# Patient Record
Sex: MALE | Race: WHITE | NOT HISPANIC OR LATINO | Employment: FULL TIME | URBAN - METROPOLITAN AREA
[De-identification: names, ages, dates, MRNs, and addresses within clinical notes are randomized per-mention and may not be internally consistent; named-entity substitution may affect disease eponyms.]

---

## 2017-01-04 ENCOUNTER — HOSPITAL ENCOUNTER (OUTPATIENT)
Dept: PHYSICAL THERAPY | Facility: CLINIC | Age: 47
Setting detail: THERAPIES SERIES
End: 2017-01-04
Attending: PSYCHIATRY & NEUROLOGY
Payer: OTHER MISCELLANEOUS

## 2017-01-04 PROCEDURE — 97110 THERAPEUTIC EXERCISES: CPT | Mod: GP | Performed by: PHYSICAL THERAPIST

## 2017-01-04 PROCEDURE — 97162 PT EVAL MOD COMPLEX 30 MIN: CPT | Mod: GP | Performed by: PHYSICAL THERAPIST

## 2017-01-04 PROCEDURE — 97140 MANUAL THERAPY 1/> REGIONS: CPT | Mod: GP | Performed by: PHYSICAL THERAPIST

## 2017-01-04 PROCEDURE — 40000718 ZZHC STATISTIC PT DEPARTMENT ORTHO VISIT: Performed by: PHYSICAL THERAPIST

## 2017-01-04 NOTE — PROGRESS NOTES
Houston LOPEZ [facility]   PHYSICAL THERAPY EVALUATION    01/04/17 1300   General Information   Type of Visit Initial OP Ortho PT Evaluation   Start of Care Date 01/04/17   Referring Physician MASOUD Goff, - Jeanette   Patient/Family Goals Statement decrease pain, get back to work   Orders Evaluate and Treat   Date of Order 12/08/17  (was waiting per MD orders)   Insurance Type Other  (work comp)   Medical Diagnosis hip pain, low back pain , thigh pain   Surgical/Medical history reviewed Yes  (DM)   Body Part(s)   Body Part(s) Hip   Presentation and Etiology   Pertinent history of current problem (include personal factors and/or comorbidities that impact the POC) work injury 10/24/16 lifting heavy , felt he wrenched his back, then later same day, was blowing the sand off the road and hose recoiled and twisted him.  LBP immed first few weeks, then noticed hip pain.  Was going to chiro for LB for about a month.  HAd MRI of hip and LB.  Hip pain still bothering him.  Sx increase sleeping, lying on his side, walking, putting shoes on.  LBP pretty much resolved.  Sx with bending, squatting.  Has been off work since injury.  Pain 2-6/10.  Has injection in hiop last week, which has helped more than anything else.   How/Where did it occur At work;While lifting   Onset date of current episode/exacerbation 10/24/17   Prior Level of Function   Functional Level Prior Comment work is active, indep living   Current Level of Function   Patient role/employment history A. Employed   Employment Comments construction, demolition   Fall Risk Screen   Fall screen completed by PT   Per patient - Fall 2 or more times in past year? No   Per patient - Fall with injury in past year? No   Is patient a fall risk? No   Hip Objective Findings   Side (if bilateral, select both right and left) Left   Posture L thoracic curvature, stands in R trunk ROT, no lumbar lordosis, mild lordosis in lower thoracic   Hip ROM Comments moving L hip  from flexion to exten hip has audible and painful snap, seeming to be deep inside hip joint   Lumbar ROM standing flexion 75% pulls LB, very tight, %, SB L 50% pain L, SB R 100%   Pelvic Screen +L fwd bend, supine L ASIS high/R low   Straight Leg Raise Test neg   Scour Test +L, painful compression even without scour   ИРИНА Test neg, tight   FADIR Test +L   Palpation tender L ant hip, L SI, B QL, lumbar paraspinals   Left Hip Flexion PROM min loss, pain end range   Left Hip Adduction PROM painful immediately   Left Hip ER PROM 40* pain/ R 30*   Left Hip IR PROM 5* painful/ R 15*   Left Hip Ext PROM 5*   Left Hip Flexion Strength 4/5 min pain   Left Hip Abduction Strength 4/5    Left Hamstring Flexibility 65*   Planned Therapy Interventions   Planned Therapy Interventions stretching;strengthening;ROM;manual therapy   Clinical Impression   Criteria for Skilled Therapeutic Interventions Met yes, treatment indicated   PT Diagnosis L hip pain, probable labral tear, pelvic/spinal malalignments   Functional limitations due to impairments walk, stand, squat, bend, lift   Clinical Presentation Evolving/Changing   Clinical Presentation Rationale LB and hip involvement, appear to be 2 separate conditions, not working, signif activity restrictions   Clinical Decision Making (Complexity) Moderate complexity   Therapy Frequency 1 time/week   Predicted Duration of Therapy Intervention (days/wks) 6wks   Risk & Benefits of therapy have been explained Yes   Patient, Family & other staff in agreement with plan of care Yes   Education Assessment   Preferred Learning Style Demonstration;Pictures/video   Barriers to Learning No barriers   ORTHO GOALS   PT Ortho Eval Goals 1;2   Ortho Goal 1   Goal Description pt to have painfree walking of community distances in 4wk   Target Date 02/04/17   Ortho Goal 2   Goal Description pt will be able to squat and lift from floor 20-30# in prep for return to work in 6wk   Target Date 02/15/17    Total Evaluation Time   Total Evaluation Time 30   Kris Hoenk, PT #[id number]  [facility]

## 2017-01-10 ENCOUNTER — HOSPITAL ENCOUNTER (OUTPATIENT)
Dept: PHYSICAL THERAPY | Facility: CLINIC | Age: 47
Setting detail: THERAPIES SERIES
End: 2017-01-10
Attending: PSYCHIATRY & NEUROLOGY
Payer: OTHER MISCELLANEOUS

## 2017-01-10 PROCEDURE — 97110 THERAPEUTIC EXERCISES: CPT | Mod: GP | Performed by: PHYSICAL THERAPIST

## 2017-01-10 PROCEDURE — 40000718 ZZHC STATISTIC PT DEPARTMENT ORTHO VISIT: Performed by: PHYSICAL THERAPIST

## 2017-01-10 PROCEDURE — 97140 MANUAL THERAPY 1/> REGIONS: CPT | Mod: GP | Performed by: PHYSICAL THERAPIST

## 2017-01-10 NOTE — PROGRESS NOTES
Houston LOPEZ Singh   PHYSICAL THERAPY PROGRESS NOTE  01/10/17 1300   Signing Clinician's Name / Credentials   Signing clinician's name / credentials Kris Hoenk, PT   Session Number   Session Number 2/9 to 2/6    Subjective Report   Subjective Report fell in tub, hip snapped, pain shot down ant thigh and he fell, L nut hurt after last here, went awway next day   Objective Measures   Objective Measures Objective Measure 1   Objective Measure 1   Details cont audible click/clunk x2 insde L hip with moving from hip flexion to extension position   Therapeutic Procedure/exercise   Minutes 10   Skilled Intervention progression of stretches   Patient Response limit ROM to avoid pain , snapping   Treatment Detail hip flexor kneeling, ИРИНА, quad stretch, review leg lifts   Manual Therapy   Minutes 15   Skilled Intervention MET, jt mobs to improve mobility and alignment   Patient Response B SI tender, L piriformis tight, tender   Treatment Detail MET pube clearing, prone L post ilium correction, cont/relax L ant hip stretch, R on L sacral torsion MET, uni PA R sacral base, L piriformis MFR/trigger points   Plan   Plan for next session sees MD againthis week, will recheck in one week, may need another scan for specifically the L hip, PT not sure if it would be more indepth than the pelvis MRI he had, hip sx resemble labral tear   Total Session Time   Total Session Time 25   Kris Hoenk, PT #[id number]  [facility]

## 2017-01-12 ENCOUNTER — OFFICE VISIT (OUTPATIENT)
Dept: ORTHOPEDICS | Facility: CLINIC | Age: 47
End: 2017-01-12
Payer: OTHER MISCELLANEOUS

## 2017-01-12 VITALS
BODY MASS INDEX: 30.35 KG/M2 | SYSTOLIC BLOOD PRESSURE: 135 MMHG | WEIGHT: 212 LBS | DIASTOLIC BLOOD PRESSURE: 90 MMHG | HEIGHT: 70 IN

## 2017-01-12 DIAGNOSIS — K29.00 ACUTE GASTRITIS, PRESENCE OF BLEEDING UNSPECIFIED, UNSPECIFIED GASTRITIS TYPE: ICD-10-CM
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
## 2017-01-12 DIAGNOSIS — S76.012D STRAIN OF HIP ADDUCTOR MUSCLE, LEFT, SUBSEQUENT ENCOUNTER: ICD-10-CM

## 2017-01-12 DIAGNOSIS — M24.852 SNAPPING HIP SYNDROME, LEFT: ICD-10-CM

## 2017-01-12 DIAGNOSIS — M16.12 OSTEOARTHRITIS OF LEFT HIP, UNSPECIFIED OSTEOARTHRITIS TYPE: ICD-10-CM

## 2017-01-12 DIAGNOSIS — M25.552 LEFT HIP PAIN: ICD-10-CM

## 2017-01-12 PROCEDURE — 99214 OFFICE O/P EST MOD 30 MIN: CPT | Performed by: FAMILY MEDICINE

## 2017-01-12 RX ORDER — OMEPRAZOLE 40 MG/1
40 CAPSULE, DELAYED RELEASE ORAL DAILY
Qty: 30 CAPSULE | Refills: 1 | Status: SHIPPED | OUTPATIENT
Start: 2017-01-12 | End: 2017-10-04

## 2017-01-12 RX ORDER — CYCLOBENZAPRINE HCL 10 MG
10 TABLET ORAL 3 TIMES DAILY PRN
Qty: 30 TABLET | Refills: 1 | Status: SHIPPED
Start: 2017-01-12 | End: 2017-03-27

## 2017-01-12 NOTE — Clinical Note
January 12, 2017      Houston A Singh  [address]   [facility] [address]      Houston was seen in my office again today for left hip pain after a work injury in October 24, 2016.  He has had a workup for a lumbar spine issue which did not identify an obvious cause of his pain, as well as an SI joint injection that did not provide relief.  His pelvis MRI demonstrated R>L hip OA changes, and so he was sent to me for further orthopedic evaluation.  His exam suggested an intraarticular hip joint issue as the primary pain generator.  He likely had an acute injury on some chronic underlying degeneration in his hip joint.  An acute labral injury is also a possibility.  He had 50% reduction in pain deep in his joint from the injection, but has ongoing pain in the muscles with continued snapping sensation.    I have advised that he can continue with physical therapy, as well consult with orthopedic surgery for additional recommnedations.  I have also provided antiinflammatory topical gel and a muscle relaxer to help with his pain.  He will follow up with me in 3 weeks to assess his progress and determine ongoing treatment options.  I have recommended that he be off from work during this time.  Updated recommendations on workability will be provided after his next visit, but can also be updated sooner based on his clinical progress, as needed.      Thanks in advance for your help, feel free to contact my office with questions or concerns.        Kobe Cifuentes DO, LORA  Primary Care Sports Medicine  [facility]

## 2017-01-12 NOTE — PROGRESS NOTES
"Houston Winters  :  1970  DOS: 2017  MRN: [id number]    Sports Medicine Clinic Visit    PCP: Norma Ramirez    Houston Winters is a 46 year old male who is seen as self referral presenting with left hip pain.    Injury: Work - caught, heaving sandblaster with his left left leg - bracing straight to stop ~ 2 months ago (10/24/16).  Also notes second injury that day after lost  of hose and twisted over top of left hip.  Pain located over left deep anterior groin, radiating to left anterior thigh.  Reports intermittent radiating, pain to left thigh.  Additional Features:  Positive: grinding, instability and weakness.  Symptoms are better with Rest.  Symptoms are worse with: prolonged standing, lifting, tying shoes.  Other evaluation and/or treatments so far consists of: Tylenol, Other medications, Rest and neurology consult (Jeanette), SI joint injection, chiropractic.  Recent imaging completed: MRI completed 16 - has copy with today.  Prior History of related problems: none  Patient reports that SI joint injection on  provided minimal relief.     Social History: works as [employer]     Interim History - 2017  Since last visit on 2016 patient has moderate left hip pain.  Left hip IA steroid injection completed on  provided ~ 45% relief.  He does note increased \"popping, grinding\" sensation over last 2 weeks.  Reports that \"popping\" sensation has caused him 2 times d/t weakness/increased pain.  Pain worse after walking > 0.5 half mile.  No new injury in the interim.  Taking up to 8-16 aleve daily.  Denies hematemesis, hemoptysis, melena or hematochezia    Review of Systems  Musculoskeletal: as above  Remainder of review of systems is negative including constitutional, CV, pulmonary, GI, Skin and Neurologic except as noted in HPI or medical history.  Past Medical and surgical history reviewed    Objective  /90 mmHg  Ht 5' 10\" (1.778 m)  Wt 212 lb " (96.163 kg)  BMI 30.42 kg/m2    General: healthy, alert and in no distress    HEENT: no scleral icterus or conjunctival erythema   Skin: no suspicious lesions or rash. No jaundice.   CV: regular rhythm by palpation, 2+ distal pulses, no pedal edema    GI:  Tender epigastric area, mild, not distended  Resp: normal respiratory effort without conversational dyspnea   Psych: normal mood and affect    Gait: antalgic, appropriate coordination and balance   Neuro: normal light touch sensory exam of the extremities. Motor strength as noted below     Left hip exam    Inspection:        no edema or ecchymosis in hip area    ROM:       Flexion 110       internal rotation 15      external rotation 30      Range of motion limited by pain, improved but still present     Mild loss of flexion and IR on R, painfree    Strength:        flexion 4/5       extension 4/5       abduction 3/5       adduction 4/5, painful    Tender:        greater trochanter       Anterior hip joint       ipsilateral SI joint TTP      Adductor attachemnt to pubes      TFL, glute medius    Non Tender:        remainder of hip area       illiac crest       ASIS       pubis    Sensation:        grossly intact in hip and thigh    Skin:       well perfused       capillary refill brisk    Special Tests:        neg (-) ИРИНА       positive (+) FADIR       positive (+) scour       neg (-) Brenden    Neg slump and SLR, intact b/l DTRs 2+, no appreciable asymmetry with strength testing, no focal lumbar TTP    Radiology  See scanned.  MRI evidence of R > L hip OA changes.  Reports and images reviewed, reports sent for scanning into epic.  Mild lumbar DDD, no stenosis, reviewed report    Assessment:  1. Work related injury    2. Left hip pain    3. Osteoarthritis of left hip, unspecified osteoarthritis type    4. Snapping hip syndrome, left    5. Strain of hip adductor muscle, left, subsequent encounter    6. Acute gastritis, presence of bleeding unspecified, unspecified  gastritis type        Plan:  Discussed the assessment with the patient.  Follow up: 3 weeks, off work for now  Letter updated  Some improvement in hip joint from injection, but muscles still very tender and dysfunctional around hip  Signs of ongoing adductor strain, snapping hip  Continue PT, trial of some activation therapy today in clinic, no significant change s/p tx  Switch muscle relaxer to help with nighttime pain and with sleep  RICE reviewed  Ortho referral given underlying degnerative change, though this may need to continue to be treated nonsurgically  Advised he must stop NSAIDs for multiple reasons, reviewed in detail today  He has been taking ranitidine, rx provided for prilosec to help with stomach protection  Topical NSAID provided, try to avoid aleve or any NSAIDs altogether for at least 1-2 weeks  Took decadron in the past, prefer not to repeat steroid burst  Supportive care reviewed  All questions were answered today  Contact us with additional questions or concerns  Signs and sx of concern reviewed        Kobe Joseph DO, CACODY  Primary Care Sports Medicine  [facility]       Time spent in one-on-one evaluation and discussion with patient regarding nature of problem, course, prior treatments, and therapeutic options, at least 50% of which was spent in counseling and coordination of care: 35 minutes        Disclaimer: This note consists of symbols derived from keyboarding, dictation and/or voice recognition software. As a result, there may be errors in the script that have gone undetected. Please consider this when interpreting information found in this chart.

## 2017-01-12 NOTE — NURSING NOTE
"Initial /90 mmHg  Ht 5' 10\" (1.778 m)  Wt 212 lb (96.163 kg)  BMI 30.42 kg/m2 Estimated body mass index is 30.42 kg/(m^2) as calculated from the following:    Height as of this encounter: 5' 10\" (1.778 m).    Weight as of this encounter: 212 lb (96.163 kg). .    Vijay Vitale ATC  "

## 2017-01-17 ENCOUNTER — HOSPITAL ENCOUNTER (OUTPATIENT)
Dept: PHYSICAL THERAPY | Facility: CLINIC | Age: 47
Setting detail: THERAPIES SERIES
End: 2017-01-17
Attending: PSYCHIATRY & NEUROLOGY
Payer: OTHER MISCELLANEOUS

## 2017-01-17 PROCEDURE — 40000718 ZZHC STATISTIC PT DEPARTMENT ORTHO VISIT: Performed by: PHYSICAL THERAPIST

## 2017-01-17 PROCEDURE — 97110 THERAPEUTIC EXERCISES: CPT | Mod: GP | Performed by: PHYSICAL THERAPIST

## 2017-01-18 ENCOUNTER — TELEPHONE (OUTPATIENT)
Dept: ORTHOPEDICS | Facility: CLINIC | Age: 47
End: 2017-01-18

## 2017-01-18 DIAGNOSIS — S76.012D STRAIN OF LEFT HIP ADDUCTOR MUSCLE, SUBSEQUENT ENCOUNTER: ICD-10-CM

## 2017-01-18 DIAGNOSIS — M16.52 POST-TRAUMATIC OSTEOARTHRITIS OF LEFT HIP: Primary | ICD-10-CM

## 2017-01-18 DIAGNOSIS — M24.852 SNAPPING HIP SYNDROME, LEFT: ICD-10-CM

## 2017-01-18 NOTE — TELEPHONE ENCOUNTER
Notified patient that letter has been completed and would be available for  at [facility] tomorrow.   He also notes that he has some discomfort over left quadriceps activation point following treatment last week.  Reassured patient that this is normal and that is the area that he was most aggravated.    Vijay Vitale ATC

## 2017-01-18 NOTE — TELEPHONE ENCOUNTER
Reason for Call:  Other letter    Detailed comments: pt calling stating he would like to go back to work on Monday 1/23. Would need a letter stating he can go back w/ supervisor restrictions/ light duty. Please call pt to go over restrictions further  Will p/u letter when finished.     Phone Number Patient can be reached at: Home number on file [phone] (home)    Best Time: any     Can we leave a detailed message on this number? YES    Call taken on 1/18/2017 at 10:34 AM by Mayra Rajan

## 2017-01-18 NOTE — TELEPHONE ENCOUNTER
Discussed patient's job requirements.  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  He desires to return to work with restrictions at this time.  Dr Joseph please advise on restrictions.  He has appt scheduled with ortho surgeon on 1/27 - was delayed d/t work comp approval.  Letter pended.    Vijay Vitale ATC

## 2017-01-18 NOTE — Clinical Note
[facility]  [address]  [address]  [place] [address]  [phone]      Houston Winters, male, 1970  [address]   [facility] [address]      Date of Treatment: 1/18/2017    Date of Accident: 10/24/16    Diagnosis:   1. Post-traumatic osteoarthritis of left hip    2. Snapping hip syndrome, left    3. Strain of left hip adductor muscle, subsequent encounter        Work Related:  YES    The employee is UNABLE to return to work until 1/23/17.    When the patient returns to work, the following restrictions apply until 2/1/2017:  A) Bend: Occasionally (1-3 hours)  B) Squat: Not at all (0 hours)  C) Walk/Stand: Occasionally (1-3 hours)  D) Reach Above Shoulders: Frequently (4-8 hours)  E) Lift, carry, push, and pull no more than:  0-10 lbs.    The employee might be taking medication so that they cannot operate moving machinery or perform activities that require balancing or working above ground.    Other restrictions: light duty work standing or sitting most appropriate for now.    FOLLOW-UP  Has appointment with orthopedic surgeon 1/27/2017                Kobe Joseph DO, LORA  Primary Care Sports Medicine  [facility]

## 2017-01-24 ENCOUNTER — TELEPHONE (OUTPATIENT)
Dept: ORTHOPEDICS | Facility: CLINIC | Age: 47
End: 2017-01-24

## 2017-01-24 NOTE — Clinical Note
[facility]  [address]  [address]  [place] [address]  [phone]      January 24, 2017    Houston A Singh  [address]   [facility] [address]        To whom it may concern,    Houston is under my care for a hip issue.  He has been referred to an orthopedic Surgeon for further evaluation and treatment.  He should remain off work at this time until at least 1/30/17 or until he sees the surgeon.  Further work restrictions will come from the orthopedic surgeon.           Sincerely,      Kobe Joseph DO, CAQ/srf

## 2017-01-24 NOTE — TELEPHONE ENCOUNTER
Patient requesting letter to state off work until 1/30.  Letter completed at the direction of Dr. Joseph. Further restrictions will need to come from the orthopedic surgeon at this time.       Patient notified, letter placed at  for him to .     BRENDAN Gonzales,  ATC

## 2017-01-25 ENCOUNTER — TELEPHONE (OUTPATIENT)
Dept: FAMILY MEDICINE | Facility: CLINIC | Age: 47
End: 2017-01-25

## 2017-01-25 DIAGNOSIS — E78.5 HYPERLIPIDEMIA WITH TARGET LDL LESS THAN 100: Primary | ICD-10-CM

## 2017-01-26 ENCOUNTER — HOSPITAL ENCOUNTER (OUTPATIENT)
Dept: PHYSICAL THERAPY | Facility: CLINIC | Age: 47
Setting detail: THERAPIES SERIES
End: 2017-01-26
Attending: PSYCHIATRY & NEUROLOGY
Payer: OTHER MISCELLANEOUS

## 2017-01-26 PROCEDURE — 40000718 ZZHC STATISTIC PT DEPARTMENT ORTHO VISIT: Performed by: PHYSICAL THERAPIST

## 2017-01-26 PROCEDURE — 97110 THERAPEUTIC EXERCISES: CPT | Mod: GP | Performed by: PHYSICAL THERAPIST

## 2017-01-26 NOTE — PROGRESS NOTES
Houston Maddoxbo  1970   PHYSICAL THERAPY PROGRESS NOTE  01/26/17 1500   Signing Clinician's Name / Credentials   Signing clinician's name / credentials Kris Hoenk, PT   Session Number   Session Number 4/9 to 2/6/17 WC   Adult Goals   PT Ortho Eval Goals 1;2   Ortho Goal 1   Goal Description pt to have painfree walking of community distances in 4wk  (not met)   Target Date 02/04/17   Ortho Goal 2   Goal Description pt will be able to squat and lift from floor 20-30# in prep for return to work in 6wk   Target Date 02/15/17   Subjective Report   Subjective Report stiff in AM when wakes up, aches all the time, can ache to knee, still snaps, no longer hurts every step he takes., is back to work with restrictions, in warehouse, sweeping   Objective Measure 1   Details cont audible click/clunk x2 inside L hip with moving from hip flexion to extension position either active or passive, very tender L glut medius tendon insertion   Therapeutic Procedure/exercise   Minutes 15   Skilled Intervention added strength ex   Patient Response limit ROM to avoid pain , snapping   Treatment Detail add to HEP clam x15, seated piriformis stretch, cont stretches, SLR, hip abd   Plan   Plan for next session seeing ortho MD tomorrow, plan may change based on findings   Total Session Time   Total Session Time 15   Kris Hoenk, PT #[id number]  [facility]

## 2017-02-03 ENCOUNTER — HOSPITAL ENCOUNTER (OUTPATIENT)
Dept: PHYSICAL THERAPY | Facility: CLINIC | Age: 47
Setting detail: THERAPIES SERIES
End: 2017-02-03
Attending: PSYCHIATRY & NEUROLOGY
Payer: OTHER MISCELLANEOUS

## 2017-02-03 PROCEDURE — 97110 THERAPEUTIC EXERCISES: CPT | Mod: GP | Performed by: PHYSICAL THERAPIST

## 2017-02-03 PROCEDURE — 40000718 ZZHC STATISTIC PT DEPARTMENT ORTHO VISIT: Performed by: PHYSICAL THERAPIST

## 2017-03-08 NOTE — PROGRESS NOTES
PHYSICAL THERAPY DISCHARGE  03/8/17 1300   Signing Clinician's Name / Credentials   Signing clinician's name / credentials Kris Hoenk, PT   Session Number   Session Number 5/9 to 2/6/17    Ortho Goal 1   Goal Description pt to have painfree walking of community distances in 4wk  (not met)   Target Date 02/04/17   Ortho Goal 2   Goal Description pt will be able to squat and lift from floor 20-30# in prep for return to work in 6wk  (does it but it hurts)   Target Date 02/15/17   Subjective Report   Subjective Report back to work regular job, full duty, [employer] sending him to [place]? said he needs a hip scope, still hurts every step   Objective Measure 1   Details seated hip flexion while pt was just moving his leg around was able to hear audible click in hip   Therapeutic Procedure/exercise   Minutes 17   Skilled Intervention finalize HEP   Patient Response uderstood   Treatment Detail finalize current HEP - cont hip abd, clam, piriformis stretches, avoid clicking, discuss plan whlie waiting for MD   Plan   Plan for next session likely to have scope, needs to see a different surgeon, per pt may need new diagnostics with dye  Patient has not been seen in over 30 days and will be discharged at this time.  Final status and plan as stated     Total Session Time   Total Session Time 17   Kris Hoenk, PT #[id number]  [facility]

## 2017-03-27 ENCOUNTER — OFFICE VISIT (OUTPATIENT)
Dept: FAMILY MEDICINE | Facility: CLINIC | Age: 47
End: 2017-03-27
Payer: OTHER MISCELLANEOUS

## 2017-03-27 VITALS
SYSTOLIC BLOOD PRESSURE: 120 MMHG | HEIGHT: 70 IN | DIASTOLIC BLOOD PRESSURE: 80 MMHG | WEIGHT: 202.6 LBS | BODY MASS INDEX: 29.01 KG/M2 | HEART RATE: 88 BPM

## 2017-03-27 DIAGNOSIS — E78.5 HYPERLIPIDEMIA WITH TARGET LDL LESS THAN 100: ICD-10-CM

## 2017-03-27 DIAGNOSIS — M25.852 LEFT HIP IMPINGEMENT SYNDROME: Primary | ICD-10-CM

## 2017-03-27 DIAGNOSIS — E11.9 TYPE 2 DIABETES MELLITUS WITHOUT COMPLICATION, WITHOUT LONG-TERM CURRENT USE OF INSULIN (H): ICD-10-CM

## 2017-03-27 LAB — HBA1C MFR BLD: 12.6 % (ref 4.3–6)

## 2017-03-27 PROCEDURE — 36415 COLL VENOUS BLD VENIPUNCTURE: CPT | Performed by: FAMILY MEDICINE

## 2017-03-27 PROCEDURE — 83036 HEMOGLOBIN GLYCOSYLATED A1C: CPT | Performed by: FAMILY MEDICINE

## 2017-03-27 PROCEDURE — 99214 OFFICE O/P EST MOD 30 MIN: CPT | Performed by: FAMILY MEDICINE

## 2017-03-27 RX ORDER — METFORMIN HCL 500 MG
500 TABLET, EXTENDED RELEASE 24 HR ORAL
Qty: 60 TABLET | Refills: 2 | Status: SHIPPED | OUTPATIENT
Start: 2017-03-27 | End: 2017-12-29

## 2017-03-27 RX ORDER — HYDROCODONE BITARTRATE AND ACETAMINOPHEN 10; 325 MG/1; MG/1
1 TABLET ORAL EVERY 4 HOURS PRN
Qty: 120 TABLET | Refills: 0 | Status: SHIPPED | OUTPATIENT
Start: 2017-03-27 | End: 2017-09-13

## 2017-03-27 RX ORDER — ATORVASTATIN CALCIUM 10 MG/1
10 TABLET, FILM COATED ORAL EVERY EVENING
Qty: 90 TABLET | Refills: 3 | Status: SHIPPED | OUTPATIENT
Start: 2017-03-27 | End: 2017-12-29

## 2017-03-27 NOTE — MR AVS SNAPSHOT
"              After Visit Summary   3/27/2017    Houston Winters    MRN: [id number]           Patient Information     Date Of Birth          1970        Visit Information        Provider Department      3/27/2017 8:40 AM OLEG Gloria MD [facility]        Today's Diagnoses     Left hip impingement syndrome    -  1    Type 2 diabetes mellitus without complication, without long-term current use of insulin (H)        Hyperlipidemia with target LDL less than 100           Follow-ups after your visit        Who to contact     If you have questions or need follow up information about today's clinic visit or your schedule please contact [facility] directly at [phone].  Normal or non-critical lab and imaging results will be communicated to you by [employer], letter or phone within 4 business days after the clinic has received the results. If you do not hear from us within 7 days, please contact the clinic through MyChart or phone. If you have a critical or abnormal lab result, we will notify you by phone as soon as possible.  Submit refill requests through [employer] or call your pharmacy and they will forward the refill request to us. Please allow 3 business days for your refill to be completed.          Additional Information About Your Visit        MyChart Information     Patience lets you send messages to your doctor, view your test results, renew your prescriptions, schedule appointments and more. To sign up, go to [email or web address] . Click on \"Log in\" on the left side of the screen, which will take you to the Welcome page. Then click on \"Sign up Now\" on the right side of the page.     You will be asked to enter the access code listed below, as well as some personal information. Please follow the directions to create your username and password.     Your access code is: [email or web address]  Expires: 2017 12:09 PM     Your access code will  in 90 days. If you need help or " "a new code, please call your [facility] clinic or [phone].        Care EveryWhere ID     This is your Care EveryWhere ID. This could be used by other organizations to access your [facility] medical records  [id number]        Your Vitals Were     Pulse Height BMI (Body Mass Index)             88 5' 10\" (1.778 m) 29.07 kg/m2          Blood Pressure from Last 3 Encounters:   03/27/17 120/80   01/12/17 135/90   12/30/16 (!) 132/93    Weight from Last 3 Encounters:   03/27/17 202 lb 9.6 oz (91.9 kg)   01/12/17 212 lb (96.2 kg)   12/30/16 212 lb (96.2 kg)              We Performed the Following     Hemoglobin A1c          Today's Medication Changes          These changes are accurate as of: 3/27/17 12:09 PM.  If you have any questions, ask your nurse or doctor.               Start taking these medicines.        Dose/Directions    amitriptyline 25 MG tablet   Commonly known as:  ELAVIL   Used for:  Left hip impingement syndrome        Dose:  25 mg   Take 1 tablet (25 mg) by mouth At Bedtime   Quantity:  30 tablet   Refills:  1       atorvastatin 10 MG tablet   Commonly known as:  LIPITOR   Used for:  Hyperlipidemia with target LDL less than 100        Dose:  10 mg   Take 1 tablet (10 mg) by mouth every evening   Quantity:  90 tablet   Refills:  3       HYDROcodone-acetaminophen  MG per tablet   Commonly known as:  NORCO   Used for:  Left hip impingement syndrome        Dose:  1 tablet   Take 1 tablet by mouth every 4 hours as needed for moderate to severe pain maximum 6 tablet(s) per day   Quantity:  120 tablet   Refills:  0       metFORMIN 500 MG 24 hr tablet   Commonly known as:  GLUCOPHAGE-XR   Used for:  Type 2 diabetes mellitus without complication, without long-term current use of insulin (H)        Dose:  500 mg   Take 1 tablet (500 mg) by mouth daily (with dinner) For 2 weeks, then 2 tablets with dinner   Quantity:  60 tablet   Refills:  2         Stop taking these medicines if you haven't already. Please " contact your care team if you have questions.     ibuprofen 200 MG tablet   Commonly known as:  ADVIL/MOTRIN           nortriptyline 10 MG capsule   Commonly known as:  PAMELOR           zolpidem 10 MG tablet   Commonly known as:  AMBIEN                Where to get your medicines      These medications were sent to [facility] - [place], [place] - [address]  [address] [facility] [address]     Phone:  [phone]     amitriptyline 25 MG tablet    atorvastatin 10 MG tablet    metFORMIN 500 MG 24 hr tablet         Some of these will need a paper prescription and others can be bought over the counter.  Ask your nurse if you have questions.     Bring a paper prescription for each of these medications     HYDROcodone-acetaminophen  MG per tablet                Primary Care Provider Office Phone # Fax #    Norma Augustaana ELMIRA Ramirez [phone]       [facility] [address] [address]        Thank you!     Thank you for choosing [facility]  for your care. Our goal is always to provide you with excellent care. Hearing back from our patients is one way we can continue to improve our services. Please take a few minutes to complete the written survey that you may receive in the mail after your visit with us. Thank you!             Your Updated Medication List - Protect others around you: Learn how to safely use, store and throw away your medicines at www.disposemymeds.org.          This list is accurate as of: 3/27/17 12:09 PM.  Always use your most recent med list.                   Brand Name Dispense Instructions for use    amitriptyline 25 MG tablet    ELAVIL    30 tablet    Take 1 tablet (25 mg) by mouth At Bedtime       aspirin 81 MG EC tablet     90 tablet    Take 1 tablet (81 mg) by mouth daily       atorvastatin 10 MG tablet    LIPITOR    90 tablet    Take 1 tablet (10 mg) by mouth every evening        blood glucose monitoring lancets     102 each    1 each daily       blood glucose monitoring test strip    ACCU-CHEK SMARTVIEW    100 strip    Check blood sugar 4 times daily       HYDROcodone-acetaminophen  MG per tablet    NORCO    120 tablet    Take 1 tablet by mouth every 4 hours as needed for moderate to severe pain maximum 6 tablet(s) per day       metFORMIN 500 MG 24 hr tablet    GLUCOPHAGE-XR    60 tablet    Take 1 tablet (500 mg) by mouth daily (with dinner) For 2 weeks, then 2 tablets with dinner       MULTIVITAMIN PO      Take 1 tablet by mouth daily       omeprazole 40 MG capsule    priLOSEC    30 capsule    Take 1 capsule (40 mg) by mouth daily Take 30-60 minutes before a meal.       order for DME     1 each    Equipment being ordered: #1 glucometer, #100 strips, #100 lancets.   Check blood sugar at least twice daily and as needed for any signs or symptoms of hypo or hyperglycemia.  May refill lancets and strips x3.

## 2017-03-27 NOTE — NURSING NOTE
"Chief Complaint   Patient presents with     Work Comp     lower back and left hip DOI 10/24/2016 pt. was seen in clinic with Dr. Aden on 10/26/2016        Initial /80 (BP Location: Right arm, Patient Position: Chair, Cuff Size: Adult Regular)  Pulse 88  Ht 5' 10\" (1.778 m)  Wt 202 lb 9.6 oz (91.9 kg)  BMI 29.07 kg/m2 Estimated body mass index is 29.07 kg/(m^2) as calculated from the following:    Height as of this encounter: 5' 10\" (1.778 m).    Weight as of this encounter: 202 lb 9.6 oz (91.9 kg).  Medication Reconciliation: complete     Leatha Morelos, CLAUDIA      "

## 2017-03-27 NOTE — PROGRESS NOTES
Subjective:  Houston Winters is a 46 year old male   Chief Complaint   Patient presents with     Work Comp     lower back and left hip DOI 10/24/2016 pt. was seen in clinic with Dr. Aden on 10/26/2016      He had a workman's comp related injury and has seen 2 different orthopedic surgeons who recommend surgery for a torn labrum in the left hip. He is seeing the Workmen's Comp. physician this week to hopefully get approval to go ahead with surgery. He is here today primarily to request some pain medications to help him until the surgery can be approved and arranged. He has been tried on various medications to try to help with this pain, including several different nonsteroidal inflammatory drugs and none of them have helped. He admits that recently he has been taking 2 Aleve tablets up to every 4-6 hours. We discussed that that is exceeding the recommended dosage and is putting him at risk for kidney injury and gastrointestinal injury. He has also been tried on gabapentin and nortriptyline at night with no benefit. Zolpidem gave him too much sedation the next day. He has been given narcotics in the past for this injury and some other injuries and that is what he is really requesting.    Diabetes he has not had follow-up on his diabetes for over a year and states that he improved his diet and lost weight and doesn't need to take any medications. However he has not had a glycosylated hemoglobin for over a year        Encounter Diagnoses   Name Primary?     Left hip impingement syndrome Yes     Type 2 diabetes mellitus without complication, without long-term current use of insulin (H)      Hyperlipidemia with target LDL less than 100        ROS:General: No change in weight, sleep or appetite.  Normal energy.  No fever or chills  Resp: No coughing, wheezing or shortness of breath  CV: No chest pains or palpitations  GI: No nausea, vomiting,  heartburn, abdominal pain, diarrhea, constipation or change in bowel  habits    Medical, surgical, social, and family histories, medications and allergies reviewed and updated.    Objective:  Exam:    GENERAL APPEARANCE ADULT: Alert, no acute distress  EYES: PERRL, EOM normal, conjunctiva and lids normal  MS: He has a very antalgic gait with favoring his left leg; pain with range of motion of the left hip, including flexion and extension and internal and external rotation  SKIN: no suspicious lesions or rashes  PSYCH: mentation appears normal., affect and mood normal    Results for orders placed or performed in visit on 03/27/17   Hemoglobin A1c   Result Value Ref Range    Hemoglobin A1C 12.6 (H) 4.3 - 6.0 %        ASSESSMENT:  1. Left hip impingement syndrome    2. Type 2 diabetes mellitus without complication, without long-term current use of insulin (H)    3. Hyperlipidemia with target LDL less than 100        PLAN:  Orders Placed This Encounter     Hemoglobin A1c     amitriptyline (ELAVIL) 25 MG tablet     HYDROcodone-acetaminophen (NORCO)  MG per tablet     metFORMIN (GLUCOPHAGE-XR) 500 MG 24 hr tablet     atorvastatin (LIPITOR) 10 MG tablet       We had a long discussion about the concerns of long-term use of narcotics, especially if it is going to take some time to get approval for his surgery. I did agree to give him one prescription of hydrocodone that should last until he can get the surgery arranged. If Workmen's Comp. is balking at doing the surgery, I think he needs to pursue legal measures to get this accomplished.  The glycosylated hemoglobin came back after he left the office. See the note attached to the lab results recommending putting him back on metformin and atorvastatin

## 2017-03-27 NOTE — PROGRESS NOTES
Please CALL PATIENT    Notify patient of ABNORMAL results.  The glycosylated hemoglobin is quite elevated which indicates that her blood sugars are not well-controlled. Even though you have improved her diet and weight, you need to be on a medication to bring her blood sugars down. I'm going to put you on metformin which works well and does not carry a risk of causing low blood sugars. I also recommend that you go back on the low dose of Lipitor as anyone with diabetes is recommended to be on a statin to lower the risk of stroke and heart attack. Then come back in to clinic in 3 months for a follow-up glycosylated hemoglobin to see how well the medication is working

## 2017-04-08 ENCOUNTER — HOSPITAL ENCOUNTER (EMERGENCY)
Facility: CLINIC | Age: 47
Discharge: HOME | End: 2017-04-08
Attending: FAMILY MEDICINE | Admitting: FAMILY MEDICINE
Payer: COMMERCIAL

## 2017-04-08 VITALS — TEMPERATURE: 98.4 F | SYSTOLIC BLOOD PRESSURE: 128 MMHG | OXYGEN SATURATION: 95 % | DIASTOLIC BLOOD PRESSURE: 84 MMHG

## 2017-04-08 DIAGNOSIS — E11.65 TYPE 2 DIABETES MELLITUS WITH HYPERGLYCEMIA, WITHOUT LONG-TERM CURRENT USE OF INSULIN (H): ICD-10-CM

## 2017-04-08 LAB
ALBUMIN UR-MCNC: NEGATIVE MG/DL
AMORPH CRY #/AREA URNS HPF: ABNORMAL /HPF
ANION GAP SERPL CALCULATED.3IONS-SCNC: 9 MMOL/L (ref 3–14)
APPEARANCE UR: ABNORMAL
BASE EXCESS BLDV CALC-SCNC: 2.4 MMOL/L
BASOPHILS # BLD AUTO: 0 10E9/L (ref 0–0.2)
BASOPHILS NFR BLD AUTO: 0.4 %
BILIRUB UR QL STRIP: NEGATIVE
BUN SERPL-MCNC: 14 MG/DL (ref 7–30)
CALCIUM SERPL-MCNC: 9.2 MG/DL (ref 8.5–10.1)
CHLORIDE SERPL-SCNC: 102 MMOL/L (ref 94–109)
CO2 SERPL-SCNC: 26 MMOL/L (ref 20–32)
COLOR UR AUTO: YELLOW
CREAT SERPL-MCNC: 0.55 MG/DL (ref 0.66–1.25)
DIFFERENTIAL METHOD BLD: NORMAL
EOSINOPHIL # BLD AUTO: 0 10E9/L (ref 0–0.7)
EOSINOPHIL NFR BLD AUTO: 0.5 %
ERYTHROCYTE [DISTWIDTH] IN BLOOD BY AUTOMATED COUNT: 12.2 % (ref 10–15)
GFR SERPL CREATININE-BSD FRML MDRD: ABNORMAL ML/MIN/1.7M2
GLUCOSE BLDC GLUCOMTR-MCNC: 186 MG/DL (ref 70–99)
GLUCOSE BLDC GLUCOMTR-MCNC: 360 MG/DL (ref 70–99)
GLUCOSE SERPL-MCNC: 351 MG/DL (ref 70–99)
GLUCOSE UR STRIP-MCNC: >1000 MG/DL
HBA1C MFR BLD: 12 % (ref 4.3–6)
HCO3 BLDV-SCNC: 27 MMOL/L (ref 21–28)
HCT VFR BLD AUTO: 45.5 % (ref 40–53)
HGB BLD-MCNC: 14.9 G/DL (ref 13.3–17.7)
HGB UR QL STRIP: NEGATIVE
IMM GRANULOCYTES # BLD: 0 10E9/L (ref 0–0.4)
IMM GRANULOCYTES NFR BLD: 0.2 %
KETONES BLD-SCNC: 0.1 MMOL/L (ref 0–0.6)
KETONES UR STRIP-MCNC: 10 MG/DL
LEUKOCYTE ESTERASE UR QL STRIP: NEGATIVE
LYMPHOCYTES # BLD AUTO: 1.5 10E9/L (ref 0.8–5.3)
LYMPHOCYTES NFR BLD AUTO: 27.6 %
MCH RBC QN AUTO: 30.7 PG (ref 26.5–33)
MCHC RBC AUTO-ENTMCNC: 32.7 G/DL (ref 31.5–36.5)
MCV RBC AUTO: 94 FL (ref 78–100)
MONOCYTES # BLD AUTO: 0.4 10E9/L (ref 0–1.3)
MONOCYTES NFR BLD AUTO: 7 %
NEUTROPHILS # BLD AUTO: 3.6 10E9/L (ref 1.6–8.3)
NEUTROPHILS NFR BLD AUTO: 64.3 %
NITRATE UR QL: NEGATIVE
PCO2 BLDV: 42 MM HG (ref 40–50)
PH BLDV: 7.42 PH (ref 7.32–7.43)
PH UR STRIP: 7.5 PH (ref 5–7)
PLATELET # BLD AUTO: 207 10E9/L (ref 150–450)
PO2 BLDV: 53 MM HG (ref 25–47)
POTASSIUM SERPL-SCNC: 4.6 MMOL/L (ref 3.4–5.3)
RBC # BLD AUTO: 4.86 10E12/L (ref 4.4–5.9)
RBC #/AREA URNS AUTO: 10 /HPF (ref 0–2)
SODIUM SERPL-SCNC: 137 MMOL/L (ref 133–144)
SP GR UR STRIP: 1.03 (ref 1–1.03)
URN SPEC COLLECT METH UR: ABNORMAL
UROBILINOGEN UR STRIP-MCNC: NORMAL MG/DL (ref 0–2)
WBC # BLD AUTO: 5.6 10E9/L (ref 4–11)
WBC #/AREA URNS AUTO: 0 /HPF (ref 0–2)

## 2017-04-08 PROCEDURE — 83036 HEMOGLOBIN GLYCOSYLATED A1C: CPT | Performed by: EMERGENCY MEDICINE

## 2017-04-08 PROCEDURE — 96361 HYDRATE IV INFUSION ADD-ON: CPT | Mod: 59

## 2017-04-08 PROCEDURE — 82010 KETONE BODYS QUAN: CPT | Performed by: EMERGENCY MEDICINE

## 2017-04-08 PROCEDURE — 99284 EMERGENCY DEPT VISIT MOD MDM: CPT | Performed by: FAMILY MEDICINE

## 2017-04-08 PROCEDURE — 25000131 ZZH RX MED GY IP 250 OP 636 PS 637: Performed by: FAMILY MEDICINE

## 2017-04-08 PROCEDURE — 82803 BLOOD GASES ANY COMBINATION: CPT | Performed by: EMERGENCY MEDICINE

## 2017-04-08 PROCEDURE — 99284 EMERGENCY DEPT VISIT MOD MDM: CPT | Mod: 25

## 2017-04-08 PROCEDURE — 25000128 H RX IP 250 OP 636: Performed by: FAMILY MEDICINE

## 2017-04-08 PROCEDURE — 80048 BASIC METABOLIC PNL TOTAL CA: CPT | Performed by: EMERGENCY MEDICINE

## 2017-04-08 PROCEDURE — 00000146 ZZHCL STATISTIC GLUCOSE BY METER IP

## 2017-04-08 PROCEDURE — 25000128 H RX IP 250 OP 636: Performed by: EMERGENCY MEDICINE

## 2017-04-08 PROCEDURE — 96374 THER/PROPH/DIAG INJ IV PUSH: CPT

## 2017-04-08 PROCEDURE — 81001 URINALYSIS AUTO W/SCOPE: CPT | Performed by: FAMILY MEDICINE

## 2017-04-08 PROCEDURE — 85025 COMPLETE CBC W/AUTO DIFF WBC: CPT | Performed by: EMERGENCY MEDICINE

## 2017-04-08 RX ORDER — GLIPIZIDE 2.5 MG/1
2.5 TABLET, EXTENDED RELEASE ORAL DAILY
Qty: 30 TABLET | Refills: 0 | Status: SHIPPED | OUTPATIENT
Start: 2017-04-08 | End: 2017-05-08 | Stop reason: ALTCHOICE

## 2017-04-08 RX ORDER — SODIUM CHLORIDE 9 MG/ML
1000 INJECTION, SOLUTION INTRAVENOUS CONTINUOUS
Status: DISCONTINUED | OUTPATIENT
Start: 2017-04-08 | End: 2017-04-08 | Stop reason: HOSPADM

## 2017-04-08 RX ADMIN — INSULIN HUMAN 10 UNITS: 100 INJECTION, SOLUTION PARENTERAL at 13:28

## 2017-04-08 RX ADMIN — SODIUM CHLORIDE 2000 ML: 9 INJECTION, SOLUTION INTRAVENOUS at 13:27

## 2017-04-08 RX ADMIN — SODIUM CHLORIDE 1000 ML: 9 INJECTION, SOLUTION INTRAVENOUS at 12:32

## 2017-04-08 ASSESSMENT — ENCOUNTER SYMPTOMS
ALLERGIC/IMMUNOLOGIC NEGATIVE: 1
RESPIRATORY NEGATIVE: 1
FATIGUE: 1
CARDIOVASCULAR NEGATIVE: 1
EYES NEGATIVE: 1
MUSCULOSKELETAL NEGATIVE: 1
NEUROLOGICAL NEGATIVE: 1
HEMATOLOGIC/LYMPHATIC NEGATIVE: 1
ENDOCRINE NEGATIVE: 1
PSYCHIATRIC NEGATIVE: 1
GASTROINTESTINAL NEGATIVE: 1

## 2017-04-08 NOTE — ED AVS SNAPSHOT
[facility]    [address] [address]    Phone:  [phone]    Fax:  [phone]                                       Hosuton Winters   MRN: [id number]    Department:  [facility]   Date of Visit:  4/8/2017           After Visit Summary Signature Page     I have received my discharge instructions, and my questions have been answered. I have discussed any challenges I see with this plan with the nurse or doctor.    ..........................................................................................................................................  Patient/Patient Representative Signature      ..........................................................................................................................................  Patient Representative Print Name and Relationship to Patient    ..................................................               ................................................  Date                                            Time    ..........................................................................................................................................  Reviewed by Signature/Title    ...................................................              ..............................................  Date                                                            Time

## 2017-04-08 NOTE — ED AVS SNAPSHOT
[facility] Emergency Department    [address] [address]    Phone:  [phone]    Fax:  [phone]                                       Houston Winters   MRN: [id number]    Department:  [facility] Emergency Department   Date of Visit:  4/8/2017           Patient Information     Date Of Birth          1970        Your diagnoses for this visit were:     Type 2 diabetes mellitus with hyperglycemia, without long-term current use of insulin (H)        You were seen by Brendan Yoo MD.      Follow-up Information     Follow up with Norma Ramirez APRN CNP. Schedule an appointment as soon as possible for a visit in 10 days.    Specialty:  Nurse Practitioner - Adult Health    Contact information:    [facility]  [address] [address]  [phone]          Discharge Instructions         Exercise to Manage Your Blood Sugar  Being physically active every day can help you manage your blood sugar. That s because an active lifestyle can improve your body s ability to use insulin. Daily activity can also help delay or prevent complications of diabetes. And it s a great way to relieve stress. If you aren t normally active, be sure to consult your health care provider before getting started.    How Much Activity Do You Need?  If daily activity is new to you, start slow and steady. Begin with 10 minutes of activity each day. Then work up to at least 40 minutes of moderate to high intensity physical activity on at least 3 to 4 days each week. Do this by adding a few minutes each week. It doesn t have to be done all at once. Each active period throughout the day adds up.  Just Move!  You don t have to join a gym or own pricey sports equipment. Just get out and walk. Walking is an aerobic exercise that makes your heart and lungs work hard. It helps your heart and blood vessels. Walking requires only a sturdy pair of sneakers and your own two feet. The  more you walk, the easier it gets.    Schedule time every day to move your feet.    Make it part of your daily routine.    Walk with a friend or a group to keep it interesting and fun.    Try taking several short walks during the day to meet your daily activity goal.  A Pedometer Makes Every Step Count  A pedometer is a small device that keeps track of how many steps you take. You can clip it to your belt (or a strap on your arm or leg) and go about your daily routine. At the end of the day, the pedometer shows the total number of steps you took. Use a pedometer to set daily goals for yourself. For instance, if you walk 4,000 steps a day, try adding 200 more steps each day. Aim for a goal of 7,500. With every step, you re doing a little more to help your body use insulin.   Adding Resistance Exercise  Resistance exercise (also called strength training), makes muscles stronger. It also helps muscles use insulin better. Ask your health care provider whether this type of exercise is right for you. If it is, your health care provider can help you work it in to your activity plan.  Staying Safe  Being active may cause blood sugar to drop faster than usual. This is especially true if you take medication to manage your blood sugar. But there are things you can do to help reduce the risk of accidental lows. Keep these tips in mind:    Always carry identification when you exercise outside your home. Carry a cell phone to use in case of emergency.    If you can, include friends and family in your activities.    Wear a medical ID bracelet that says you have diabetes.    Use the right safety equipment for the activity you do (such as a bicycle helmet when you ride a bicycle outdoors). Wear closed-toed shoes that fit your feet well.    Drink plenty of water before and during activity.    Keep a fast-acting sugar (such as glucose tablets) on hand in case of low blood sugar.    Dress properly for the weather. Wear a hat if it s  niki, or wait until evening if it s too hot.    Avoid being active for long periods in very hot or very cold weather.    Skip activity if you re sick.     Notice How Activity Affects Blood Sugar  Physical activity is important when you have diabetes. But you need to keep an eye on your blood sugar level. Check often if you have been active for longer than usual, or if the activity was unplanned. Make it a habit to check your blood sugar before being active. And check again a few hours later. Use your log book to write down how activity affects your numbers. If you take insulin, you may be able to adjust your dose before a planned activity. This can help prevent lows. Talk to your health care provider to learn more.        1325-1711 [employer]. [address] [place] [address], [place] [address]. All rights reserved. This information is not intended as a substitute for professional medical care. Always follow your healthcare professional's instructions.      Discontinue the metformin.  Glipizide as directed.  Recheck in clinic with her primary care provider in 10-14 days.  Return to the emergency department if worse or further concerns.    24 Hour Appointment Hotline       To make an appointment at any [facility], call [phone] ([phone]). If you don't have a family doctor or clinic, we will help you find one. [facility] clinics are conveniently located to serve the needs of you and your family.             Review of your medicines      START taking        Dose / Directions Last dose taken    glipiZIDE 2.5 MG 24 hr tablet   Commonly known as:  glipiZIDE XL   Dose:  2.5 mg   Quantity:  30 tablet        Take 1 tablet (2.5 mg) by mouth daily   Refills:  0          Our records show that you are taking the medicines listed below. If these are incorrect, please call your family doctor or clinic.        Dose / Directions Last dose taken    amitriptyline 25 MG tablet   Commonly known as:  ELAVIL   Dose:   25 mg   Quantity:  30 tablet        Take 1 tablet (25 mg) by mouth At Bedtime   Refills:  1        aspirin 81 MG EC tablet   Dose:  81 mg   Quantity:  90 tablet        Take 1 tablet (81 mg) by mouth daily   Refills:  3        atorvastatin 10 MG tablet   Commonly known as:  LIPITOR   Dose:  10 mg   Quantity:  90 tablet        Take 1 tablet (10 mg) by mouth every evening   Refills:  3        blood glucose monitoring lancets   Dose:  1 each   Quantity:  102 each        1 each daily   Refills:  12        blood glucose monitoring test strip   Commonly known as:  ACCU-CHEK SMARTVIEW   Quantity:  100 strip        Check blood sugar 4 times daily   Refills:  12        HYDROcodone-acetaminophen  MG per tablet   Commonly known as:  NORCO   Dose:  1 tablet   Quantity:  120 tablet        Take 1 tablet by mouth every 4 hours as needed for moderate to severe pain maximum 6 tablet(s) per day   Refills:  0        metFORMIN 500 MG 24 hr tablet   Commonly known as:  GLUCOPHAGE-XR   Dose:  500 mg   Quantity:  60 tablet        Take 1 tablet (500 mg) by mouth daily (with dinner) For 2 weeks, then 2 tablets with dinner   Refills:  2        MULTIVITAMIN PO   Dose:  1 tablet        Take 1 tablet by mouth daily   Refills:  0        omeprazole 40 MG capsule   Commonly known as:  priLOSEC   Dose:  40 mg   Quantity:  30 capsule        Take 1 capsule (40 mg) by mouth daily Take 30-60 minutes before a meal.   Refills:  1        order for DME   Quantity:  1 each        Equipment being ordered: #1 glucometer, #100 strips, #100 lancets.   Check blood sugar at least twice daily and as needed for any signs or symptoms of hypo or hyperglycemia.  May refill lancets and strips x3.   Refills:  3                Prescriptions were sent or printed at these locations (1 Prescription)                   Other Prescriptions                Printed at Department/Unit printer (1 of 1)         glipiZIDE (GLIPIZIDE XL) 2.5 MG 24 hr tablet                 Procedures and tests performed during your visit     Procedure/Test Number of Times Performed    Basic metabolic panel 1    Blood gas venous 1    CBC with platelets differential 1    Glucose Monitoring Nursing 1    Glucose by meter 2    Hemaglobin A1C 1    Ketone quantitative 1    UA reflex to Microscopic 1      Orders Needing Specimen Collection     None      Pending Results     No orders found from 4/6/2017 to 4/9/2017.            Pending Culture Results     No orders found from 4/6/2017 to 4/9/2017.            Test Results From Your Hospital Stay        4/8/2017 11:51 AM      Component Results     Component Value Ref Range & Units Status    Glucose 360 (H) 70 - 99 mg/dL Final         4/8/2017 12:49 PM      Component Results     Component Value Ref Range & Units Status    WBC 5.6 4.0 - 11.0 10e9/L Final    RBC Count 4.86 4.4 - 5.9 10e12/L Final    Hemoglobin 14.9 13.3 - 17.7 g/dL Final    Hematocrit 45.5 40.0 - 53.0 % Final    MCV 94 78 - 100 fl Final    MCH 30.7 26.5 - 33.0 pg Final    MCHC 32.7 31.5 - 36.5 g/dL Final    RDW 12.2 10.0 - 15.0 % Final    Platelet Count 207 150 - 450 10e9/L Final    Diff Method Automated Method  Final    % Neutrophils 64.3 % Final    % Lymphocytes 27.6 % Final    % Monocytes 7.0 % Final    % Eosinophils 0.5 % Final    % Basophils 0.4 % Final    % Immature Granulocytes 0.2 % Final    Absolute Neutrophil 3.6 1.6 - 8.3 10e9/L Final    Absolute Lymphocytes 1.5 0.8 - 5.3 10e9/L Final    Absolute Monocytes 0.4 0.0 - 1.3 10e9/L Final    Absolute Eosinophils 0.0 0.0 - 0.7 10e9/L Final    Absolute Basophils 0.0 0.0 - 0.2 10e9/L Final    Abs Immature Granulocytes 0.0 0 - 0.4 10e9/L Final         4/8/2017  1:08 PM      Component Results     Component Value Ref Range & Units Status    Sodium 137 133 - 144 mmol/L Final    Potassium 4.6 3.4 - 5.3 mmol/L Final    Specimen slightly hemolyzed, potassium may be falsely elevated    Chloride 102 94 - 109 mmol/L Final    Carbon Dioxide 26 20 - 32  mmol/L Final    Anion Gap 9 3 - 14 mmol/L Final    Glucose 351 (H) 70 - 99 mg/dL Final    Urea Nitrogen 14 7 - 30 mg/dL Final    Creatinine 0.55 (L) 0.66 - 1.25 mg/dL Final    GFR Estimate >90  Non  GFR Calc   >60 mL/min/1.7m2 Final    GFR Estimate If Black >90   GFR Calc   >60 mL/min/1.7m2 Final    Calcium 9.2 8.5 - 10.1 mg/dL Final         4/8/2017  1:19 PM      Component Results     Component Value Ref Range & Units Status    Ph Venous 7.42 7.32 - 7.43 pH Final    PCO2 Venous 42 40 - 50 mm Hg Final    PO2 Venous 53 (H) 25 - 47 mm Hg Final    Bicarbonate Venous 27 21 - 28 mmol/L Final    Base Excess Venous 2.4 mmol/L Final    Abnormal Result, Ref range: -7.7 to 1.9         4/8/2017  1:47 PM      Component Results     Component Value Ref Range & Units Status    Ketone Quantitative 0.1 0.0 - 0.6 mmol/L Final         4/8/2017  3:41 PM      Component Results     Component Value Ref Range & Units Status    Color Urine Yellow  Final    Appearance Urine Slightly Cloudy  Final    Glucose Urine >1000 (A) NEG mg/dL Final    Bilirubin Urine Negative NEG Final    Ketones Urine 10 (A) NEG mg/dL Final    Specific Gravity Urine 1.029 1.003 - 1.035 Final    Blood Urine Negative NEG Final    pH Urine 7.5 (H) 5.0 - 7.0 pH Final    Protein Albumin Urine Negative NEG mg/dL Final    Urobilinogen mg/dL Normal 0.0 - 2.0 mg/dL Final    Nitrite Urine Negative NEG Final    Leukocyte Esterase Urine Negative NEG Final    Source Midstream Urine  Final    RBC Urine 10 (H) 0 - 2 /HPF Final    WBC Urine 0 0 - 2 /HPF Final    Amorphous Crystals Many (A) NEG /HPF Final         4/8/2017  1:14 PM      Component Results     Component Value Ref Range & Units Status    Hemoglobin A1C 12.0 (H) 4.3 - 6.0 % Final         4/8/2017  2:55 PM      Component Results     Component Value Ref Range & Units Status    Glucose 186 (H) 70 - 99 mg/dL Final                Thank you for choosing [facility]       Thank you for choosing  "[facility] for your care. Our goal is always to provide you with excellent care. Hearing back from our patients is one way we can continue to improve our services. Please take a few minutes to complete the written survey that you may receive in the mail after you visit with us. Thank you!        [employer] Information     [employer] lets you send messages to your doctor, view your test results, renew your prescriptions, schedule appointments and more. To sign up, go to [email or web address] . Click on \"Log in\" on the left side of the screen, which will take you to the Welcome page. Then click on \"Sign up Now\" on the right side of the page.     You will be asked to enter the access code listed below, as well as some personal information. Please follow the directions to create your username and password.     Your access code is: [email or web address]  Expires: 2017 12:09 PM     Your access code will  in 90 days. If you need help or a new code, please call your [facility] clinic or [phone].        Care EveryWhere ID     This is your Care EveryWhere ID. This could be used by other organizations to access your [facility] medical records  [id number]        After Visit Summary       This is your record. Keep this with you and show to your community pharmacist(s) and doctor(s) at your next visit.                  "

## 2017-04-08 NOTE — ED PROVIDER NOTES
History     Chief Complaint   Patient presents with     Hyperglycemia     running 300-400, 455 prior to coming in, on metformin     HPI  Houston Winters is a 46 year old male, past medical history significant for erectile dysfunction, epilepsy, obesity, hyperlipidemia, tobacco use disorder, hypertension, presents to the emergency department with concerns of hyperglycemia.  History is obtained from the patient and his wife who state that over the past week his blood sugars have been in the 400 range and they're concerned about it.  He notes a general overall lack of energy.  Not working since this past October as he has a work-related injury that he is currently awaiting surgery for.  Despite this he has not gained weight but rather lasted despite excellent appetite and normal activity because of his pain and that he is not working.  He apparently went to see his primary care doctor week ago after self discontinuing all of his medications about a year ago, he had metformin started last week.  Since that time he reports feeling poorly and having higher blood sugars then baseline.  He reports to me that his blood sugar would be in the 180-250 range prior to starting metformin.    Active Ambulatory Problems     Diagnosis Date Noted     GANGLOIN CYST /RIGHT ANKLE 06/15/2006     ED (erectile dysfunction) 12/18/2008     Partial epilepsy (H) 03/12/2009     Obesity 07/17/2014     Hyperlipidemia with target LDL less than 100 07/17/2014     Tobacco use disorder 02/04/2015     Hypertension, goal below 140/90 02/04/2015     Type 2 diabetes mellitus without complication, without long-term current use of insulin (H) 03/27/2017     Resolved Ambulatory Problems     Diagnosis Date Noted     CARDIOVASCULAR SCREENING; LDL GOAL LESS THAN 160 10/31/2010     Anemia 10/29/2013     Essential hypertension, benign 03/19/2014     Past Medical History:   Diagnosis Date     Epilepsy (H)      MEDICAL HISTORY OF - Age 16      Nicotine dependence       Past Surgical History:   Procedure Laterality Date     CL AFF SURGICAL PATHOLOGY  2005    ganglion cyst removed     EXCISE MASS LOWER EXTREMITY  2/15/2013    Procedure: EXCISE MASS LOWER EXTREMITY;  Right Ankle Excision of ganglion cyst;  Surgeon: Akbar Melo DPM;  Location: [facility] OR     HERNIA REPAIR  2000    umbilical repair     HERNIORRHAPHY UMBILICAL N/A 11/10/2015    Procedure: HERNIORRHAPHY UMBILICAL;  Surgeon: Garry Leos MD;  Location: [facility] OR     Social History     Social History     Marital status:      Spouse name: N/A     Number of children: N/A     Years of education: N/A     Occupational History     Not on file.     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 No     Social History Narrative     Family History   Problem Relation Age of Onset     Arthritis Mother      CANCER Maternal Grandmother      Arthritis Maternal Grandmother      lupus     GASTROINTESTINAL DISEASE Maternal Grandfather      war injury, colostomy     Respiratory Daughter      growing out of it     Unknown/Adopted Father      DIABETES No family hx of      Coronary Artery Disease No family hx of      Hypertension No  family hx of      Hyperlipidemia No family hx of      Breast Cancer No family hx of      Cancer - colorectal No family hx of      Ovarian Cancer No family hx of      Prostate Cancer No family hx of      Current Facility-Administered Medications   Medication     0.9% sodium chloride infusion     Current Outpatient Prescriptions   Medication     glipiZIDE (GLIPIZIDE XL) 2.5 MG 24 hr tablet     amitriptyline (ELAVIL) 25 MG tablet     HYDROcodone-acetaminophen (NORCO)  MG per tablet     metFORMIN (GLUCOPHAGE-XR) 500 MG 24 hr tablet     atorvastatin (LIPITOR) 10 MG tablet     omeprazole (PRILOSEC) 40 MG capsule     aspirin 81 MG EC tablet     Multiple Vitamins-Minerals (MULTIVITAMIN OR)     ORDER FOR DME     glucose blood VI test strips (ACCU-CHEK SMARTVIEW) strip     ACCU-CHEK FASTCLIX LANCETS MISC      No Known Allergies    I have reviewed the Medications, Allergies, Past Medical and Surgical History, and Social History in the Epic system.    Review of Systems   Constitutional: Positive for fatigue.   HENT: Negative.    Eyes: Negative.    Respiratory: Negative.    Cardiovascular: Negative.    Gastrointestinal: Negative.    Endocrine: Negative.    Genitourinary: Negative.    Musculoskeletal: Negative.    Allergic/Immunologic: Negative.    Neurological: Negative.    Hematological: Negative.    Psychiatric/Behavioral: Negative.    All other systems reviewed and are negative.      Physical Exam   BP: 127/81  Heart Rate: 108  Temp: 98.4  F (36.9  C)  SpO2: 95 %  Physical Exam   Constitutional: He is oriented to person, place, and time. He appears well-developed and well-nourished.   HENT:   Head: Normocephalic and atraumatic.   Right Ear: External ear normal.   Left Ear: External ear normal.   Nose: Nose normal.   Mouth/Throat: Oropharynx is clear and moist.   Eyes: Conjunctivae and EOM are normal. Pupils are equal, round, and reactive to light.   Neck: Normal range of motion. Neck supple.   Cardiovascular: Normal  rate, regular rhythm, normal heart sounds and intact distal pulses.    Pulmonary/Chest: Effort normal and breath sounds normal.   Abdominal: Soft. Bowel sounds are normal.   Musculoskeletal: Normal range of motion.   Neurological: He is alert and oriented to person, place, and time.   Skin: Skin is warm and dry.   Psychiatric: He has a normal mood and affect. His behavior is normal.   Nursing note and vitals reviewed.      ED Course     ED Course     Procedures             Critical Care time:  none               Labs Ordered and Resulted from Time of ED Arrival Up to the Time of Departure from the ED   GLUCOSE BY METER - Abnormal; Notable for the following:        Result Value    Glucose 360 (*)     All other components within normal limits   BASIC METABOLIC PANEL - Abnormal; Notable for the following:     Glucose 351 (*)     Creatinine 0.55 (*)     All other components within normal limits   BLOOD GAS VENOUS - Abnormal; Notable for the following:     PO2 Venous 53 (*)     All other components within normal limits   HEMOGLOBIN A1C - Abnormal; Notable for the following:     Hemoglobin A1C 12.0 (*)     All other components within normal limits   GLUCOSE BY METER - Abnormal; Notable for the following:     Glucose 186 (*)     All other components within normal limits   CBC WITH PLATELETS DIFFERENTIAL   KETONE QUANTITATIVE   URINE MACROSCOPIC WITH REFLEX TO MICRO   GLUCOSE MONITOR NURSING POCT   3:37 PM  I reviewed the patient's lab diagnostics with him.  He is hemoglobin A1c of 12 would certainly reflect very poor long-term glycemic control.  He seems fixated on not being comfortable taking metformin.  We'll discontinue the metformin for him and I will have him take glipizide XL 2.5 mg once daily with the plan for follow-up in clinic doing Accu-Cheks at least twice daily over the next 10 days.  Return to the emergency department for acute concerns.      Assessments & Plan (with Medical Decision Making)   Poorly  controlled 46-year-old male type II diabetic who presents with concerns of hyperglycemia has discussed in the HPI.  Physical exam is unremarkable.  Lab diagnostics are reviewed as above reflecting poor glycemic control chronically.  Discussed medication at length with him, follow-up on his hyperglycemic concern should be in clinic.  He is welcome to return to the emergency department of course at any time for acute concern.  We were able to lower his blood sugar with fluids and insulin acutely.  I have discontinued his metformin although this would be the best 1st choice of medication for him he does not seem to tolerate it nor want to be on it.  For this reason I placed him on glipizide and asked that he follow-up with his primary care provider in 10 days.  I would like him to do Accu-Cheks twice daily.      Disclaimer: This note consists of symbols derived from keyboarding, dictation and/or voice recognition software. As a result, there may be errors in the script that have gone undetected. Please consider this when interpreting information found in this chart.      I have reviewed the nursing notes.    I have reviewed the findings, diagnosis, plan and need for follow up with the patient.    New Prescriptions    GLIPIZIDE (GLIPIZIDE XL) 2.5 MG 24 HR TABLET    Take 1 tablet (2.5 mg) by mouth daily       Final diagnoses:   Type 2 diabetes mellitus with hyperglycemia, without long-term current use of insulin (H)       4/8/2017   [facility] EMERGENCY DEPARTMENT     Brendan Yoo MD  04/08/17 [id number]

## 2017-04-08 NOTE — DISCHARGE INSTRUCTIONS
Exercise to Manage Your Blood Sugar  Being physically active every day can help you manage your blood sugar. That s because an active lifestyle can improve your body s ability to use insulin. Daily activity can also help delay or prevent complications of diabetes. And it s a great way to relieve stress. If you aren t normally active, be sure to consult your health care provider before getting started.    How Much Activity Do You Need?  If daily activity is new to you, start slow and steady. Begin with 10 minutes of activity each day. Then work up to at least 40 minutes of moderate to high intensity physical activity on at least 3 to 4 days each week. Do this by adding a few minutes each week. It doesn t have to be done all at once. Each active period throughout the day adds up.  Just Move!  You don t have to join a gym or own pricey sports equipment. Just get out and walk. Walking is an aerobic exercise that makes your heart and lungs work hard. It helps your heart and blood vessels. Walking requires only a sturdy pair of sneakers and your own two feet. The more you walk, the easier it gets.    Schedule time every day to move your feet.    Make it part of your daily routine.    Walk with a friend or a group to keep it interesting and fun.    Try taking several short walks during the day to meet your daily activity goal.  A Pedometer Makes Every Step Count  A pedometer is a small device that keeps track of how many steps you take. You can clip it to your belt (or a strap on your arm or leg) and go about your daily routine. At the end of the day, the pedometer shows the total number of steps you took. Use a pedometer to set daily goals for yourself. For instance, if you walk 4,000 steps a day, try adding 200 more steps each day. Aim for a goal of 7,500. With every step, you re doing a little more to help your body use insulin.   Adding Resistance Exercise  Resistance exercise (also called strength training), makes  muscles stronger. It also helps muscles use insulin better. Ask your health care provider whether this type of exercise is right for you. If it is, your health care provider can help you work it in to your activity plan.  Staying Safe  Being active may cause blood sugar to drop faster than usual. This is especially true if you take medication to manage your blood sugar. But there are things you can do to help reduce the risk of accidental lows. Keep these tips in mind:    Always carry identification when you exercise outside your home. Carry a cell phone to use in case of emergency.    If you can, include friends and family in your activities.    Wear a medical ID bracelet that says you have diabetes.    Use the right safety equipment for the activity you do (such as a bicycle helmet when you ride a bicycle outdoors). Wear closed-toed shoes that fit your feet well.    Drink plenty of water before and during activity.    Keep a fast-acting sugar (such as glucose tablets) on hand in case of low blood sugar.    Dress properly for the weather. Wear a hat if it s niki, or wait until evening if it s too hot.    Avoid being active for long periods in very hot or very cold weather.    Skip activity if you re sick.     Notice How Activity Affects Blood Sugar  Physical activity is important when you have diabetes. But you need to keep an eye on your blood sugar level. Check often if you have been active for longer than usual, or if the activity was unplanned. Make it a habit to check your blood sugar before being active. And check again a few hours later. Use your log book to write down how activity affects your numbers. If you take insulin, you may be able to adjust your dose before a planned activity. This can help prevent lows. Talk to your health care provider to learn more.        9506-3233 [employer]. [address] [place] [address], [place] [address]. All rights reserved. This information is not intended as a  substitute for professional medical care. Always follow your healthcare professional's instructions.      Discontinue the metformin.  Glipizide as directed.  Recheck in clinic with her primary care provider in 10-14 days.  Return to the emergency department if worse or further concerns.

## 2017-05-08 ENCOUNTER — OFFICE VISIT (OUTPATIENT)
Dept: FAMILY MEDICINE | Facility: CLINIC | Age: 47
End: 2017-05-08
Payer: OTHER MISCELLANEOUS

## 2017-05-08 VITALS
BODY MASS INDEX: 29.79 KG/M2 | WEIGHT: 208.1 LBS | HEART RATE: 92 BPM | SYSTOLIC BLOOD PRESSURE: 120 MMHG | HEIGHT: 70 IN | DIASTOLIC BLOOD PRESSURE: 64 MMHG

## 2017-05-08 DIAGNOSIS — F17.200 TOBACCO USE DISORDER: ICD-10-CM

## 2017-05-08 DIAGNOSIS — E11.9 TYPE 2 DIABETES MELLITUS WITHOUT COMPLICATION, WITHOUT LONG-TERM CURRENT USE OF INSULIN (H): ICD-10-CM

## 2017-05-08 DIAGNOSIS — I10 HYPERTENSION, GOAL BELOW 140/90: ICD-10-CM

## 2017-05-08 DIAGNOSIS — E78.5 HYPERLIPIDEMIA WITH TARGET LDL LESS THAN 100: ICD-10-CM

## 2017-05-08 DIAGNOSIS — M25.859 FEMORAL ACETABULAR IMPINGEMENT: ICD-10-CM

## 2017-05-08 DIAGNOSIS — M24.152 DEGENERATIVE TEAR OF ACETABULAR LABRUM OF LEFT HIP: ICD-10-CM

## 2017-05-08 DIAGNOSIS — Z01.818 PREOP GENERAL PHYSICAL EXAM: Primary | ICD-10-CM

## 2017-05-08 PROCEDURE — 99214 OFFICE O/P EST MOD 30 MIN: CPT | Performed by: FAMILY MEDICINE

## 2017-05-08 RX ORDER — INSULIN GLARGINE 100 [IU]/ML
22 INJECTION, SOLUTION SUBCUTANEOUS EVERY MORNING
Qty: 10 ML | Refills: 3 | Status: SHIPPED | OUTPATIENT
Start: 2017-05-08 | End: 2017-05-15

## 2017-05-08 RX ORDER — SYRINGE-NEEDLE,INSULIN,0.5 ML 27GX1/2"
SYRINGE, EMPTY DISPOSABLE MISCELLANEOUS
Qty: 100 EACH | Refills: 11 | Status: SHIPPED | OUTPATIENT
Start: 2017-05-08 | End: 2021-05-18

## 2017-05-08 NOTE — PATIENT INSTRUCTIONS
Before Your Surgery      Call your surgeon if there is any change in your health. This includes signs of a cold or flu (such as a sore throat, runny nose, cough, rash or fever).    Do not smoke, drink alcohol or take over the counter medicine (unless your surgeon or primary care doctor tells you to) for the 24 hours before and after surgery.    If you take prescribed drugs: Follow your doctor s orders about which medicines to take and which to stop until after surgery. Take 1/2 your dose of Lantus the am of surgery. Don't take any metformin the day of surgery    Eating and drinking prior to surgery: follow the instructions from your surgeon    Take a shower or bath the night before surgery. Use the soap your surgeon gave you to gently clean your skin. If you do not have soap from your surgeon, use your regular soap. Do not shave or scrub the surgery site.  Wear clean pajamas and have clean sheets on your bed.

## 2017-05-08 NOTE — PROGRESS NOTES
[facility]  [address]  [facility] [address]  [phone]  Dept: [phone]    PRE-OP EVALUATION:  Today's date: 2017    Houston Winters (: 1970) presents for pre-operative evaluation assessment as requested by Dr. Veliz.  He requires evaluation and anesthesia risk assessment prior to undergoing surgery/procedure for treatment of left hip .  Proposed procedure: Repair of labral tear left hip    Date of Surgery/ Procedure: 2017  Time of Surgery/ Procedure: yet to be determined  Hospital/Surgical Facility: [facility]     Primary Physician: Norma Ramirez  Type of Anesthesia Anticipated: General    Patient has a Health Care Directive or Living Will:  NO    1. NO - Do you have a history of heart attack, stroke, stent, bypass or surgery on an artery in the head, neck, heart or legs?  2. NO - Do you ever have any pain or discomfort in your chest?  3. NO - Do you have a history of  Heart Failure?  4. NO - Are you troubled by shortness of breath when: walking on the level, up a slight hill or at night?  5. NO - Do you currently have a cold, bronchitis or other respiratory infection?  6. NO - Do you have a cough, shortness of breath or wheezing?  7. YES - Do you sometimes get pains in the calves of your legs when you walk?  8. NO - Do you or anyone in your family have previous history of blood clots?  9. NO - Do you or does anyone in your family have a serious bleeding problem such as prolonged bleeding following surgeries or cuts?  10. NO - Have you ever had problems with anemia or been told to take iron pills?  11. NO - Have you had any abnormal blood loss such as black, tarry or bloody stools, or abnormal vaginal bleeding?  12. NO - Have you ever had a blood transfusion?  13. NO - Have you or any of your relatives ever had problems with anesthesia?  14. NO - Do you have sleep apnea, excessive snoring or daytime drowsiness?  15. NO - Do you have any prosthetic  heart valves?  16. NO - Do you have prosthetic joints?  17. NO - Is there any chance that you may be pregnant?      HPI:                                                      Brief HPI related to upcoming procedure: He suffered a work-related injury to his left hip over a year ago and this has not responded to conservative measures. Evaluation confirms that there is a labral tear of the left acetabulum along with acetabular femoral impingement. Dr. Veliz has recommended the above procedure for definitive care of this problem      See problem list for active medical problems.  Problems all longstanding and stable, except as noted/documented.  See ROS for pertinent symptoms related to these conditions.                                                                                                  .  DIABETES - Patient has a longstanding history of DiabetesType Type II . He has had reasonable control of his blood sugars, then over the last year he lost a significant amount of weight and began eating a healthier diet, so he took himself off of his diabetic medications, thinking his blood sugars were well-controlled. At a recent office visit on 3/27/17 his glycosylated hemoglobin was checked and came back at 12.6, so I convinced him to go back on his metformin. His blood sugars continued quite elevated so he went into the emergency room, and they took him off the metformin and put him on glipizide, but this has not helped either. At this point he states he is willing to go on insulin to get his blood sugars under control so that he does not have to cancel his hip surgery.                                                                                                                                           .    MEDICAL HISTORY:                                                      Patient Active Problem List    Diagnosis Date Noted     Type 2 diabetes mellitus without complication, without long-term current use of  "insulin (H) 03/27/2017     Priority: Medium     Tobacco use disorder 02/04/2015     Priority: Medium     Hypertension, goal below 140/90 02/04/2015     Priority: Medium     Obesity 07/17/2014     Priority: Medium     Hyperlipidemia with target LDL less than 100 07/17/2014     Priority: Medium     Diagnosis updated by automated process. Provider to review and confirm.       Partial epilepsy (H) 03/12/2009     Priority: Medium     (Problem list name updated by automated process. Provider to review and confirm.)       ED (erectile dysfunction) 12/18/2008     Priority: Medium     GANGLOIN CYST /RIGHT ANKLE 06/15/2006     Priority: Medium      Past Medical History:   Diagnosis Date     Epilepsy (H)     Intractable epilepsy, last seizure 2008     MEDICAL HISTORY OF - Age 16     Gangrene in foot stepped on a nail hospitalized for 7 weeks     Nicotine dependence      Past Surgical History:   Procedure Laterality Date     CL AFF SURGICAL PATHOLOGY  2005    ganglion cyst removed     EXCISE MASS LOWER EXTREMITY  2/15/2013    Procedure: EXCISE MASS LOWER EXTREMITY;  Right Ankle Excision of ganglion cyst;  Surgeon: Akbar Melo DPM;  Location: [facility] OR     HERNIA REPAIR  2000    umbilical repair     HERNIORRHAPHY UMBILICAL N/A 11/10/2015    Procedure: HERNIORRHAPHY UMBILICAL;  Surgeon: Garry Leos MD;  Location: [facility] OR     Current Outpatient Prescriptions   Medication Sig Dispense Refill     insulin glargine (LANTUS VIAL) 100 UNIT/ML injection Inject 22 Units Subcutaneous every morning 10 mL 3     order for DME Test strips for pt's glucometer, brand as covered by insurance Test bid and prn. 200 each 4     insulin syringe-needle U-100 (BD INSULIN SYRINGE ULTRAFINE) 31G X 5/16\" 1 ML Use one syringe 22 units daily daily or as directed. 100 each 11     amitriptyline (ELAVIL) 25 MG tablet Take 1 tablet (25 mg) by mouth At Bedtime 30 tablet 1     HYDROcodone-acetaminophen (NORCO)  MG per tablet Take 1 tablet by mouth " "every 4 hours as needed for moderate to severe pain maximum 6 tablet(s) per day 120 tablet 0     metFORMIN (GLUCOPHAGE-XR) 500 MG 24 hr tablet Take 1 tablet (500 mg) by mouth daily (with dinner) For 2 weeks, then 2 tablets with dinner 60 tablet 2     atorvastatin (LIPITOR) 10 MG tablet Take 1 tablet (10 mg) by mouth every evening 90 tablet 3     omeprazole (PRILOSEC) 40 MG capsule Take 1 capsule (40 mg) by mouth daily Take 30-60 minutes before a meal. 30 capsule 1     Multiple Vitamins-Minerals (MULTIVITAMIN OR) Take 1 tablet by mouth daily        aspirin 81 MG EC tablet Take 1 tablet (81 mg) by mouth daily 90 tablet 3     ORDER FOR DME Equipment being ordered: #1 glucometer, #100 strips, #100 lancets.   Check blood sugar at least twice daily and as needed for any signs or symptoms of hypo or hyperglycemia.  May refill lancets and strips x3. 1 each 3     glucose blood VI test strips (ACCU-CHEK SMARTVIEW) strip Check blood sugar 4 times daily 100 strip 12     ACCU-CHEK FASTCLIX LANCETS MISC 1 each daily 102 each 12     OTC products: None, except as noted above    No Known Allergies   Latex Allergy: NO    Social History   Substance Use Topics     Smoking status: Former Smoker     Packs/day: 0.25     Years: 25.00     Types: Cigarettes     Quit date: 1/18/2016     Smokeless tobacco: Former User     Alcohol use 0.0 oz/week     0 Standard drinks or equivalent per week      Comment: mixed 2-4 on weekends, not every weekend     History   Drug Use No       REVIEW OF SYSTEMS:                                                    Constitutional, HEENT, cardiovascular, pulmonary, gi and gu systems are negative, except as otherwise noted.    EXAM:                                                    /64 (BP Location: Right arm, Patient Position: Chair, Cuff Size: Adult Large)  Pulse 92  Ht 5' 10\" (1.778 m)  Wt 208 lb 1.6 oz (94.4 kg)  BMI 29.86 kg/m2    GENERAL APPEARANCE: healthy, alert and no distress     EYES: EOMI,  " PERRL     HENT: ear canals and TM's normal and nose and mouth without ulcers or lesions     NECK: no adenopathy, no asymmetry, masses, or scars and thyroid normal to palpation     RESP: lungs clear to auscultation - no rales, rhonchi or wheezes     CV: regular rates and rhythm, normal S1 S2, no S3 or S4 and no murmur, click or rub     ABDOMEN:  soft, nontender, no HSM or masses and bowel sounds normal     MS: Antalgic gait with obvious pain in left hip with ambulation     SKIN: no suspicious lesions or rashes     NEURO: Normal strength and tone, sensory exam grossly normal, mentation intact and speech normal     PSYCH: mentation appears normal. and affect normal/bright     LYMPHATICS: No axillary, cervical, or supraclavicular nodes    DIAGNOSTICS:                                                    EKG: Not indicated due to non-vascular surgery and low risk of event (age <65 and without cardiac risk factors)    Recent Labs   Lab Test  04/08/17   1230  03/27/17   0917  04/26/16   0857   01/03/14   1610   06/26/09   1242   HGB  14.9   --   14.5   < >  13.0*   < >  13.9   PLT  207   --   207   < >  162   --   236   INR   --    --    --    --   0.94   --   0.92   NA  137   --   134   < >   --    < >  141   POTASSIUM  4.6   --   4.7   < >   --    < >  4.0   CR  0.55*   --   0.70   < >   --    < >  0.99   A1C  12.0*  12.6*   --    < >   --    < >   --     < > = values in this interval not displayed.        IMPRESSION:                                                    Reason for surgery/procedure: Left femoral acetabular impingement and left acetabular labral tear  Diagnosis/reason for consult: Evaluate for anesthesia risk     The proposed surgical procedure is considered LOW risk.    REVISED CARDIAC RISK INDEX  The patient has the following serious cardiovascular risks for perioperative complications such as (MI, PE, VFib and 3  AV Block):  No serious cardiac risks  INTERPRETATION: 0 risks: Class I (very low risk - 0.4%  "complication rate)    The patient has the following additional risks for perioperative complications:  No identified additional risks      ICD-10-CM    1. Preop general physical exam Z01.818    2. Femoral acetabular impingement M25.859    3. 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 Type 2 diabetes mellitus without complication, without long-term current use of insulin (H) E11.9 insulin glargine (LANTUS VIAL) 100 UNIT/ML injection     order for DME     insulin syringe-needle U-100 (BD INSULIN SYRINGE ULTRAFINE) 31G X 5/16\" 1 ML   5. Hyperlipidemia with target LDL less than 100 E78.5    6. Tobacco use disorder F17.200    7. Hypertension, goal below 140/90 I10        RECOMMENDATIONS:                                                      To try and improve his blood sugar control will put him on Lantus 22 units daily and have him go back on metformin. If he is not seeing a significant improvement in his sugars in a week will call me and will increase the dose of Lantus    --Patient is to take all scheduled medications on the day of surgery EXCEPT for modifications listed below.    Diabetes Medication Use    -----Hold usual  oral diabetic meds (e.g. Metformin while NPO.   -----Take 50% of long acting insulin (e.g. Lantus,) while NPO (fasting)      APPROVAL GIVEN to proceed with proposed procedure, without further diagnostic evaluation       Signed Electronically by: OLEG Gloria MD    Copy of this evaluation report is provided to requesting physician.    [facility] Preop Guidelines  "

## 2017-05-08 NOTE — NURSING NOTE
"Chief Complaint   Patient presents with     Work Comp     Pre-Op Exam     surgery 5/22/2017 with Dr. Gibbons @ [facility] ? left hip scope fix inpindgement     ER F/U     diabetes        Initial /64 (BP Location: Right arm, Patient Position: Chair, Cuff Size: Adult Large)  Pulse 92  Ht 5' 10\" (1.778 m)  Wt 208 lb 1.6 oz (94.4 kg)  BMI 29.86 kg/m2 Estimated body mass index is 29.86 kg/(m^2) as calculated from the following:    Height as of this encounter: 5' 10\" (1.778 m).    Weight as of this encounter: 208 lb 1.6 oz (94.4 kg).  Medication Reconciliation: complete     Leatha Morelos, CMA      "

## 2017-05-08 NOTE — MR AVS SNAPSHOT
After Visit Summary   5/8/2017    Houston Winters    MRN: [id number]           Patient Information     Date Of Birth          1970        Visit Information        Provider Department      5/8/2017 10:20 AM OLEG Gloria MD [facility]        Today's Diagnoses     Preop general physical exam    -  1    Type 2 diabetes mellitus without complication, without long-term current use of insulin (H)          Care Instructions      Before Your Surgery      Call your surgeon if there is any change in your health. This includes signs of a cold or flu (such as a sore throat, runny nose, cough, rash or fever).    Do not smoke, drink alcohol or take over the counter medicine (unless your surgeon or primary care doctor tells you to) for the 24 hours before and after surgery.    If you take prescribed drugs: Follow your doctor s orders about which medicines to take and which to stop until after surgery. Take 1/2 your dose of Lantus the am of surgery. Don't take any metformin the day of surgery    Eating and drinking prior to surgery: follow the instructions from your surgeon    Take a shower or bath the night before surgery. Use the soap your surgeon gave you to gently clean your skin. If you do not have soap from your surgeon, use your regular soap. Do not shave or scrub the surgery site.  Wear clean pajamas and have clean sheets on your bed.         Follow-ups after your visit        Who to contact     If you have questions or need follow up information about today's clinic visit or your schedule please contact [facility]note 3] directly at [phone].  Normal or non-critical lab and imaging results will be communicated to you by MyChart, letter or phone within 4 business days after the clinic has received the results. If you do not hear from us within 7 days, please contact the clinic through MyChart or phone. If you have a critical or abnormal lab result, we will notify you by  "phone as soon as possible.  Submit refill requests through [employer] or call your pharmacy and they will forward the refill request to us. Please allow 3 business days for your refill to be completed.          Additional Information About Your Visit        [employer] Information     [employer] lets you send messages to your doctor, view your test results, renew your prescriptions, schedule appointments and more. To sign up, go to [email or web address] . Click on \"Log in\" on the left side of the screen, which will take you to the Welcome page. Then click on \"Sign up Now\" on the right side of the page.     You will be asked to enter the access code listed below, as well as some personal information. Please follow the directions to create your username and password.     Your access code is: [email or web address]  Expires: 2017 12:09 PM     Your access code will  in 90 days. If you need help or a new code, please call your [facility] clinic or [phone].        Care EveryWhere ID     This is your Care EveryWhere ID. This could be used by other organizations to access your [facility] medical records  [id number]        Your Vitals Were     Pulse Height BMI (Body Mass Index)             92 5' 10\" (1.778 m) 29.86 kg/m2          Blood Pressure from Last 3 Encounters:   17 120/64   17 128/84   17 120/80    Weight from Last 3 Encounters:   17 208 lb 1.6 oz (94.4 kg)   17 202 lb 9.6 oz (91.9 kg)   17 212 lb (96.2 kg)              Today, you had the following     No orders found for display         Today's Medication Changes          These changes are accurate as of: 17 11:18 AM.  If you have any questions, ask your nurse or doctor.               Start taking these medicines.        Dose/Directions    insulin glargine 100 UNIT/ML injection   Commonly known as:  LANTUS VIAL   Used for:  Type 2 diabetes mellitus without complication, without long-term current use of insulin (H)        Dose:  22 " Units   Inject 22 Units Subcutaneous every morning   Quantity:  10 mL   Refills:  3         Stop taking these medicines if you haven't already. Please contact your care team if you have questions.     glipiZIDE 2.5 MG 24 hr tablet   Commonly known as:  glipiZIDE XL                Where to get your medicines      These medications were sent to [facility] - [place], [place] - [address]  [address], [facility] [address]     Phone:  [phone]     insulin glargine 100 UNIT/ML injection                Primary Care Provider Office Phone # Fax #    ELMIRA Birch [phone]       [facility] [address] [facility] [address]        Thank you!     Thank you for choosing [facility]  for your care. Our goal is always to provide you with excellent care. Hearing back from our patients is one way we can continue to improve our services. Please take a few minutes to complete the written survey that you may receive in the mail after your visit with us. Thank you!             Your Updated Medication List - Protect others around you: Learn how to safely use, store and throw away your medicines at www.disposemymeds.org.          This list is accurate as of: 5/8/17 11:18 AM.  Always use your most recent med list.                   Brand Name Dispense Instructions for use    amitriptyline 25 MG tablet    ELAVIL    30 tablet    Take 1 tablet (25 mg) by mouth At Bedtime       aspirin 81 MG EC tablet     90 tablet    Take 1 tablet (81 mg) by mouth daily       atorvastatin 10 MG tablet    LIPITOR    90 tablet    Take 1 tablet (10 mg) by mouth every evening       blood glucose monitoring lancets     102 each    1 each daily       blood glucose monitoring test strip    ACCU-CHEK SMARTVIEW    100 strip    Check blood sugar 4 times daily       HYDROcodone-acetaminophen  MG per tablet    NORCO    120 tablet    Take 1 tablet by mouth every 4 hours  as needed for moderate to severe pain maximum 6 tablet(s) per day       insulin glargine 100 UNIT/ML injection    LANTUS VIAL    10 mL    Inject 22 Units Subcutaneous every morning       metFORMIN 500 MG 24 hr tablet    GLUCOPHAGE-XR    60 tablet    Take 1 tablet (500 mg) by mouth daily (with dinner) For 2 weeks, then 2 tablets with dinner       MULTIVITAMIN PO      Take 1 tablet by mouth daily       omeprazole 40 MG capsule    priLOSEC    30 capsule    Take 1 capsule (40 mg) by mouth daily Take 30-60 minutes before a meal.       order for DME     1 each    Equipment being ordered: #1 glucometer, #100 strips, #100 lancets.   Check blood sugar at least twice daily and as needed for any signs or symptoms of hypo or hyperglycemia.  May refill lancets and strips x3.

## 2017-05-15 ENCOUNTER — TELEPHONE (OUTPATIENT)
Dept: FAMILY MEDICINE | Facility: CLINIC | Age: 47
End: 2017-05-15

## 2017-05-15 DIAGNOSIS — E11.9 TYPE 2 DIABETES MELLITUS WITHOUT COMPLICATION, WITHOUT LONG-TERM CURRENT USE OF INSULIN (H): ICD-10-CM

## 2017-05-15 RX ORDER — INSULIN GLARGINE 100 [IU]/ML
30 INJECTION, SOLUTION SUBCUTANEOUS EVERY MORNING
Qty: 10 ML | Refills: 3 | Status: SHIPPED | OUTPATIENT
Start: 2017-05-15 | End: 2021-05-18

## 2017-05-15 NOTE — TELEPHONE ENCOUNTER
Patient was given the information listed below and will call if he has any questions or concerns.    Germania RICHARDS RN

## 2017-05-15 NOTE — TELEPHONE ENCOUNTER
Reason for Call:  Other call back    Detailed comments: patient called stating he is suppose to check in with Dr. Gloria regarding his blood sugars. He states that Dr. Gloria put him on a Lantus dose of 22, and 1000mg Metformin, he states that the Lantus does not seem to be lowing his sugars. Morning and Evening his blood sugars are still high 300's, and at mid day they do go down to 250. Wondering if he should adjust the Lantus again, as he is having surgery next Monday.    Phone Number Patient can be reached at: Home number on file [phone] (home)    Best Time: any    Can we leave a detailed message on this number? YES   Meme Alvarez  Clinic Station  [facility]      Call taken on 5/15/2017 at 8:43 AM by Meme Alvarez

## 2017-05-15 NOTE — TELEPHONE ENCOUNTER
S-(situation): Patient is still having elevated blood sugars in the morning and in the evening    B-(background): Patient was seen on 5/8/17 for an pre op and some changes were made to his medications     A-(assessment): Patient was seen in clinic and was put on lantus 22 units and takes metformin 2 tablets in the morning.  Patient states his blood sugar is still elevated. Patient reports his sugars are 350-415 in the morning and at 3 or 4pm they range about 250 and in the evening at 7 or 8pm they are 370-425.  Patient does report he has slight headaches and increased urine frequency.  Patient was told to call if the medication was not helping his sugars.    R-(recommendations): Will send to the provider for review.      Thank you  Germania RICHARDS RN

## 2017-05-15 NOTE — TELEPHONE ENCOUNTER
Yes, he should increase his Lantus to 30 units daily, and if no significant drop in his blood sugars in 4 days, go up to 36 units

## 2017-06-05 ENCOUNTER — HOSPITAL ENCOUNTER (OUTPATIENT)
Dept: PHYSICAL THERAPY | Facility: CLINIC | Age: 47
Setting detail: THERAPIES SERIES
End: 2017-06-05
Attending: ORTHOPAEDIC SURGERY
Payer: OTHER MISCELLANEOUS

## 2017-06-05 PROCEDURE — 40000718 ZZHC STATISTIC PT DEPARTMENT ORTHO VISIT: Performed by: PHYSICAL THERAPIST

## 2017-06-05 PROCEDURE — 97162 PT EVAL MOD COMPLEX 30 MIN: CPT | Mod: GP | Performed by: PHYSICAL THERAPIST

## 2017-06-05 PROCEDURE — 97110 THERAPEUTIC EXERCISES: CPT | Mod: GP | Performed by: PHYSICAL THERAPIST

## 2017-06-05 NOTE — PROGRESS NOTES
06/05/17 0800   General Information   Type of Visit Initial OP Ortho PT Evaluation   Start of Care Date 06/05/17   Referring Physician Eriberto Veliz    Patient/Family Goals Statement Decrease P, Shoes and Socks, in/out of car, Walking, Stairs    Orders Evaluate and Treat   Date of Order 05/31/17   Insurance Type Other   Insurance Comments/Visits Authorized Work Comp    Medical Diagnosis s/p Hip Arthoroscopy, CAM resection, Acetabular Chondroplasty, Microffx Acetabulum.    Surgical/Medical history reviewed (DM, Seizures, L Hip Scope 5/22/17. )   Precautions/Limitations no known precautions/limitations   Weight-Bearing Status - LLE partial weight-bearing (% in comments)   General Information Comments Taking vicoden for P.    Body Part(s)   Body Part(s) Hip   Presentation and Etiology   Pertinent history of current problem (include personal factors and/or comorbidities that impact the POC) At work, Pushed really hard on lift gate of truck, Back started to  hurt, was spun around a little later the same day. Had P in LB and Hip. Back got better, but was continuing to have groin pain. Had some PT, but hip was still problematic, saw Ortho, was referred to specialist for hips, had to wait 3 months to get every authorized and get appointments. Underwent surgery 5/22/17, is currently on crutches for 4 -6 weeks. Since surgery buttock pain and medial groin pain is gone.    Impairments A. Pain;B. Decreased WB tolerance;D. Decreased ROM;E. Decreased flexibility;F. Decreased strength and endurance;G. Impaired balance;H. Impaired gait   Functional Limitations perform activities of daily living;perform required work activities   Symptom Location P in L groin and down front of L leg to knee. Tingling only if sit for a long time.    How/Where did it occur At work   Onset date of current episode/exacerbation 10/24/16   Chronicity Chronic   Pain rating (0-10 point scale) (4-8/10- )   Pain quality A. Sharp;C. Aching;E. Shooting;F.  Stabbing   Frequency of pain/symptoms A. Constant   Pain/symptoms are: Worse in the morning   Pain/symptoms exacerbated by A. Sitting;B. Walking;C. Lifting;G. Certain positions;I. Bending;J. ADL;K. Home tasks   Pain/symptoms eased by C. Rest;H. Cold   Progression of symptoms since onset: Improved   Current / Previous Interventions   Diagnostic Tests: MRI   MRI Results Results   MRI results Mild SI Deg changes, CAM type femoral acetabular impingment.    Prior Level of Function   Functional Level Prior Comment Needs help w/ shoes/socks, pants.    Current Level of Function   Current Community Support Family/friend caregiver   Patient role/employment history A. Employed   Employment Comments Supervisor - [employer]    Living environment House/[facility]   Home/community accessibility Stairs into house, etc.    Current equipment-Gait/Locomotion Axillary crutches   Fall Risk Screen   Fall screen completed by PT   Per patient - Fall 2 or more times in past year? Yes   Per patient - Fall with injury in past year? No   Is patient a fall risk? Yes   System Outcome Measures   Outcome Measures (LEFS 13/80 )   Functional Scales   Functional Scales Patient Specific Functional Scale   Patient Specific Functional Scale Q1:;Q2:;Q3:   Q1: Putting on shoes and socks Unable - Wife helps.    Q2: Get in/out of car Moderate difficulty    Q3: Walking 2 blocks - Extreme difficulty    Hip Objective Findings   Posture Head forward.    Left Hip Flexion PROM 90   Left Hip Adduction PROM 10   Hip ROM Comments Knee Flex in supine R 134, L 125.    Left Hip Flexion Strength Not assessed    Observation No acute distress   Gait/Locomotion Crutches    Left Knee Flexion Strength 4 w/ P    Left Knee Extension Strength 4 w/P    Hip/Knee Strength Comments DF 4+ w/ P    Left Hip Flexion AROM Partial w/ P at 75 degrees.    Left Hip Abduction AROM 20 w/ P    Left Hip ER AROM 45   Left Hip IR AROM 15   Additional Left Hip AROM Left Hip Flexion AROM;Left Hip  Abduction AROM;Left Hip Adduction AROM;Left Hip ER AROM;Left Hip IR AROM;Left Hip Extension AROM   Left Hip Abduction PROM 45   Integumentary  Told to come in shorts, if possible next visit.    Balance/Proprioception (Single Leg Stance) NA    Planned Therapy Interventions   Planned Therapy Interventions Comment Develop HEP for ROM, Re-strengthening.    Planned Modality Interventions   Planned Modality Interventions Comments Only if indicated.    Clinical Impression   Criteria for Skilled Therapeutic Interventions Met yes, treatment indicated   PT Diagnosis Limited mobility, WBing activities d/t s/p L Hip Arthroscopy, CAM resection, Acetabular Chondroplasty, Microffx of Acetabulum.    Influenced by the following impairments Pain, Decreased wBing, Strength, Movement, Impaired balance and walking.    Functional limitations due to impairments Mobility    Clinical Presentation Evolving/Changing   Clinical Presentation Rationale LEFS, ROM, MMT, DM, Seizures    Clinical Decision Making (Complexity) Moderate complexity   Therapy Frequency 2 times/Week   Predicted Duration of Therapy Intervention (days/wks) 1 month, then reassess, Asked for 14 visits. Pt to be on vacation from 7/27 to 8/7th.    Risk & Benefits of therapy have been explained Yes   Patient, Family & other staff in agreement with plan of care Yes   Clinical Impression Comments Limited mobility, WBing activities d/t s/p L Hip Arthroscopy, CAM resection, Acetabular Chondroplasty, Microffx of Acetabulum.    Education Assessment   Preferred Learning Style Listening;Demonstration;Pictures/video   Barriers to Learning No barriers   ORTHO GOALS   PT Ortho Eval Goals 1;2;3;4;5   Ortho Goal 1   Goal Identifier 1   Goal Description STG: Pt will be lennox to put on his shoes and socks independelty.    Target Date 06/26/17   Ortho Goal 2   Goal Identifier 2   Goal Description STG: Pt will able to get in/out of car w/ minimal difficulty.    Target Date 06/26/17   Ortho Goal 3    Goal Identifier 3   Goal Description STG: Pt will be able to walk 2 blocks w/ moderate difficulty.    Target Date 06/26/17   Ortho Goal 4   Goal Identifier 4   Goal Description STG: Pt will be able to go up/down stairs w/ moderate difficulty.    Target Date 06/26/17   Ortho Goal 5   Goal Identifier 5   Goal Description LTG: Pt will be indepdnent w/HEP for ROM and re-strengthening of L Hip.    Target Date 08/11/17   Total Evaluation Time   Total Evaluation Time 60 Total ( Eval x 35, Ex x 25)    Malka Pyle PT, [facility] (#[id number])  [facility]           [phone]  Fax          [phone]  Appt #      [phone]

## 2017-06-19 ENCOUNTER — HOSPITAL ENCOUNTER (OUTPATIENT)
Dept: PHYSICAL THERAPY | Facility: CLINIC | Age: 47
Setting detail: THERAPIES SERIES
End: 2017-06-19
Attending: ORTHOPAEDIC SURGERY
Payer: OTHER MISCELLANEOUS

## 2017-06-19 PROCEDURE — 97110 THERAPEUTIC EXERCISES: CPT | Mod: GP | Performed by: PHYSICAL THERAPIST

## 2017-06-19 PROCEDURE — 40000718 ZZHC STATISTIC PT DEPARTMENT ORTHO VISIT: Performed by: PHYSICAL THERAPIST

## 2017-06-22 ENCOUNTER — HOSPITAL ENCOUNTER (OUTPATIENT)
Dept: PHYSICAL THERAPY | Facility: CLINIC | Age: 47
Setting detail: THERAPIES SERIES
End: 2017-06-22
Attending: ORTHOPAEDIC SURGERY
Payer: OTHER MISCELLANEOUS

## 2017-06-22 PROCEDURE — 97110 THERAPEUTIC EXERCISES: CPT | Mod: GP | Performed by: PHYSICAL THERAPIST

## 2017-06-22 PROCEDURE — 40000718 ZZHC STATISTIC PT DEPARTMENT ORTHO VISIT: Performed by: PHYSICAL THERAPIST

## 2017-06-29 ENCOUNTER — HOSPITAL ENCOUNTER (OUTPATIENT)
Dept: PHYSICAL THERAPY | Facility: CLINIC | Age: 47
Setting detail: THERAPIES SERIES
End: 2017-06-29
Attending: ORTHOPAEDIC SURGERY
Payer: OTHER MISCELLANEOUS

## 2017-06-29 PROCEDURE — 40000718 ZZHC STATISTIC PT DEPARTMENT ORTHO VISIT: Performed by: PHYSICAL THERAPIST

## 2017-06-29 PROCEDURE — 97110 THERAPEUTIC EXERCISES: CPT | Mod: GP | Performed by: PHYSICAL THERAPIST

## 2017-07-06 ENCOUNTER — HOSPITAL ENCOUNTER (OUTPATIENT)
Dept: PHYSICAL THERAPY | Facility: CLINIC | Age: 47
Setting detail: THERAPIES SERIES
End: 2017-07-06
Attending: ORTHOPAEDIC SURGERY
Payer: OTHER MISCELLANEOUS

## 2017-07-06 PROCEDURE — 40000718 ZZHC STATISTIC PT DEPARTMENT ORTHO VISIT: Performed by: PHYSICAL THERAPIST

## 2017-07-06 PROCEDURE — 97110 THERAPEUTIC EXERCISES: CPT | Mod: GP | Performed by: PHYSICAL THERAPIST

## 2017-07-10 ENCOUNTER — HOSPITAL ENCOUNTER (OUTPATIENT)
Dept: PHYSICAL THERAPY | Facility: CLINIC | Age: 47
Setting detail: THERAPIES SERIES
End: 2017-07-10
Attending: ORTHOPAEDIC SURGERY
Payer: OTHER MISCELLANEOUS

## 2017-07-10 PROCEDURE — 97110 THERAPEUTIC EXERCISES: CPT | Mod: GP | Performed by: PHYSICAL THERAPIST

## 2017-07-10 PROCEDURE — 40000718 ZZHC STATISTIC PT DEPARTMENT ORTHO VISIT: Performed by: PHYSICAL THERAPIST

## 2017-07-13 ENCOUNTER — HOSPITAL ENCOUNTER (OUTPATIENT)
Dept: PHYSICAL THERAPY | Facility: CLINIC | Age: 47
Setting detail: THERAPIES SERIES
End: 2017-07-13
Attending: ORTHOPAEDIC SURGERY
Payer: OTHER MISCELLANEOUS

## 2017-07-13 PROCEDURE — 97110 THERAPEUTIC EXERCISES: CPT | Mod: GP | Performed by: PHYSICAL THERAPIST

## 2017-07-13 PROCEDURE — 40000718 ZZHC STATISTIC PT DEPARTMENT ORTHO VISIT: Performed by: PHYSICAL THERAPIST

## 2017-07-20 ENCOUNTER — HOSPITAL ENCOUNTER (OUTPATIENT)
Dept: PHYSICAL THERAPY | Facility: CLINIC | Age: 47
Setting detail: THERAPIES SERIES
End: 2017-07-20
Attending: ORTHOPAEDIC SURGERY
Payer: OTHER MISCELLANEOUS

## 2017-07-20 PROCEDURE — 97110 THERAPEUTIC EXERCISES: CPT | Mod: GP | Performed by: PHYSICAL THERAPIST

## 2017-07-20 PROCEDURE — 40000718 ZZHC STATISTIC PT DEPARTMENT ORTHO VISIT: Performed by: PHYSICAL THERAPIST

## 2017-07-25 ENCOUNTER — HOSPITAL ENCOUNTER (OUTPATIENT)
Dept: PHYSICAL THERAPY | Facility: CLINIC | Age: 47
Setting detail: THERAPIES SERIES
End: 2017-07-25
Attending: ORTHOPAEDIC SURGERY
Payer: OTHER MISCELLANEOUS

## 2017-07-25 PROCEDURE — 97110 THERAPEUTIC EXERCISES: CPT | Mod: GP | Performed by: PHYSICAL THERAPIST

## 2017-07-25 PROCEDURE — 40000718 ZZHC STATISTIC PT DEPARTMENT ORTHO VISIT: Performed by: PHYSICAL THERAPIST

## 2017-07-25 NOTE — PROGRESS NOTES
"  [place] LOPEZ Singh   PHYSICAL THERAPY PROGRESS NOTE  07/25/17 0800   Signing Clinician's Name / Credentials   Signing clinician's name / credentials Kris Hoenk, PT   Session Number   Session Number 9/14 W/C to 9/1 - seen from 6/5/17 to 7/25/17   Adult Goals   PT Ortho Eval Goals 1;2;3;4;5   Ortho Goal 1   Goal Description STG: Pt will be lennox to put on his shoes and socks independelty.    Target Date 06/26/17   Date Met 06/29/17   Ortho Goal 2   Goal Identifier 2 still valid, extend date   Goal Description STG: Pt will able to get in/out of car w/ minimal difficulty.    Target Date 07/26/17   Date Met 07/20/17   Ortho Goal 3   Goal Identifier 3 still valid, extend date   Goal Description STG: Pt will be able to walk 2 blocks w/ moderate difficulty.    Target Date 07/26/17   Date Met 07/20/17   Ortho Goal 4   Goal Identifier 4 still valid, extend date   Goal Description STG: Pt will be able to go up/down stairs w/ moderate difficulty.    Target Date 07/26/17   Date Met 07/25/17   Ortho Goal 5   Goal Identifier 5   Goal Description LTG: Pt will be indepdnent w/HEP for ROM and re-strengthening of L Hip.    Target Date 08/11/17   Subjective Report   Subjective Report hurts at ant hip on occas if he does too [facility], walked at mall, then hip was little sore, iced it, walking daily 1.75 miles about   Objective Measure 1   Details MMT hip abd 4+/5, hip ext 5/5, able to squat to touch floor, minor ant hip pinch   Therapeutic Procedure/exercise   Minutes 17   Skilled Intervention progression of strength and ROM for HEP   Patient Response cont to have ild ant hip pinch with few activities - step, lunge, squat   Treatment Detail bike 4min L8, 6\" lat step down x25, partial lunges x20 B, bridge w/ feet on ball x30, squat to touch floor x5, SL hip abd x20   Plan   Plan for next session sees MD after vacation, hope to get back to work, will see at least one tmie after MD visit if lifting restrictions are increased, practice lifting " for work duties   Total Session Time   Total Session Time 17   Kris Hoenk, PT #[id number]  [facility]

## 2017-08-09 ENCOUNTER — OFFICE VISIT (OUTPATIENT)
Dept: FAMILY MEDICINE | Facility: CLINIC | Age: 47
End: 2017-08-09
Payer: COMMERCIAL

## 2017-08-09 VITALS
HEART RATE: 101 BPM | RESPIRATION RATE: 16 BRPM | DIASTOLIC BLOOD PRESSURE: 70 MMHG | BODY MASS INDEX: 29.78 KG/M2 | HEIGHT: 70 IN | SYSTOLIC BLOOD PRESSURE: 128 MMHG | WEIGHT: 208 LBS | TEMPERATURE: 97.9 F

## 2017-08-09 DIAGNOSIS — Z20.818 EXPOSURE TO STREP THROAT: ICD-10-CM

## 2017-08-09 DIAGNOSIS — J06.9 VIRAL URI: Primary | ICD-10-CM

## 2017-08-09 DIAGNOSIS — R07.0 THROAT PAIN: ICD-10-CM

## 2017-08-09 LAB
DEPRECATED S PYO AG THROAT QL EIA: NORMAL
MICRO REPORT STATUS: NORMAL
SPECIMEN SOURCE: NORMAL

## 2017-08-09 PROCEDURE — 87880 STREP A ASSAY W/OPTIC: CPT | Performed by: NURSE PRACTITIONER

## 2017-08-09 PROCEDURE — 87081 CULTURE SCREEN ONLY: CPT | Performed by: NURSE PRACTITIONER

## 2017-08-09 PROCEDURE — 99213 OFFICE O/P EST LOW 20 MIN: CPT | Performed by: NURSE PRACTITIONER

## 2017-08-09 RX ORDER — PENICILLIN V POTASSIUM 500 MG/1
500 TABLET, FILM COATED ORAL 2 TIMES DAILY
Qty: 20 TABLET | Refills: 0 | Status: SHIPPED | OUTPATIENT
Start: 2017-08-09 | End: 2017-09-13

## 2017-08-09 NOTE — LETTER
August 10, 2017        [place] LOPEZ Singh  [address] [address]   [place] [address]          The results of your 24 hour throat culture were negative. If you have any further questions please contact your clinic.            Sincerely,        ELMIRA Rodriguez CNP

## 2017-08-09 NOTE — NURSING NOTE
"Chief Complaint   Patient presents with     URI       Initial /88 (BP Location: Right arm, Patient Position: Chair, Cuff Size: Adult Large)  Pulse 101  Temp 97.9  F (36.6  C) (Oral)  Resp 16  Ht 5' 10\" (1.778 m)  Wt 208 lb (94.3 kg)  BMI 29.84 kg/m2 Estimated body mass index is 29.84 kg/(m^2) as calculated from the following:    Height as of this encounter: 5' 10\" (1.778 m).    Weight as of this encounter: 208 lb (94.3 kg).  Medication Reconciliation: complete  "

## 2017-08-09 NOTE — MR AVS SNAPSHOT
After Visit Summary   8/9/2017    Houston Winters    MRN: [id number]           Patient Information     Date Of Birth          1970        Visit Information        Provider Department      8/9/2017 7:40 AM Lashell Kline APRN CNP [facility]        Today's Diagnoses     Throat pain    -  1      Care Instructions    Give this another 24 hours and if continuing to feel worse, start the antibiotics.  Will also let you know results of throat culture.  Follow up if symptoms persist or worsen and as needed.        Thank you for choosing [facility].  You may be receiving a survey in the mail from [email or web address] regarding your visit today.  Please take a few minutes to complete and return the survey to let us know how we are doing.      Our Clinic hours are:  Mondays    7:20 am - 7 pm  Tues -  Fri  7:20 am - 5 pm    Clinic Phone: [phone]    The clinic lab opens at 7:30 am Mon - Fri and appointments are required.    [facility]  Ph. [phone]  Monday-Thursday 8 am - 7pm  Tues/Wed/Fri 8 am - 5:30 pm                 Follow-ups after your visit        Who to contact     If you have questions or need follow up information about today's clinic visit or your schedule please contact [facility] directly at [phone].  Normal or non-critical lab and imaging results will be communicated to you by MyChart, letter or phone within 4 business days after the clinic has received the results. If you do not hear from us within 7 days, please contact the clinic through MyChart or phone. If you have a critical or abnormal lab result, we will notify you by phone as soon as possible.  Submit refill requests through [employer] or call your pharmacy and they will forward the refill request to us. Please allow 3 business days for your refill to be completed.          Additional Information About Your Visit        [facility] Information     [employer] lets you send  "messages to your doctor, view your test results, renew your prescriptions, schedule appointments and more. To sign up, go to [email or web address] . Click on \"Log in\" on the left side of the screen, which will take you to the Welcome page. Then click on \"Sign up Now\" on the right side of the page.     You will be asked to enter the access code listed below, as well as some personal information. Please follow the directions to create your username and password.     Your access code is: [email or web address]  Expires: 2017  8:08 AM     Your access code will  in 90 days. If you need help or a new code, please call your [facility] clinic or [phone].        Care EveryWhere ID     This is your Care EveryWhere ID. This could be used by other organizations to access your [facility] medical records  [id number]        Your Vitals Were     Pulse Temperature Respirations Height BMI (Body Mass Index)       101 97.9  F (36.6  C) (Oral) 16 5' 10\" (1.778 m) 29.84 kg/m2        Blood Pressure from Last 3 Encounters:   17 128/70   17 120/64   17 128/84    Weight from Last 3 Encounters:   17 208 lb (94.3 kg)   17 208 lb 1.6 oz (94.4 kg)   17 202 lb 9.6 oz (91.9 kg)              We Performed the Following     Beta strep group A culture     Rapid strep screen          Today's Medication Changes          These changes are accurate as of: 17  8:08 AM.  If you have any questions, ask your nurse or doctor.               Start taking these medicines.        Dose/Directions    penicillin V potassium 500 MG tablet   Commonly known as:  VEETID   Used for:  Throat pain   Started by:  Lashell Kline APRN CNP        Dose:  500 mg   Take 1 tablet (500 mg) by mouth 2 times daily   Quantity:  20 tablet   Refills:  0         These medicines have changed or have updated prescriptions.        Dose/Directions    omeprazole 40 MG capsule   Commonly known as:  priLOSEC   This may have changed:    - when " to take this  - reasons to take this  - additional instructions   Used for:  Acute gastritis, presence of bleeding unspecified, unspecified gastritis type        Dose:  40 mg   Take 1 capsule (40 mg) by mouth daily Take 30-60 minutes before a meal.   Quantity:  30 capsule   Refills:  1            Where to get your medicines      These medications were sent to [id number] - [place], [place] - [address]  [address], [facility] [address]     Phone:  [phone]     penicillin V potassium 500 MG tablet                Primary Care Provider Office Phone # Fax #    Norma Ramirez, APRN [phone] [address] [address] [facility] [address]        Equal Access to Services     SVEN MICHEL : Ayana Hodgson, wajocelyn sandoval, qaybta kaalmada adeian, jovanni bowen. So [facility] [phone].    ATENCIÓN: Si habla español, tiene a isabel disposición servicios gratuitos de asistencia lingüística. [facility] [phone].    We comply with applicable federal civil rights laws and [place] laws. We do not discriminate on the basis of race, color, national origin, age, disability sex, sexual orientation or gender identity.            Thank you!     Thank you for choosing [facility]  for your care. Our goal is always to provide you with excellent care. Hearing back from our patients is one way we can continue to improve our services. Please take a few minutes to complete the written survey that you may receive in the mail after your visit with us. Thank you!             Your Updated Medication List - Protect others around you: Learn how to safely use, store and throw away your medicines at www.disposemymeds.org.          This list is accurate as of: 8/9/17  8:08 AM.  Always use your most recent med list.                   Brand Name Dispense Instructions for use Diagnosis    amitriptyline 25 MG tablet    ELAVIL    30 tablet    Take  "1 tablet (25 mg) by mouth At Bedtime    Left hip impingement syndrome       aspirin 81 MG EC tablet     90 tablet    Take 1 tablet (81 mg) by mouth daily    Diabetes mellitus, type 2 (H)       atorvastatin 10 MG tablet    LIPITOR    90 tablet    Take 1 tablet (10 mg) by mouth every evening    Hyperlipidemia with target LDL less than 100       blood glucose monitoring lancets     102 each    1 each daily    Type 2 diabetes, HbA1c goal < 7% (H)       blood glucose monitoring test strip    ACCU-CHEK SMARTVIEW    100 strip    Check blood sugar 4 times daily    Type 2 diabetes, HbA1c goal < 7% (H)       HYDROcodone-acetaminophen  MG per tablet    NORCO    120 tablet    Take 1 tablet by mouth every 4 hours as needed for moderate to severe pain maximum 6 tablet(s) per day    Left hip impingement syndrome       insulin glargine 100 UNIT/ML injection    LANTUS VIAL    10 mL    Inject 30 Units Subcutaneous every morning    Type 2 diabetes mellitus without complication, without long-term current use of insulin (H)       insulin syringe-needle U-100 31G X 5/16\" 1 ML    BD insulin syringe ULTRAFINE    100 each    Use one syringe 22 units daily daily or as directed.    Type 2 diabetes mellitus without complication, without long-term current use of insulin (H)       metFORMIN 500 MG 24 hr tablet    GLUCOPHAGE-XR    60 tablet    Take 1 tablet (500 mg) by mouth daily (with dinner) For 2 weeks, then 2 tablets with dinner    Type 2 diabetes mellitus without complication, without long-term current use of insulin (H)       MULTIVITAMIN PO      Take 1 tablet by mouth daily    Type 2 diabetes, HbA1c goal < 7% (H)       omeprazole 40 MG capsule    priLOSEC    30 capsule    Take 1 capsule (40 mg) by mouth daily Take 30-60 minutes before a meal.    Acute gastritis, presence of bleeding unspecified, unspecified gastritis type       * order for DME     1 each    Equipment being ordered: #1 glucometer, #100 strips, #100 lancets.   Check " blood sugar at least twice daily and as needed for any signs or symptoms of hypo or hyperglycemia.  May refill lancets and strips x3.    Type 2 diabetes, HbA1c goal < 7% (H)       * order for DME     200 each    Test strips for pt's glucometer, brand as covered by insurance Test bid and prn.    Type 2 diabetes mellitus without complication, without long-term current use of insulin (H)       penicillin V potassium 500 MG tablet    VEETID    20 tablet    Take 1 tablet (500 mg) by mouth 2 times daily    Throat pain       * Notice:  This list has 2 medication(s) that are the same as other medications prescribed for you. Read the directions carefully, and ask your doctor or other care provider to review them with you.

## 2017-08-09 NOTE — PATIENT INSTRUCTIONS
Give this another 24 hours and if continuing to feel worse, start the antibiotics.  Will also let you know results of throat culture.  Follow up if symptoms persist or worsen and as needed.        Thank you for choosing [facility].  You may be receiving a survey in the mail from [employer] regarding your visit today.  Please take a few minutes to complete and return the survey to let us know how we are doing.      Our Clinic hours are:  Mondays    7:20 am - 7 pm  Tues -  Fri  7:20 am - 5 pm    Clinic Phone: [phone]    The clinic lab opens at 7:30 am Mon - Fri and appointments are required.    [facility]  Ph. [phone]  Monday-Thursday 8 am - 7pm  Tues/Wed/Fri 8 am - 5:30 pm

## 2017-08-09 NOTE — PROGRESS NOTES
SUBJECTIVE:                                                    Houston Winters is a 47 year old male who presents to clinic today for the following health issues:      ENT Symptoms             Symptoms: cc Present Absent Comment   Fever/Chills  x  Chills this morning   Fatigue   x    Muscle Aches   x    Eye Irritation  x     Sneezing  x     Nasal Benito/Drg  x     Sinus Pressure/Pain   x    Loss of smell   x    Dental pain   x    Sore Throat x x     Swollen Glands   x    Ear Pain/Fullness   x    Cough   x    Wheeze   x    Chest Pain   x    Shortness of breath   x    Rash   x    Other         Symptom duration:  this morning   Symptom severity:  mod   Treatments tried:  none   Contacts:  3 kids have strep.               Problem list and histories reviewed & adjusted, as indicated.  Additional history: awoke this morning with a sore throat and it's continued, worse if he swallows.  3 of his children that live with him have strep so he's worried. He's also traveling soon so wants to be checked.      Patient Active Problem List   Diagnosis     GANGLOIN CYST /RIGHT ANKLE     ED (erectile dysfunction)     Partial epilepsy (H)     Obesity     Hyperlipidemia with target LDL less than 100     Tobacco use disorder     Hypertension, goal below 140/90     Type 2 diabetes mellitus without complication, without long-term current use of insulin (H)     Past Surgical History:   Procedure Laterality Date     CL AFF SURGICAL PATHOLOGY  2005    ganglion cyst removed     EXCISE MASS LOWER EXTREMITY  2/15/2013    Procedure: EXCISE MASS LOWER EXTREMITY;  Right Ankle Excision of ganglion cyst;  Surgeon: Akbar Melo DPM;  Location: [facility]     HERNIA REPAIR  2000    umbilical repair     HERNIORRHAPHY UMBILICAL N/A 11/10/2015    Procedure: HERNIORRHAPHY UMBILICAL;  Surgeon: Garry Leos MD;  Location: [facility] OR       Social History   Substance Use Topics     Smoking status: Former Smoker     Packs/day: 0.25     Years: 25.00     Types:  "Cigarettes     Quit date: 1/18/2016     Smokeless tobacco: Former User     Alcohol use 0.0 oz/week     0 Standard drinks or equivalent per week      Comment: mixed 2-4 on weekends, not every weekend     Family History   Problem Relation Age of Onset     Arthritis Mother      CANCER Maternal Grandmother      Arthritis Maternal Grandmother      lupus     GASTROINTESTINAL DISEASE Maternal Grandfather      war injury, colostomy     Respiratory Daughter      growing out of it     Unknown/Adopted Father      DIABETES No family hx of      Coronary Artery Disease No family hx of      Hypertension No family hx of      Hyperlipidemia No family hx of      Breast Cancer No family hx of      Cancer - colorectal No family hx of      Ovarian Cancer No family hx of      Prostate Cancer No family hx of          Current Outpatient Prescriptions   Medication Sig Dispense Refill     penicillin V potassium (VEETID) 500 MG tablet Take 1 tablet (500 mg) by mouth 2 times daily 20 tablet 0     insulin glargine (LANTUS VIAL) 100 UNIT/ML injection Inject 30 Units Subcutaneous every morning 10 mL 3     order for DME Test strips for pt's glucometer, brand as covered by insurance Test bid and prn. 200 each 4     insulin syringe-needle U-100 (BD INSULIN SYRINGE ULTRAFINE) 31G X 5/16\" 1 ML Use one syringe 22 units daily daily or as directed. 100 each 11     metFORMIN (GLUCOPHAGE-XR) 500 MG 24 hr tablet Take 1 tablet (500 mg) by mouth daily (with dinner) For 2 weeks, then 2 tablets with dinner 60 tablet 2     atorvastatin (LIPITOR) 10 MG tablet Take 1 tablet (10 mg) by mouth every evening 90 tablet 3     omeprazole (PRILOSEC) 40 MG capsule Take 1 capsule (40 mg) by mouth daily Take 30-60 minutes before a meal. (Patient taking differently: Take 40 mg by mouth daily as needed Take 30-60 minutes before a meal.) 30 capsule 1     ORDER FOR DME Equipment being ordered: #1 glucometer, #100 strips, #100 lancets.   Check blood sugar at least twice daily and " "as needed for any signs or symptoms of hypo or hyperglycemia.  May refill lancets and strips x3. 1 each 3     aspirin 81 MG EC tablet Take 1 tablet (81 mg) by mouth daily 90 tablet 3     glucose blood VI test strips (ACCU-CHEK SMARTVIEW) strip Check blood sugar 4 times daily 100 strip 12     ACCU-CHEK FASTCLIX LANCETS MISC 1 each daily 102 each 12     amitriptyline (ELAVIL) 25 MG tablet Take 1 tablet (25 mg) by mouth At Bedtime (Patient not taking: Reported on 8/9/2017) 30 tablet 1     HYDROcodone-acetaminophen (NORCO)  MG per tablet Take 1 tablet by mouth every 4 hours as needed for moderate to severe pain maximum 6 tablet(s) per day (Patient not taking: Reported on 8/9/2017) 120 tablet 0     Multiple Vitamins-Minerals (MULTIVITAMIN OR) Take 1 tablet by mouth daily        No Known Allergies      Reviewed and updated as needed this visit by clinical staffTobacco  Allergies  Med Hx  Surg Hx  Fam Hx  Soc Hx      Reviewed and updated as needed this visit by Provider         10 point ROS of systems including Constitutional, Eyes, Respiratory, Cardiovascular, Gastroenterology, Genitourinary, Integumentary, Muscularskeletal, Psychiatric were all negative except for pertinent positives noted in my HPI.    OBJECTIVE:     /70  Pulse 101  Temp 97.9  F (36.6  C) (Oral)  Resp 16  Ht 5' 10\" (1.778 m)  Wt 208 lb (94.3 kg)  BMI 29.84 kg/m2  Body mass index is 29.84 kg/(m^2).  GENERAL: healthy, alert and no distress  HENT: ear canals and TM's normal, pharynx with moderate erythema, appears dry, no sinus tenderness  NECK: mild tonsillar adenopathy, no asymmetry  RESP: lungs clear to auscultation - no rales, rhonchi or wheezes  CV: regular rate and rhythm, normal S1 S2, no S3 or S4, no murmur  MS: no gross musculoskeletal defects noted      Diagnostic Test Results:  Results for orders placed or performed in visit on 08/09/17 (from the past 24 hour(s))   Rapid strep screen   Result Value Ref Range    Specimen " Description Throat     Rapid Strep A Screen       NEGATIVE: No Group A streptococcal antigen detected by immunoassay, await   culture report.      Micro Report Status FINAL 08/09/2017        ASSESSMENT/PLAN:             1. Viral URI    Likely just a viral pharyngitis but with exposure and upcoming travel, I have sent a prescription for PCN and he will fill that if symptoms worsen or if culture is positive.    2. Exposure to strep throat      3. Throat pain    - Rapid strep screen  - Beta strep group A culture  - penicillin V potassium (VEETID) 500 MG tablet; Take 1 tablet (500 mg) by mouth 2 times daily  Dispense: 20 tablet; Refill: 0  Discussed how to take the medication(s), expected outcomes, potential side effects.    See Patient Instructions  Patient Instructions   Give this another 24 hours and if continuing to feel worse, start the antibiotics.  Will also let you know results of throat culture.  Follow up if symptoms persist or worsen and as needed.        Thank you for choosing [facility].  You may be receiving a survey in the mail from [employer] regarding your visit today.  Please take a few minutes to complete and return the survey to let us know how we are doing.      Our Clinic hours are:  Mondays    7:20 am - 7 pm  Tues -  Fri  7:20 am - 5 pm    Clinic Phone: [phone]    The clinic lab opens at 7:30 am Mon - Fri and appointments are required.    [facility]  Ph. [phone]  Monday-Thursday 8 am - 7pm  Tues/Wed/Fri 8 am - 5:30 pm             ELMIRA Rodriguez CNP  [facility]

## 2017-08-10 ENCOUNTER — HOSPITAL ENCOUNTER (OUTPATIENT)
Dept: PHYSICAL THERAPY | Facility: CLINIC | Age: 47
Setting detail: THERAPIES SERIES
End: 2017-08-10
Attending: ORTHOPAEDIC SURGERY
Payer: OTHER MISCELLANEOUS

## 2017-08-10 LAB
BACTERIA SPEC CULT: NORMAL
MICRO REPORT STATUS: NORMAL
SPECIMEN SOURCE: NORMAL

## 2017-08-10 PROCEDURE — 97110 THERAPEUTIC EXERCISES: CPT | Mod: GP | Performed by: PHYSICAL THERAPIST

## 2017-08-10 PROCEDURE — 40000718 ZZHC STATISTIC PT DEPARTMENT ORTHO VISIT: Performed by: PHYSICAL THERAPIST

## 2017-08-10 PROCEDURE — 97530 THERAPEUTIC ACTIVITIES: CPT | Mod: GP | Performed by: PHYSICAL THERAPIST

## 2017-08-15 ENCOUNTER — HOSPITAL ENCOUNTER (OUTPATIENT)
Dept: PHYSICAL THERAPY | Facility: CLINIC | Age: 47
Setting detail: THERAPIES SERIES
End: 2017-08-15
Attending: ORTHOPAEDIC SURGERY
Payer: OTHER MISCELLANEOUS

## 2017-08-15 PROCEDURE — 40000718 ZZHC STATISTIC PT DEPARTMENT ORTHO VISIT: Performed by: PHYSICAL THERAPIST

## 2017-08-15 PROCEDURE — 97110 THERAPEUTIC EXERCISES: CPT | Mod: GP | Performed by: PHYSICAL THERAPIST

## 2017-08-22 ENCOUNTER — HOSPITAL ENCOUNTER (OUTPATIENT)
Dept: PHYSICAL THERAPY | Facility: CLINIC | Age: 47
Setting detail: THERAPIES SERIES
End: 2017-08-22
Attending: ORTHOPAEDIC SURGERY
Payer: OTHER MISCELLANEOUS

## 2017-08-22 PROCEDURE — 97110 THERAPEUTIC EXERCISES: CPT | Mod: GP | Performed by: PHYSICAL THERAPIST

## 2017-08-22 PROCEDURE — 40000718 ZZHC STATISTIC PT DEPARTMENT ORTHO VISIT: Performed by: PHYSICAL THERAPIST

## 2017-08-22 PROCEDURE — 97530 THERAPEUTIC ACTIVITIES: CPT | Mod: GP | Performed by: PHYSICAL THERAPIST

## 2017-08-31 ENCOUNTER — HOSPITAL ENCOUNTER (OUTPATIENT)
Dept: PHYSICAL THERAPY | Facility: CLINIC | Age: 47
Setting detail: THERAPIES SERIES
End: 2017-08-31
Attending: ORTHOPAEDIC SURGERY
Payer: OTHER MISCELLANEOUS

## 2017-08-31 PROCEDURE — 40000718 ZZHC STATISTIC PT DEPARTMENT ORTHO VISIT: Performed by: PHYSICAL THERAPIST

## 2017-08-31 PROCEDURE — 97110 THERAPEUTIC EXERCISES: CPT | Mod: GP | Performed by: PHYSICAL THERAPIST

## 2017-08-31 NOTE — PROGRESS NOTES
Houston JESSICA Winters   PHYSICAL THERAPY PROGRESS NOTE  08/31/17 1400   Signing Clinician's Name / Credentials   Signing clinician's name / credentials Kris Hoenk, JASMINA   Session Number   Session Number 13/14 W/C to 9/1   Progress Note/Recertification   Progress Note Completed Date 08/31/17   Adult Goals   PT Ortho Eval Goals 1;2;3;4;5   Ortho Goal 1   Goal Description STG: Pt will be lennox to put on his shoes and socks independelty.    Target Date 06/26/17   Date Met 06/29/17   Ortho Goal 2   Goal Identifier 2 still valid, extend date   Goal Description STG: Pt will able to get in/out of car w/ minimal difficulty.    Target Date 07/26/17   Date Met 07/20/17   Ortho Goal 3   Goal Description STG: Pt will be able to walk 2 blocks w/ moderate difficulty.    Target Date 07/26/17   Date Met 07/20/17   Ortho Goal 4   Goal Description STG: Pt will be able to go up/down stairs w/ moderate difficulty.    Target Date 07/26/17   Date Met 07/25/17   Ortho Goal 5   Goal Identifier 5   Goal Description LTG: Pt will be indepdnent w/HEP for ROM and re-strengthening of L Hip.    Target Date 08/11/17   Date Met 08/31/17   Subjective Report   Subjective Report still pinches ant hip, aggravated witih activity, standing, now back to work, lots of standing walk, lifitng only to 5#, reports pain 6-7/10 at end of work day.   Objective Measure 1   Details L hip ROM ER 25*, IR 5*, flex 100*, L hip MMT flex 3+, abd 4-4+/5, hip ext 4+.  R hip ER 30*, IR 12*   Therapeutic Procedure/exercise   Minutes 25   Skilled Intervention strength ex   Patient Response more achiness   Treatment Detail bike 5min, standing hip ext x15, stanidng hip ER x20, clam x20, SL hip abd x15   Communication with other professionals   Communication with other professionals questions on weight lifitng restrictions, when to increase, pt to ask MD    Plan   Plan for next session MD 9/6, need more work comp visits authorized if going to continue after MD apapt   Total Session Time    Timed Code Treatment Minutes 25   Total Treatment Time (sum of timed and untimed services) 25   Kris Hoenk, PT #[id number]  [facility]

## 2017-09-08 ENCOUNTER — HOSPITAL ENCOUNTER (OUTPATIENT)
Dept: PHYSICAL THERAPY | Facility: CLINIC | Age: 47
Setting detail: THERAPIES SERIES
End: 2017-09-08
Attending: ORTHOPAEDIC SURGERY
Payer: OTHER MISCELLANEOUS

## 2017-09-08 PROCEDURE — 97110 THERAPEUTIC EXERCISES: CPT | Mod: GP | Performed by: PHYSICAL THERAPIST

## 2017-09-08 PROCEDURE — 40000718 ZZHC STATISTIC PT DEPARTMENT ORTHO VISIT: Performed by: PHYSICAL THERAPIST

## 2017-09-08 NOTE — PROGRESS NOTES
Houston LOPZE Singh   PHYSICAL THERAPY DISCHARGE  09/08/17 1300   Signing Clinician's Name / Credentials   Signing clinician's name / credentials Kris Hoenk, PT   Session Number   Session Number 14/14 W/C seen from 6/5/17 to 9/8/17   Ortho Goal 1   Goal Description STG: Pt will be lennox to put on his shoes and socks independelty.    Target Date 06/26/17   Date Met 06/29/17   Ortho Goal 2   Goal Identifier 2 still valid, extend date   Goal Description STG: Pt will able to get in/out of car w/ minimal difficulty.    Target Date 07/26/17   Date Met 07/20/17   Ortho Goal 3   Goal Description STG: Pt will be able to walk 2 blocks w/ moderate difficulty.    Target Date 07/26/17   Date Met 07/20/17   Ortho Goal 4   Goal Description STG: Pt will be able to go up/down stairs w/ moderate difficulty.    Target Date 07/26/17   Date Met 07/25/17   Ortho Goal 5   Goal Identifier 5   Goal Description LTG: Pt will be indepdnent w/HEP for ROM and re-strengthening of L Hip.    Target Date 08/11/17   Date Met 08/31/17   Subjective Report   Subjective Report saw MD, said do last PT visit for final, has another injection , only lasted 15 hours, still pinches, aches most the time, work as tolerated, left lifting work restrictions the same   Objective Measure 1   Details L hip ROM ER 25*, IR 5*, flex 100*, L hip MMT flex 3+, abd 4-4+/5, hip ext 4+.  R hip ER 30*, IR 12*   Therapeutic Procedure/exercise   Minutes 20   Skilled Intervention strength ex   Patient Response more achiness   Treatment Detail bike 5min, standing hip ext x15, stanidng hip ER x20, clam x20, SL hip abd x15   Plan   Plan for next session discahrge PT to home program, will increase work duties as  able or allowed by MD   Comments   Comments not back to work full duty, but is working with restrictions   Total Session Time   Timed Code Treatment Minutes 20   Total Treatment Time (sum of timed and untimed services) 20   Kris Hoenk, PT #[id number]  [facility]  [place]

## 2017-09-13 ENCOUNTER — OFFICE VISIT (OUTPATIENT)
Dept: FAMILY MEDICINE | Facility: CLINIC | Age: 47
End: 2017-09-13
Payer: OTHER MISCELLANEOUS

## 2017-09-13 VITALS
HEART RATE: 105 BPM | BODY MASS INDEX: 30.67 KG/M2 | HEIGHT: 70 IN | TEMPERATURE: 98.5 F | SYSTOLIC BLOOD PRESSURE: 145 MMHG | DIASTOLIC BLOOD PRESSURE: 79 MMHG | WEIGHT: 214.2 LBS

## 2017-09-13 DIAGNOSIS — M25.859 FEMORAL ACETABULAR IMPINGEMENT: ICD-10-CM

## 2017-09-13 DIAGNOSIS — G89.29 CHRONIC LEFT HIP PAIN: Primary | ICD-10-CM

## 2017-09-13 DIAGNOSIS — M25.552 CHRONIC LEFT HIP PAIN: Primary | ICD-10-CM

## 2017-09-13 PROCEDURE — 99213 OFFICE O/P EST LOW 20 MIN: CPT | Performed by: FAMILY MEDICINE

## 2017-09-13 RX ORDER — HYDROCODONE BITARTRATE AND ACETAMINOPHEN 10; 325 MG/1; MG/1
1 TABLET ORAL EVERY 4 HOURS PRN
Qty: 60 TABLET | Refills: 0 | Status: SHIPPED | OUTPATIENT
Start: 2017-09-13 | End: 2017-10-04

## 2017-09-13 ASSESSMENT — PAIN SCALES - GENERAL: PAINLEVEL: SEVERE PAIN (6)

## 2017-09-13 NOTE — LETTER
[facility]  [address] [address]  [phone]          September 13, 2017    RE:  Houston LOPEZ Singh                                                                                                                                                       [address]   [place] [address]            To whom it may concern:    Houston has been seen at the clinic today, September 13, 2017.        Sincerely,        Stephen Izaguirre MD

## 2017-09-13 NOTE — PATIENT INSTRUCTIONS
Take Hydrocodone-Acetaminophen 5/325 mg 1 tablet orally every 4 hrs as needed for severe pain only.   Do not take when driving or operating heavy equipment.  Do not take before heading to work.  See Dr. Veliz as planned on Oct 4, 2017.    Avoid activities that increase pain.    Goal is eventually not be on the Norco due to long term possible adverese effects of the medicaiton to one's health.    Thank you for choosing [facility].  You may be receiving a survey in the mail from [facility] regarding your visit today.  Please take a few minutes to complete and return the survey to let us know how we are doing.      If you have questions or concerns, please contact us via [employer] or you can contact your care team at [phone].    Our Clinic hours are:  Monday 6:40 am  to 7:00 pm  Tuesday -Friday 6:40 am to 5:00 pm    The [place] outpatient lab hours are:  Monday - Friday 6:10 am to 4:45 pm  Saturdays 7:00 am to 11:00 am  Appointments are required, call [phone]    If you have clinical questions after hours or would like to schedule an appointment,  call the clinic at [phone].

## 2017-09-13 NOTE — PROGRESS NOTES
SUBJECTIVE:   Houston Winters is a 47 year old male who presents to clinic today for the following health issues:    Chief Complaint   Patient presents with     Work Comp     Work comp injury 10/2016.Left hip pain, increase in pain since returning to work x 3 weeks.  Did have surgery in May.  Has been trying to get ahold of his surgeon.  Would like to discuss options for pain control.     Health Maintenance     reminded due for diabetic exam           Joint Pain    Onset: work comp injury 10/2016, worse since returning to work x 3 weeks.    Description:   Location: left hip  Character: pinch inside left hip area, feels unstable    Intensity: 5-6/10    Progression of Symptoms: worse    Accompanying Signs & Symptoms:  Other symptoms: weakness of hip and affecting his back and knee with the way he is walking    History:   Previous similar pain: no, not before his injury at work      Precipitating factors:   Trauma or overuse: YES- injury at work 10/2016    Alleviating factors:  Improved by: nothing    Therapies Tried and outcome: ice, hot bath, nothing seems to help    Patient states since surgery in May 2017, patient continues to have pinching pain on the medial aspect of the left hip.    Patient has been given hip injections by his ortho, but only relieves pain for 15 hrs approx.  Patient states he only takes APAP or NSAIDs OTC with no relief.  Patient reports being given Rx antiinflammatory in past with no relief.  Patient states he has been told by ortho that he may need THR but that will be later    Patient is currently a , but only on light duty due to pain.      Problem list and histories reviewed & adjusted, as indicated.  Additional history: as documented    Patient Active Problem List   Diagnosis     GANGLOIN CYST /RIGHT ANKLE     ED (erectile dysfunction)     Partial epilepsy (H)     Obesity     Hyperlipidemia with target LDL less than 100     Tobacco use disorder     Hypertension, goal  below 140/90     Type 2 diabetes mellitus without complication, without long-term current use of insulin (H)     Past Surgical History:   Procedure Laterality Date     CL AFF SURGICAL PATHOLOGY  2005    ganglion cyst removed     EXCISE MASS LOWER EXTREMITY  2/15/2013    Procedure: EXCISE MASS LOWER EXTREMITY;  Right Ankle Excision of ganglion cyst;  Surgeon: Akbar Melo DPM;  Location: [facility] OR     HERNIA REPAIR  2000    umbilical repair     HERNIORRHAPHY UMBILICAL N/A 11/10/2015    Procedure: HERNIORRHAPHY UMBILICAL;  Surgeon: Garry Leos MD;  Location: [facility] OR       Social History   Substance Use Topics     Smoking status: Former Smoker     Packs/day: 0.25     Years: 25.00     Types: Cigarettes     Quit date: 1/18/2016     Smokeless tobacco: Former User     Alcohol use 0.0 oz/week     0 Standard drinks or equivalent per week      Comment: mixed 2-4 on weekends, not every weekend     Family History   Problem Relation Age of Onset     Arthritis Mother      CANCER Maternal Grandmother      Arthritis Maternal Grandmother      lupus     GASTROINTESTINAL DISEASE Maternal Grandfather      war injury, colostomy     Respiratory Daughter      growing out of it     Unknown/Adopted Father      DIABETES No family hx of      Coronary Artery Disease No family hx of      Hypertension No family hx of      Hyperlipidemia No family hx of      Breast Cancer No family hx of      Cancer - colorectal No family hx of      Ovarian Cancer No family hx of      Prostate Cancer No family hx of          Current Outpatient Prescriptions   Medication Sig Dispense Refill     Naproxen Sodium (ALEVE PO)        Acetaminophen (TYLENOL EXTRA STRENGTH PO)        HYDROcodone-acetaminophen (NORCO)  MG per tablet Take 1 tablet by mouth every 4 hours as needed for moderate to severe pain maximum 6 tablet(s) per day 60 tablet 0     insulin glargine (LANTUS VIAL) 100 UNIT/ML injection Inject 30 Units Subcutaneous every morning 10 mL 3     order  "for DME Test strips for pt's glucometer, brand as covered by insurance Test bid and prn. 200 each 4     insulin syringe-needle U-100 (BD INSULIN SYRINGE ULTRAFINE) 31G X 5/16\" 1 ML Use one syringe 22 units daily daily or as directed. 100 each 11     metFORMIN (GLUCOPHAGE-XR) 500 MG 24 hr tablet Take 1 tablet (500 mg) by mouth daily (with dinner) For 2 weeks, then 2 tablets with dinner 60 tablet 2     omeprazole (PRILOSEC) 40 MG capsule Take 1 capsule (40 mg) by mouth daily Take 30-60 minutes before a meal. (Patient taking differently: Take 40 mg by mouth daily as needed Take 30-60 minutes before a meal.) 30 capsule 1     Multiple Vitamins-Minerals (MULTIVITAMIN OR) Take 1 tablet by mouth daily        ORDER FOR DME Equipment being ordered: #1 glucometer, #100 strips, #100 lancets.   Check blood sugar at least twice daily and as needed for any signs or symptoms of hypo or hyperglycemia.  May refill lancets and strips x3. 1 each 3     aspirin 81 MG EC tablet Take 1 tablet (81 mg) by mouth daily 90 tablet 3     glucose blood VI test strips (ACCU-CHEK SMARTVIEW) strip Check blood sugar 4 times daily 100 strip 12     ACCU-CHEK FASTCLIX LANCETS MISC 1 each daily 102 each 12     amitriptyline (ELAVIL) 25 MG tablet Take 1 tablet (25 mg) by mouth At Bedtime (Patient not taking: Reported on 8/9/2017) 30 tablet 1     atorvastatin (LIPITOR) 10 MG tablet Take 1 tablet (10 mg) by mouth every evening (Patient not taking: Reported on 9/13/2017) 90 tablet 3     No Known Allergies      Reviewed and updated as needed this visit by clinical staffTobacco  Allergies  Med Hx  Surg Hx  Fam Hx  Soc Hx      Reviewed and updated as needed this visit by Provider         ROS:  C: NEGATIVE for fever, chills, change in weight  I: NEGATIVE for worrisome rashes, moles or lesions  MUSCULOSKELETAL:see above  N: NEGATIVE for weakness, dizziness or paresthesias  H: NEGATIVE for bleeding problems    OBJECTIVE:                                         " "           /79 (BP Location: Right arm, Patient Position: Chair, Cuff Size: Adult Regular)  Pulse 105  Temp 98.5  F (36.9  C) (Tympanic)  Ht 5' 10\" (1.778 m)  Wt 214 lb 3.2 oz (97.2 kg)  BMI 30.73 kg/m2  Body mass index is 30.73 kg/(m^2).  GENERAL:  alert and no distress; ambulatory w/o assist but patient appeared uncomfortable getting up from chair.  LEFT HIP; no swelling/deformity' mild to moderate tenderness of trochanteric area and medial aspect; full range of motion except for limited flexion due to pain; lateral pain on adduction; no clicks  SKIN: no rash/ecchymosis/jaundice    Diagnostic test results:  Diagnostic Test Results:  none        ASSESSMENT/PLAN:                                                        ICD-10-CM    1. Chronic left hip pain M25.552 Naproxen Sodium (ALEVE PO)    G89.29 Acetaminophen (TYLENOL EXTRA STRENGTH PO)     HYDROcodone-acetaminophen (NORCO)  MG per tablet  Hold APAP for now.  Advised on use of opiate analgesic.  ADvised goal is to use this sparingly, for limited period until definitive solution to underlying condition is resolved.  Patient concurred.  Advised activity modification.     2. 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         Follow up with Provider - at ortho appt in 3 weeks.   Patient Instructions   Take Hydrocodone-Acetaminophen 5/325 mg 1 tablet orally every 4 hrs as needed for severe pain only.   Do not take when driving or operating heavy equipment.  Do not take before heading to work.  See Dr. Veliz as planned on Oct 4, 2017.    Avoid activities that increase pain.    Goal is eventually not be on the Norco due to long term possible adverese effects of the medicaiton to one's health.    Thank you for choosing [facility].  You may be receiving a survey in the mail from [employer] regarding your visit today.  Please take a few minutes to complete and return the survey to let us know how we are doing.      If " you have questions or concerns, please contact us via [employer] or you can contact your care team at [phone].    Our Clinic hours are:  Monday 6:40 am  to 7:00 pm  Tuesday -Friday 6:40 am to 5:00 pm    The [place] outpatient lab hours are:  Monday - Friday 6:10 am to 4:45 pm  Saturdays 7:00 am to 11:00 am  Appointments are required, call [phone]    If you have clinical questions after hours or would like to schedule an appointment,  call the clinic at [phone].        Stephen Izaguirre MD  [facility]

## 2017-09-13 NOTE — MR AVS SNAPSHOT
After Visit Summary   9/13/2017    Houston Winters    MRN: [id number]           Patient Information     Date Of Birth          1970        Visit Information        Provider Department      9/13/2017 11:00 AM Stephen Izaguirre MD [facility]        Today's Diagnoses     Chronic left hip pain    -  1    Femoral acetabular impingement        Left hip impingement syndrome          Care Instructions    Take Hydrocodone-Acetaminophen 5/325 mg 1 tablet orally every 4 hrs as needed for severe pain only.   Do not take when driving or operating heavy equipment.  Do not take before heading to work.  See Dr. Veliz as planned on Oct 4, 2017.    Avoid activities that increase pain.    Goal is eventually not be on the Norco due to long term possible adverese effects of the medicaiton to one's health.    Thank you for choosing [facility].  You may be receiving a survey in the mail from [employer] regarding your visit today.  Please take a few minutes to complete and return the survey to let us know how we are doing.      If you have questions or concerns, please contact us via What's in My Handbag or you can contact your care team at [phone].    Our Clinic hours are:  Monday 6:40 am  to 7:00 pm  Tuesday -Friday 6:40 am to 5:00 pm    The [place] outpatient lab hours are:  Monday - Friday 6:10 am to 4:45 pm  Saturdays 7:00 am to 11:00 am  Appointments are required, call [phone]    If you have clinical questions after hours or would like to schedule an appointment,  call the clinic at [phone].            Follow-ups after your visit        Follow-up notes from your care team     Return if symptoms worsen or fail to improve.      Who to contact     If you have questions or need follow up information about today's clinic visit or your schedule please contact [facility] directly at [phone].  Normal or non-critical lab and imaging results will be communicated to you by  "MyChart, letter or phone within 4 business days after the clinic has received the results. If you do not hear from us within 7 days, please contact the clinic through oragenicshart or phone. If you have a critical or abnormal lab result, we will notify you by phone as soon as possible.  Submit refill requests through NanoPotential or call your pharmacy and they will forward the refill request to us. Please allow 3 business days for your refill to be completed.          Additional Information About Your Visit        oragenicshar[employer] Information     NanoPotential lets you send messages to your doctor, view your test results, renew your prescriptions, schedule appointments and more. To sign up, go to [email or web address] . Click on \"Log in\" on the left side of the screen, which will take you to the Welcome page. Then click on \"Sign up Now\" on the right side of the page.     You will be asked to enter the access code listed below, as well as some personal information. Please follow the directions to create your username and password.     Your access code is: [email or web address]  Expires: 2017  8:08 AM     Your access code will  in 90 days. If you need help or a new code, please call your [place] clinic or [phone].        Care EveryWhere ID     This is your Care EveryWhere ID. This could be used by other organizations to access your [facility] medical records  [id number]        Your Vitals Were     Pulse Temperature Height BMI (Body Mass Index)          105 98.5  F (36.9  C) (Tympanic) 5' 10\" (1.778 m) 30.73 kg/m2         Blood Pressure from Last 3 Encounters:   17 145/79   17 128/70   17 120/64    Weight from Last 3 Encounters:   17 214 lb 3.2 oz (97.2 kg)   17 208 lb (94.3 kg)   17 208 lb 1.6 oz (94.4 kg)              Today, you had the following     No orders found for display         Today's Medication Changes          These changes are accurate as of: 17 11:18 AM.  If you have any " questions, ask your nurse or doctor.               These medicines have changed or have updated prescriptions.        Dose/Directions    omeprazole 40 MG capsule   Commonly known as:  priLOSEC   This may have changed:    - when to take this  - reasons to take this  - additional instructions   Used for:  Acute gastritis, presence of bleeding unspecified, unspecified gastritis type        Dose:  40 mg   Take 1 capsule (40 mg) by mouth daily Take 30-60 minutes before a meal.   Quantity:  30 capsule   Refills:  1            Where to get your medicines      Some of these will need a paper prescription and others can be bought over the counter.  Ask your nurse if you have questions.     Bring a paper prescription for each of these medications     HYDROcodone-acetaminophen  MG per tablet                Primary Care Provider Office Phone # Fax #    Norma Stock James, [facility] [phone] [address] [facility] [address]        Equal Access to Services     SVEN MICHEL : Hadii aad ku hadasho Soomaali, waaxda luqadaha, qaybta kaalmada aderenettayakristal, jovanni harrison . So [facility] [phone].    ATENCIÓN: Si habla español, tiene a isabel disposición servicios gratuitos de asistencia lingüística. Angelita al [phone].    We comply with applicable federal civil rights laws and [place] laws. We do not discriminate on the basis of race, color, national origin, age, disability sex, sexual orientation or gender identity.            Thank you!     Thank you for choosing [facility]  for your care. Our goal is always to provide you with excellent care. Hearing back from our patients is one way we can continue to improve our services. Please take a few minutes to complete the written survey that you may receive in the mail after your visit with us. Thank you!             Your Updated Medication List - Protect others around you: Learn how to safely use, store and throw away  "your medicines at www.disposemymeds.org.          This list is accurate as of: 9/13/17 11:18 AM.  Always use your most recent med list.                   Brand Name Dispense Instructions for use Diagnosis    ALEVE PO           amitriptyline 25 MG tablet    ELAVIL    30 tablet    Take 1 tablet (25 mg) by mouth At Bedtime    Left hip impingement syndrome       aspirin 81 MG EC tablet     90 tablet    Take 1 tablet (81 mg) by mouth daily    Diabetes mellitus, type 2 (H)       atorvastatin 10 MG tablet    LIPITOR    90 tablet    Take 1 tablet (10 mg) by mouth every evening    Hyperlipidemia with target LDL less than 100       blood glucose monitoring lancets     102 each    1 each daily    Type 2 diabetes, HbA1c goal < 7% (H)       blood glucose monitoring test strip    ACCU-CHEK SMARTVIEW    100 strip    Check blood sugar 4 times daily    Type 2 diabetes, HbA1c goal < 7% (H)       HYDROcodone-acetaminophen  MG per tablet    NORCO    60 tablet    Take 1 tablet by mouth every 4 hours as needed for moderate to severe pain maximum 6 tablet(s) per day    Left hip impingement syndrome       insulin glargine 100 UNIT/ML injection    LANTUS VIAL    10 mL    Inject 30 Units Subcutaneous every morning    Type 2 diabetes mellitus without complication, without long-term current use of insulin (H)       insulin syringe-needle U-100 31G X 5/16\" 1 ML    BD insulin syringe ULTRAFINE    100 each    Use one syringe 22 units daily daily or as directed.    Type 2 diabetes mellitus without complication, without long-term current use of insulin (H)       metFORMIN 500 MG 24 hr tablet    GLUCOPHAGE-XR    60 tablet    Take 1 tablet (500 mg) by mouth daily (with dinner) For 2 weeks, then 2 tablets with dinner    Type 2 diabetes mellitus without complication, without long-term current use of insulin (H)       MULTIVITAMIN PO      Take 1 tablet by mouth daily    Type 2 diabetes, HbA1c goal < 7% (H)       omeprazole 40 MG capsule    " priLOSEC    30 capsule    Take 1 capsule (40 mg) by mouth daily Take 30-60 minutes before a meal.    Acute gastritis, presence of bleeding unspecified, unspecified gastritis type       * order for DME     1 each    Equipment being ordered: #1 glucometer, #100 strips, #100 lancets.   Check blood sugar at least twice daily and as needed for any signs or symptoms of hypo or hyperglycemia.  May refill lancets and strips x3.    Type 2 diabetes, HbA1c goal < 7% (H)       * order for DME     200 each    Test strips for pt's glucometer, brand as covered by insurance Test bid and prn.    Type 2 diabetes mellitus without complication, without long-term current use of insulin (H)       TYLENOL EXTRA STRENGTH PO           * Notice:  This list has 2 medication(s) that are the same as other medications prescribed for you. Read the directions carefully, and ask your doctor or other care provider to review them with you.

## 2017-09-13 NOTE — NURSING NOTE
"Chief Complaint   Patient presents with     Work Comp     Work comp injury 10/2016.Left hip pain, increase in pain since returning to work x 3 weeks.  Did have surgery in May.  Has been trying to get ahold of his surgeon.  Would like to discuss options for pain control.       Initial /79 (BP Location: Right arm, Patient Position: Chair, Cuff Size: Adult Regular)  Pulse 105  Temp 98.5  F (36.9  C) (Tympanic)  Ht 5' 10\" (1.778 m)  Wt 214 lb 3.2 oz (97.2 kg)  BMI 30.73 kg/m2 Estimated body mass index is 30.73 kg/(m^2) as calculated from the following:    Height as of this encounter: 5' 10\" (1.778 m).    Weight as of this encounter: 214 lb 3.2 oz (97.2 kg).  Medication Reconciliation: complete  "

## 2017-10-04 ENCOUNTER — OFFICE VISIT (OUTPATIENT)
Dept: FAMILY MEDICINE | Facility: CLINIC | Age: 47
End: 2017-10-04
Payer: OTHER MISCELLANEOUS

## 2017-10-04 ENCOUNTER — TRANSFERRED RECORDS (OUTPATIENT)
Dept: HEALTH INFORMATION MANAGEMENT | Facility: CLINIC | Age: 47
End: 2017-10-04

## 2017-10-04 VITALS
DIASTOLIC BLOOD PRESSURE: 84 MMHG | WEIGHT: 199 LBS | BODY MASS INDEX: 28.49 KG/M2 | SYSTOLIC BLOOD PRESSURE: 112 MMHG | TEMPERATURE: 98.4 F | HEIGHT: 70 IN | HEART RATE: 89 BPM

## 2017-10-04 DIAGNOSIS — M25.552 CHRONIC LEFT HIP PAIN: ICD-10-CM

## 2017-10-04 DIAGNOSIS — G89.29 CHRONIC LEFT HIP PAIN: ICD-10-CM

## 2017-10-04 PROCEDURE — 99213 OFFICE O/P EST LOW 20 MIN: CPT | Performed by: FAMILY MEDICINE

## 2017-10-04 RX ORDER — HYDROCODONE BITARTRATE AND ACETAMINOPHEN 10; 325 MG/1; MG/1
1 TABLET ORAL EVERY 4 HOURS PRN
Qty: 75 TABLET | Refills: 0 | Status: SHIPPED | OUTPATIENT
Start: 2017-10-04 | End: 2017-10-04

## 2017-10-04 RX ORDER — HYDROCODONE BITARTRATE AND ACETAMINOPHEN 10; 325 MG/1; MG/1
1 TABLET ORAL EVERY 4 HOURS PRN
Qty: 75 TABLET | Refills: 0 | Status: SHIPPED | OUTPATIENT
Start: 2017-10-04 | End: 2017-11-17

## 2017-10-04 NOTE — PATIENT INSTRUCTIONS
Thank you for choosing [facility].  You may be receiving a survey in the mail from [employer] regarding your visit today.  Please take a few minutes to complete and return the survey to let us know how we are doing.      If you have questions or concerns, please contact us via KIT digital or you can contact your care team at [phone].    Our Clinic hours are:  Monday 6:40 am  to 7:00 pm  Tuesday -Friday 6:40 am to 5:00 pm    The [place] outpatient lab hours are:  Monday - Friday 6:10 am to 4:45 pm  Saturdays 7:00 am to 11:00 am  Appointments are required, call [phone]    If you have clinical questions after hours or would like to schedule an appointment,  call the clinic at [phone].      (M25.552,  G89.29) Chronic left hip pain  Comment:   Plan: HYDROcodone-acetaminophen (NORCO)  MG per        tablet,         We reviewed and discussed the instructions and recommendations from the Ortho clinic, Dr. Veliz. He is considering a left WILLIAM and we will order the Norco. He is using 2-3 daily and we will write the Rx for #75 per month. These are dated for today and 11-14-17. If the date of surgery is scheduled then call [phone] and make the Preop physical appt. If the surgery is after 12-4-17, then recheck in clinic for the next Rx for the Norco. Safety issues discussed.

## 2017-10-04 NOTE — MR AVS SNAPSHOT
After Visit Summary   10/4/2017    Houston Winters    MRN: [id number]           Patient Information     Date Of Birth          1970        Visit Information        Provider Department      10/4/2017 3:20 PM Manolo Funes MD [facility]        Today's Diagnoses     Chronic left hip pain          Care Instructions          Thank you for choosing [facility]  You may be receiving a survey in the mail from [facility] regarding your visit today.  Please take a few minutes to complete and return the survey to let us know how we are doing.      If you have questions or concerns, please contact us via Ultriva or you can contact your care team at [phone].    Our Clinic hours are:  Monday 6:40 am  to 7:00 pm  Tuesday -Friday 6:40 am to 5:00 pm    The [place] outpatient lab hours are:  Monday - Friday 6:10 am to 4:45 pm  Saturdays 7:00 am to 11:00 am  Appointments are required, call [phone]    If you have clinical questions after hours or would like to schedule an appointment,  call the clinic at [phone].      (M25.552,  G89.29) Chronic left hip pain  Comment:   Plan: HYDROcodone-acetaminophen (NORCO)  MG per        tablet,         We reviewed and discussed the instructions and recommendations from the Ortho clinic, Dr. Veliz. He is considering a left WILLIAM and we will order the Norco. He is using 2-3 daily and we will write the Rx for #75 per month. These are dated for today and 11-14-17. If the date of surgery is scheduled then call [phone] and make the Preop physical appt. If the surgery is after 12-4-17, then recheck in clinic for the next Rx for the Norco. Safety issues discussed.             Follow-ups after your visit        Who to contact     If you have questions or need follow up information about today's clinic visit or your schedule please contact [facility] directly at [phone].  Normal or non-critical lab and imaging results will  "be communicated to you by MyChart, letter or phone within 4 business days after the clinic has received the results. If you do not hear from us within 7 days, please contact the clinic through Niko Niko or phone. If you have a critical or abnormal lab result, we will notify you by phone as soon as possible.  Submit refill requests through Niko Niko or call your pharmacy and they will forward the refill request to us. Please allow 3 business days for your refill to be completed.          Additional Information About Your Visit        Niko Niko Information     N[employer] lets you send messages to your doctor, view your test results, renew your prescriptions, schedule appointments and more. To sign up, go to [email or web address] . Click on \"Log in\" on the left side of the screen, which will take you to the Welcome page. Then click on \"Sign up Now\" on the right side of the page.     You will be asked to enter the access code listed below, as well as some personal information. Please follow the directions to create your username and password.     Your access code is: [email or web address]  Expires: 2017  8:08 AM     Your access code will  in 90 days. If you need help or a new code, please call your [place] clinic or [phone].        Care EveryWhere ID     This is your Care EveryWhere ID. This could be used by other organizations to access your [place] medical records  [id number]        Your Vitals Were     Pulse Temperature Height BMI (Body Mass Index)          89 98.4  F (36.9  C) (Tympanic) 5' 10\" (1.778 m) 28.55 kg/m2         Blood Pressure from Last 3 Encounters:   10/04/17 112/84   17 145/79   17 128/70    Weight from Last 3 Encounters:   10/04/17 199 lb (90.3 kg)   17 214 lb 3.2 oz (97.2 kg)   17 208 lb (94.3 kg)              Today, you had the following     No orders found for display         Today's Medication Changes          These changes are accurate as of: 10/4/17  4:07 PM.  If you " have any questions, ask your nurse or doctor.               Start taking these medicines.        Dose/Directions    HYDROcodone-acetaminophen  MG per tablet   Commonly known as:  NORCO   Used for:  Chronic left hip pain   Started by:  Manolo Funes MD        Dose:  1 tablet   Take 1 tablet by mouth every 4 hours as needed for moderate to severe pain maximum 6 tablet(s) per day. Fill on 11-4-17.   Quantity:  75 tablet   Refills:  0         Stop taking these medicines if you haven't already. Please contact your care team if you have questions.     amitriptyline 25 MG tablet   Commonly known as:  ELAVIL   Stopped by:  Manolo Funes MD                Where to get your medicines      Some of these will need a paper prescription and others can be bought over the counter.  Ask your nurse if you have questions.     Bring a paper prescription for each of these medications     HYDROcodone-acetaminophen  MG per tablet                Primary Care Provider Office Phone # Fax #    Norma Montrell Ramirez, APRN [facility] [phone] [address] [facility] [address]        Equal Access to Services     YURY MICHEL : Hadii junior ku hadasho Soomaali, waaxda luqadaha, qaybta kaalmada adeegyada, waxiqra samaniego haykaleb harrison . So [facility] [phone].    ATENCIÓN: Si habla español, tiene a isabel disposición servicios gratuitos de asistencia lingüística. Llame al [phone].    We comply with applicable federal civil rights laws and [place] laws. We do not discriminate on the basis of race, color, national origin, age, disability, sex, sexual orientation, or gender identity.            Thank you!     Thank you for choosing [facility]  for your care. Our goal is always to provide you with excellent care. Hearing back from our patients is one way we can continue to improve our services. Please take a few minutes to complete the written survey that you may receive in the mail  "after your visit with us. Thank you!             Your Updated Medication List - Protect others around you: Learn how to safely use, store and throw away your medicines at www.disposemymeds.org.          This list is accurate as of: 10/4/17  4:07 PM.  Always use your most recent med list.                   Brand Name Dispense Instructions for use Diagnosis    aspirin 81 MG EC tablet     90 tablet    Take 1 tablet (81 mg) by mouth daily    Diabetes mellitus, type 2 (H)       atorvastatin 10 MG tablet    LIPITOR    90 tablet    Take 1 tablet (10 mg) by mouth every evening    Hyperlipidemia with target LDL less than 100       blood glucose monitoring lancets     102 each    1 each daily    Type 2 diabetes, HbA1c goal < 7% (H)       blood glucose monitoring test strip    ACCU-CHEK SMARTVIEW    100 strip    Check blood sugar 4 times daily    Type 2 diabetes, HbA1c goal < 7% (H)       HYDROcodone-acetaminophen  MG per tablet    NORCO    75 tablet    Take 1 tablet by mouth every 4 hours as needed for moderate to severe pain maximum 6 tablet(s) per day. Fill on 11-4-17.    Chronic left hip pain       insulin glargine 100 UNIT/ML injection    LANTUS VIAL    10 mL    Inject 30 Units Subcutaneous every morning    Type 2 diabetes mellitus without complication, without long-term current use of insulin (H)       insulin syringe-needle U-100 31G X 5/16\" 1 ML    BD insulin syringe ULTRAFINE    100 each    Use one syringe 22 units daily daily or as directed.    Type 2 diabetes mellitus without complication, without long-term current use of insulin (H)       metFORMIN 500 MG 24 hr tablet    GLUCOPHAGE-XR    60 tablet    Take 1 tablet (500 mg) by mouth daily (with dinner) For 2 weeks, then 2 tablets with dinner    Type 2 diabetes mellitus without complication, without long-term current use of insulin (H)       MULTIVITAMIN PO      Take 1 tablet by mouth daily    Type 2 diabetes, HbA1c goal < 7% (H)       * order for DME     1 " each    Equipment being ordered: #1 glucometer, #100 strips, #100 lancets.   Check blood sugar at least twice daily and as needed for any signs or symptoms of hypo or hyperglycemia.  May refill lancets and strips x3.    Type 2 diabetes, HbA1c goal < 7% (H)       * order for DME     200 each    Test strips for pt's glucometer, brand as covered by insurance Test bid and prn.    Type 2 diabetes mellitus without complication, without long-term current use of insulin (H)       * Notice:  This list has 2 medication(s) that are the same as other medications prescribed for you. Read the directions carefully, and ask your doctor or other care provider to review them with you.

## 2017-10-04 NOTE — PROGRESS NOTES
"  SUBJECTIVE:   Houston Winters is a 47 year old male who presents to clinic today for the following health issues:      WORK COMP-PAIN MANAGEMENT      Duration: 10-24-16 for date of injury.    Description (location/character/radiation): Work Comp follow up pain management.  Chronic left hip pain.  He did have surgery on 5-22-17 at [facility].   He would like to discuss about a longer refill of the pain medication so he is not coming in monthly.  He did get a phone call today that the next MRI is approved to be scheduled.    Intensity:  Varies with what he is doing.  Getting worse.    Accompanying signs and symptoms: Weakness of the hip and is affecting how he walks with his back and knee.     History (similar episodes/previous evaluation): Previous clinic visit.  Had a clinic visit with Dr. Veliz this morning.     Precipitating or alleviating factors: Sitting and more active.    Therapies tried and outcome: Ice, hot bath.  Hydrocodone  as directed.      Here to talk about pain management.            Current Outpatient Prescriptions:      HYDROcodone-acetaminophen (NORCO)  MG per tablet, Take 1 tablet by mouth every 4 hours as needed for moderate to severe pain maximum 6 tablet(s) per day. Fill on 11-4-17., Disp: 75 tablet, Rfl: 0     insulin glargine (LANTUS VIAL) 100 UNIT/ML injection, Inject 30 Units Subcutaneous every morning, Disp: 10 mL, Rfl: 3     order for DME, Test strips for pt's glucometer, brand as covered by insurance Test bid and prn., Disp: 200 each, Rfl: 4     insulin syringe-needle U-100 (BD INSULIN SYRINGE ULTRAFINE) 31G X 5/16\" 1 ML, Use one syringe 22 units daily daily or as directed., Disp: 100 each, Rfl: 11     metFORMIN (GLUCOPHAGE-XR) 500 MG 24 hr tablet, Take 1 tablet (500 mg) by mouth daily (with dinner) For 2 weeks, then 2 tablets with dinner, Disp: 60 tablet, Rfl: 2     atorvastatin (LIPITOR) 10 MG tablet, Take 1 tablet (10 mg) by mouth every evening, Disp: 90 tablet, Rfl: " "3     Multiple Vitamins-Minerals (MULTIVITAMIN OR), Take 1 tablet by mouth daily , Disp: , Rfl:      ORDER FOR DME, Equipment being ordered: #1 glucometer, #100 strips, #100 lancets.   Check blood sugar at least twice daily and as needed for any signs or symptoms of hypo or hyperglycemia.  May refill lancets and strips x3., Disp: 1 each, Rfl: 3     aspirin 81 MG EC tablet, Take 1 tablet (81 mg) by mouth daily, Disp: 90 tablet, Rfl: 3     glucose blood VI test strips (ACCU-CHEK SMARTVIEW) strip, Check blood sugar 4 times daily, Disp: 100 strip, Rfl: 12     ACCU-CHEK FASTCLIX LANCETS MISC, 1 each daily, Disp: 102 each, Rfl: 12    Patient Active Problem List   Diagnosis     GANGLOIN CYST /RIGHT ANKLE     ED (erectile dysfunction)     Partial epilepsy (H)     Obesity     Hyperlipidemia with target LDL less than 100     Tobacco use disorder     Hypertension, goal below 140/90     Type 2 diabetes mellitus without complication, without long-term current use of insulin (H)       Blood pressure 112/84, pulse 89, temperature 98.4  F (36.9  C), temperature source Tympanic, height 5' 10\" (1.778 m), weight 199 lb (90.3 kg).    Exam:  GENERAL APPEARANCE: healthy, alert and no distress  MS: decreased range of motion of the left hip.   SKIN: no suspicious lesions or rashes  PSYCH: mentation appears normal and affect normal/bright      (M25.552,  G89.29) Chronic left hip pain  Comment:   Plan: HYDROcodone-acetaminophen (NORCO)  MG per        tablet,         We reviewed and discussed the instructions and recommendations from the Ortho clinic, Dr. Veliz. He is considering a left WILLIAM and we will order the Norco. He is using 2-3 daily and we will write the Rx for #75 per month. These are dated for today and 11-14-17. If the date of surgery is scheduled then call [phone] and make the Preop physical appt. If the surgery is after 12-4-17, then recheck in clinic for the next Rx for the Norco. Safety issues discussed.     Manolo Webber " Tost[place]

## 2017-10-04 NOTE — NURSING NOTE
"Chief Complaint   Patient presents with     Work Comp     Here to discuss about pain management.       Initial /84  Pulse 89  Temp 98.4  F (36.9  C) (Tympanic)  Ht 5' 10\" (1.778 m)  Wt 199 lb (90.3 kg)  BMI 28.55 kg/m2 Estimated body mass index is 28.55 kg/(m^2) as calculated from the following:    Height as of this encounter: 5' 10\" (1.778 m).    Weight as of this encounter: 199 lb (90.3 kg).  Medication Reconciliation: complete  "

## 2017-10-16 ENCOUNTER — TELEPHONE (OUTPATIENT)
Dept: FAMILY MEDICINE | Facility: CLINIC | Age: 47
End: 2017-10-16

## 2017-10-16 NOTE — TELEPHONE ENCOUNTER
Panel management: Diabetes    Please send a letter and remind the patient that he is due for a diabetes follow-up office visit. It has been 6 months since his last one and his risk factors were not under control at that time

## 2017-11-17 ENCOUNTER — OFFICE VISIT (OUTPATIENT)
Dept: FAMILY MEDICINE | Facility: CLINIC | Age: 47
End: 2017-11-17
Payer: COMMERCIAL

## 2017-11-17 VITALS
HEART RATE: 89 BPM | BODY MASS INDEX: 30.21 KG/M2 | HEIGHT: 70 IN | SYSTOLIC BLOOD PRESSURE: 136 MMHG | WEIGHT: 211 LBS | DIASTOLIC BLOOD PRESSURE: 80 MMHG

## 2017-11-17 DIAGNOSIS — M25.552 CHRONIC LEFT HIP PAIN: ICD-10-CM

## 2017-11-17 DIAGNOSIS — G89.29 CHRONIC LEFT HIP PAIN: ICD-10-CM

## 2017-11-17 PROCEDURE — 99213 OFFICE O/P EST LOW 20 MIN: CPT | Performed by: FAMILY MEDICINE

## 2017-11-17 RX ORDER — HYDROCODONE BITARTRATE AND ACETAMINOPHEN 10; 325 MG/1; MG/1
1 TABLET ORAL EVERY 4 HOURS PRN
Qty: 75 TABLET | Refills: 0 | Status: SHIPPED | OUTPATIENT
Start: 2017-11-17 | End: 2018-01-04

## 2017-11-17 NOTE — NURSING NOTE
"Chief Complaint   Patient presents with     Work Comp     pain management       Initial /80 (BP Location: Left arm, Patient Position: Chair, Cuff Size: Adult Large)  Pulse 89  Ht 5' 10\" (1.778 m)  Wt 211 lb (95.7 kg)  BMI 30.28 kg/m2 Estimated body mass index is 30.28 kg/(m^2) as calculated from the following:    Height as of this encounter: 5' 10\" (1.778 m).    Weight as of this encounter: 211 lb (95.7 kg).  Medication Reconciliation: complete  "

## 2017-11-17 NOTE — PROGRESS NOTES
"  SUBJECTIVE:   Houston Winters is a 47 year old male who presents to clinic today for the following health issues:      WORK COMP-PAIN MANAGEMENT       Duration: 10-24-16 for date of injury.    Description (location/character/radiation): Work Comp follow up pain management.  Chronic left hip pain.  He's waiting on to get a surgery date scheduled once the workman's comp gets worked out.    Intensity:  Grades his pain a 4-5 today.    Accompanying signs and symptoms: Weakness of the hip and is affecting how he walks with his back and knee.     History (similar episodes/previous evaluation): Previous clinic visits.    Precipitating or alleviating factors: Sitting and more active.    Therapies tried and outcome: Ice, hot bath.  Hydrocodone  as directed.      Here to talk about pain management.    Medication Followup of Hydrocodone 10/325    Taking Medication as prescribed: yes    Side Effects:  None    Medication Helping Symptoms:  yes         Current Outpatient Prescriptions:      HYDROcodone-acetaminophen (NORCO)  MG per tablet, Take 1 tablet by mouth every 4 hours as needed for moderate to severe pain maximum 6 tablet(s) per day. Fill on 12-4-17., Disp: 75 tablet, Rfl: 0     insulin glargine (LANTUS VIAL) 100 UNIT/ML injection, Inject 30 Units Subcutaneous every morning, Disp: 10 mL, Rfl: 3     metFORMIN (GLUCOPHAGE-XR) 500 MG 24 hr tablet, Take 1 tablet (500 mg) by mouth daily (with dinner) For 2 weeks, then 2 tablets with dinner, Disp: 60 tablet, Rfl: 2     atorvastatin (LIPITOR) 10 MG tablet, Take 1 tablet (10 mg) by mouth every evening, Disp: 90 tablet, Rfl: 3     aspirin 81 MG EC tablet, Take 1 tablet (81 mg) by mouth daily, Disp: 90 tablet, Rfl: 3     order for DME, Test strips for pt's glucometer, brand as covered by insurance Test bid and prn., Disp: 200 each, Rfl: 4     insulin syringe-needle U-100 (BD INSULIN SYRINGE ULTRAFINE) 31G X 5/16\" 1 ML, Use one syringe 22 units daily daily or as " "directed., Disp: 100 each, Rfl: 11     Multiple Vitamins-Minerals (MULTIVITAMIN OR), Take 1 tablet by mouth daily , Disp: , Rfl:      ORDER FOR DME, Equipment being ordered: #1 glucometer, #100 strips, #100 lancets.   Check blood sugar at least twice daily and as needed for any signs or symptoms of hypo or hyperglycemia.  May refill lancets and strips x3., Disp: 1 each, Rfl: 3     glucose blood VI test strips (ACCU-CHEK SMARTVIEW) strip, Check blood sugar 4 times daily, Disp: 100 strip, Rfl: 12     ACCU-CHEK FASTCLIX LANCETS MISC, 1 each daily, Disp: 102 each, Rfl: 12    Patient Active Problem List   Diagnosis     GANGLOIN CYST /RIGHT ANKLE     ED (erectile dysfunction)     Partial epilepsy (H)     Obesity     Hyperlipidemia with target LDL less than 100     Tobacco use disorder     Hypertension, goal below 140/90     Type 2 diabetes mellitus without complication, without long-term current use of insulin (H)       Blood pressure 136/80, pulse 89, height 5' 10\" (1.778 m), weight 211 lb (95.7 kg).    Exam:  GENERAL APPEARANCE: healthy, alert and no distress  CV: regular rates and rhythm,  MS: decreased range of motion of the left hip.   SKIN: no suspicious lesions or rashes  PSYCH: mentation appears normal and affect normal/bright      (M25.552,  G89.29) Chronic left hip pain  Comment:   Plan: HYDROcodone-acetaminophen (NORCO)  MG per        tablet        For the pain management we discussed the use of the Norco and he is using two every day and there are about half the days he uses one more. This is a total of 75 per month.   The llast Rx was dated on 11-4-17, and today will give the new Rx with the date to fill on 12-4-17. If needed and the surgery is not done by 1-4-18 then recheck in the clinic before that date. If the surgery is scheduled then call [phone] for a preop appt. Use the non drug therapies.       Manolo Funes"

## 2017-11-17 NOTE — MR AVS SNAPSHOT
After Visit Summary   11/17/2017    Houston Winters    MRN: [id number]           Patient Information     Date Of Birth          1970        Visit Information        Provider Department      11/17/2017 2:20 PM Manolo Funes MD [facility]        Today's Diagnoses     Chronic left hip pain          Care Instructions          Thank you for choosing [facility].  You may be receiving a survey in the mail from [facility] regarding your visit today.  Please take a few minutes to complete and return the survey to let us know how we are doing.      If you have questions or concerns, please contact us via [email or web address] or you can contact your care team at [phone].    Our Clinic hours are:  Monday 6:40 am  to 7:00 pm  Tuesday -Friday 6:40 am to 5:00 pm    The [place] outpatient lab hours are:  Monday - Friday 6:10 am to 4:45 pm  Saturdays 7:00 am to 11:00 am  Appointments are required, call [phone]    If you have clinical questions after hours or would like to schedule an appointment,  call the clinic at [phone].    For the pain management we discussed the use of the Norco and he is using two every day and there are about half the days he uses one more. This is a total of 75 per month.   The llast Rx was dated on 11-4-17, and today will give the new Rx with the date to fill on 12-4-17. If needed and the surgery is not done by 1-4-18 then recheck in the clinic before that date. If the surgery is scheduled then call [phone] for a preop appt. Use the non drug therapies.           Follow-ups after your visit        Who to contact     If you have questions or need follow up information about today's clinic visit or your schedule please contact [facility] directly at [phone].  Normal or non-critical lab and imaging results will be communicated to you by MyChart, letter or phone within 4 business days after the clinic has received the results. If you do not  "hear from us within 7 days, please contact the clinic through [employer] or phone. If you have a critical or abnormal lab result, we will notify you by phone as soon as possible.  Submit refill requests through [employer] or call your pharmacy and they will forward the refill request to us. Please allow 3 business days for your refill to be completed.          Additional Information About Your Visit        [email or web address] Information     [employer] lets you send messages to your doctor, view your test results, renew your prescriptions, schedule appointments and more. To sign up, go to [email or web address] . Click on \"Log in\" on the left side of the screen, which will take you to the Welcome page. Then click on \"Sign up Now\" on the right side of the page.     You will be asked to enter the access code listed below, as well as some personal information. Please follow the directions to create your username and password.     Your access code is: [email or web address]  Expires: 2/15/2018  3:14 PM     Your access code will  in 90 days. If you need help or a new code, please call your [place] clinic or [phone].        Care EveryWhere ID     This is your Care EveryWhere ID. This could be used by other organizations to access your [place] medical records  [id number]        Your Vitals Were     Pulse Height BMI (Body Mass Index)             89 5' 10\" (1.778 m) 30.28 kg/m2          Blood Pressure from Last 3 Encounters:   17 136/80   10/04/17 112/84   17 145/79    Weight from Last 3 Encounters:   17 211 lb (95.7 kg)   10/04/17 199 lb (90.3 kg)   17 214 lb 3.2 oz (97.2 kg)              Today, you had the following     No orders found for display         Today's Medication Changes          These changes are accurate as of: 17  3:14 PM.  If you have any questions, ask your nurse or doctor.               These medicines have changed or have updated prescriptions.        Dose/Directions    " HYDROcodone-acetaminophen  MG per tablet   Commonly known as:  NORCO   This may have changed:  additional instructions   Used for:  Chronic left hip pain   Changed by:  Manolo Funes MD        Dose:  1 tablet   Take 1 tablet by mouth every 4 hours as needed for moderate to severe pain maximum 6 tablet(s) per day. Fill on 12-4-17.   Quantity:  75 tablet   Refills:  0            Where to get your medicines      Some of these will need a paper prescription and others can be bought over the counter.  Ask your nurse if you have questions.     Bring a paper prescription for each of these medications     HYDROcodone-acetaminophen  MG per tablet                Primary Care Provider Office Phone # Fax #    Norma Ramirez, APRN [facility] [phone] [address] [address]        Equal Access to Services     SVEN MICHEL : Ayana morenoo Soomaali, waaxda luqadaha, qaybta kaalmada adeegyada, jovanni harrison . So [facility] [phone].    ATENCIÓN: Si habla español, tiene a isabel disposición servicios gratuitos de asistencia lingüística. Llame al [phone].    We comply with applicable federal civil rights laws and [place] laws. We do not discriminate on the basis of race, color, national origin, age, disability, sex, sexual orientation, or gender identity.            Thank you!     Thank you for choosing [facility]  for your care. Our goal is always to provide you with excellent care. Hearing back from our patients is one way we can continue to improve our services. Please take a few minutes to complete the written survey that you may receive in the mail after your visit with us. Thank you!             Your Updated Medication List - Protect others around you: Learn how to safely use, store and throw away your medicines at www.disposemymeds.org.          This list is accurate as of: 11/17/17  3:14 PM.  Always use your most recent med  "list.                   Brand Name Dispense Instructions for use Diagnosis    aspirin 81 MG EC tablet     90 tablet    Take 1 tablet (81 mg) by mouth daily    Diabetes mellitus, type 2 (H)       atorvastatin 10 MG tablet    LIPITOR    90 tablet    Take 1 tablet (10 mg) by mouth every evening    Hyperlipidemia with target LDL less than 100       blood glucose monitoring lancets     102 each    1 each daily    Type 2 diabetes, HbA1c goal < 7% (H)       blood glucose monitoring test strip    ACCU-CHEK SMARTVIEW    100 strip    Check blood sugar 4 times daily    Type 2 diabetes, HbA1c goal < 7% (H)       HYDROcodone-acetaminophen  MG per tablet    NORCO    75 tablet    Take 1 tablet by mouth every 4 hours as needed for moderate to severe pain maximum 6 tablet(s) per day. Fill on 12-4-17.    Chronic left hip pain       insulin glargine 100 UNIT/ML injection    LANTUS VIAL    10 mL    Inject 30 Units Subcutaneous every morning    Type 2 diabetes mellitus without complication, without long-term current use of insulin (H)       insulin syringe-needle U-100 31G X 5/16\" 1 ML    BD insulin syringe ULTRAFINE    100 each    Use one syringe 22 units daily daily or as directed.    Type 2 diabetes mellitus without complication, without long-term current use of insulin (H)       metFORMIN 500 MG 24 hr tablet    GLUCOPHAGE-XR    60 tablet    Take 1 tablet (500 mg) by mouth daily (with dinner) For 2 weeks, then 2 tablets with dinner    Type 2 diabetes mellitus without complication, without long-term current use of insulin (H)       MULTIVITAMIN PO      Take 1 tablet by mouth daily    Type 2 diabetes, HbA1c goal < 7% (H)       * order for DME     1 each    Equipment being ordered: #1 glucometer, #100 strips, #100 lancets.   Check blood sugar at least twice daily and as needed for any signs or symptoms of hypo or hyperglycemia.  May refill lancets and strips x3.    Type 2 diabetes, HbA1c goal < 7% (H)       * order for DME     200 " each    Test strips for pt's glucometer, brand as covered by insurance Test bid and prn.    Type 2 diabetes mellitus without complication, without long-term current use of insulin (H)       * Notice:  This list has 2 medication(s) that are the same as other medications prescribed for you. Read the directions carefully, and ask your doctor or other care provider to review them with you.

## 2017-11-17 NOTE — PATIENT INSTRUCTIONS
Thank you for choosing [facility].  You may be receiving a survey in the mail from [facility] regarding your visit today.  Please take a few minutes to complete and return the survey to let us know how we are doing.      If you have questions or concerns, please contact us via [email or web address] or you can contact your care team at [phone].    Our Clinic hours are:  Monday 6:40 am  to 7:00 pm  Tuesday -Friday 6:40 am to 5:00 pm    The [place] outpatient lab hours are:  Monday - Friday 6:10 am to 4:45 pm  Saturdays 7:00 am to 11:00 am  Appointments are required, call [phone]    If you have clinical questions after hours or would like to schedule an appointment,  call the clinic at [phone].    For the pain management we discussed the use of the Norco and he is using two every day and there are about half the days he uses one more. This is a total of 75 per month.   The llast Rx was dated on 11-4-17, and today will give the new Rx with the date to fill on 12-4-17. If needed and the surgery is not done by 1-4-18 then recheck in the clinic before that date. If the surgery is scheduled then call [phone] for a preop appt. Use the non drug therapies.

## 2017-12-29 ENCOUNTER — HOSPITAL ENCOUNTER (INPATIENT)
Facility: CLINIC | Age: 47
LOS: 2 days | Discharge: HOME | End: 2017-12-31
Attending: EMERGENCY MEDICINE | Admitting: INTERNAL MEDICINE
Payer: COMMERCIAL

## 2017-12-29 ENCOUNTER — APPOINTMENT (OUTPATIENT)
Dept: CT IMAGING | Facility: CLINIC | Age: 47
End: 2017-12-29
Attending: PHYSICIAN ASSISTANT
Payer: COMMERCIAL

## 2017-12-29 DIAGNOSIS — K56.609 SMALL BOWEL OBSTRUCTION (H): Primary | ICD-10-CM

## 2017-12-29 LAB
ALBUMIN SERPL-MCNC: 4 G/DL (ref 3.4–5)
ALBUMIN UR-MCNC: NEGATIVE MG/DL
ALP SERPL-CCNC: 125 U/L (ref 40–150)
ALT SERPL W P-5'-P-CCNC: 37 U/L (ref 0–70)
ANION GAP SERPL CALCULATED.3IONS-SCNC: 9 MMOL/L (ref 3–14)
APPEARANCE UR: CLEAR
AST SERPL W P-5'-P-CCNC: 14 U/L (ref 0–45)
BASOPHILS # BLD AUTO: 0 10E9/L (ref 0–0.2)
BASOPHILS NFR BLD AUTO: 0.1 %
BILIRUB SERPL-MCNC: 0.5 MG/DL (ref 0.2–1.3)
BILIRUB UR QL STRIP: NEGATIVE
BUN SERPL-MCNC: 12 MG/DL (ref 7–30)
CALCIUM SERPL-MCNC: 9 MG/DL (ref 8.5–10.1)
CHLORIDE SERPL-SCNC: 99 MMOL/L (ref 94–109)
CO2 SERPL-SCNC: 26 MMOL/L (ref 20–32)
COLOR UR AUTO: YELLOW
CREAT SERPL-MCNC: 0.52 MG/DL (ref 0.66–1.25)
DIFFERENTIAL METHOD BLD: NORMAL
EOSINOPHIL # BLD AUTO: 0.1 10E9/L (ref 0–0.7)
EOSINOPHIL NFR BLD AUTO: 0.5 %
ERYTHROCYTE [DISTWIDTH] IN BLOOD BY AUTOMATED COUNT: 12.7 % (ref 10–15)
GFR SERPL CREATININE-BSD FRML MDRD: >90 ML/MIN/1.7M2
GLUCOSE BLDC GLUCOMTR-MCNC: 264 MG/DL (ref 70–99)
GLUCOSE BLDC GLUCOMTR-MCNC: 268 MG/DL (ref 70–99)
GLUCOSE SERPL-MCNC: 315 MG/DL (ref 70–99)
GLUCOSE UR STRIP-MCNC: >1000 MG/DL
HBA1C MFR BLD: 12 % (ref 4.3–6)
HCT VFR BLD AUTO: 46.4 % (ref 40–53)
HGB BLD-MCNC: 15.6 G/DL (ref 13.3–17.7)
HGB UR QL STRIP: NEGATIVE
IMM GRANULOCYTES # BLD: 0 10E9/L (ref 0–0.4)
IMM GRANULOCYTES NFR BLD: 0.3 %
KETONES UR STRIP-MCNC: 5 MG/DL
LACTATE BLD-SCNC: 1.1 MMOL/L (ref 0.7–2)
LEUKOCYTE ESTERASE UR QL STRIP: NEGATIVE
LIPASE SERPL-CCNC: 98 U/L (ref 73–393)
LYMPHOCYTES # BLD AUTO: 2.5 10E9/L (ref 0.8–5.3)
LYMPHOCYTES NFR BLD AUTO: 26.7 %
MCH RBC QN AUTO: 31 PG (ref 26.5–33)
MCHC RBC AUTO-ENTMCNC: 33.6 G/DL (ref 31.5–36.5)
MCV RBC AUTO: 92 FL (ref 78–100)
MONOCYTES # BLD AUTO: 0.6 10E9/L (ref 0–1.3)
MONOCYTES NFR BLD AUTO: 6.2 %
NEUTROPHILS # BLD AUTO: 6.1 10E9/L (ref 1.6–8.3)
NEUTROPHILS NFR BLD AUTO: 66.2 %
NITRATE UR QL: NEGATIVE
PH UR STRIP: 6 PH (ref 5–7)
PLATELET # BLD AUTO: 296 10E9/L (ref 150–450)
POTASSIUM SERPL-SCNC: 4.1 MMOL/L (ref 3.4–5.3)
PROT SERPL-MCNC: 8 G/DL (ref 6.8–8.8)
RBC # BLD AUTO: 5.03 10E12/L (ref 4.4–5.9)
SODIUM SERPL-SCNC: 134 MMOL/L (ref 133–144)
SOURCE: ABNORMAL
SP GR UR STRIP: >1.05 (ref 1–1.03)
UROBILINOGEN UR STRIP-MCNC: NORMAL MG/DL (ref 0–2)
WBC # BLD AUTO: 9.2 10E9/L (ref 4–11)

## 2017-12-29 PROCEDURE — 80053 COMPREHEN METABOLIC PANEL: CPT | Performed by: PHYSICIAN ASSISTANT

## 2017-12-29 PROCEDURE — 99223 1ST HOSP IP/OBS HIGH 75: CPT | Mod: AI | Performed by: INTERNAL MEDICINE

## 2017-12-29 PROCEDURE — 81003 URINALYSIS AUTO W/O SCOPE: CPT | Performed by: PHYSICIAN ASSISTANT

## 2017-12-29 PROCEDURE — 25000128 H RX IP 250 OP 636: Performed by: PHYSICIAN ASSISTANT

## 2017-12-29 PROCEDURE — 83690 ASSAY OF LIPASE: CPT | Performed by: PHYSICIAN ASSISTANT

## 2017-12-29 PROCEDURE — 85025 COMPLETE CBC W/AUTO DIFF WBC: CPT | Performed by: PHYSICIAN ASSISTANT

## 2017-12-29 PROCEDURE — 25000125 ZZHC RX 250: Performed by: PHYSICIAN ASSISTANT

## 2017-12-29 PROCEDURE — 99285 EMERGENCY DEPT VISIT HI MDM: CPT | Mod: 25 | Performed by: EMERGENCY MEDICINE

## 2017-12-29 PROCEDURE — 96376 TX/PRO/DX INJ SAME DRUG ADON: CPT | Performed by: EMERGENCY MEDICINE

## 2017-12-29 PROCEDURE — 74177 CT ABD & PELVIS W/CONTRAST: CPT

## 2017-12-29 PROCEDURE — 36415 COLL VENOUS BLD VENIPUNCTURE: CPT | Performed by: PHYSICIAN ASSISTANT

## 2017-12-29 PROCEDURE — 83036 HEMOGLOBIN GLYCOSYLATED A1C: CPT | Performed by: PHYSICIAN ASSISTANT

## 2017-12-29 PROCEDURE — 25000131 ZZH RX MED GY IP 250 OP 636 PS 637: Performed by: PHYSICIAN ASSISTANT

## 2017-12-29 PROCEDURE — 83605 ASSAY OF LACTIC ACID: CPT | Performed by: PHYSICIAN ASSISTANT

## 2017-12-29 PROCEDURE — 96374 THER/PROPH/DIAG INJ IV PUSH: CPT | Performed by: EMERGENCY MEDICINE

## 2017-12-29 PROCEDURE — 00000146 ZZHCL STATISTIC GLUCOSE BY METER IP

## 2017-12-29 PROCEDURE — 12000007 ZZH R&B INTERMEDIATE

## 2017-12-29 RX ORDER — ONDANSETRON 2 MG/ML
4 INJECTION INTRAMUSCULAR; INTRAVENOUS EVERY 6 HOURS PRN
Status: DISCONTINUED | OUTPATIENT
Start: 2017-12-29 | End: 2017-12-31 | Stop reason: HOSPADM

## 2017-12-29 RX ORDER — NALOXONE HYDROCHLORIDE 0.4 MG/ML
.1-.4 INJECTION, SOLUTION INTRAMUSCULAR; INTRAVENOUS; SUBCUTANEOUS
Status: DISCONTINUED | OUTPATIENT
Start: 2017-12-29 | End: 2017-12-31 | Stop reason: HOSPADM

## 2017-12-29 RX ORDER — SODIUM CHLORIDE 9 MG/ML
INJECTION, SOLUTION INTRAVENOUS CONTINUOUS
Status: DISCONTINUED | OUTPATIENT
Start: 2017-12-29 | End: 2017-12-30

## 2017-12-29 RX ORDER — DEXTROSE MONOHYDRATE 25 G/50ML
25-50 INJECTION, SOLUTION INTRAVENOUS
Status: DISCONTINUED | OUTPATIENT
Start: 2017-12-29 | End: 2017-12-31 | Stop reason: HOSPADM

## 2017-12-29 RX ORDER — HYDROMORPHONE HYDROCHLORIDE 1 MG/ML
.3-.5 INJECTION, SOLUTION INTRAMUSCULAR; INTRAVENOUS; SUBCUTANEOUS
Status: DISCONTINUED | OUTPATIENT
Start: 2017-12-29 | End: 2017-12-31 | Stop reason: HOSPADM

## 2017-12-29 RX ORDER — HYDROMORPHONE HYDROCHLORIDE 1 MG/ML
0.5 INJECTION, SOLUTION INTRAMUSCULAR; INTRAVENOUS; SUBCUTANEOUS
Status: COMPLETED | OUTPATIENT
Start: 2017-12-29 | End: 2017-12-29

## 2017-12-29 RX ORDER — PROCHLORPERAZINE 25 MG
25 SUPPOSITORY, RECTAL RECTAL EVERY 12 HOURS PRN
Status: DISCONTINUED | OUTPATIENT
Start: 2017-12-29 | End: 2017-12-31 | Stop reason: HOSPADM

## 2017-12-29 RX ORDER — NICOTINE POLACRILEX 4 MG
15-30 LOZENGE BUCCAL
Status: DISCONTINUED | OUTPATIENT
Start: 2017-12-29 | End: 2017-12-31 | Stop reason: HOSPADM

## 2017-12-29 RX ORDER — BISACODYL 5 MG/1
5 TABLET, DELAYED RELEASE ORAL DAILY PRN
COMMUNITY
End: 2018-10-07

## 2017-12-29 RX ORDER — IOPAMIDOL 755 MG/ML
100 INJECTION, SOLUTION INTRAVASCULAR ONCE
Status: COMPLETED | OUTPATIENT
Start: 2017-12-29 | End: 2017-12-29

## 2017-12-29 RX ADMIN — Medication 0.5 MG: at 18:14

## 2017-12-29 RX ADMIN — IOPAMIDOL 100 ML: 755 INJECTION, SOLUTION INTRAVENOUS at 16:41

## 2017-12-29 RX ADMIN — Medication 0.5 MG: at 16:17

## 2017-12-29 RX ADMIN — INSULIN ASPART 5 UNITS: 100 INJECTION, SOLUTION INTRAVENOUS; SUBCUTANEOUS at 23:47

## 2017-12-29 RX ADMIN — Medication 0.5 MG: at 22:21

## 2017-12-29 RX ADMIN — SODIUM CHLORIDE 66 ML: 9 INJECTION, SOLUTION INTRAVENOUS at 16:41

## 2017-12-29 RX ADMIN — SODIUM CHLORIDE 1000 ML: 9 INJECTION, SOLUTION INTRAVENOUS at 18:15

## 2017-12-29 RX ADMIN — SODIUM CHLORIDE: 9 INJECTION, SOLUTION INTRAVENOUS at 22:17

## 2017-12-29 ASSESSMENT — ACTIVITIES OF DAILY LIVING (ADL)
AMBULATION: 0-->INDEPENDENT
SWALLOWING: 0-->SWALLOWS FOODS/LIQUIDS WITHOUT DIFFICULTY
RETIRED_EATING: 0-->INDEPENDENT
COGNITION: 0 - NO COGNITION ISSUES REPORTED
RETIRED_COMMUNICATION: 0-->UNDERSTANDS/COMMUNICATES WITHOUT DIFFICULTY
BATHING: 0-->INDEPENDENT
DRESS: 0-->INDEPENDENT
FALL_HISTORY_WITHIN_LAST_SIX_MONTHS: NO
TRANSFERRING: 0-->INDEPENDENT
TOILETING: 0-->INDEPENDENT

## 2017-12-29 ASSESSMENT — ENCOUNTER SYMPTOMS
ABDOMINAL PAIN: 1
VOMITING: 0
DIARRHEA: 0
MUSCULOSKELETAL NEGATIVE: 1
CONSTIPATION: 0
FEVER: 0
CHILLS: 0
RESPIRATORY NEGATIVE: 1
CARDIOVASCULAR NEGATIVE: 1
CONSTITUTIONAL NEGATIVE: 1
NAUSEA: 0

## 2017-12-29 NOTE — IP AVS SNAPSHOT
MRN:[id number]                      After Visit Summary   12/29/2017    Houston Winters    MRN: [id number]           Thank you!     Thank you for choosing [facility] for your care. Our goal is always to provide you with excellent care. Hearing back from our patients is one way we can continue to improve our services. Please take a few minutes to complete the written survey that you may receive in the mail after you visit with us. Thank you!        Patient Information     Date Of Birth          1970        Designated Caregiver       Most Recent Value    Caregiver    Will someone help with your care after discharge? no      About your hospital stay     You were admitted on:  December 29, 2017 You last received care in the:  [facility]    You were discharged on:  December 31, 2017       Who to Call     For medical emergencies, please call 911.  For non-urgent questions about your medical care, please call your primary care provider or clinic, [phone]          Attending Provider     Provider Specialty    Horacio Boykin MD Emergency Medicine    Sharon Simpson PA-C Physician Assistant    Alex Knutson MD Internal Medicine    Brooke Negrete MD Internal Medicine       Primary Care Provider Office Phone # Fax #    Norma Ramirez, ELMIRA ANDREWS [phone]      After Care Instructions     Activity       Your activity upon discharge: activity as tolerated            Diet       Follow this diet upon discharge: Orders Placed This Encounter  Regular diet                  Follow-up Appointments     Follow-up and recommended labs and tests        Follow up with primary care provider, Norma Ramirez, within 1-2weeks, for hospital follow- up.                  Your next 10 appointments already scheduled     Jan 04, 2018  2:40 PM CST   [facility]    [address]  [address]  [facility] [address]   [phone]              Pending Results     No orders found from 2017 to 2017.            Statement of Approval     Ordered          17 1317  I have reviewed and agree with all the recommendations and orders detailed in this document.  EFFECTIVE NOW     Approved and electronically signed by:  Brooke Negrete MD             Admission Information     Date & Time Provider Department Dept. Phone    2017 Brooke Negrete MD [facility] [place] [phone]      Your Vitals Were     Blood Pressure Pulse Temperature Respirations Height Weight    134/81 (BP Location: Left arm) 87 99.1  F (37.3  C) (Oral) 18 1.778 m (5' 10\") 92.1 kg (203 lb 0.7 oz)    Pulse Oximetry BMI (Body Mass Index)                97% 29.13 kg/m2          MyChart Information     [employer] lets you send messages to your doctor, view your test results, renew your prescriptions, schedule appointments and more. To sign up, go to [email or web address] . Click on \"Log in\" on the left side of the screen, which will take you to the Welcome page. Then click on \"Sign up Now\" on the right side of the page.     You will be asked to enter the access code listed below, as well as some personal information. Please follow the directions to create your username and password.     Your access code is: [email or web address]  Expires: 2/15/2018  3:14 PM     Your access code will  in 90 days. If you need help or a new code, please call your [place] clinic or [phone].        Care EveryWhere ID     This is your Care EveryWhere ID. This could be used by other organizations to access your [place] medical records  [id number]        Equal Access to Services     SVEN MICHEL : Ayana Hodgson, pia sandoval, jovanni mckinney. So [facility] [phone].    ATENCIÓN: Si habla español, tiene a isabel disposición servicios gratuitos de asistencia " "sherinFabián Goldstein al [phone].    We comply with applicable federal civil rights laws and [place] laws. We do not discriminate on the basis of race, color, national origin, age, disability, sex, sexual orientation, or gender identity.               Review of your medicines      CONTINUE these medicines which have NOT CHANGED        Dose / Directions    aspirin 81 MG EC tablet   Used for:  Diabetes mellitus, type 2 (H)        Dose:  81 mg   Take 1 tablet (81 mg) by mouth daily   Quantity:  90 tablet   Refills:  3       bisacodyl 5 MG EC tablet   Commonly known as:  DULCOLAX        Dose:  5 mg   Take 5 mg by mouth daily as needed for constipation   Refills:  0       HYDROcodone-acetaminophen  MG per tablet   Commonly known as:  NORCO   Used for:  Chronic left hip pain        Dose:  1 tablet   Take 1 tablet by mouth every 4 hours as needed for moderate to severe pain maximum 6 tablet(s) per day. Fill on 12-4-17.   Quantity:  75 tablet   Refills:  0       insulin glargine 100 UNIT/ML injection   Commonly known as:  LANTUS VIAL   Used for:  Type 2 diabetes mellitus without complication, without long-term current use of insulin (H)        Dose:  30 Units   Inject 30 Units Subcutaneous every morning   Quantity:  10 mL   Refills:  3       insulin syringe-needle U-100 31G X 5/16\" 1 ML   Commonly known as:  BD insulin syringe ULTRAFINE   Used for:  Type 2 diabetes mellitus without complication, without long-term current use of insulin (H)        Use one syringe 22 units daily daily or as directed.   Quantity:  100 each   Refills:  11       MULTIVITAMIN PO   Used for:  Type 2 diabetes, HbA1c goal < 7% (H)        Dose:  1 tablet   Take 1 tablet by mouth daily   Refills:  0       order for DME   Used for:  Type 2 diabetes mellitus without complication, without long-term current use of insulin (H)        Test strips for pt's glucometer, brand as covered by insurance Test bid and prn.   Quantity:  200 each   Refills:  " "4                Protect others around you: Learn how to safely use, store and throw away your medicines at www.disposemymeds.org.             Medication List: This is a list of all your medications and when to take them. Check marks below indicate your daily home schedule. Keep this list as a reference.      Medications           Morning Afternoon Evening Bedtime As Needed    aspirin 81 MG EC tablet   Take 1 tablet (81 mg) by mouth daily                                   bisacodyl 5 MG EC tablet   Commonly known as:  DULCOLAX   Take 5 mg by mouth daily as needed for constipation                                   HYDROcodone-acetaminophen  MG per tablet   Commonly known as:  NORCO   Take 1 tablet by mouth every 4 hours as needed for moderate to severe pain maximum 6 tablet(s) per day. Fill on 12-4-17.                                   insulin glargine 100 UNIT/ML injection   Commonly known as:  LANTUS VIAL   Inject 30 Units Subcutaneous every morning                                   insulin syringe-needle U-100 31G X 5/16\" 1 ML   Commonly known as:  BD insulin syringe ULTRAFINE   Use one syringe 22 units daily daily or as directed.                                MULTIVITAMIN PO   Take 1 tablet by mouth daily                                   order for DME   Test strips for pt's glucometer, brand as covered by insurance Test bid and prn.                                  "

## 2017-12-29 NOTE — IP AVS SNAPSHOT
[facility]    [address] [address]    Phone:  [phone]    Fax:  [phone]                                       After Visit Summary   12/29/2017    Houston Winters    MRN: [id number]           After Visit Summary Signature Page     I have received my discharge instructions, and my questions have been answered. I have discussed any challenges I see with this plan with the nurse or doctor.    ..........................................................................................................................................  Patient/Patient Representative Signature      ..........................................................................................................................................  Patient Representative Print Name and Relationship to Patient    ..................................................               ................................................  Date                                            Time    ..........................................................................................................................................  Reviewed by Signature/Title    ...................................................              ..............................................  Date                                                            Time

## 2017-12-29 NOTE — ED PROVIDER NOTES
History     Chief Complaint   Patient presents with     Abdominal Pain     onset week HX     HPI  Houston Winters is a 47 year old male with history of type 2 diabetes mellitus, hypertension, epilepsy who presents with complaints of diffuse abdominal pain over the past week.  Patient reports that the pain was intermittent at first but has been become more constant and severe especially today.  He describes the pain as cramping and occasionally sharp.  Patient reports that he initially thought he was taking too many pain medications (that he takes for his hip) causing constipation however notes that his bowel movements have been normal and he has even trialed various laxatives with no change in his pain.  He has not had any nausea or vomiting at any time.  He denies any associated diarrhea or urinary symptoms.  Denies fevers, chills, cough, sore throat, or rash.  Patient has history of umbilical hernia surgery repair 2 years ago (by Dr. Leos).  He also had another umbilical hernia repair in 2000.  He denies any other abdominal surgeries in the past.  Patient reports he has been able to continue eating without any change in his pain.  Patient is scheduled to have a left total hip replacement in 3 weeks (this is what he takes his Norco for).      Problem List:    Patient Active Problem List    Diagnosis Date Noted     Type 2 diabetes mellitus without complication, without long-term current use of insulin (H) 03/27/2017     Priority: Medium     Tobacco use disorder 02/04/2015     Priority: Medium     Hypertension, goal below 140/90 02/04/2015     Priority: Medium     Obesity 07/17/2014     Priority: Medium     Hyperlipidemia with target LDL less than 100 07/17/2014     Priority: Medium     Diagnosis updated by automated process. Provider to review and confirm.       Partial epilepsy (H) 03/12/2009     Priority: Medium     (Problem list name updated by automated process. Provider to review and confirm.)       ED  (erectile dysfunction) 12/18/2008     Priority: Medium     GANGLOIN CYST /RIGHT ANKLE 06/15/2006     Priority: Medium        Past Medical History:    Past Medical History:   Diagnosis Date     Epilepsy (H)      MEDICAL HISTORY OF - Age 16      Nicotine dependence        Past Surgical History:    Past Surgical History:   Procedure Laterality Date     CL AFF SURGICAL PATHOLOGY  2005    ganglion cyst removed     EXCISE MASS LOWER EXTREMITY  2/15/2013    Procedure: EXCISE MASS LOWER EXTREMITY;  Right Ankle Excision of ganglion cyst;  Surgeon: Akbar Melo DPM;  Location: [facility] OR     HERNIA REPAIR  2000    umbilical repair     HERNIORRHAPHY UMBILICAL N/A 11/10/2015    Procedure: HERNIORRHAPHY UMBILICAL;  Surgeon: Garry Leos MD;  Location: [facility] OR       Family History:    Family History   Problem Relation Age of Onset     Arthritis Mother      CANCER Maternal Grandmother      Arthritis Maternal Grandmother      lupus     GASTROINTESTINAL DISEASE Maternal Grandfather      war injury, colostomy     Respiratory Daughter      growing out of it     Unknown/Adopted Father      DIABETES No family hx of      Coronary Artery Disease No family hx of      Hypertension No family hx of      Hyperlipidemia No family hx of      Breast Cancer No family hx of      Cancer - colorectal No family hx of      Ovarian Cancer No family hx of      Prostate Cancer No family hx of        Social History:  Marital Status:   [2]  Social History   Substance Use Topics     Smoking status: Former Smoker     Packs/day: 0.25     Years: 25.00     Types: Cigarettes     Quit date: 1/18/2016     Smokeless tobacco: Former User     Alcohol use 0.0 oz/week     0 Standard drinks or equivalent per week      Comment: mixed 2-4 on weekends, not every weekend        Medications:      bisacodyl (DULCOLAX) 5 MG EC tablet   HYDROcodone-acetaminophen (NORCO)  MG per tablet   insulin glargine (LANTUS VIAL) 100 UNIT/ML injection   order for DME  "  insulin syringe-needle U-100 (BD INSULIN SYRINGE ULTRAFINE) 31G X 5/16\" 1 ML   Multiple Vitamins-Minerals (MULTIVITAMIN OR)   aspirin 81 MG EC tablet         Review of Systems   Constitutional: Negative.  Negative for chills and fever.   Respiratory: Negative.    Cardiovascular: Negative.    Gastrointestinal: Positive for abdominal pain. Negative for constipation, diarrhea, nausea and vomiting.   Genitourinary: Negative.    Musculoskeletal: Negative.    Skin: Negative.  Negative for rash.   All other systems reviewed and are negative.      Physical Exam   BP: 119/85  Pulse: (!) 16  Heart Rate: 116  Temp: 98.4  F (36.9  C)  Resp: 20  Height: 177.8 cm (5' 10\")  Weight: 95.3 kg (210 lb)  SpO2: 97 %      Physical Exam   Constitutional: He appears well-developed and well-nourished. He appears distressed.   HENT:   Head: Normocephalic and atraumatic.   Cardiovascular: Normal rate, regular rhythm and normal heart sounds.    Pulmonary/Chest: Effort normal and breath sounds normal.   Abdominal: Soft. He exhibits distension. There is generalized tenderness. There is guarding. There is no rebound.   Neurological: He is alert.   Skin: Skin is warm and dry. No rash noted.       ED Course     ED Course     Procedures    Results for orders placed or performed during the hospital encounter of 12/29/17   CT Abdomen Pelvis w Contrast    Narrative    CT ABDOMEN AND PELVIS WITH CONTRAST 12/29/2017 4:46 PM     HISTORY: Diffuse abdominal tenderness and distention.     TECHNIQUE: 100 mL Isovue-370. Radiation dose for this scan was reduced  using automated exposure control, adjustment of the mA and/or kV  according to patient size, or iterative reconstruction technique.    COMPARISON: CT scan from 4/26/2016.    FINDINGS: Visualized lung bases are unremarkable. Fatty infiltration  of the liver is stable. Gallbladder is unremarkable. Spleen and  pancreas are normal. No adrenal lesions. Kidneys are normal. No  retroperitoneal adenopathy or " evidence of aortic aneurysm.    There are multiple loops of dilated bowel in the midabdomen. It is  difficult to identify the exact point of transition but there are  multiple normal-sized loops of small bowel in the distal ileum. This  is suspicious for small bowel obstruction likely in the mid abdomen.    There is a small amount of free fluid seen which is abnormal as well.  No definite free air. There is a large amount of liquid in the stomach  as well.    The appendix is normal. No evidence of diverticulitis. No thickened  bowel wall.    Incidental note made of several soft tissue calcifications or  ossification just anterior to the left hip joint.      Impression    IMPRESSION:  1. Multiple dilated fluid containing loops of small bowel in the  midabdomen suspicious for small bowel obstruction. It is likely an  obstruction of the mid small bowel although the exact transition point  is difficult to identify. No evidence of hernia.  2. There is a small amount of free fluid in the pelvis. No free air  seen.  3. Stable fatty infiltration of the liver.   Comprehensive metabolic panel   Result Value Ref Range    Sodium 134 133 - 144 mmol/L    Potassium 4.1 3.4 - 5.3 mmol/L    Chloride 99 94 - 109 mmol/L    Carbon Dioxide 26 20 - 32 mmol/L    Anion Gap 9 3 - 14 mmol/L    Glucose 315 (H) 70 - 99 mg/dL    Urea Nitrogen 12 7 - 30 mg/dL    Creatinine 0.52 (L) 0.66 - 1.25 mg/dL    GFR Estimate >90 >60 mL/min/1.7m2    GFR Estimate If Black >90 >60 mL/min/1.7m2    Calcium 9.0 8.5 - 10.1 mg/dL    Bilirubin Total 0.5 0.2 - 1.3 mg/dL    Albumin 4.0 3.4 - 5.0 g/dL    Protein Total 8.0 6.8 - 8.8 g/dL    Alkaline Phosphatase 125 40 - 150 U/L    ALT 37 0 - 70 U/L    AST 14 0 - 45 U/L   Lipase   Result Value Ref Range    Lipase 98 73 - 393 U/L   CBC with platelets differential   Result Value Ref Range    WBC 9.2 4.0 - 11.0 10e9/L    RBC Count 5.03 4.4 - 5.9 10e12/L    Hemoglobin 15.6 13.3 - 17.7 g/dL    Hematocrit 46.4 40.0 - 53.0 %     MCV 92 78 - 100 fl    MCH 31.0 26.5 - 33.0 pg    MCHC 33.6 31.5 - 36.5 g/dL    RDW 12.7 10.0 - 15.0 %    Platelet Count 296 150 - 450 10e9/L    Diff Method Automated Method     % Neutrophils 66.2 %    % Lymphocytes 26.7 %    % Monocytes 6.2 %    % Eosinophils 0.5 %    % Basophils 0.1 %    % Immature Granulocytes 0.3 %    Absolute Neutrophil 6.1 1.6 - 8.3 10e9/L    Absolute Lymphocytes 2.5 0.8 - 5.3 10e9/L    Absolute Monocytes 0.6 0.0 - 1.3 10e9/L    Absolute Eosinophils 0.1 0.0 - 0.7 10e9/L    Absolute Basophils 0.0 0.0 - 0.2 10e9/L    Abs Immature Granulocytes 0.0 0 - 0.4 10e9/L       Assessments & Plan (with Medical Decision Making)     Pt is a 47 year old male with history of type 2 diabetes mellitus, hypertension, epilepsy who presents with complaints of diffuse abdominal pain over the past week.  Patient reports that the pain was intermittent at first but has been become more constant and severe especially today.  He describes the pain as cramping and occasionally sharp.  Patient reports that he initially thought he was taking too many pain medications (that he takes for his hip) causing constipation however notes that his bowel movements have been normal and he has even trialed various laxatives with no change in his pain.  He has not had any nausea or vomiting at any time.  Patient has history of umbilical hernia surgery repair 2 years ago (by Dr. Leos).  He also had another umbilical hernia repair in 2000.  He denies any other abdominal surgeries in the past.  Patient reports he has been able to continue eating without any change in his pain.  Pt is afebrile on arrival.  Exam as above.  Patient appears uncomfortable on arrival.  He has diffuse abdominal tenderness and distention.  Pt appears more comfortable after receiving 0.5 mg Dilaudid.  No leukocytosis noted on CBC.  CMP is largely unremarkable.  Lipase is not elevated.  Abdominal/pelvis CT revealed multiple dilated fluid containing loops of small  bowel in the mid-abdomen concerning for small bowel obstruction.  Radiology reports this is likely a mid-small bowel obstruction although the exact transition point is not easily identified.  Discussed results with patient.  Discussed patient's case with on-call surgeon, Dr. Coronado, at 5:39 PM.  He recommends admitting patient to the medicine service, and he will evaluate patient in the morning.  He recommends that patient is to remain NPO and receive IV fluids in the meantime.  If patients starts vomiting, Dr. Coronado reports that an NG tube should be placed.      Discussed pt's case with hospitalist on-call, Dr. Knutson, who accepts admission and care of the patient. Also discussed pt's case with the ED physician on staff (Dr. Boykin).  He agreed with the assessment, diagnosis, treatment, and plan of the patient.    I have reviewed the nursing notes.    I have reviewed the findings, diagnosis, plan and need for follow up with the patient.       ED to Inpatient Handoff:    Discussed with Dr. Knutson at 1800  Patient accepted for Inpatient Stay  Pending studies include None  Code Status: Full Code           Current Discharge Medication List          Final diagnoses:   Small bowel obstruction       12/29/2017   [facility] EMERGENCY DEPARTMENT     Sharon Simpson PA-C  12/29/17 [id number]

## 2017-12-30 LAB
ALBUMIN SERPL-MCNC: 3 G/DL (ref 3.4–5)
ALBUMIN UR-MCNC: NEGATIVE MG/DL
ALP SERPL-CCNC: 88 U/L (ref 40–150)
ALT SERPL W P-5'-P-CCNC: 32 U/L (ref 0–70)
ANION GAP SERPL CALCULATED.3IONS-SCNC: 6 MMOL/L (ref 3–14)
APPEARANCE UR: CLEAR
AST SERPL W P-5'-P-CCNC: 17 U/L (ref 0–45)
BILIRUB SERPL-MCNC: 0.9 MG/DL (ref 0.2–1.3)
BILIRUB UR QL STRIP: NEGATIVE
BUN SERPL-MCNC: 13 MG/DL (ref 7–30)
CALCIUM SERPL-MCNC: 7.8 MG/DL (ref 8.5–10.1)
CHLORIDE SERPL-SCNC: 105 MMOL/L (ref 94–109)
CO2 SERPL-SCNC: 27 MMOL/L (ref 20–32)
COLOR UR AUTO: YELLOW
CREAT SERPL-MCNC: 0.59 MG/DL (ref 0.66–1.25)
ERYTHROCYTE [DISTWIDTH] IN BLOOD BY AUTOMATED COUNT: 12.5 % (ref 10–15)
GFR SERPL CREATININE-BSD FRML MDRD: >90 ML/MIN/1.7M2
GLUCOSE BLDC GLUCOMTR-MCNC: 200 MG/DL (ref 70–99)
GLUCOSE BLDC GLUCOMTR-MCNC: 205 MG/DL (ref 70–99)
GLUCOSE BLDC GLUCOMTR-MCNC: 237 MG/DL (ref 70–99)
GLUCOSE BLDC GLUCOMTR-MCNC: 242 MG/DL (ref 70–99)
GLUCOSE BLDC GLUCOMTR-MCNC: 256 MG/DL (ref 70–99)
GLUCOSE SERPL-MCNC: 237 MG/DL (ref 70–99)
GLUCOSE UR STRIP-MCNC: >1000 MG/DL
HCT VFR BLD AUTO: 39.8 % (ref 40–53)
HGB BLD-MCNC: 13 G/DL (ref 13.3–17.7)
HGB UR QL STRIP: NEGATIVE
KETONES UR STRIP-MCNC: NEGATIVE MG/DL
LACTATE BLD-SCNC: 1 MMOL/L (ref 0.7–2)
LEUKOCYTE ESTERASE UR QL STRIP: NEGATIVE
MCH RBC QN AUTO: 30.6 PG (ref 26.5–33)
MCHC RBC AUTO-ENTMCNC: 32.7 G/DL (ref 31.5–36.5)
MCV RBC AUTO: 94 FL (ref 78–100)
MUCOUS THREADS #/AREA URNS LPF: PRESENT /LPF
NITRATE UR QL: NEGATIVE
PH UR STRIP: 6.5 PH (ref 5–7)
PLATELET # BLD AUTO: 236 10E9/L (ref 150–450)
POTASSIUM SERPL-SCNC: 4.1 MMOL/L (ref 3.4–5.3)
PROT SERPL-MCNC: 5.9 G/DL (ref 6.8–8.8)
RBC # BLD AUTO: 4.25 10E12/L (ref 4.4–5.9)
RBC #/AREA URNS AUTO: 0 /HPF (ref 0–2)
SODIUM SERPL-SCNC: 138 MMOL/L (ref 133–144)
SOURCE: ABNORMAL
SP GR UR STRIP: 1.02 (ref 1–1.03)
SQUAMOUS #/AREA URNS AUTO: <1 /HPF (ref 0–1)
UROBILINOGEN UR STRIP-MCNC: NORMAL MG/DL (ref 0–2)
WBC # BLD AUTO: 5.7 10E9/L (ref 4–11)
WBC #/AREA URNS AUTO: <1 /HPF (ref 0–2)

## 2017-12-30 PROCEDURE — 83605 ASSAY OF LACTIC ACID: CPT | Performed by: PHYSICIAN ASSISTANT

## 2017-12-30 PROCEDURE — 36415 COLL VENOUS BLD VENIPUNCTURE: CPT | Performed by: PHYSICIAN ASSISTANT

## 2017-12-30 PROCEDURE — 85027 COMPLETE CBC AUTOMATED: CPT | Performed by: PHYSICIAN ASSISTANT

## 2017-12-30 PROCEDURE — 25000128 H RX IP 250 OP 636: Performed by: SURGERY

## 2017-12-30 PROCEDURE — 99253 IP/OBS CNSLTJ NEW/EST LOW 45: CPT | Performed by: SURGERY

## 2017-12-30 PROCEDURE — 81001 URINALYSIS AUTO W/SCOPE: CPT | Performed by: INTERNAL MEDICINE

## 2017-12-30 PROCEDURE — 80053 COMPREHEN METABOLIC PANEL: CPT | Performed by: PHYSICIAN ASSISTANT

## 2017-12-30 PROCEDURE — 25000128 H RX IP 250 OP 636: Performed by: PHYSICIAN ASSISTANT

## 2017-12-30 PROCEDURE — 99233 SBSQ HOSP IP/OBS HIGH 50: CPT | Performed by: INTERNAL MEDICINE

## 2017-12-30 PROCEDURE — 25000128 H RX IP 250 OP 636: Performed by: INTERNAL MEDICINE

## 2017-12-30 PROCEDURE — 12000007 ZZH R&B INTERMEDIATE

## 2017-12-30 PROCEDURE — 00000146 ZZHCL STATISTIC GLUCOSE BY METER IP

## 2017-12-30 RX ORDER — DEXTROSE MONOHYDRATE 25 G/50ML
25-50 INJECTION, SOLUTION INTRAVENOUS
Status: DISCONTINUED | OUTPATIENT
Start: 2017-12-30 | End: 2017-12-30

## 2017-12-30 RX ORDER — NICOTINE POLACRILEX 4 MG
15-30 LOZENGE BUCCAL
Status: DISCONTINUED | OUTPATIENT
Start: 2017-12-30 | End: 2017-12-30

## 2017-12-30 RX ORDER — DEXTROSE, SODIUM CHLORIDE, SODIUM LACTATE, POTASSIUM CHLORIDE, AND CALCIUM CHLORIDE 5; .6; .31; .03; .02 G/100ML; G/100ML; G/100ML; G/100ML; G/100ML
INJECTION, SOLUTION INTRAVENOUS CONTINUOUS
Status: DISCONTINUED | OUTPATIENT
Start: 2017-12-30 | End: 2017-12-30

## 2017-12-30 RX ORDER — SODIUM CHLORIDE, SODIUM LACTATE, POTASSIUM CHLORIDE, CALCIUM CHLORIDE 600; 310; 30; 20 MG/100ML; MG/100ML; MG/100ML; MG/100ML
INJECTION, SOLUTION INTRAVENOUS CONTINUOUS
Status: DISCONTINUED | OUTPATIENT
Start: 2017-12-30 | End: 2017-12-31 | Stop reason: CLARIF

## 2017-12-30 RX ADMIN — Medication 0.5 MG: at 22:41

## 2017-12-30 RX ADMIN — INSULIN ASPART 4 UNITS: 100 INJECTION, SOLUTION INTRAVENOUS; SUBCUTANEOUS at 08:52

## 2017-12-30 RX ADMIN — SODIUM CHLORIDE, POTASSIUM CHLORIDE, SODIUM LACTATE AND CALCIUM CHLORIDE: 600; 310; 30; 20 INJECTION, SOLUTION INTRAVENOUS at 07:44

## 2017-12-30 RX ADMIN — INSULIN ASPART 3 UNITS: 100 INJECTION, SOLUTION INTRAVENOUS; SUBCUTANEOUS at 12:21

## 2017-12-30 RX ADMIN — SODIUM CHLORIDE, POTASSIUM CHLORIDE, SODIUM LACTATE AND CALCIUM CHLORIDE: 600; 310; 30; 20 INJECTION, SOLUTION INTRAVENOUS at 09:37

## 2017-12-30 RX ADMIN — SODIUM CHLORIDE, POTASSIUM CHLORIDE, SODIUM LACTATE AND CALCIUM CHLORIDE: 600; 310; 30; 20 INJECTION, SOLUTION INTRAVENOUS at 21:28

## 2017-12-30 RX ADMIN — SODIUM CHLORIDE, POTASSIUM CHLORIDE, SODIUM LACTATE AND CALCIUM CHLORIDE 1000 ML: 600; 310; 30; 20 INJECTION, SOLUTION INTRAVENOUS at 08:54

## 2017-12-30 RX ADMIN — INSULIN ASPART 3 UNITS: 100 INJECTION, SOLUTION INTRAVENOUS; SUBCUTANEOUS at 05:07

## 2017-12-30 RX ADMIN — Medication 0.5 MG: at 05:11

## 2017-12-30 RX ADMIN — Medication 0.5 MG: at 17:17

## 2017-12-30 NOTE — PLAN OF CARE
Problem: Patient Care Overview  Goal: Plan of Care/Patient Progress Review  Outcome: Improving  Pt tolerating clear liquids, continues to deny abdominal pain or nausea.  BS hypo, no flatus this evening.  Given 1 dose of IV dilaudid for hip pain with relief.

## 2017-12-30 NOTE — PROGRESS NOTES
[facility] Internal Medicine Progress Note     Date of Service (when I saw the patient): 12/30/2017    REASON FOR ADMISSION / INTERVAL HISTORY:  Presented with abdominal pain. Has SBO. See details below.     Ct abd-IMPRESSION:  1. Multiple dilated fluid containing loops of small bowel in the  midabdomen suspicious for small bowel obstruction. It is likely an  obstruction of the mid small bowel although the exact transition point  is difficult to identify. No evidence of hernia.  2. There is a small amount of free fluid in the pelvis. No free air  seen.  3. Stable fatty infiltration of the liver.    ASSESSMENT/PLAN:   Small bowel obstruction  Patient presents with crampy abdominal pain x 1. CT abdomen as above.  Suspect small bowel obstruction due to adhesions as history of open ventral hernia repair about 11 years ago with laparoscopic revision in 2015.  Passing flatus and had a small BM  -start clears     Diabetes Mellitus, Type II  Chronic, not well controlled.  Last a1C 12.0% on 04/2017. Managed with Lantus 30 units every morning. Previously on metformin, but patient stopped medication.   -continue lantus at 15units in the morning/ ISS    Chronic Left Hip Pain  Patient has a history of left hip arthroplasty after injury at work with continued pain. Scheduled to have left hip replacement in January at [facility]. Managed as an outpatient with 10 mg oxycodone - 325mg acetaminophen at home q 6 hours. Usually takes 2-3 per day.  Continue po meds when taking po.     Hyperlipidemia  Chronic. Previously on atorvastatin, however patient reports he discontinued it. Last lipid 2015. HDL 36, LDL 90, triglycerides 219.     Partial Seizure Disorder  Patient has a history of seizures. States his last seizure was 8-10 years ago. Previously on topamax. Not currently taking anything    DISPO  Home in AM if bowel obstruction resolved/ taking po.         DANNIE THACKER MD   Pg [phone]    DVT Prhylaxis: Low Risk/Ambulatory  "with no VTE prophylaxis indicated  Code Status: Full Code    ROS:  As described in A/P and Exam.  Otherwise ALL are  negative.    PHYSICAL EXAM:  All vitals have been reviewed    Blood pressure 126/77, pulse 72, temperature 98.3  F (36.8  C), temperature source Oral, resp. rate 18, height 1.778 m (5' 10\"), weight 92.1 kg (203 lb 0.7 oz), SpO2 97 %.    I/O this shift:  In: -   Out: 150 [Urine:150]    GENERAL APPEARANCE: healthy, alert and no distress  EYES: conjunctiva clear, eyes grossly normal  HENT: external ears and nose normal   NECK: supple, no masses or adenopathy  RESP: lungs clear to auscultation - no rales, rhonchi or wheezes  CV: regular rate and rhythm, normal S1 S2, no S3 or S4 and no murmur, click or rub   ABDOMEN: soft,mild diffuse tenderness, no HSM or masses and bowel sounds normal  MS: no clubbing, cyanosis; no edema  SKIN: clear without significant rashes or lesions  NEURO: -non-focal moves all 4 extr    ROUTINE  LABS (Last four results)  CMP  Recent Labs  Lab 12/30/17  0750 12/29/17  1545    134   POTASSIUM 4.1 4.1   CHLORIDE 105 99   CO2 27 26   ANIONGAP 6 9   * 315*   BUN 13 12   CR 0.59* 0.52*   GFRESTIMATED >90 >90   GFRESTBLACK >90 >90   BERNABE 7.8* 9.0   PROTTOTAL 5.9* 8.0   ALBUMIN 3.0* 4.0   BILITOTAL 0.9 0.5   ALKPHOS 88 125   AST 17 14   ALT 32 37     CBC  Recent Labs  Lab 12/30/17  0750 12/29/17  1545   WBC 5.7 9.2   RBC 4.25* 5.03   HGB 13.0* 15.6   HCT 39.8* 46.4   MCV 94 92   MCH 30.6 31.0   MCHC 32.7 33.6   RDW 12.5 12.7    296     INRNo lab results found in last 7 days.  Arterial Blood GasNo lab results found in last 7 days.    Recent Results (from the past 24 hour(s))   CT Abdomen Pelvis w Contrast    Narrative    CT ABDOMEN AND PELVIS WITH CONTRAST 12/29/2017 4:46 PM     HISTORY: Diffuse abdominal tenderness and distention.     TECHNIQUE: 100 mL Isovue-370. Radiation dose for this scan was reduced  using automated exposure control, adjustment of the mA and/or " kV  according to patient size, or iterative reconstruction technique.    COMPARISON: CT scan from 4/26/2016.    FINDINGS: Visualized lung bases are unremarkable. Fatty infiltration  of the liver is stable. Gallbladder is unremarkable. Spleen and  pancreas are normal. No adrenal lesions. Kidneys are normal. No  retroperitoneal adenopathy or evidence of aortic aneurysm.    There are multiple loops of dilated bowel in the midabdomen. It is  difficult to identify the exact point of transition but there are  multiple normal-sized loops of small bowel in the distal ileum. This  is suspicious for small bowel obstruction likely in the mid abdomen.    There is a small amount of free fluid seen which is abnormal as well.  No definite free air. There is a large amount of liquid in the stomach  as well.    The appendix is normal. No evidence of diverticulitis. No thickened  bowel wall.    Incidental note made of several soft tissue calcifications or  ossification just anterior to the left hip joint.      Impression    IMPRESSION:  1. Multiple dilated fluid containing loops of small bowel in the  midabdomen suspicious for small bowel obstruction. It is likely an  obstruction of the mid small bowel although the exact transition point  is difficult to identify. No evidence of hernia.  2. There is a small amount of free fluid in the pelvis. No free air  seen.  3. Stable fatty infiltration of the liver.    KRYSTIAN CASEY MD

## 2017-12-30 NOTE — H&P
"[facility]    History and Physical  Hospital Medicine       Date of Admission:  12/29/2017  Date of Service: 12/29/2017     Assessment & Plan   Houston Winters is a 47 year old male with PMH type 2 DM, hypertension, seizure disorder who presents with diffuse abdominal pain x 1 week.    Small bowel obstruction  Patient presents with crampy abdominal pain x 1. CT abdomen shows, \"Multiple dilated fluid containing loops of small bowel in the midabdomen suspicious for small bowel obstruction. It is likely an obstruction of the mid small bowel although the exact transition point is difficult to identify. No evidence of hernia.\" Suspect small bowel obstruction due to adhesions as history of open ventral hernia repair about 11 years ago with laparoscopic revision in 2015. Will trial conservative therapy as patient appears nontoxic with normal WBC, afebrile, VSS, no signs of perforation on CT. Electrolytes normal. Denies fever, chills, nausea or vomiting. Pain currently well managed. Last bowel movement and flatus morning of admission.   - NPO, will place NG if emesis per surgery  - Surgery consult, will be seen in AM  - IVF with NS  - Pain management with hydrocodone   - Monitor electrolytes  - lactic acid now  - AM CBC, CMP, lactic acid    Diabetes Mellitus, Type II  Chronic, not well controlled.  Last a1C 12.0% on 04/2017. Managed with Lantus 30 units every morning. Previously on metformin, but patient stopped medication.   -continue PTA lantus at 30 units in the morning  -High SSI  -Draw A1C  -hypo/hyperglycemia protocol with blood glucose monitoring    Chronic Left Hip Pain  Patient has a history of left hip arthroplasty after injury at work with continued pain. Scheduled to have left hip replacement in January at [place]. Managed as an outpatient with 10 mg oxycodone - 325mg acetaminophen at home q 6 hours. Usually takes 2-3 per day.  - Pain management as above while NPO "     Hypertension  Previously controlled with medications, however patient states his blood pressures were higher on medication, so he weaned himself off. Patient normotensive on admission.    Hyperlipidemia  Chronic. Previously on atorvastatin, however patient reports he discontinued it. Last lipid 2015. HDL 36, LDL 90, triglycerides 219.    Partial Seizure Disorder  Patient has a history of seizures. States his last seizure was 8-10 years ago. Previously on topamax. Not currently taking anything    FEN:  -  cc/hr  -Will monitor electrolytes and replace as needed  - NPO    DVT Prophylaxis: Low Risk/Ambulatory with no VTE prophylaxis indicated  Code Status: Full Code    Disposition: Anticipate discharge in 2-3 days once pain . Appropriate for inpatient level care.    I have discussed patient and formulated plan with Dr. Alex Urbano, PA-DARIAN  [facility]        Primary Care Physician   Norma Ramirez [phone]    History is obtained from the patient, ED notes and review of the EMR.    Past Medical History      Epilepsy (H)     Intractable epilepsy, last seizure 2008     MEDICAL HISTORY OF - Age 16     Gangrene in foot stepped on a nail hospitalized for 7 weeks     Nicotine dependence        Small bowel obstruction 12/29/2017     Type 2 diabetes mellitus without complication, without long-term current use of insulin (H) 03/27/2017     Tobacco use disorder 02/04/2015     Hypertension, goal below 140/90 02/04/2015     Obesity 07/17/2014     Hyperlipidemia with target LDL less than 100 07/17/2014     ED (erectile dysfunction) 12/18/2008     GANGLOIN CYST /RIGHT ANKLE 06/15/2006     Past Surgical History   Past Surgical History:   Procedure Laterality Date     CL AFF SURGICAL PATHOLOGY  2005    ganglion cyst removed     EXCISE MASS LOWER EXTREMITY  2/15/2013    Procedure: EXCISE MASS LOWER EXTREMITY;  Right Ankle Excision of ganglion cyst;  Surgeon: Akbar Melo DPM;   "Location: [facility] OR     HERNIA REPAIR  2000    umbilical repair     HERNIORRHAPHY UMBILICAL N/A 11/10/2015    Procedure: HERNIORRHAPHY UMBILICAL;  Surgeon: Garry Leos MD;  Location: [facility] OR     History of Present Illness   Houston Winters is a 47 year old male who presents with abdominal pain x 1 week. The pain started as \"stabbing stomach cramps\", which lasted about 10-15 minutes. The pain comes and goes. It is all over his stomach. Over the week the pain became more frequent, with today being the worst. He rates it a 8/10 at its worst. He noticed that it was worse at night or while laying down. Nothing has improved the pain. He does not associated the pain with eating. He has had to strain more during bowel movements but has not noticed a changed in the color/consistency. Denies bloody or black tarry bowel movements. He tried Dulcolax and Magnesium Citrate at home with no change in symptoms. He also noted some bloating, with the left side of his abdomen worse than the right. His last bowel movement was this morning and he last flatus was also this morning. He denies nausea, vomiting, diarrhea or decrease in appetite. He does have a history of 2 prior abdominal surgeries for both for hernia repair.    He recently was sick with cold symptoms and is still recovering. He reports hoarseness, congestion, productive cough of green phlegm and rhinorrhea. He also reports chronic left hip pain which is unchanged from baseline.    He denies fever, chills, malaise, weight loss, lightheadedness, dizziness, headaches, sore throat, chest pain, shortness of breath, palpitations, changes in urination, arthralgias, myalgias or changes in mood.     ED Course: Labs were obtained including: CMP, lipase, CBC and UA. A CT of the abdomen was obtained. He was given a bolus of NS and started on continuous IVF at 125cc/hr. He was given hydromorphone for pain. Surgery was consulted and recommended: admission to inpatient with surgical consult " "in the morning, NPO, IVF and NG tube if emesis.     Prior to Admission Medications   Prior to Admission Medications   Prescriptions Last Dose Informant Patient Reported? Taking?   HYDROcodone-acetaminophen (NORCO)  MG per tablet 12/29/2017 at 1200 Self No Yes   Sig: Take 1 tablet by mouth every 4 hours as needed for moderate to severe pain maximum 6 tablet(s) per day. Fill on 12-4-17.   Multiple Vitamins-Minerals (MULTIVITAMIN OR) 12/28/2017 at Unknown time Self Yes Yes   Sig: Take 1 tablet by mouth daily    aspirin 81 MG EC tablet 12/28/2017 at Unknown time Self No Yes   Sig: Take 1 tablet (81 mg) by mouth daily   bisacodyl (DULCOLAX) 5 MG EC tablet 12/29/2017 at Unknown time Self Yes Yes   Sig: Take 5 mg by mouth daily as needed for constipation   insulin glargine (LANTUS VIAL) 100 UNIT/ML injection 12/28/2017 at am Self No Yes   Sig: Inject 30 Units Subcutaneous every morning   insulin syringe-needle U-100 (BD INSULIN SYRINGE ULTRAFINE) 31G X 5/16\" 1 ML 12/28/2017 at am Self No Yes   Sig: Use one syringe 22 units daily daily or as directed.   order for DME 12/28/2017 at Unknown time Self No Yes   Sig: Test strips for pt's glucometer, brand as covered by insurance Test bid and prn.      Facility-Administered Medications: None     Allergies   No Known Allergies    Family History    Family History   Problem Relation Age of Onset     Arthritis Mother      CANCER Maternal Grandmother      Arthritis Maternal Grandmother      lupus     GASTROINTESTINAL DISEASE Maternal Grandfather      war injury, colostomy     Respiratory Daughter      growing out of it     Unknown/Adopted Father      DIABETES No family hx of      Coronary Artery Disease No family hx of      Hypertension No family hx of      Hyperlipidemia No family hx of      Breast Cancer No family hx of      Cancer - colorectal No family hx of      Ovarian Cancer No family hx of      Prostate Cancer No family hx of      Social History   Social History " "    Social History     Marital status:      Spouse name: N/A     Number of children: N/A     Years of education: N/A     Occupational History     Not on file.     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 No     Social History Narrative       Review of Systems   The 10 point Review of Systems is negative other than noted in the HPI or here.   Constitutional: Denies fever, chills, unintentional weight loss, change in appetite or fatigue.    Physical Exam   /85  Pulse 85  Temp 98.4  F (36.9  C) (Oral)  Resp 18  Ht 1.778 m (5' 10\")  Wt 92.2 kg (203 lb 4.2 oz)  SpO2 97%  BMI 29.17 kg/m2     Weight: 203 lbs 4.23 oz Body mass index is 29.17 kg/(m^2).     Constitutional: Patient is lying comfortably in hospital bed. Alert, oriented, cooperative, no apparent distress, appears nontoxic, Appears stated age.  Eyes: Sclera are anicteric, EOMI  HENT: Normocephalic. Atraumatic.   Cardiovascular: Regular rate and rhythm, and no murmur, rubs or gallops noted. No carotid bruits noted. Radial pulses are 2+ bilaterally. No lower extremity edema.  Respiratory: No accessory muscle usage. Speaking in full sentences. " Clear to auscultation bilaterally without wheezes, crackles or rhonchi.   GI: mildly hyperactive bowel sounds present, non-tympanic. soft. Mild diffuse tenderness to palpation, worse in the LLQ and epigastrium, with rebound and voluntary guarding. Slightly distended.  Genitourinary: Deferred  Musculoskeletal: Normal muscle bulk and tone. Moves all extremities appropriately.  Skin: Warm and dry, no rashes.     Data   Data reviewed today:     Recent Labs  Lab 12/29/17  1545   WBC 9.2   HGB 15.6   MCV 92         POTASSIUM 4.1   CHLORIDE 99   CO2 26   BUN 12   CR 0.52*   ANIONGAP 9   BERNABE 9.0   *   ALBUMIN 4.0   PROTTOTAL 8.0   BILITOTAL 0.5   ALKPHOS 125   ALT 37   AST 14   LIPASE 98     Recent Results (from the past 24 hour(s))   CT Abdomen Pelvis w Contrast    Narrative    CT ABDOMEN AND PELVIS WITH CONTRAST 12/29/2017 4:46 PM     HISTORY: Diffuse abdominal tenderness and distention.     TECHNIQUE: 100 mL Isovue-370. Radiation dose for this scan was reduced  using automated exposure control, adjustment of the mA and/or kV  according to patient size, or iterative reconstruction technique.    COMPARISON: CT scan from 4/26/2016.    FINDINGS: Visualized lung bases are unremarkable. Fatty infiltration  of the liver is stable. Gallbladder is unremarkable. Spleen and  pancreas are normal. No adrenal lesions. Kidneys are normal. No  retroperitoneal adenopathy or evidence of aortic aneurysm.    There are multiple loops of dilated bowel in the midabdomen. It is  difficult to identify the exact point of transition but there are  multiple normal-sized loops of small bowel in the distal ileum. This  is suspicious for small bowel obstruction likely in the mid abdomen.    There is a small amount of free fluid seen which is abnormal as well.  No definite free air. There is a large amount of liquid in the stomach  as well.    The appendix is normal. No evidence of diverticulitis. No thickened  bowel  wall.    Incidental note made of several soft tissue calcifications or  ossification just anterior to the left hip joint.      Impression    IMPRESSION:  1. Multiple dilated fluid containing loops of small bowel in the  midabdomen suspicious for small bowel obstruction. It is likely an  obstruction of the mid small bowel although the exact transition point  is difficult to identify. No evidence of hernia.  2. There is a small amount of free fluid in the pelvis. No free air  seen.  3. Stable fatty infiltration of the liver.       I personally reviewed the abdominal CT image(s).     I have discussed patient and formulated plan with Dr. Alex Knutson.    Kaitlin Urbano, [facility]

## 2017-12-30 NOTE — PROGRESS NOTES
Pt reports some pain with urination this AM, voided 150 cc home urine.  New complaint since admission.  Dr. Negrete aware, 1L bolus given, UA sent.  Denies abdominal pain or nausea at this time.  Few BS heard, abdomin soft, but no flatus.  Pt up ambulating in hallway, continue to encourage activity.

## 2017-12-30 NOTE — PLAN OF CARE
Problem: Patient Care Overview  Goal: Plan of Care/Patient Progress Review  Pt denies nausea. States continues to have crampy abdominal pain. rec'd dilaudid 0.5mg iv , one dose last aaron & one dose now with good relief. Abdomen soft, BS+, no flatus. +vdg. BG checked every 4hrs & insulin given per orders as pt is NPO.

## 2017-12-30 NOTE — CONSULTS
[facility]    Surgery Consultation    Date of Admission:  12/29/2017  Patient seen in consultation for Small Bowel Obstruction by Sharon Simpson    Assessment & Plan   Houston Winters is a 47 year old male who was admitted on 12/29/2017. I was asked to see the patient for SBO.    Conservative Mgt; no acute surgical intervention at this time.  NPO  IVF (Changed to LR @ 100; change to D5LR once BG <200)  Monitor electrolytes and replace as needed  Continue glycemic control  Medical Mgt Per Primary team.    If nausea worsens or if emesis, will need NGT placed.  If patient begins to have consistent Flatus; ok to start on CLD.      Active Problems:    Type 2 diabetes mellitus without complication, without long-term current use of insulin (H)    Small bowel obstruction      Akbar Pope    Code Status    Full Code    Reason for Consult   Reason for consult: SBO    Primary Care Physician   Norma Ramirez    Chief Complaint   Abdominal Pain/Cramping    History is obtained from the patient    History of Present Illness   Houston Winters is a 47 year old male with h/o DM and ventral hernia repair x2 who presents with 1 week history of worsening 4-6/10 sharp/cramping abdominal pain now associated with 1 day history of obstipation.  Patient states his last BM and episode of Flatus were yesterday morning.  He has had a similar episode in the past that resolved spontaneously.  Currently denies and N/V.  States the pain medications help.  Nothing appears to worsen the symptoms.  Overall, reports feeling better since hospital admission.     Past Medical History   I have reviewed this patient's medical history and updated it with pertinent information if needed.   Past Medical History:   Diagnosis Date     DM (diabetes mellitus) (H)      Epilepsy (H)     Intractable epilepsy, last seizure 2008     MEDICAL HISTORY OF - Age 16     Gangrene in foot stepped on a nail hospitalized for  "7 weeks     Nicotine dependence        Past Surgical History   I have reviewed this patient's surgical history and updated it with pertinent information if needed.  Past Surgical History:   Procedure Laterality Date     CL AFF SURGICAL PATHOLOGY  2005    ganglion cyst removed     EXCISE MASS LOWER EXTREMITY  2/15/2013    Procedure: EXCISE MASS LOWER EXTREMITY;  Right Ankle Excision of ganglion cyst;  Surgeon: Akbar Melo DPM;  Location: [facility] OR     HERNIA REPAIR  2000    umbilical repair     HERNIORRHAPHY UMBILICAL N/A 11/10/2015    Procedure: HERNIORRHAPHY UMBILICAL;  Surgeon: Garry Leos MD;  Location: [facility] OR       Prior to Admission Medications   Prior to Admission Medications   Prescriptions Last Dose Informant Patient Reported? Taking?   HYDROcodone-acetaminophen (NORCO)  MG per tablet 12/29/2017 at 1200 Self No Yes   Sig: Take 1 tablet by mouth every 4 hours as needed for moderate to severe pain maximum 6 tablet(s) per day. Fill on 12-4-17.   Multiple Vitamins-Minerals (MULTIVITAMIN OR) 12/28/2017 at Unknown time Self Yes Yes   Sig: Take 1 tablet by mouth daily    aspirin 81 MG EC tablet 12/28/2017 at Unknown time Self No Yes   Sig: Take 1 tablet (81 mg) by mouth daily   bisacodyl (DULCOLAX) 5 MG EC tablet 12/29/2017 at Unknown time Self Yes Yes   Sig: Take 5 mg by mouth daily as needed for constipation   insulin glargine (LANTUS VIAL) 100 UNIT/ML injection 12/28/2017 at am Self No Yes   Sig: Inject 30 Units Subcutaneous every morning   insulin syringe-needle U-100 (BD INSULIN SYRINGE ULTRAFINE) 31G X 5/16\" 1 ML 12/28/2017 at am Self No Yes   Sig: Use one syringe 22 units daily daily or as directed.   order for DME 12/28/2017 at Unknown time Self No Yes   Sig: Test strips for pt's glucometer, brand as covered by insurance Test bid and prn.      Facility-Administered Medications: None     Allergies   No Known Allergies    Social History   I have reviewed this patient's social history and updated " it with pertinent information if needed. Houston Winters  reports that he quit smoking about 1 years ago. His smoking use included Cigarettes. He has a 6.25 pack-year smoking history. He has quit using smokeless tobacco. He reports that he drinks alcohol. He reports that he does not use illicit drugs.    Family History   I have reviewed this patient's family history and updated it with pertinent information if needed.   Family History   Problem Relation Age of Onset     Arthritis Mother      CANCER Maternal Grandmother      Arthritis Maternal Grandmother      lupus     GASTROINTESTINAL DISEASE Maternal Grandfather      war injury, colostomy     Respiratory Daughter      growing out of it     Unknown/Adopted Father      DIABETES No family hx of      Coronary Artery Disease No family hx of      Hypertension No family hx of      Hyperlipidemia No family hx of      Breast Cancer No family hx of      Cancer - colorectal No family hx of      Ovarian Cancer No family hx of      Prostate Cancer No family hx of        Review of Systems   CONSTITUTIONAL:  negative  EYES:  negative  HEENT:  negative  RESPIRATORY:  negative  CARDIOVASCULAR:  negative  GASTROINTESTINAL:  positive for change in bowel habits and abdominal pain  GENITOURINARY:  negative  INTEGUMENT/BREAST:  negative  HEMATOLOGIC/LYMPHATIC:  negative  ALLERGIC/IMMUNOLOGIC:  negative  ENDOCRINE:  negative  MUSCULOSKELETAL:  negative  NEUROLOGICAL:  negative  BEHAVIOR/PSYCH:  negative    Physical Exam   Temp: 98.3  F (36.8  C) Temp src: Oral BP: 132/64 Pulse: 80 Heart Rate: 116 Resp: 18 SpO2: 96 % O2 Device: None (Room air)    Vital Signs with Ranges  Temp:  [98.3  F (36.8  C)-98.4  F (36.9  C)] 98.3  F (36.8  C)  Pulse:  [16-85] 80  Heart Rate:  [116] 116  Resp:  [18-20] 18  BP: (119-142)/(64-85) 132/64  SpO2:  [96 %-97 %] 96 %  203 lbs .7 oz    Constitutional: Awake, alert, cooperative, no apparent distress, and appears stated age.  Eyes: Lids and lashes normal,  pupils equal, round and reactive to light, extra ocular muscles intact, sclera clear, conjunctiva normal.  ENT: Normocephalic, without obvious abnormality, atraumatic, sinuses nontender on palpation  Respiratory: No increased work of breathing, good air exchange,   Cardiovascular:  regular rate and rhythm,  GI: No scars, soft, mildly distended, non-tender, no masses palpated, no hepatosplenomegaly.  Lymph/Hematologic: No cervical lymphadenopathy and no supraclavicular lymphadenopathy.  Genitourinary:  Deferred  Skin: No bruising or bleeding, normal skin color, texture, turgor, no redness, warmth, or swelling, no rashes, no lesions,  nails normal without discoloration or clubbing and no jaundice.  Musculoskeletal: There is no redness, warmth, or swelling of the joints.  Full range of motion noted.  Motor strength is 5 out of 5 all extremities bilaterally.  Tone is normal.  Neurologic: Awake, alert, oriented to name, place and time.  Cranial nerves II-XII are grossly intact.  Motor is 5 out of 5 bilaterally.    Sensory is intact.   Neuropsychiatric: Calm, normal eye contact, alert, normal affect, oriented to self, place, time and situation, memory for past and recent events intact and thought process normal.    Data   I personally reviewed the abdominal CT image(s) showing dilated loops of small bowel w/ air fluid levels.  No obvious transition point.  SBO.

## 2017-12-30 NOTE — PLAN OF CARE
"Problem: Patient Care Overview  Goal: Plan of Care/Patient Progress Review  Pt denies nausea. States has abdominal \"cramping\". Abdomen soft, BS+, no flatus per pt. Up indep in room. IVF infusing. Pt NPO & aware.       "

## 2017-12-30 NOTE — PLAN OF CARE
Problem: Patient Care Overview  Goal: Plan of Care/Patient Progress Review  Outcome: Improving  Pt having minimal abdominal pain, no nausea.  Reports small bm and some flatus this afternoon.  Diet advanced to clear liquids, tolerating well.

## 2017-12-30 NOTE — PLAN OF CARE
"Problem: Patient Care Overview  Goal: Plan of Care/Patient Progress Review  Outcome: No Change  [facility] ADMISSION NOTE    Patient admitted to room 2400 at approximately 1900 via cart from emergency room. Patient was accompanied by transport tech.     Verbal SBAR report received from Barbi prior to patient arrival.     Patient transferred to bed via self. Patient alert and oriented X 3. Pain is controlled with current analgesics.  Medication(s) being used: narcotic analgesics including dilaudid. Admission vital signs: Blood pressure 129/85, pulse 85, temperature 98.4  F (36.9  C), temperature source Oral, resp. rate 18, height 1.778 m (5' 10\"), weight 92.2 kg (203 lb 4.2 oz), SpO2 97 %. Patient was oriented to room, call light, plan of care, visiting hours.     The following safety risks were identified during admission: none. Yellow risk band applied: RUSS Thorpe        "

## 2017-12-31 VITALS
BODY MASS INDEX: 29.07 KG/M2 | OXYGEN SATURATION: 97 % | TEMPERATURE: 99.1 F | HEIGHT: 70 IN | HEART RATE: 87 BPM | WEIGHT: 203.04 LBS | RESPIRATION RATE: 18 BRPM | DIASTOLIC BLOOD PRESSURE: 81 MMHG | SYSTOLIC BLOOD PRESSURE: 134 MMHG

## 2017-12-31 LAB
GLUCOSE BLDC GLUCOMTR-MCNC: 284 MG/DL (ref 70–99)
GLUCOSE BLDC GLUCOMTR-MCNC: 305 MG/DL (ref 70–99)

## 2017-12-31 PROCEDURE — 25000128 H RX IP 250 OP 636: Performed by: PHYSICIAN ASSISTANT

## 2017-12-31 PROCEDURE — 00000146 ZZHCL STATISTIC GLUCOSE BY METER IP

## 2017-12-31 PROCEDURE — 99239 HOSP IP/OBS DSCHRG MGMT >30: CPT | Performed by: INTERNAL MEDICINE

## 2017-12-31 RX ADMIN — Medication 0.5 MG: at 07:02

## 2017-12-31 NOTE — PROGRESS NOTES
PATRICIA HERNANDEZ DISCHARGE NOTE    Patient discharged to home at 2:00 PM via ambulation. Accompanied by staff. Discharge instructions reviewed with patient, opportunity offered to ask questions. Prescriptions - None ordered for discharge. All belongings sent with patient.    [place]iana Ruvalcaba

## 2017-12-31 NOTE — PLAN OF CARE
Problem: Bowel Obstruction (Adult)  Goal: Signs and Symptoms of Listed Potential Problems Will be Absent, Minimized or Managed (Bowel Obstruction)  Signs and symptoms of listed potential problems will be absent, minimized or managed by discharge/transition of care (reference Bowel Obstruction (Adult) CPG).   No  Nausea. Pt complains of mild cramping pain on left side of abdomen. Also states having left hip pain & requesting dilaudid. BS+x4, although less active on left side this morning. Pt states he is passing flatus and has not had a BM this shift. Has had 8oz coffee this shift. Dilaudid 0.5mg iv given for abdomen & left hip pain.

## 2017-12-31 NOTE — PLAN OF CARE
Problem: Patient Care Overview  Goal: Plan of Care/Patient Progress Review  Pt tolerating clear liquids,  denies nausea. States has mild crampy pain but denies need for pain med at this time. Up ambulating in [place] this evening & stated he stopped at the bathroom & had medium stool. +vdg. BS+, abdomen soft. +flatus.

## 2017-12-31 NOTE — PROGRESS NOTES
"Pt tolerating clear liquid diet  Pass flatus per nursing record and had 2 recorded bowel movements.    Vital signs:  Temp: 98.1  F (36.7  C) Temp src: Oral BP: 137/77 Pulse: 87 Heart Rate: 81 Resp: 18 SpO2: 97 % O2 Device: None (Room air)   Height: 177.8 cm (5' 10\") Weight: 92.1 kg (203 lb 0.7 oz)  Estimated body mass index is 29.13 kg/(m^2) as calculated from the following:    Height as of this encounter: 1.778 m (5' 10\").    Weight as of this encounter: 92.1 kg (203 lb 0.7 oz).      Assesment/Plan:    SBO   Improved   Ok to advance diet as tolerated   No surgical intervention   Recommend to D/C IV pain medications   Ok to use toradol IV PRN    Continue electrolyte replacement protocol       Surgery available by phone if needed.    Akbar Pope D.O.  General Surgery    "

## 2017-12-31 NOTE — DISCHARGE SUMMARY
"[facility] Discharge Summary    Houston Winters MRN# [id number]   Age: 47 year old YOB: 1970     Date of Admission:  12/29/2017  Date of Discharge::  12/31/2017  Admitting Physician:  Alex Knutson MD  Discharge Physician:  Brooke Negrete MD  Primary Physician: Norma Ramirez  Transferring Facility: N/A     Home clinic: [address] [place] [address]          Admission Diagnoses:   Small bowel obstruction [K56.609]          Discharge Diagnosis:   Principle diagnosis: SBO  Secondary diagnoses:  Patient Active Problem List   Diagnosis     GANGLOIN CYST /RIGHT ANKLE     ED (erectile dysfunction)     Partial epilepsy (H)     Obesity     Hyperlipidemia with target LDL less than 100     Tobacco use disorder     Hypertension, goal below 140/90     Type 2 diabetes mellitus without complication, without long-term current use of insulin (H)     Small bowel obstruction          Brief History of Presenting Illness:   As per admit hx  Houston Winters is a 47 year old male who presents with abdominal pain x 1 week. The pain started as \"stabbing stomach cramps\", which lasted about 10-15 minutes. The pain comes and goes. It is all over his stomach. Over the week the pain became more frequent, with today being the worst. He rates it a 8/10 at its worst. He noticed that it was worse at night or while laying down. Nothing has improved the pain. He does not associated the pain with eating. He has had to strain more during bowel movements but has not noticed a changed in the color/consistency. Denies bloody or black tarry bowel movements. He tried Dulcolax and Magnesium Citrate at home with no change in symptoms. He also noted some bloating, with the left side of his abdomen worse than the right. His last bowel movement was this morning and he last flatus was also this morning. He denies nausea, vomiting, diarrhea or decrease in appetite. He does have a history of 2 prior abdominal surgeries for " both for hernia repair.       No results found for this or any previous visit (from the past 24 hour(s)).    Ct abd-IMPRESSION:  1. Multiple dilated fluid containing loops of small bowel in the  midabdomen suspicious for small bowel obstruction. It is likely an  obstruction of the mid small bowel although the exact transition point  is difficult to identify. No evidence of hernia.  2. There is a small amount of free fluid in the pelvis. No free air  seen.  3. Stable fatty infiltration of the liver.         Hospital Course:   Small bowel obstruction  Patient presents with crampy abdominal pain x 1. CT abdomen as above.  Suspect small bowel obstruction due to adhesions as history of open ventral hernia repair about 11 years ago with laparoscopic revision in 2015.  Passing flatus and had a small BM. Tolerated full diet and toast. Will have some regular food before d/c.     Diabetes Mellitus, Type II  Chronic, not well controlled.  Last a1C 12.0% on 04/2017. Managed with Lantus 30 units every morning. Previously on metformin, but patient stopped medication.   Continue home meds     Chronic Left Hip Pain  Patient has a history of left hip arthroplasty after injury at work with continued pain. Scheduled to have left hip replacement in January at [facility]. Managed as an outpatient with 10 mg oxycodone - 325mg acetaminophen at home q 6 hours. Usually takes 2-3 per day.  Continue po meds       Hyperlipidemia  Chronic. Previously on atorvastatin, however patient reports he discontinued it. Last lipid 2015. HDL 36, LDL 90, triglycerides 219.      Partial Seizure Disorder  Patient has a history of seizures. States his last seizure was 8-10 years ago. Previously on topamax. Not currently taking anything    DISPO  Home. Pt wants to go too--tolerated full diet/ toast well. Will try regular diet before d/c.             Procedures:   No procedures performed during this admission         Allergies:    No Known Allergies           "Medications Prior to Admission:     Prescriptions Prior to Admission   Medication Sig Dispense Refill Last Dose     bisacodyl (DULCOLAX) 5 MG EC tablet Take 5 mg by mouth daily as needed for constipation   12/29/2017 at Unknown time     HYDROcodone-acetaminophen (NORCO)  MG per tablet Take 1 tablet by mouth every 4 hours as needed for moderate to severe pain maximum 6 tablet(s) per day. Fill on 12-4-17. 75 tablet 0 12/29/2017 at 1200     insulin glargine (LANTUS VIAL) 100 UNIT/ML injection Inject 30 Units Subcutaneous every morning 10 mL 3 12/28/2017 at am     order for DME Test strips for pt's glucometer, brand as covered by insurance Test bid and prn. 200 each 4 12/28/2017 at Unknown time     insulin syringe-needle U-100 (BD INSULIN SYRINGE ULTRAFINE) 31G X 5/16\" 1 ML Use one syringe 22 units daily daily or as directed. 100 each 11 12/28/2017 at am     Multiple Vitamins-Minerals (MULTIVITAMIN OR) Take 1 tablet by mouth daily    12/28/2017 at Unknown time     aspirin 81 MG EC tablet Take 1 tablet (81 mg) by mouth daily 90 tablet 3 12/28/2017 at Unknown time             Discharge Medications:     Current Discharge Medication List      CONTINUE these medications which have NOT CHANGED    Details   bisacodyl (DULCOLAX) 5 MG EC tablet Take 5 mg by mouth daily as needed for constipation      HYDROcodone-acetaminophen (NORCO)  MG per tablet Take 1 tablet by mouth every 4 hours as needed for moderate to severe pain maximum 6 tablet(s) per day. Fill on 12-4-17.  Qty: 75 tablet, Refills: 0    Associated Diagnoses: Chronic left hip pain      insulin glargine (LANTUS VIAL) 100 UNIT/ML injection Inject 30 Units Subcutaneous every morning  Qty: 10 mL, Refills: 3    Comments: Note increase in dose  Associated Diagnoses: Type 2 diabetes mellitus without complication, without long-term current use of insulin (H)      order for DME Test strips for pt's glucometer, brand as covered by insurance Test bid and prn.  Qty: " "200 each, Refills: 4    Associated Diagnoses: Type 2 diabetes mellitus without complication, without long-term current use of insulin (H)      insulin syringe-needle U-100 (BD INSULIN SYRINGE ULTRAFINE) 31G X 5/16\" 1 ML Use one syringe 22 units daily daily or as directed.  Qty: 100 each, Refills: 11    Associated Diagnoses: Type 2 diabetes mellitus without complication, without long-term current use of insulin (H)      Multiple Vitamins-Minerals (MULTIVITAMIN OR) Take 1 tablet by mouth daily     Associated Diagnoses: Type 2 diabetes, HbA1c goal < 7% (H)      aspirin 81 MG EC tablet Take 1 tablet (81 mg) by mouth daily  Qty: 90 tablet, Refills: 3    Associated Diagnoses: Diabetes mellitus, type 2 (H)                   Consultations:   Consultation during this admission received from surgery            Discharge Exam:   Blood pressure 134/81, pulse 87, temperature 99.1  F (37.3  C), temperature source Oral, resp. rate 18, height 1.778 m (5' 10\"), weight 92.1 kg (203 lb 0.7 oz), SpO2 97 %.  GENERAL APPEARANCE: healthy, alert and no distress  EYES: conjunctiva clear, eyes grossly normal  HENT: external ears and nose normal   NECK: supple, no masses or adenopathy  RESP: lungs clear to auscultation - no rales, rhonchi or wheezes  CV: regular rate and rhythm, normal S1 S2, no S3 or S4 and no murmur, click or rub   ABDOMEN: soft, nontender, no HSM or masses and bowel sounds normal  MS: no clubbing, cyanosis; no edema  SKIN: clear without significant rashes or lesions  NEURO: Normal strength and tone, sensory exam grossly normal, mentation intact and speech normal    Unresulted Labs Ordered in the Past 30 Days of this Admission     No orders found from 10/30/2017 to 12/30/2017.          No results found for this or any previous visit (from the past 24 hour(s)).         Pending Tests at Discharge:   None         Discharge Instructions and Follow-Up:   Discharge diet: Regular   Discharge activity: Activity as tolerated "   Discharge follow-up: Follow up with primary care provider in 2 weeks           Discharge Disposition:   Discharged to home      Attestation:  I have reviewed today's vital signs, notes, medications, labs and imaging.    Time Spent on this Encounter   I, Brooke Negrete, personally saw the patient today and spent greater than 30 minutes discharging this patient.      Brooke Negrete MD

## 2018-01-02 ENCOUNTER — TELEPHONE (OUTPATIENT)
Dept: FAMILY MEDICINE | Facility: CLINIC | Age: 48
End: 2018-01-02

## 2018-01-02 NOTE — TELEPHONE ENCOUNTER
ED/UC/IP follow up phone call:12/31/2017 [facility]    RN please call to follow up.    Number of ED visits in past 12 months = 1/1    Meme Alvarez  Clinic Station  Flex

## 2018-01-03 NOTE — TELEPHONE ENCOUNTER
"ED / Discharge Outreach Protocol    Patient Contact    Attempt # 1    Was call answered?  Yes.  \"May I please speak with <patient name>\"  Is patient available?   Yes  Hospital/TCU/ED for chronic condition Discharge Protocol    \"Hi, my name is Alexa Lucio, a registered nurse, and I am calling from [facility].  I am calling to follow up and see how things are going for you after your recent emergency visit/hospital/TCU stay.\"    Tell me how you are doing now that you are home?\" \"feel like a million bucks, thanks, \"        Discharge Instructions    \"Let's review your discharge instructions.  What is/are the follow-up recommendations?  Pt. Response: has appt tomorrow for preop    \"Has an appointment with your primary care provider been scheduled?\"   Yes. (confirm)    \"When you see the provider, I would recommend that you bring your medications with you.\"    Medications    \"Tell me what changed about your medicines when you discharged?\"    Changes to chronic meds?    0-1    \"What questions do you have about your medications?\"    None     New diagnoses of heart failure, COPD, diabetes, or MI?    No     On insulin: \"Did you start on insulin in the hospital or did you have your insulin dose changed?\"  No           Medication reconciliation completed? Yes  Was MTM referral placed (*Make sure to put transitions as reason for referral)?   No    Call Summary    \"What questions or concerns do you have about your recent visit and your follow-up care?\"     none    \"If you have questions or things don't continue to improve, we encourage you contact us through the main clinic number [phone] (give number).  Even if the clinic is not open, triage nurses are available 24/7 to help you.     We would like you to know that our clinic has extended hours 7 am to 7 pm some days each week  (provide information).  We also have urgent care (provide details on closest location and hours/contact info)\"      \"Thank you for your time " "and take care!\"    Alexa Lucio RNC         "

## 2018-01-04 ENCOUNTER — OFFICE VISIT (OUTPATIENT)
Dept: FAMILY MEDICINE | Facility: CLINIC | Age: 48
End: 2018-01-04
Payer: COMMERCIAL

## 2018-01-04 VITALS
SYSTOLIC BLOOD PRESSURE: 115 MMHG | TEMPERATURE: 98.7 F | BODY MASS INDEX: 29.77 KG/M2 | DIASTOLIC BLOOD PRESSURE: 74 MMHG | HEIGHT: 69 IN | HEART RATE: 95 BPM | WEIGHT: 201 LBS

## 2018-01-04 DIAGNOSIS — G89.29 CHRONIC LEFT HIP PAIN: ICD-10-CM

## 2018-01-04 DIAGNOSIS — Z01.818 PREOP GENERAL PHYSICAL EXAM: Primary | ICD-10-CM

## 2018-01-04 DIAGNOSIS — M25.552 CHRONIC LEFT HIP PAIN: ICD-10-CM

## 2018-01-04 PROCEDURE — 99214 OFFICE O/P EST MOD 30 MIN: CPT | Performed by: FAMILY MEDICINE

## 2018-01-04 RX ORDER — HYDROCODONE BITARTRATE AND ACETAMINOPHEN 10; 325 MG/1; MG/1
1 TABLET ORAL EVERY 4 HOURS PRN
Qty: 75 TABLET | Refills: 0 | Status: SHIPPED | OUTPATIENT
Start: 2018-01-04 | End: 2018-06-26

## 2018-01-04 NOTE — MR AVS SNAPSHOT
After Visit Summary   1/4/2018    Houston Winters    MRN: [id number]           Patient Information     Date Of Birth          1970        Visit Information        Provider Department      1/4/2018 2:40 PM Manolo Funes MD [facility]        Today's Diagnoses     Preop general physical exam    -  1    Chronic left hip pain          Care Instructions      Before Your Surgery      Call your surgeon if there is any change in your health. This includes signs of a cold or flu (such as a sore throat, runny nose, cough, rash or fever).    Do not smoke, drink alcohol or take over the counter medicine (unless your surgeon or primary care doctor tells you to) for the 24 hours before and after surgery.    If you take prescribed drugs: Follow your doctor s orders about which medicines to take and which to stop until after surgery.    Eating and drinking prior to surgery: follow the instructions from your surgeon    Take a shower or bath the night before surgery. Use the soap your surgeon gave you to gently clean your skin. If you do not have soap from your surgeon, use your regular soap. Do not shave or scrub the surgery site.  Wear clean pajamas and have clean sheets on your bed.         Thank you for choosing [facility] (formerly [facility]).  You may be receiving a survey in the mail from [employer] [facility] [facility] [employer] regarding your visit today.  Please take a few minutes to complete and return the survey to let us know how we are doing.      If you have questions or concerns, please contact us via Telinet or you can contact your care team at [phone].    Our Clinic hours are:  Monday 6:40 am  to 7:00 pm  Tuesday -Friday 6:40 am to 5:00 pm    The [place] outpatient lab hours are:  Monday - Friday 6:10 am to 4:45 pm  Saturdays 7:00 am to 11:00 am  Appointments are required, call [phone]      If you have clinical questions after hours or would like to schedule an appointment,  call the clinic at  [phone].      DIAGNOSTICS:                                                    EKG: Not indicated due to non-vascular surgery and low risk of event (age <65 and without cardiac risk factors)  Labs were done on 17: creatinine was 0.59; Hgb was 13.0.     Recent Labs   Lab Test  17   0750  17   1600  17   1545  17   1230   14   1610   HGB  13.0*   --   15.6  14.9   < >  13.0*   PLT  236   --   296  207   < >  162   INR   --    --    --    --    --   0.94   NA  138   --   134  137   < >   --    POTASSIUM  4.1   --   4.1  4.6   < >   --    CR  0.59*   --   0.52*  0.55*   < >   --    A1C   --   12.0*   --   12.0*   < >   --     < > = values in this interval not displayed.        IMPRESSION:                                                    Reason for surgery/procedure: Houston Winters (: 1970) presents for pre-operative evaluation assessment as requested by Dr. Veliz. He requires evaluation and anesthesia risk assessment prior to undergoing surgery/procedure for treatment of Left Hip Injury at work 10/24/2016 .  Proposed procedure: Total Left Hip Replacement     The proposed surgical procedure is considered LOW risk.    REVISED CARDIAC RISK INDEX  The patient has the following serious cardiovascular risks for perioperative complications such as (MI, PE, VFib and 3  AV Block):  No serious cardiac risks  INTERPRETATION: 0 risks: Class I (very low risk - 0.4% complication rate)    The patient has the following additional risks for perioperative complications:  No identified additional risks      ICD-10-CM    1. Preop general physical exam Z01.818    2. Chronic left hip pain M25.552 HYDROcodone-acetaminophen (NORCO)  MG per tablet    G89.29        RECOMMENDATIONS:                                                      --Patient is to take all scheduled medications on the day befire surgery EXCEPT for modifications listed below.  The instructions for the Lantus insulin should  "followed from the surgeon. If they did not specify the insulin dose, then take 15 units on the am of the surgery,   Which is one half the usual dose.   Stop aspirin and advil and aleve one week before surgery. Tylenol is OK.   Your stomach should be empty for 8 hours before surgery.     APPROVAL GIVEN to proceed with proposed procedure, without further diagnostic evaluation            Follow-ups after your visit        Who to contact     If you have questions or need follow up information about today's clinic visit or your schedule please contact [facility] directly at [phone].  Normal or non-critical lab and imaging results will be communicated to you by [employer], letter or phone within 4 business days after the clinic has received the results. If you do not hear from us within 7 days, please contact the clinic through HotDeskt or phone. If you have a critical or abnormal lab result, we will notify you by phone as soon as possible.  Submit refill requests through [employer] or call your pharmacy and they will forward the refill request to us. Please allow 3 business days for your refill to be completed.          Additional Information About Your Visit        [employer] Information     [employer] lets you send messages to your doctor, view your test results, renew your prescriptions, schedule appointments and more. To sign up, go to [email or web address] . Click on \"Log in\" on the left side of the screen, which will take you to the Welcome page. Then click on \"Sign up Now\" on the right side of the page.     You will be asked to enter the access code listed below, as well as some personal information. Please follow the directions to create your username and password.     Your access code is: [email or web address]  Expires: 2/15/2018  3:14 PM     Your access code will  in 90 days. If you need help or a new code, please call your [facility] or [phone].        Care EveryWhere ID     This is your Care " "EveryWhere ID. This could be used by other organizations to access your [facility] medical records  [id number]        Your Vitals Were     Pulse Temperature Height BMI (Body Mass Index)          95 98.7  F (37.1  C) (Tympanic) 5' 9\" (1.753 m) 29.68 kg/m2         Blood Pressure from Last 3 Encounters:   01/04/18 115/74   12/31/17 134/81   11/17/17 136/80    Weight from Last 3 Encounters:   01/04/18 201 lb (91.2 kg)   12/30/17 203 lb 0.7 oz (92.1 kg)   11/17/17 211 lb (95.7 kg)              Today, you had the following     No orders found for display         Where to get your medicines      Some of these will need a paper prescription and others can be bought over the counter.  Ask your nurse if you have questions.     Bring a paper prescription for each of these medications     HYDROcodone-acetaminophen  MG per tablet          Primary Care Provider Office Phone # Fax #    Norma Montrell Ramirez, APRN [facility] [phone] [address] [facility] [address]        Equal Access to Services     SVEN MICHEL : Hadii junior morenoo Sokyle, waaxda luqadaha, qaybta kaalmada adeian, jovanni harrison . So [facility] [phone].    ATENCIÓN: Si habla español, tiene a isabel disposición servicios gratuitos de asistencia lingüística. Llame al [phone].    We comply with applicable federal civil rights laws and [place] laws. We do not discriminate on the basis of race, color, national origin, age, disability, sex, sexual orientation, or gender identity.            Thank you!     Thank you for choosing [facility]  for your care. Our goal is always to provide you with excellent care. Hearing back from our patients is one way we can continue to improve our services. Please take a few minutes to complete the written survey that you may receive in the mail after your visit with us. Thank you!             Your Updated Medication List - Protect others around you: Learn how to " "safely use, store and throw away your medicines at www.disposemymeds.org.          This list is accurate as of: 1/4/18  3:22 PM.  Always use your most recent med list.                   Brand Name Dispense Instructions for use Diagnosis    aspirin 81 MG EC tablet     90 tablet    Take 1 tablet (81 mg) by mouth daily    Diabetes mellitus, type 2 (H)       bisacodyl 5 MG EC tablet    DULCOLAX     Take 5 mg by mouth daily as needed for constipation        HYDROcodone-acetaminophen  MG per tablet    NORCO    75 tablet    Take 1 tablet by mouth every 4 hours as needed for moderate to severe pain maximum 6 tablet(s) per day. Fill on 12-4-17.    Chronic left hip pain       insulin glargine 100 UNIT/ML injection    LANTUS VIAL    10 mL    Inject 30 Units Subcutaneous every morning    Type 2 diabetes mellitus without complication, without long-term current use of insulin (H)       insulin syringe-needle U-100 31G X 5/16\" 1 ML    BD insulin syringe ULTRAFINE    100 each    Use one syringe 22 units daily daily or as directed.    Type 2 diabetes mellitus without complication, without long-term current use of insulin (H)       MULTIVITAMIN PO      Take 1 tablet by mouth daily    Type 2 diabetes, HbA1c goal < 7% (H)       order for DME     200 each    Test strips for pt's glucometer, brand as covered by insurance Test bid and prn.    Type 2 diabetes mellitus without complication, without long-term current use of insulin (H)         "

## 2018-01-04 NOTE — PROGRESS NOTES
[facility]  [address] [address]  [phone]  Dept: [phone]    PRE-OP EVALUATION:WORK COMP   Today's date: 2018    Houston Winters (: 1970) presents for pre-operative evaluation assessment as requested by Dr. Veliz. He requires evaluation and anesthesia risk assessment prior to undergoing surgery/procedure for treatment of Left Hip Injury at work 10/24/2016 .  Proposed procedure: Total Left Hip Replacement     Date of Surgery/ Procedure: 2018  Time of Surgery/ Procedure: to be called   Hospital/Surgical Facility: [place], [place]  Fax number for surgical facility: [phone]  Primary Physician: Norma Ramirez  Type of Anesthesia Anticipated: General    Patient has a Health Care Directive or Living Will:  NO    1. NO - Do you have a history of heart attack, stroke, stent, bypass or surgery on an artery in the head, neck, heart or legs?  2. NO - Do you ever have any pain or discomfort in your chest?  3. NO - Do you have a history of  Heart Failure?  4. NO - Are you troubled by shortness of breath when: walking on the level, up a slight hill or at night?  5. NO - Do you currently have a cold, bronchitis or other respiratory infection?  6. NO - Do you have a cough, shortness of breath or wheezing?  7. NO - Do you sometimes get pains in the calves of your legs when you walk?  8. NO - Do you or anyone in your family have previous history of blood clots?  9. NO - Do you or does anyone in your family have a serious bleeding problem such as prolonged bleeding following surgeries or cuts?  10. NO - Have you ever had problems with anemia or been told to take iron pills?  11. NO - Have you had any abnormal blood loss such as black, tarry or bloody stools, or abnormal vaginal bleeding?  12. NO - Have you ever had a blood transfusion?  13. NO - Have you or any of your relatives ever had problems with anesthesia?  14. NO - Do you have sleep  apnea, excessive snoring or daytime drowsiness?  15. NO - Do you have any prosthetic heart valves?  16. NO - Do you have prosthetic joints?      HPI:                                                      Brief HPI related to upcoming procedure: see above      See problem list for active medical problems.  Problems all longstanding and stable, except as noted/documented.  See ROS for pertinent symptoms related to these conditions.                                                                                                  .    MEDICAL HISTORY:                                                    Patient Active Problem List    Diagnosis Date Noted     Small bowel obstruction 12/29/2017     Priority: Medium     Type 2 diabetes mellitus without complication, without long-term current use of insulin (H) 03/27/2017     Priority: Medium     Tobacco use disorder 02/04/2015     Priority: Medium     Hypertension, goal below 140/90 02/04/2015     Priority: Medium     Obesity 07/17/2014     Priority: Medium     Hyperlipidemia with target LDL less than 100 07/17/2014     Priority: Medium     Diagnosis updated by automated process. Provider to review and confirm.       Partial epilepsy (H) 03/12/2009     Priority: Medium     (Problem list name updated by automated process. Provider to review and confirm.)       ED (erectile dysfunction) 12/18/2008     Priority: Medium     GANGLOIN CYST /RIGHT ANKLE 06/15/2006     Priority: Medium      Past Medical History:   Diagnosis Date     DM (diabetes mellitus) (H)      Epilepsy (H)     Intractable epilepsy, last seizure 2008     MEDICAL HISTORY OF - Age 16     Gangrene in foot stepped on a nail hospitalized for 7 weeks     Nicotine dependence      Past Surgical History:   Procedure Laterality Date     CL AFF SURGICAL PATHOLOGY  2005    ganglion cyst removed     EXCISE MASS LOWER EXTREMITY  2/15/2013    Procedure: EXCISE MASS LOWER EXTREMITY;  Right Ankle Excision of ganglion cyst;   "Surgeon: Akbar Melo DPM;  Location: [facility] OR     HERNIA REPAIR  2000    umbilical repair     HERNIORRHAPHY UMBILICAL N/A 11/10/2015    Procedure: HERNIORRHAPHY UMBILICAL;  Surgeon: Garry Leos MD;  Location: [facility] OR     Current Outpatient Prescriptions   Medication Sig Dispense Refill     bisacodyl (DULCOLAX) 5 MG EC tablet Take 5 mg by mouth daily as needed for constipation       HYDROcodone-acetaminophen (NORCO)  MG per tablet Take 1 tablet by mouth every 4 hours as needed for moderate to severe pain maximum 6 tablet(s) per day. Fill on 12-4-17. 75 tablet 0     insulin glargine (LANTUS VIAL) 100 UNIT/ML injection Inject 30 Units Subcutaneous every morning 10 mL 3     order for DME Test strips for pt's glucometer, brand as covered by insurance Test bid and prn. 200 each 4     insulin syringe-needle U-100 (BD INSULIN SYRINGE ULTRAFINE) 31G X 5/16\" 1 ML Use one syringe 22 units daily daily or as directed. 100 each 11     Multiple Vitamins-Minerals (MULTIVITAMIN OR) Take 1 tablet by mouth daily        aspirin 81 MG EC tablet Take 1 tablet (81 mg) by mouth daily 90 tablet 3     OTC products: None, except as noted above    No Known Allergies   Latex Allergy: NO    Social History   Substance Use Topics     Smoking status: Former Smoker     Packs/day: 0.25     Years: 25.00     Types: Cigarettes     Quit date: 1/18/2016     Smokeless tobacco: Former User     Alcohol use 0.0 oz/week     0 Standard drinks or equivalent per week      Comment: mixed 2-4 on weekends, not every weekend     History   Drug Use No       REVIEW OF SYSTEMS:                                                    C: NEGATIVE for fever, chills, change in weight  E/M: NEGATIVE for ear, mouth and throat problems  R: NEGATIVE for significant cough or SOB  CV: NEGATIVE for chest pain, palpitations or peripheral edema  GI: he had a small bowel obstruction and was in the [facility] from 12-29-17, to 12-31-17.   You have been feeling " "well and tolerating soft foods. Go to the full diet and if you have any recurrent symptoms then reduce the diet again to soft for 1-2 days.     EXAM:                                                    /74 (BP Location: Left arm, Patient Position: Chair, Cuff Size: Adult Large)  Pulse 95  Temp 98.7  F (37.1  C) (Tympanic)  Ht 5' 9\" (1.753 m)  Wt 201 lb (91.2 kg)  BMI 29.68 kg/m2  Exam:  GENERAL APPEARANCE: healthy, alert and no distress  EYES: EOMI,  PERRL  HENT: ear canals and TM's normal and nose and mouth without ulcers or lesions  NECK: no adenopathy, no asymmetry, masses, or scars and thyroid normal to palpation  RESP: lungs clear to auscultation - no rales, rhonchi or wheezes  CV: regular rates and rhythm, normal S1 S2, no S3 or S4 and no murmur, click or rub -  ABDOMEN:  soft, nontender, no HSM or masses and bowel sounds normal  GU_male: testicles normal without atrophy or masses, no hernias and penis normal without urethral discharge  MS: decreased range of motion on the left; normal pulses and sensation of the feet bilaterally  SKIN: no suspicious lesions or rashes  NEURO: Normal strength and tone, sensory exam grossly normal, mentation intact and speech normal  PSYCH: mentation appears normal and affect normal/bright  LYMPHATICS: No axillary, cervical, inguinal, or supraclavicular nodes        DIAGNOSTICS:                                                    EKG: Not indicated due to non-vascular surgery and low risk of event (age <65 and without cardiac risk factors)  Labs were done on 12-30-17: creatinine was 0.59; Hgb was 13.0.     Recent Labs   Lab Test  12/30/17   0750  12/29/17   1600  12/29/17   1545  04/08/17   1230   01/03/14   1610   HGB  13.0*   --   15.6  14.9   < >  13.0*   PLT  236   --   296  207   < >  162   INR   --    --    --    --    --   0.94   NA  138   --   134  137   < >   --    POTASSIUM  4.1   --   4.1  4.6   < >   --    CR  0.59*   --   0.52*  0.55*   < >   --    A1C   -- "   12.0*   --   12.0*   < >   --     < > = values in this interval not displayed.        IMPRESSION:                                                    Reason for surgery/procedure: Houston Winters (: 1970) presents for pre-operative evaluation assessment as requested by Dr. Veliz. He requires evaluation and anesthesia risk assessment prior to undergoing surgery/procedure for treatment of Left Hip Injury at work 10/24/2016 .  Proposed procedure: Total Left Hip Replacement     The proposed surgical procedure is considered LOW risk.    REVISED CARDIAC RISK INDEX  The patient has the following serious cardiovascular risks for perioperative complications such as (MI, PE, VFib and 3  AV Block):  No serious cardiac risks  INTERPRETATION: 0 risks: Class I (very low risk - 0.4% complication rate)    The patient has the following additional risks for perioperative complications:  No identified additional risks      ICD-10-CM    1. Preop general physical exam Z01.818    2. Chronic left hip pain M25.552 HYDROcodone-acetaminophen (NORCO)  MG per tablet    G89.29        RECOMMENDATIONS:                                                      --Patient is to take all scheduled medications on the day befire surgery EXCEPT for modifications listed below.  The instructions for the Lantus insulin should followed from the surgeon. If they did not specify the insulin dose, then take 15 units on the am of the surgery,   Which is one half the usual dose.   Stop aspirin and advil and aleve one week before surgery. Tylenol is OK.   Your stomach should be empty for 8 hours before surgery.     APPROVAL GIVEN to proceed with proposed procedure, without further diagnostic evaluation           Signed Electronically by: Manolo Funes MD    Copy of this evaluation report is provided to requesting physician.    [facility] Preop Guidelines

## 2018-01-04 NOTE — PATIENT INSTRUCTIONS
Before Your Surgery      Call your surgeon if there is any change in your health. This includes signs of a cold or flu (such as a sore throat, runny nose, cough, rash or fever).    Do not smoke, drink alcohol or take over the counter medicine (unless your surgeon or primary care doctor tells you to) for the 24 hours before and after surgery.    If you take prescribed drugs: Follow your doctor s orders about which medicines to take and which to stop until after surgery.    Eating and drinking prior to surgery: follow the instructions from your surgeon    Take a shower or bath the night before surgery. Use the soap your surgeon gave you to gently clean your skin. If you do not have soap from your surgeon, use your regular soap. Do not shave or scrub the surgery site.  Wear clean pajamas and have clean sheets on your bed.         Thank you for choosing [facility].  You may be receiving a survey in the mail from [facility] regarding your visit today.  Please take a few minutes to complete and return the survey to let us know how we are doing.      If you have questions or concerns, please contact us via [employer] or you can contact your care team at [phone].    Our Clinic hours are:  Monday 6:40 am  to 7:00 pm  Tuesday -Friday 6:40 am to 5:00 pm    The [place] outpatient lab hours are:  Monday - Friday 6:10 am to 4:45 pm  Saturdays 7:00 am to 11:00 am  Appointments are required, call [phone]      If you have clinical questions after hours or would like to schedule an appointment,  call the clinic at [phone].      DIAGNOSTICS:                                                    EKG: Not indicated due to non-vascular surgery and low risk of event (age <65 and without cardiac risk factors)  Labs were done on 12-30-17: creatinine was 0.59; Hgb was 13.0.     Recent Labs   Lab Test  12/30/17   0750  12/29/17   1600  12/29/17   1545  04/08/17   1230   01/03/14   1610   HGB  13.0*   --   15.6  14.9   < >   13.0*   PLT  236   --   296  207   < >  162   INR   --    --    --    --    --   0.94   NA  138   --   134  137   < >   --    POTASSIUM  4.1   --   4.1  4.6   < >   --    CR  0.59*   --   0.52*  0.55*   < >   --    A1C   --   12.0*   --   12.0*   < >   --     < > = values in this interval not displayed.        IMPRESSION:                                                    Reason for surgery/procedure: Houston Winters (: 1970) presents for pre-operative evaluation assessment as requested by Dr. Veliz. He requires evaluation and anesthesia risk assessment prior to undergoing surgery/procedure for treatment of Left Hip Injury at work 10/24/2016 .  Proposed procedure: Total Left Hip Replacement     The proposed surgical procedure is considered LOW risk.    REVISED CARDIAC RISK INDEX  The patient has the following serious cardiovascular risks for perioperative complications such as (MI, PE, VFib and 3  AV Block):  No serious cardiac risks  INTERPRETATION: 0 risks: Class I (very low risk - 0.4% complication rate)    The patient has the following additional risks for perioperative complications:  No identified additional risks      ICD-10-CM    1. Preop general physical exam Z01.818    2. Chronic left hip pain M25.552 HYDROcodone-acetaminophen (NORCO)  MG per tablet    G89.29        RECOMMENDATIONS:                                                      --Patient is to take all scheduled medications on the day befire surgery EXCEPT for modifications listed below.  The instructions for the Lantus insulin should followed from the surgeon. If they did not specify the insulin dose, then take 15 units on the am of the surgery,   Which is one half the usual dose.   Stop aspirin and advil and aleve one week before surgery. Tylenol is OK.   Your stomach should be empty for 8 hours before surgery.     APPROVAL GIVEN to proceed with proposed procedure, without further diagnostic evaluation

## 2018-01-04 NOTE — NURSING NOTE
"Chief Complaint   Patient presents with     Work Comp     pre op for surgery 1/24/18 at [facility] with Dr. Veliz , Total Left Hip Replacement      Refill Request     pain meds       Initial /74 (BP Location: Left arm, Patient Position: Chair, Cuff Size: Adult Large)  Pulse 95  Temp 98.7  F (37.1  C) (Tympanic)  Ht 5' 9\" (1.753 m)  Wt 201 lb (91.2 kg)  BMI 29.68 kg/m2 Estimated body mass index is 29.68 kg/(m^2) as calculated from the following:    Height as of this encounter: 5' 9\" (1.753 m).    Weight as of this encounter: 201 lb (91.2 kg).  Medication Reconciliation: complete      "

## 2018-01-16 ENCOUNTER — TELEPHONE (OUTPATIENT)
Dept: FAMILY MEDICINE | Facility: CLINIC | Age: 48
End: 2018-01-16

## 2018-01-16 NOTE — LETTER
Houston Winters  [address]   [place] [address]          To Whom This May Concern:      Please excuse Houston Winters from work from 1/16/18-1/24/18 for an acute condition.    Thank you,             Dr. Manolo Funes MD./Diana GARCIA RN

## 2018-01-16 NOTE — TELEPHONE ENCOUNTER
Spoke with pt and indicated that Dr. Funes is out of office today.      Dr. Funes:  Ok to provide this letter for pt?    Yane KELLY RN

## 2018-01-16 NOTE — LETTER
Houston Winters  [address]   [place] [address]          To Whom This May Concern:      Please excuse Houston Winters from work from 1/18/18- 1/24/18 for an acute condition.    Thank you,               Dr. Manolo Funes M.D./Diana GARCIA RN

## 2018-01-16 NOTE — TELEPHONE ENCOUNTER
Reason for Call:  Letter    Detailed comments: Pt is scheduled for a total hip replacement 1/24/18.  He is asking for a letter to excuse him from work, strarting today due to pain, until after the surgery.  He would like to  the letter at the clinic      Phone Number Patient can be reached at: Home number on file [phone] (home)    Best Time: any    Can we leave a detailed message on this number? YES    Call taken on 1/16/2018 at 1:42 PM by Mary Cooley

## 2018-01-16 NOTE — LETTER
Houston Winters  [address]   [place] [address]        To Whom This May Concern:        Please excuse Houston Winters from work for an acute condition.

## 2018-01-17 NOTE — TELEPHONE ENCOUNTER
Pt is wondering if the letter can be placed at the .  He will be in to pick it up.  Nelly Herrera

## 2018-01-17 NOTE — TELEPHONE ENCOUNTER
We can give him a letter to be off from today until 1-24-18, the day of the surgery.   After that, it is up to Dr. Veliz, the surgeon, to determine workability.   Manolo Funes

## 2018-01-17 NOTE — TELEPHONE ENCOUNTER
Dr. Funes,    Thank you for being willing to provide this letter for the patient.  I have started for your completion and signature. Diana GARCIA RN

## 2018-01-18 NOTE — TELEPHONE ENCOUNTER
Patient is calling stating that he was there yesterday, and no note was ready. He needs this today, to turn into his work. Please expedite, and call the patient when ready for . [phone].  [facility] [place] [facility]

## 2018-01-18 NOTE — TELEPHONE ENCOUNTER
This letter is not editable for me now. Can you add the Dates from now until 1-24-18 to it.   And add my name. Then print it and send it to my box.   Manolo Funes

## 2018-01-18 NOTE — TELEPHONE ENCOUNTER
Patient needs the work excuse to say 1/16/18 through 1/24/18.  The current letter states 1/18/18- 1/24/18.

## 2018-01-18 NOTE — TELEPHONE ENCOUNTER
Letter is redone and placed on Dr. Funes's desk.  Patient needs today give him a call when ready. Diana GARCIA RN

## 2018-01-28 ENCOUNTER — APPOINTMENT (OUTPATIENT)
Dept: ULTRASOUND IMAGING | Facility: CLINIC | Age: 48
End: 2018-01-28
Attending: EMERGENCY MEDICINE
Payer: OTHER MISCELLANEOUS

## 2018-01-28 ENCOUNTER — HOSPITAL ENCOUNTER (EMERGENCY)
Facility: CLINIC | Age: 48
Discharge: HOME | End: 2018-01-28
Attending: EMERGENCY MEDICINE | Admitting: EMERGENCY MEDICINE
Payer: OTHER MISCELLANEOUS

## 2018-01-28 VITALS
OXYGEN SATURATION: 96 % | RESPIRATION RATE: 16 BRPM | WEIGHT: 201.06 LBS | SYSTOLIC BLOOD PRESSURE: 122 MMHG | BODY MASS INDEX: 28.78 KG/M2 | HEIGHT: 70 IN | TEMPERATURE: 98.8 F | DIASTOLIC BLOOD PRESSURE: 82 MMHG

## 2018-01-28 DIAGNOSIS — T81.40XA POSTOPERATIVE INFECTION, INITIAL ENCOUNTER: ICD-10-CM

## 2018-01-28 DIAGNOSIS — Z96.642 HISTORY OF TOTAL HIP ARTHROPLASTY, LEFT: ICD-10-CM

## 2018-01-28 DIAGNOSIS — E11.9 TYPE 2 DIABETES MELLITUS WITHOUT COMPLICATION, WITHOUT LONG-TERM CURRENT USE OF INSULIN (H): ICD-10-CM

## 2018-01-28 LAB
BASOPHILS # BLD AUTO: 0 10E9/L (ref 0–0.2)
BASOPHILS NFR BLD AUTO: 0.3 %
DIFFERENTIAL METHOD BLD: ABNORMAL
EOSINOPHIL # BLD AUTO: 0.1 10E9/L (ref 0–0.7)
EOSINOPHIL NFR BLD AUTO: 1.4 %
ERYTHROCYTE [DISTWIDTH] IN BLOOD BY AUTOMATED COUNT: 12.5 % (ref 10–15)
HCT VFR BLD AUTO: 40.3 % (ref 40–53)
HGB BLD-MCNC: 13 G/DL (ref 13.3–17.7)
IMM GRANULOCYTES # BLD: 0 10E9/L (ref 0–0.4)
IMM GRANULOCYTES NFR BLD: 0.2 %
LYMPHOCYTES # BLD AUTO: 1.6 10E9/L (ref 0.8–5.3)
LYMPHOCYTES NFR BLD AUTO: 24.3 %
MCH RBC QN AUTO: 30.4 PG (ref 26.5–33)
MCHC RBC AUTO-ENTMCNC: 32.3 G/DL (ref 31.5–36.5)
MCV RBC AUTO: 94 FL (ref 78–100)
MONOCYTES # BLD AUTO: 0.4 10E9/L (ref 0–1.3)
MONOCYTES NFR BLD AUTO: 6.8 %
NEUTROPHILS # BLD AUTO: 4.4 10E9/L (ref 1.6–8.3)
NEUTROPHILS NFR BLD AUTO: 67 %
PLATELET # BLD AUTO: 216 10E9/L (ref 150–450)
RBC # BLD AUTO: 4.27 10E12/L (ref 4.4–5.9)
WBC # BLD AUTO: 6.5 10E9/L (ref 4–11)

## 2018-01-28 PROCEDURE — 93971 EXTREMITY STUDY: CPT | Mod: LT

## 2018-01-28 PROCEDURE — 99285 EMERGENCY DEPT VISIT HI MDM: CPT | Mod: Z6 | Performed by: EMERGENCY MEDICINE

## 2018-01-28 PROCEDURE — 85025 COMPLETE CBC W/AUTO DIFF WBC: CPT | Performed by: EMERGENCY MEDICINE

## 2018-01-28 PROCEDURE — 25000128 H RX IP 250 OP 636: Performed by: EMERGENCY MEDICINE

## 2018-01-28 PROCEDURE — 96375 TX/PRO/DX INJ NEW DRUG ADDON: CPT | Performed by: EMERGENCY MEDICINE

## 2018-01-28 PROCEDURE — 96365 THER/PROPH/DIAG IV INF INIT: CPT | Performed by: EMERGENCY MEDICINE

## 2018-01-28 PROCEDURE — 99285 EMERGENCY DEPT VISIT HI MDM: CPT | Mod: 25 | Performed by: EMERGENCY MEDICINE

## 2018-01-28 RX ORDER — CEFAZOLIN SODIUM 2 G/100ML
2 INJECTION, SOLUTION INTRAVENOUS EVERY 8 HOURS
Status: CANCELLED
Start: 2018-01-28

## 2018-01-28 RX ORDER — CEFAZOLIN SODIUM 2 G/100ML
2 INJECTION, SOLUTION INTRAVENOUS ONCE
Status: COMPLETED | OUTPATIENT
Start: 2018-01-28 | End: 2018-01-28

## 2018-01-28 RX ADMIN — HYDROMORPHONE HYDROCHLORIDE 1 MG: 1 INJECTION, SOLUTION INTRAMUSCULAR; INTRAVENOUS; SUBCUTANEOUS at 10:55

## 2018-01-28 RX ADMIN — CEFAZOLIN SODIUM 2 G: 2 INJECTION, SOLUTION INTRAVENOUS at 11:09

## 2018-01-28 ASSESSMENT — ENCOUNTER SYMPTOMS: FEVER: 1

## 2018-01-28 NOTE — ED AVS SNAPSHOT
[facility]    [address] [address]    Phone:  [phone]    Fax:  [phone]                                       Houston Winters   MRN: [id number]    Department:  [facility]   Date of Visit:  1/28/2018           After Visit Summary Signature Page     I have received my discharge instructions, and my questions have been answered. I have discussed any challenges I see with this plan with the nurse or doctor.    ..........................................................................................................................................  Patient/Patient Representative Signature      ..........................................................................................................................................  Patient Representative Print Name and Relationship to Patient    ..................................................               ................................................  Date                                            Time    ..........................................................................................................................................  Reviewed by Signature/Title    ...................................................              ..............................................  Date                                                            Time

## 2018-01-28 NOTE — ED PROVIDER NOTES
"  History     Chief Complaint   Patient presents with     Post-op Problem     right total hip onwednedsay, swelling to left leg increase pain     HPI  Houston Winters is a 47 year old male with history of partial epilepsy, hypertension, and type 2 diabetes who presents to the ED with post-op pain. The patient had a total left hip arthroplasty on 1/24/18 from a work injury. He reports severe pain in the right hip today. He reports the surgery went well and he was able to return home in 20 hours. He reports he has been walking regularly since surgery and has been taking tylenol. He reports a fever of 100.8 on Thursday 1/25/18, but experienced no fever since. The patient reports that the bandage has been on since he left the hospital.     Patient Active Problem List   Diagnosis     GANGLOIN CYST /RIGHT ANKLE     ED (erectile dysfunction)     Partial epilepsy (H)     Obesity     Hyperlipidemia with target LDL less than 100     Tobacco use disorder     Hypertension, goal below 140/90     Type 2 diabetes mellitus without complication, without long-term current use of insulin (H)     Small bowel obstruction     Current Outpatient Prescriptions   Medication Sig Dispense Refill     HYDROcodone-acetaminophen (NORCO)  MG per tablet Take 1 tablet by mouth every 4 hours as needed for moderate to severe pain maximum 6 tablet(s) per day. Fill on 12-4-17. 75 tablet 0     bisacodyl (DULCOLAX) 5 MG EC tablet Take 5 mg by mouth daily as needed for constipation       insulin glargine (LANTUS VIAL) 100 UNIT/ML injection Inject 30 Units Subcutaneous every morning 10 mL 3     order for DME Test strips for pt's glucometer, brand as covered by insurance Test bid and prn. 200 each 4     insulin syringe-needle U-100 (BD INSULIN SYRINGE ULTRAFINE) 31G X 5/16\" 1 ML Use one syringe 22 units daily daily or as directed. 100 each 11     Multiple Vitamins-Minerals (MULTIVITAMIN OR) Take 1 tablet by mouth daily        aspirin 81 MG EC tablet " Take 1 tablet (81 mg) by mouth daily 90 tablet 3     No Known Allergies    Problem List:    Patient Active Problem List    Diagnosis Date Noted     Small bowel obstruction 12/29/2017     Priority: Medium     Type 2 diabetes mellitus without complication, without long-term current use of insulin (H) 03/27/2017     Priority: Medium     Tobacco use disorder 02/04/2015     Priority: Medium     Hypertension, goal below 140/90 02/04/2015     Priority: Medium     Obesity 07/17/2014     Priority: Medium     Hyperlipidemia with target LDL less than 100 07/17/2014     Priority: Medium     Diagnosis updated by automated process. Provider to review and confirm.       Partial epilepsy (H) 03/12/2009     Priority: Medium     (Problem list name updated by automated process. Provider to review and confirm.)       ED (erectile dysfunction) 12/18/2008     Priority: Medium     GANGLOIN CYST /RIGHT ANKLE 06/15/2006     Priority: Medium      Past Medical History:    Past Medical History:   Diagnosis Date     DM (diabetes mellitus) (H)      Epilepsy (H)      MEDICAL HISTORY OF - Age 16      Nicotine dependence      Past Surgical History:    Past Surgical History:   Procedure Laterality Date     CL AFF SURGICAL PATHOLOGY  2005    ganglion cyst removed     EXCISE MASS LOWER EXTREMITY  2/15/2013    Procedure: EXCISE MASS LOWER EXTREMITY;  Right Ankle Excision of ganglion cyst;  Surgeon: Akbar Melo DPM;  Location: [facility] OR     HERNIA REPAIR  2000    umbilical repair     HERNIORRHAPHY UMBILICAL N/A 11/10/2015    Procedure: HERNIORRHAPHY UMBILICAL;  Surgeon: Garry Leos MD;  Location: [facility] OR     Family History:    Family History   Problem Relation Age of Onset     Arthritis Mother      CANCER Maternal Grandmother      Arthritis Maternal Grandmother      lupus     GASTROINTESTINAL DISEASE Maternal Grandfather      war injury, colostomy     Respiratory Daughter      growing out of it     Unknown/Adopted Father      DIABETES No family  "hx of      Coronary Artery Disease No family hx of      Hypertension No family hx of      Hyperlipidemia No family hx of      Breast Cancer No family hx of      Cancer - colorectal No family hx of      Ovarian Cancer No family hx of      Prostate Cancer No family hx of      Social History:  Marital Status:   [2]  Social History   Substance Use Topics     Smoking status: Former Smoker     Packs/day: 0.25     Years: 25.00     Types: Cigarettes     Quit date: 1/18/2016     Smokeless tobacco: Former User     Alcohol use 0.0 oz/week     0 Standard drinks or equivalent per week      Comment: mixed 2-4 on weekends, not every weekend     Medications:      HYDROcodone-acetaminophen (NORCO)  MG per tablet   bisacodyl (DULCOLAX) 5 MG EC tablet   insulin glargine (LANTUS VIAL) 100 UNIT/ML injection   order for DME   insulin syringe-needle U-100 (BD INSULIN SYRINGE ULTRAFINE) 31G X 5/16\" 1 ML   Multiple Vitamins-Minerals (MULTIVITAMIN OR)   aspirin 81 MG EC tablet     Review of Systems   Constitutional: Positive for fever.   All other systems reviewed and are negative.    Physical Exam   BP: 133/90  Heart Rate: 109  Temp: 98.8  F (37.1  C)  Resp: 16  Height: 177.8 cm (5' 10\")  Weight: 91.2 kg (201 lb 1 oz)  SpO2: 96 %      Physical Exam  Surgical incision dressing was removed.  No drainage.  No signs for infection.  Area of erythema developing along the anterolateral thigh down to the knee.  It is warm slightly tender and this is concerning for early cellulitic process.  Soft tissue swelling extends around the knee but this appears to be external to the joint and there is no effusion.  No calf tenderness though slight swelling  CMS intact  ED Course     ED Course     Procedures                   Results for orders placed or performed during the hospital encounter of 01/28/18   US Lower Extremity Venous Duplex Left    Narrative    VENOUS ULTRASOUND LEFT LEG  1/28/2018 11:51 AM     HISTORY: Pain. Recent total hip " arthroplasty.  Rule out DVT.     COMPARISON: None.    FINDINGS:  Examination of the deep veins with graded compression and  color flow Doppler with spectral wave form analysis shows no evidence  of thrombus in the common femoral vein, femoral vein, popliteal vein  or calf veins.         Impression    IMPRESSION: No evidence of deep venous thrombosis.     CBC with platelets differential   Result Value Ref Range    WBC 6.5 4.0 - 11.0 10e9/L    RBC Count 4.27 (L) 4.4 - 5.9 10e12/L    Hemoglobin 13.0 (L) 13.3 - 17.7 g/dL    Hematocrit 40.3 40.0 - 53.0 %    MCV 94 78 - 100 fl    MCH 30.4 26.5 - 33.0 pg    MCHC 32.3 31.5 - 36.5 g/dL    RDW 12.5 10.0 - 15.0 %    Platelet Count 216 150 - 450 10e9/L    Diff Method Automated Method     % Neutrophils 67.0 %    % Lymphocytes 24.3 %    % Monocytes 6.8 %    % Eosinophils 1.4 %    % Basophils 0.3 %    % Immature Granulocytes 0.2 %    Absolute Neutrophil 4.4 1.6 - 8.3 10e9/L    Absolute Lymphocytes 1.6 0.8 - 5.3 10e9/L    Absolute Monocytes 0.4 0.0 - 1.3 10e9/L    Absolute Eosinophils 0.1 0.0 - 0.7 10e9/L    Absolute Basophils 0.0 0.0 - 0.2 10e9/L    Abs Immature Granulocytes 0.0 0 - 0.4 10e9/L          No results found for this or any previous visit (from the past 24 hour(s)).    Medications   HYDROmorphone (DILAUDID) injection 1 mg (not administered)   ceFAZolin (ANCEF) intermittent infusion 2 g in 100 mL dextrose PRE-MIX (not administered)       9:54 AM Patient Assessed.     Assessments & Plan (with Medical Decision Making)  Patient is status post left total hip arthroplasty from January 24.  Presents with increased pain in the anterolateral thigh.  There is surrounding erythema warmth and soft tissue tenderness which is concerning for cellulitis.  Patient also has had low-grade fever.  Incision does not show any drainage or signs for seroma.  Ultrasound has been ordered to rule out DVT.  IV antibiotics with Ancef.  WBC count pending.  12:09 PM  No DVT.  No leukocytosis.   Temperature currently 98.8 F  Patient received Ancef 2 g IV.  Scheduled for return outpatient infusion therapy for 2 additional doses.  Scheduled every 8 hours.  Recheck at that time determine if we can switch to oral antibiotics.  Patient to contact orthopedic clinic for recheck tomorrow or Tuesday at the latest.       I have reviewed the nursing notes.    I have reviewed the findings, diagnosis, plan and need for follow up with the patient.      New Prescriptions    No medications on file       Final diagnoses:   Postoperative infection, initial encounter   History of total hip arthroplasty, left   Type 2 diabetes mellitus without complication, without long-term current use of insulin (H)     This document serves as a record of the services and decisions personally performed and made by Thad Ramirez, *. It was created on HIS/HER behalf by   Mary Chandler, a trained medical scribe. The creation of this document is based the provider's statements to the medical scribe.  Mary Chandler 10:06 AM 1/28/2018    Provider:   The information in this document, created by the medical scribe for me, accurately reflects the services I personally performed and the decisions made by me. I have reviewed and approved this document for accuracy prior to leaving the patient care area.  Thad Ramirez, * 10:06 AM 1/28/2018 1/28/2018   [facility] EMERGENCY DEPARTMENT     Thad Ramirez, DO  01/28/18 1213

## 2018-01-28 NOTE — ED AVS SNAPSHOT
[facility] Emergency Department    [address] [address]    Phone:  [phone]    Fax:  [phone]                                       Houston Winters   MRN: [id number]    Department:  [facility] Emergency Department   Date of Visit:  1/28/2018           Patient Information     Date Of Birth          1970        Your diagnoses for this visit were:     Postoperative infection, initial encounter     History of total hip arthroplasty, left     Type 2 diabetes mellitus without complication, without long-term current use of insulin (H)        You were seen by Thad Ramirez DO.        Discharge Instructions       Return at 8 PM and 4 AM for repeat IV antibiotic therapy with Ancef  Contact orthopedic clinic for appointment on Monday or Tuesday.  Keep leg elevated when not ambulating  Physician reassessment after completing third dose of IV antibiotics to determine if additional IV therapy needed or treatment can be switched to oral therapy.    24 Hour Appointment Hotline       To make an appointment at any [facility], call [phone] ([phone]). If you don't have a family doctor or clinic, we will help you find one. [place] clinics are conveniently located to serve the needs of you and your family.             Review of your medicines      Our records show that you are taking the medicines listed below. If these are incorrect, please call your family doctor or clinic.        Dose / Directions Last dose taken    aspirin 81 MG EC tablet   Dose:  81 mg   Quantity:  90 tablet        Take 1 tablet (81 mg) by mouth daily   Refills:  3        bisacodyl 5 MG EC tablet   Commonly known as:  DULCOLAX   Dose:  5 mg        Take 5 mg by mouth daily as needed for constipation   Refills:  0        HYDROcodone-acetaminophen  MG per tablet   Commonly known as:  NORCO   Dose:  1 tablet   Quantity:  75 tablet        Take 1 tablet by mouth every 4 hours as needed for  "moderate to severe pain maximum 6 tablet(s) per day. Fill on 12-4-17.   Refills:  0        insulin glargine 100 UNIT/ML injection   Commonly known as:  LANTUS VIAL   Dose:  30 Units   Quantity:  10 mL        Inject 30 Units Subcutaneous every morning   Refills:  3        insulin syringe-needle U-100 31G X 5/16\" 1 ML   Commonly known as:  BD insulin syringe ULTRAFINE   Quantity:  100 each        Use one syringe 22 units daily daily or as directed.   Refills:  11        MULTIVITAMIN PO   Dose:  1 tablet        Take 1 tablet by mouth daily   Refills:  0        order for DME   Quantity:  200 each        Test strips for pt's glucometer, brand as covered by insurance Test bid and prn.   Refills:  4                Procedures and tests performed during your visit     CBC with platelets differential    US Lower Extremity Venous Duplex Left      Orders Needing Specimen Collection     None      Pending Results     No orders found from 1/26/2018 to 1/29/2018.            Pending Culture Results     No orders found from 1/26/2018 to 1/29/2018.            Pending Results Instructions     If you had any lab results that were not finalized at the time of your Discharge, you can call the ED Lab Result RN at [phone]. You will be contacted by this team for any positive Lab results or changes in treatment. The nurses are available 7 days a week from 10A to 6:30P.  You can leave a message 24 hours per day and they will return your call.        Test Results From Your Hospital Stay        1/28/2018 11:30 AM      Component Results     Component Value Ref Range & Units Status    WBC 6.5 4.0 - 11.0 10e9/L Final    RBC Count 4.27 (L) 4.4 - 5.9 10e12/L Final    Hemoglobin 13.0 (L) 13.3 - 17.7 g/dL Final    Hematocrit 40.3 40.0 - 53.0 % Final    MCV 94 78 - 100 fl Final    MCH 30.4 26.5 - 33.0 pg Final    MCHC 32.3 31.5 - 36.5 g/dL Final    RDW 12.5 10.0 - 15.0 % Final    Platelet Count 216 150 - 450 10e9/L Final    Diff Method Automated " "Method  Final    % Neutrophils 67.0 % Final    % Lymphocytes 24.3 % Final    % Monocytes 6.8 % Final    % Eosinophils 1.4 % Final    % Basophils 0.3 % Final    % Immature Granulocytes 0.2 % Final    Absolute Neutrophil 4.4 1.6 - 8.3 10e9/L Final    Absolute Lymphocytes 1.6 0.8 - 5.3 10e9/L Final    Absolute Monocytes 0.4 0.0 - 1.3 10e9/L Final    Absolute Eosinophils 0.1 0.0 - 0.7 10e9/L Final    Absolute Basophils 0.0 0.0 - 0.2 10e9/L Final    Abs Immature Granulocytes 0.0 0 - 0.4 10e9/L Final         1/28/2018 12:10 PM      Narrative     VENOUS ULTRASOUND LEFT LEG  1/28/2018 11:51 AM     HISTORY: Pain. Recent total hip arthroplasty.  Rule out DVT.     COMPARISON: None.    FINDINGS:  Examination of the deep veins with graded compression and  color flow Doppler with spectral wave form analysis shows no evidence  of thrombus in the common femoral vein, femoral vein, popliteal vein  or calf veins.           Impression     IMPRESSION: No evidence of deep venous thrombosis.      MARBELLA CHAVEZ MD                Thank you for choosing [facility]       Thank you for choosing [facility] for your care. Our goal is always to provide you with excellent care. Hearing back from our patients is one way we can continue to improve our services. Please take a few minutes to complete the written survey that you may receive in the mail after you visit with us. Thank you!        [employer] Information     [facility] lets you send messages to your doctor, view your test results, renew your prescriptions, schedule appointments and more. To sign up, go to [email or web address] . Click on \"Log in\" on the left side of the screen, which will take you to the Welcome page. Then click on \"Sign up Now\" on the right side of the page.     You will be asked to enter the access code listed below, as well as some personal information. Please follow the directions to create your username and password.     Your access code is: [email or web address]  Expires: 2/15/2018  " 3:14 PM     Your access code will  in 90 days. If you need help or a new code, please call your [place] clinic or [phone].        Care EveryWhere ID     This is your Care EveryWhere ID. This could be used by other organizations to access your [place] medical records  [id number]        Equal Access to Services     [facility] : Ayana Hodgson, wajocelyn sandoval, gagandeep dunn, jovanni harirson . So [facility] [phone].    ATENCIÓN: Si habla español, tiene a isabel disposición servicios gratuitos de asistencia lingüística. Llame al [phone].    We comply with applicable federal civil rights laws and [place] laws. We do not discriminate on the basis of race, color, national origin, age, disability, sex, sexual orientation, or gender identity.            After Visit Summary       This is your record. Keep this with you and show to your community pharmacist(s) and doctor(s) at your next visit.

## 2018-01-28 NOTE — DISCHARGE INSTRUCTIONS
Return at 8 PM and 4 AM for repeat IV antibiotic therapy with Ancef  Contact orthopedic clinic for appointment on Monday or Tuesday.  Keep leg elevated when not ambulating  Physician reassessment after completing third dose of IV antibiotics to determine if additional IV therapy needed or treatment can be switched to oral therapy.

## 2018-01-28 NOTE — ED NOTES
Pt had had his left hip replaced last week at [facility] in [place], due to work injury. He has been taking 325mg ASA daily as prescribed. Left knee started swelling yesterday and worse today and having pain in calf of left leg and numbness in left foot. Pt is a/o x 4. Denies fever/chills, N/V/D, eating and drinking normal. CMS intact. If need he states he is to be transferred back to [facility]

## 2018-02-15 ENCOUNTER — HOSPITAL ENCOUNTER (OUTPATIENT)
Dept: PHYSICAL THERAPY | Facility: CLINIC | Age: 48
Setting detail: THERAPIES SERIES
End: 2018-02-15
Attending: ORTHOPAEDIC SURGERY
Payer: OTHER MISCELLANEOUS

## 2018-02-15 PROCEDURE — 40000718 ZZHC STATISTIC PT DEPARTMENT ORTHO VISIT: Performed by: PHYSICAL THERAPIST

## 2018-02-15 PROCEDURE — 97161 PT EVAL LOW COMPLEX 20 MIN: CPT | Mod: GP | Performed by: PHYSICAL THERAPIST

## 2018-02-15 PROCEDURE — 97110 THERAPEUTIC EXERCISES: CPT | Mod: GP | Performed by: PHYSICAL THERAPIST

## 2018-02-15 NOTE — PROGRESS NOTES
Houston Winters   PHYSICAL THERAPY EVALUATION    02/15/18 0800   General Information   Type of Visit Initial OP Ortho PT Evaluation   Start of Care Date 02/15/18   Referring Physician DR. Veliz   Patient/Family Goals Statement decrease pain, be able to do everything he did before   Orders Evaluate and Treat   Date of Order 01/25/18   Insurance Type Other   Insurance Comments/Visits Authorized work comp   Medical Diagnosis L WILLIAM   Surgical/Medical history reviewed Yes  (B hip scope, microfracture, DM)   Body Part(s)   Body Part(s) Hip   Presentation and Etiology   Pertinent history of current problem (include personal factors and/or comorbidities that impact the POC) work injury 10/24/16 lifting heavy , felt he wrenched his back, then later same day, was blowing the sand off the road and hose recoiled and twisted him..  Had hip arthroscope 5/22/17, never fully got better. Now had L WILLIAM 1/24/18.  Pain 3-8/10.  Sx sitting, getting, in/out of truck, on/off toilet seat are difficult.  Can't put on socks, has slip on shoes   Onset date of current episode/exacerbation 10/24/16   Prior Level of Function   Functional Level Prior Comment work is active, construction warehouse, deliver parts to job sites, indep living   Current Level of Function   Patient role/employment history A. Employed   Employment Comments construction, warehouse work,    Living environment [place]   Fall Risk Screen   Fall screen completed by PT   Have you fallen 2 or more times in the past year? No   Have you fallen and had an injury in the past year? No   Is patient a fall risk? No   Hip Objective Findings   Side (if bilateral, select both right and left) Left   Integumentary  incision not observed, noticeable edema lateral/post L hip, prox thigh larger than R   Gait/Locomotion SEC, antalgic, L foot toed inward   Left Hip Flexion PROM 90*   Left Hip Abduction AROM 20*   Left Hip ER PROM 40* in ИРИНА position   Left Hip Ext  PROM at least 5*   Left Hip Flexion Strength 3-/5   Left Hip Abduction Strength <3/5   Left Knee Flexion Strength 5   Left Knee Extension Strength 4   Left Hip Extension Strength 3   Planned Therapy Interventions   Planned Therapy Interventions strengthening;ROM;gait training;balance training;neuromuscular re-education   Clinical Impression   Criteria for Skilled Therapeutic Interventions Met yes, treatment indicated   PT Diagnosis decreased hip ROM and strength, dependent gait post WILLIAM   Functional limitations due to impairments walk, stand, sit work, hosuehold tasks, lift/carry, bend, personal cares   Clinical Presentation Evolving/Changing   Clinical Presentation Rationale prior surgery, B hips, DM,    Clinical Decision Making (Complexity) Low complexity   Therapy Frequency 2 times/day   Predicted Duration of Therapy Intervention (days/wks) 4wks, then 1x/wk x4wks   Risk & Benefits of therapy have been explained Yes   Patient, Family & other staff in agreement with plan of care Yes   Education Assessment   Preferred Learning Style Listening;Demonstration;Pictures/video   Barriers to Learning No barriers   ORTHO GOALS   PT Ortho Eval Goals 1;2;3;4;5   Ortho Goal 1   Goal Description will have increased hip ROM in order to be able to put on socks in 4wk   Target Date 03/15/18   Ortho Goal 2   Goal Description STG: Pt will able to get in/out of car w/o difficulty. in 4wk   Target Date 03/15/18   Ortho Goal 3   Goal Description pt will be able to walk indep with min to no limp in 6wkj   Target Date 03/29/18   Ortho Goal 4   Goal Description pt will be able to do reciprocol stairs in 8wk   Target Date 04/15/18   Ortho Goal 5   Goal Description pt will be able to return to work with restrictions in 8wk, or as MD allows   Target Date 04/15/18   Total Evaluation Time   Total Evaluation Time 15   Kris Hoenk, PT #[id number]  [facility]

## 2018-02-20 ENCOUNTER — HOSPITAL ENCOUNTER (OUTPATIENT)
Dept: PHYSICAL THERAPY | Facility: CLINIC | Age: 48
Setting detail: THERAPIES SERIES
End: 2018-02-20
Attending: ORTHOPAEDIC SURGERY
Payer: OTHER MISCELLANEOUS

## 2018-02-20 PROCEDURE — 97110 THERAPEUTIC EXERCISES: CPT | Mod: GP | Performed by: PHYSICAL THERAPIST

## 2018-02-20 PROCEDURE — 40000718 ZZHC STATISTIC PT DEPARTMENT ORTHO VISIT: Performed by: PHYSICAL THERAPIST

## 2018-02-22 ENCOUNTER — HOSPITAL ENCOUNTER (OUTPATIENT)
Dept: PHYSICAL THERAPY | Facility: CLINIC | Age: 48
Setting detail: THERAPIES SERIES
End: 2018-02-22
Attending: ORTHOPAEDIC SURGERY
Payer: OTHER MISCELLANEOUS

## 2018-02-22 PROCEDURE — 40000718 ZZHC STATISTIC PT DEPARTMENT ORTHO VISIT: Performed by: PHYSICAL THERAPIST

## 2018-02-22 PROCEDURE — 97110 THERAPEUTIC EXERCISES: CPT | Mod: GP | Performed by: PHYSICAL THERAPIST

## 2018-02-27 ENCOUNTER — HOSPITAL ENCOUNTER (OUTPATIENT)
Dept: PHYSICAL THERAPY | Facility: CLINIC | Age: 48
Setting detail: THERAPIES SERIES
End: 2018-02-27
Attending: ORTHOPAEDIC SURGERY
Payer: OTHER MISCELLANEOUS

## 2018-02-27 PROCEDURE — 97110 THERAPEUTIC EXERCISES: CPT | Mod: GP | Performed by: PHYSICAL THERAPIST

## 2018-02-27 PROCEDURE — 40000718 ZZHC STATISTIC PT DEPARTMENT ORTHO VISIT: Performed by: PHYSICAL THERAPIST

## 2018-03-01 ENCOUNTER — HOSPITAL ENCOUNTER (OUTPATIENT)
Dept: PHYSICAL THERAPY | Facility: CLINIC | Age: 48
Setting detail: THERAPIES SERIES
End: 2018-03-01
Attending: ORTHOPAEDIC SURGERY
Payer: OTHER MISCELLANEOUS

## 2018-03-01 PROCEDURE — 97110 THERAPEUTIC EXERCISES: CPT | Mod: GP | Performed by: PHYSICAL THERAPIST

## 2018-03-01 PROCEDURE — 40000718 ZZHC STATISTIC PT DEPARTMENT ORTHO VISIT: Performed by: PHYSICAL THERAPIST

## 2018-03-07 ENCOUNTER — HOSPITAL ENCOUNTER (OUTPATIENT)
Dept: PHYSICAL THERAPY | Facility: CLINIC | Age: 48
Setting detail: THERAPIES SERIES
End: 2018-03-07
Attending: ORTHOPAEDIC SURGERY
Payer: OTHER MISCELLANEOUS

## 2018-03-07 PROCEDURE — 40000718 ZZHC STATISTIC PT DEPARTMENT ORTHO VISIT: Performed by: PHYSICAL THERAPIST

## 2018-03-07 PROCEDURE — 97110 THERAPEUTIC EXERCISES: CPT | Mod: GP | Performed by: PHYSICAL THERAPIST

## 2018-03-15 ENCOUNTER — HOSPITAL ENCOUNTER (OUTPATIENT)
Dept: PHYSICAL THERAPY | Facility: CLINIC | Age: 48
Setting detail: THERAPIES SERIES
End: 2018-03-15
Attending: ORTHOPAEDIC SURGERY
Payer: OTHER MISCELLANEOUS

## 2018-03-15 PROCEDURE — 97110 THERAPEUTIC EXERCISES: CPT | Mod: GP | Performed by: PHYSICAL THERAPIST

## 2018-03-15 PROCEDURE — 40000718 ZZHC STATISTIC PT DEPARTMENT ORTHO VISIT: Performed by: PHYSICAL THERAPIST

## 2018-03-22 ENCOUNTER — HOSPITAL ENCOUNTER (OUTPATIENT)
Dept: PHYSICAL THERAPY | Facility: CLINIC | Age: 48
Setting detail: THERAPIES SERIES
End: 2018-03-22
Attending: ORTHOPAEDIC SURGERY
Payer: OTHER MISCELLANEOUS

## 2018-03-22 PROCEDURE — 40000718 ZZHC STATISTIC PT DEPARTMENT ORTHO VISIT: Performed by: PHYSICAL THERAPIST

## 2018-03-22 PROCEDURE — 97110 THERAPEUTIC EXERCISES: CPT | Mod: GP | Performed by: PHYSICAL THERAPIST

## 2018-04-27 ENCOUNTER — HOSPITAL ENCOUNTER (OUTPATIENT)
Dept: PHYSICAL THERAPY | Facility: CLINIC | Age: 48
Setting detail: THERAPIES SERIES
End: 2018-04-27
Attending: ORTHOPAEDIC SURGERY
Payer: OTHER MISCELLANEOUS

## 2018-04-27 PROCEDURE — 40000718 ZZHC STATISTIC PT DEPARTMENT ORTHO VISIT: Performed by: PHYSICAL THERAPIST

## 2018-04-27 PROCEDURE — 97110 THERAPEUTIC EXERCISES: CPT | Mod: GP | Performed by: PHYSICAL THERAPIST

## 2018-04-27 NOTE — PROGRESS NOTES
"  Houston LOPEZ Singh   PHYSICAL THERAPY DISCHARGE  04/27/18 0700   Signing Clinician's Name / Credentials   Signing clinician's name / credentials Kris Hoenk, PT   Session Number   Session Number 9/14 WC to 4/30 - seen from 2/15/18 to 4/27/18   Progress Note/Recertification   Progress Note Due Date 04/27/18   Ortho Goal 1   Goal Description will have increased hip ROM in order to be able to put on socks in 4wk  (can but it hurts, can barely reach it)   Target Date 03/15/18   Ortho Goal 2   Goal Description STG: Pt will able to get in/out of car w/o difficulty. in 4wk   Target Date 03/15/18   Date Met 03/07/18   Ortho Goal 3   Goal Description pt will be able to walk indep with min to no limp in 6wkj   Target Date 03/29/18   Date Met 03/15/18  (minimal limp)   Ortho Goal 4   Goal Description pt will be able to do reciprocol stairs in 8wk  (can do with rail, going down hurts hip more, not met)   Target Date 04/15/18   Subjective Report   Subjective Report Pain 3/10, still pinches at anterior hip/groin.  Better with walking more, has work restrictions, no tall ladders or crawling through ducts, so they don't have anything for him to do   Objective Measure 1   Objective Measure hip PROM: flex 100*, ext 7*, ER 25*. MMT hip flex 3+ painful at addcutor, hip abd 4/5, ext 4+, unable to control 6\" lateral step down, is able to squat to touch floor   Details cannot fully flex at hip to reach foot or ER hip to put on socks   Therapeutic Procedure/exercise   Minutes 25   Skilled Intervention finalize  HEP, to improve gait, stairs, bending   Patient Response pinch/pull adductor isprimary complaint   Treatment Detail SL hip abd, SLR, prone hip ext - gave grn TB for home standing hip 3 ways, does not have ankle weights, 6\" lateral step down x12, squa t to touch floor x5, SLS on bent knee x2 trials, adductor stretching ИРИНА, foot on step in ER position, side lunge   Plan   Plan for next session DC to HEP, goals met as  stated, does " not see MD for 6 wks, highly encouraged pt to continue with isolated strengthening, denia as he begins to do more biking and walking this summer   Total Session Time   Timed Code Treatment Minutes 25   Total Treatment Time (sum of timed and untimed services) 25   Kris Hoenk, PT #[id number]  [facility]

## 2018-06-26 ENCOUNTER — OFFICE VISIT (OUTPATIENT)
Dept: FAMILY MEDICINE | Facility: CLINIC | Age: 48
End: 2018-06-26
Payer: OTHER MISCELLANEOUS

## 2018-06-26 VITALS
SYSTOLIC BLOOD PRESSURE: 136 MMHG | HEART RATE: 94 BPM | WEIGHT: 206.8 LBS | BODY MASS INDEX: 29.6 KG/M2 | DIASTOLIC BLOOD PRESSURE: 84 MMHG | HEIGHT: 70 IN | RESPIRATION RATE: 16 BRPM

## 2018-06-26 DIAGNOSIS — G89.29 CHRONIC LEFT HIP PAIN: ICD-10-CM

## 2018-06-26 DIAGNOSIS — M25.552 CHRONIC LEFT HIP PAIN: ICD-10-CM

## 2018-06-26 DIAGNOSIS — G40.109 PARTIAL EPILEPSY (H): Primary | ICD-10-CM

## 2018-06-26 PROCEDURE — 99214 OFFICE O/P EST MOD 30 MIN: CPT | Performed by: FAMILY MEDICINE

## 2018-06-26 RX ORDER — GABAPENTIN 300 MG/1
600 CAPSULE ORAL DAILY
Qty: 60 CAPSULE | Refills: 11 | Status: SHIPPED | OUTPATIENT
Start: 2018-06-26 | End: 2018-10-07

## 2018-06-26 RX ORDER — MELOXICAM 15 MG/1
TABLET ORAL
COMMUNITY
Start: 2018-01-29 | End: 2018-10-07

## 2018-06-26 RX ORDER — HYDROCODONE BITARTRATE AND ACETAMINOPHEN 10; 325 MG/1; MG/1
1 TABLET ORAL EVERY 4 HOURS PRN
Qty: 30 TABLET | Refills: 0 | Status: SHIPPED | OUTPATIENT
Start: 2018-06-26 | End: 2018-09-14

## 2018-06-26 RX ORDER — GABAPENTIN 300 MG/1
CAPSULE ORAL
COMMUNITY
Start: 2018-01-29 | End: 2018-06-26

## 2018-06-26 ASSESSMENT — PAIN SCALES - GENERAL: PAINLEVEL: MODERATE PAIN (4)

## 2018-06-26 NOTE — NURSING NOTE
Chief Complaint   Patient presents with     Work Comp     Patient had hip replacement 1/24/18, hip pain continues- needs refill for norco.     Kenia MC CMA

## 2018-06-26 NOTE — PROGRESS NOTES
"  SUBJECTIVE:   Hosuton Winters is a 47 year old male who presents to clinic today for the following health issues:      Medication Followup of Norco    Taking Medication as prescribed: Patient ran out of medication    Side Effects:  None    Medication Helping Symptoms:  yes         Current Outpatient Prescriptions:      aspirin 81 MG EC tablet, Take 1 tablet (81 mg) by mouth daily, Disp: 90 tablet, Rfl: 3     insulin glargine (LANTUS VIAL) 100 UNIT/ML injection, Inject 30 Units Subcutaneous every morning, Disp: 10 mL, Rfl: 3     insulin syringe-needle U-100 (BD INSULIN SYRINGE ULTRAFINE) 31G X 5/16\" 1 ML, Use one syringe 22 units daily daily or as directed., Disp: 100 each, Rfl: 11     Multiple Vitamins-Minerals (MULTIVITAMIN OR), Take 1 tablet by mouth daily , Disp: , Rfl:      bisacodyl (DULCOLAX) 5 MG EC tablet, Take 5 mg by mouth daily as needed for constipation, Disp: , Rfl:      gabapentin (NEURONTIN) 300 MG capsule, , Disp: , Rfl:      HYDROcodone-acetaminophen (NORCO)  MG per tablet, Take 1 tablet by mouth every 4 hours as needed for moderate to severe pain maximum 6 tablet(s) per day. Fill on 12-4-17. (Patient not taking: Reported on 6/26/2018), Disp: 75 tablet, Rfl: 0     meloxicam (MOBIC) 15 MG tablet, , Disp: , Rfl:      order for DME, Test strips for pt's glucometer, brand as covered by insurance Test bid and prn., Disp: 200 each, Rfl: 4    Patient Active Problem List   Diagnosis     GANGLOIN CYST /RIGHT ANKLE     ED (erectile dysfunction)     Partial epilepsy (H)     Obesity     Hyperlipidemia with target LDL less than 100     Tobacco use disorder     Hypertension, goal below 140/90     Type 2 diabetes mellitus without complication, without long-term current use of insulin (H)     Small bowel obstruction     Postoperative infection, initial encounter       Blood pressure 150/90, pulse 94, resp. rate 16, height 5' 10\" (1.778 m), weight 206 lb 12.8 oz (93.8 kg).    Exam:  GENERAL APPEARANCE: " alert, mild distress and cooperative  MS: decreased range of motion of the left hip  NEURO: Normal strength and tone, sensory exam grossly normal, mentation intact and speech normal  PSYCH: mentation appears normal and affect normal/bright      (M25.552,  G89.29) Chronic left hip pain  Comment:   Plan: HYDROcodone-acetaminophen (NORCO)  MG per        tablet        We discussed the issues. He has not needed the Norco since January, but PT is painful and the limited supply of #30 is written today.   Use this after PT at  Home and do not drive on this med. Use no other meds that cause drowsiness, and avoid alcohol with the med.   We discussed the Neurontin and increase the dose from 300 mg to 600 mg daily.     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 The last seizure was 2008. This is good for 4 years.     Manolo Funes

## 2018-06-26 NOTE — MR AVS SNAPSHOT
After Visit Summary   6/26/2018    Houston Winters    MRN: [id number]           Patient Information     Date Of Birth          1970        Visit Information        Provider Department      6/26/2018 10:00 AM Manolo Funes MD [facility]        Today's Diagnoses     Partial epilepsy (H)    -  1    Chronic left hip pain          Care Instructions    (M25.552,  G89.29) Chronic left hip pain  Comment:   Plan: HYDROcodone-acetaminophen (NORCO)  MG per        tablet        We discussed the issues. He has not needed the Norco since January, but PT is painful and the limited supply of #30 is written today.   Use this after PT at  Home and do not drive on this med. Use no other meds that cause drowsiness, and avoid alcohol with the med.     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 The last seizure was 2008. This is good for 4 years.           Follow-ups after your visit        Who to contact     If you have questions or need follow up information about today's clinic visit or your schedule please contact [facility] directly at [phone].  Normal or non-critical lab and imaging results will be communicated to you by MyChart, letter or phone within 4 business days after the clinic has received the results. If you do not hear from us within 7 days, please contact the clinic through MyChart or phone. If you have a critical or abnormal lab result, we will notify you by phone as soon as possible.  Submit refill requests through [employer] or call your pharmacy and they will forward the refill request to us. Please allow 3 business days for your refill to be completed.          Additional Information About Your Visit        Care EveryWhere ID     This is your Care EveryWhere ID. This could be used by other organizations to access your [facility] medical records  [id number]        Your Vitals Were     Pulse Respirations Height  "BMI (Body Mass Index)          94 16 5' 10\" (1.778 m) 29.67 kg/m2         Blood Pressure from Last 3 Encounters:   06/26/18 150/90   01/28/18 122/82   01/04/18 115/74    Weight from Last 3 Encounters:   06/26/18 206 lb 12.8 oz (93.8 kg)   01/28/18 201 lb 1 oz (91.2 kg)   01/04/18 201 lb (91.2 kg)              Today, you had the following     No orders found for display         Where to get your medicines      Some of these will need a paper prescription and others can be bought over the counter.  Ask your nurse if you have questions.     Bring a paper prescription for each of these medications     HYDROcodone-acetaminophen  MG per tablet         Information about OPIOIDS     PRESCRIPTION OPIOIDS: WHAT YOU NEED TO KNOW   We gave you an opioid (narcotic) pain medicine. It is important to manage your pain, but opioids are not always the best choice. You should first try all the other options your care team gave you. Take this medicine for as short a time (and as few doses) as possible.     These medicines have risks:    DO NOT drive when on new or higher doses of pain medicine. These medicines can affect your alertness and reaction times, and you could be arrested for driving under the influence (DUI). If you need to use opioids long-term, talk to your care team about driving.    DO NOT operate heave machinery    DO NOT do any other dangerous activities while taking these medicines.     DO NOT drink any alcohol while taking these medicines.      If the opioid prescribed includes acetaminophen, DO NOT take with any other medicines that contain acetaminophen. Read all labels carefully. Look for the word  acetaminophen  or  Tylenol.  Ask your pharmacist if you have questions or are unsure.    You can get addicted to pain medicines, especially if you have a history of addiction (chemical, alcohol or substance dependence). Talk to your care team about ways to reduce this risk.    Store your pills in a secure place, " locked if possible. We will not replace any lost or stolen medicine. If you don t finish your medicine, please throw away (dispose) as directed by your pharmacist. The [place] Pollution Control Agency has more information about safe disposal: https://www.pca.state.[place]/living-green/managing-unwanted-medications.     All opioids tend to cause constipation. Drink plenty of water and eat foods that have a lot of fiber, such as fruits, vegetables, prune juice, apple juice and high-fiber cereal. Take a laxative (Miralax, milk of magnesia, Colace, Senna) if you don t move your bowels at least every other day.          Primary Care Provider Fax #    Physician No Ref-Primary [phone]       No address on file        Equal Access to Services     SVEN MICHEL : Ayana Hodgson, waaxda luqadaha, qaybta kaalmada mona, jovanni bowen. So [facility] [phone].    ATENCIÓN: Si habla español, tiene a isabel disposición servicios gratuitos de asistencia lingüística. Llame al [phone].    We comply with applicable federal civil rights laws and [place] laws. We do not discriminate on the basis of race, color, national origin, age, disability, sex, sexual orientation, or gender identity.            Thank you!     Thank you for choosing [facility]  for your care. Our goal is always to provide you with excellent care. Hearing back from our patients is one way we can continue to improve our services. Please take a few minutes to complete the written survey that you may receive in the mail after your visit with us. Thank you!             Your Updated Medication List - Protect others around you: Learn how to safely use, store and throw away your medicines at www.disposemymeds.org.          This list is accurate as of 6/26/18 10:50 AM.  Always use your most recent med list.                   Brand Name Dispense Instructions for use Diagnosis    aspirin 81 MG EC tablet     90  "tablet    Take 1 tablet (81 mg) by mouth daily    Diabetes mellitus, type 2 (H)       bisacodyl 5 MG EC tablet    DULCOLAX     Take 5 mg by mouth daily as needed for constipation        gabapentin 300 MG capsule    NEURONTIN          HYDROcodone-acetaminophen  MG per tablet    NORCO    30 tablet    Take 1 tablet by mouth every 4 hours as needed for moderate to severe pain maximum 6 tablet(s) per day. Fill on 12-4-17.    Chronic left hip pain       insulin glargine 100 UNIT/ML injection    LANTUS VIAL    10 mL    Inject 30 Units Subcutaneous every morning    Type 2 diabetes mellitus without complication, without long-term current use of insulin (H)       insulin syringe-needle U-100 31G X 5/16\" 1 ML    BD insulin syringe ULTRAFINE    100 each    Use one syringe 22 units daily daily or as directed.    Type 2 diabetes mellitus without complication, without long-term current use of insulin (H)       meloxicam 15 MG tablet    MOBIC          MULTIVITAMIN PO      Take 1 tablet by mouth daily    Type 2 diabetes, HbA1c goal < 7% (H)       order for DME     200 each    Test strips for pt's glucometer, brand as covered by insurance Test bid and prn.    Type 2 diabetes mellitus without complication, without long-term current use of insulin (H)         "

## 2018-06-26 NOTE — PATIENT INSTRUCTIONS
(M25.552,  G89.29) Chronic left hip pain  Comment:   Plan: HYDROcodone-acetaminophen (NORCO)  MG per        tablet        We discussed the issues. He has not needed the Norco since January, but PT is painful and the limited supply of #30 is written today.   Use this after PT at  Home and do not drive on this med. Use no other meds that cause drowsiness, and avoid alcohol with the med.     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 The last seizure was 2008. This is good for 4 years.

## 2018-09-12 ENCOUNTER — APPOINTMENT (OUTPATIENT)
Dept: GENERAL RADIOLOGY | Facility: CLINIC | Age: 48
End: 2018-09-12
Attending: EMERGENCY MEDICINE
Payer: OTHER MISCELLANEOUS

## 2018-09-12 ENCOUNTER — HOSPITAL ENCOUNTER (EMERGENCY)
Facility: CLINIC | Age: 48
Discharge: HOME | End: 2018-09-12
Attending: EMERGENCY MEDICINE | Admitting: EMERGENCY MEDICINE
Payer: OTHER MISCELLANEOUS

## 2018-09-12 VITALS
RESPIRATION RATE: 16 BRPM | TEMPERATURE: 98.7 F | SYSTOLIC BLOOD PRESSURE: 137 MMHG | DIASTOLIC BLOOD PRESSURE: 76 MMHG | OXYGEN SATURATION: 97 %

## 2018-09-12 DIAGNOSIS — G89.29 CHRONIC LEFT HIP PAIN: ICD-10-CM

## 2018-09-12 DIAGNOSIS — M25.552 CHRONIC LEFT HIP PAIN: ICD-10-CM

## 2018-09-12 PROCEDURE — 73502 X-RAY EXAM HIP UNI 2-3 VIEWS: CPT

## 2018-09-12 PROCEDURE — 99283 EMERGENCY DEPT VISIT LOW MDM: CPT

## 2018-09-12 PROCEDURE — 99283 EMERGENCY DEPT VISIT LOW MDM: CPT | Mod: Z6 | Performed by: EMERGENCY MEDICINE

## 2018-09-12 RX ORDER — OXYCODONE AND ACETAMINOPHEN 5; 325 MG/1; MG/1
1-2 TABLET ORAL EVERY 4 HOURS PRN
Qty: 12 TABLET | Refills: 0 | Status: SHIPPED | OUTPATIENT
Start: 2018-09-12 | End: 2018-10-07

## 2018-09-12 ASSESSMENT — ENCOUNTER SYMPTOMS
CARDIOVASCULAR NEGATIVE: 1
GASTROINTESTINAL NEGATIVE: 1
PSYCHIATRIC NEGATIVE: 1
ENDOCRINE NEGATIVE: 1
HEMATOLOGIC/LYMPHATIC NEGATIVE: 1
EYES NEGATIVE: 1
NEUROLOGICAL NEGATIVE: 1
ALLERGIC/IMMUNOLOGIC NEGATIVE: 1
CONSTITUTIONAL NEGATIVE: 1
RESPIRATORY NEGATIVE: 1

## 2018-09-12 NOTE — ED AVS SNAPSHOT
[facility] Emergency [facility]    [address] [address]    Phone:  [phone]    Fax:  [phone]                                       Houston Winters   MRN: [id number]    Department:  [facility]   Date of Visit:  9/12/2018           After Visit Summary Signature Page     I have received my discharge instructions, and my questions have been answered. I have discussed any challenges I see with this plan with the nurse or doctor.    ..........................................................................................................................................  Patient/Patient Representative Signature      ..........................................................................................................................................  Patient Representative Print Name and Relationship to Patient    ..................................................               ................................................  Date                                   Time    ..........................................................................................................................................  Reviewed by Signature/Title    ...................................................              ..............................................  Date                                               Time          22EPIC Rev 08/18

## 2018-09-12 NOTE — ED AVS SNAPSHOT
[facility] Emergency Department    [address] [address]    Phone:  [phone]    Fax:  [phone]                                       Houston Winters   MRN: [id number]    Department:  [facility] Emergency Department   Date of Visit:  9/12/2018           Patient Information     Date Of Birth          1970        Your diagnoses for this visit were:     Chronic left hip pain Left hip arthroplasty January 2018 by Dr SHANNON Veliz       You were seen by Horacio Boykin MD.      Follow-up Information     Call Jemal Veliz MD.    Specialty:  Orthopaedic Surgery    Why:  Call Dr Veliz to review next best step with continued pain and discomfort.    Contact information:    [facility]  [address] [place] [address]  [phone]          Follow up with Manolo Funes MD.    Specialty:  Family Practice    Why:  You have seen Dr Funes in the past for pain medications. You may consider establishing care with Dr Funes for referral for second opinion and medication refill    Contact information:    [address] [address]  [phone]        Discharge References/Attachments     CHRONIC PAIN, MANAGING (ENGLISH)    (S) OPIOID PAIN MEDICINES: WHAT YOU NEED TO KNOW (ENGLISH)    OXYCODONE HYDROCHLORIDE, ACETAMINOPHEN ORAL TABLET (ENGLISH)      Your next 10 appointments already scheduled     Sep 14, 2018  7:40 AM CDT   SHORT with ELMIRA Jones CNP   [facility] ([facility])    [address] [address] [phone]              24 Hour Appointment Hotline       To make an appointment at any [facility], call [phone] ([phone]). If you don't have a family doctor or clinic, we will help you find one. [facility] are conveniently located to serve the needs of you and your family.             Review of your medicines      START taking        Dose /  "Directions Last dose taken    oxyCODONE-acetaminophen 5-325 MG per tablet   Commonly known as:  PERCOCET   Dose:  1-2 tablet   Quantity:  12 tablet        Take 1-2 tablets by mouth every 4 hours as needed for pain   Refills:  0          Our records show that you are taking the medicines listed below. If these are incorrect, please call your family doctor or clinic.        Dose / Directions Last dose taken    aspirin 81 MG EC tablet   Dose:  81 mg   Quantity:  90 tablet        Take 1 tablet (81 mg) by mouth daily   Refills:  3        bisacodyl 5 MG EC tablet   Commonly known as:  DULCOLAX   Dose:  5 mg        Take 5 mg by mouth daily as needed for constipation   Refills:  0        gabapentin 300 MG capsule   Commonly known as:  NEURONTIN   Dose:  600 mg   Quantity:  60 capsule        Take 2 capsules (600 mg) by mouth daily   Refills:  11        HYDROcodone-acetaminophen  MG per tablet   Commonly known as:  NORCO   Dose:  1 tablet   Quantity:  30 tablet        Take 1 tablet by mouth every 4 hours as needed for moderate to severe pain maximum 6 tablet(s) per day. Fill on 12-4-17.   Refills:  0        insulin glargine 100 UNIT/ML injection   Commonly known as:  LANTUS VIAL   Dose:  30 Units   Quantity:  10 mL        Inject 30 Units Subcutaneous every morning   Refills:  3        insulin syringe-needle U-100 31G X 5/16\" 1 ML   Commonly known as:  BD insulin syringe ULTRAFINE   Quantity:  100 each        Use one syringe 22 units daily daily or as directed.   Refills:  11        meloxicam 15 MG tablet   Commonly known as:  MOBIC        Refills:  0        MULTIVITAMIN PO   Dose:  1 tablet        Take 1 tablet by mouth daily   Refills:  0        order for DME   Quantity:  200 each        Test strips for pt's glucometer, brand as covered by insurance Test bid and prn.   Refills:  4                Information about OPIOIDS     PRESCRIPTION OPIOIDS: WHAT YOU NEED TO KNOW   We gave you an opioid (narcotic) pain medicine. " It is important to manage your pain, but opioids are not always the best choice. You should first try all the other options your care team gave you. Take this medicine for as short a time (and as few doses) as possible.    Some activities can increase your pain, such as bandage changes or therapy sessions. It may help to take your pain medicine 30 to 60 minutes before these activities. Reduce your stress by getting enough sleep, working on hobbies you enjoy and practicing relaxation or meditation. Talk to your care team about ways to manage your pain beyond prescription opioids.    These medicines have risks:    DO NOT drive when on new or higher doses of pain medicine. These medicines can affect your alertness and reaction times, and you could be arrested for driving under the influence (DUI). If you need to use opioids long-term, talk to your care team about driving.    DO NOT operate heavy machinery    DO NOT do any other dangerous activities while taking these medicines.    DO NOT drink any alcohol while taking these medicines.     If the opioid prescribed includes acetaminophen, DO NOT take with any other medicines that contain acetaminophen. Read all labels carefully. Look for the word  acetaminophen  or  Tylenol.  Ask your pharmacist if you have questions or are unsure.    You can get addicted to pain medicines, especially if you have a history of addiction (chemical, alcohol or substance dependence). Talk to your care team about ways to reduce this risk.    All opioids tend to cause constipation. Drink plenty of water and eat foods that have a lot of fiber, such as fruits, vegetables, prune juice, apple juice and high-fiber cereal. Take a laxative (Miralax, milk of magnesia, Colace, Senna) if you don t move your bowels at least every other day. Other side effects include upset stomach, sleepiness, dizziness, throwing up, tolerance (needing more of the medicine to have the same effect), physical dependence  and slowed breathing.    Store your pills in a secure place, locked if possible. We will not replace any lost or stolen medicine. If you don t finish your medicine, please throw away (dispose) as directed by your pharmacist. The [place] Pollution Control Agency has more information about safe disposal: https://www.pca.state.[place]/living-green/managing-unwanted-medications        Prescriptions were sent or printed at these locations (1 Prescription)                   [facility] - [place], [place] - [address]   [address] [address]    Telephone:  [phone]   Fax:  [phone]   Hours:                  Printed at Department/Unit printer (1 of 1)         oxyCODONE-acetaminophen (PERCOCET) 5-325 MG per tablet                Procedures and tests performed during your visit     Pelvis XR w/ unilateral hip left      Orders Needing Specimen Collection     None      Pending Results     No orders found from 9/10/2018 to 9/13/2018.            Pending Culture Results     No orders found from 9/10/2018 to 9/13/2018.            Pending Results Instructions     If you had any lab results that were not finalized at the time of your Discharge, you can call the ED Lab Result RN at [phone]. You will be contacted by this team for any positive Lab results or changes in treatment. The nurses are available 7 days a week from 10A to 6:30P.  You can leave a message 24 hours per day and they will return your call.        Test Results From Your Hospital Stay        9/12/2018 10:14 PM      Narrative     PELVIS AND LEFT HIP TWO VIEWS   9/12/2018 9:46 PM     HISTORY: Chronic left hip pain.    COMPARISON: 12/29/2017 - CT abdomen and pelvis.        Impression     IMPRESSION:   1. A left hip arthroplasty is in place. No evidence of hardware  failure or malalignment. A small amount of heterotopic ossification is  present within the soft tissues at the lateral aspect of the left hip  joint.  2. No  visualized acute fracture or malalignment of the pelvis or  bilateral hips.   3. Mild degenerative changes in the right hip joint.     NAOMY CHATMAN MD                Thank you for choosing [facility]       Thank you for choosing [facility] for your care. Our goal is always to provide you with excellent care. Hearing back from our patients is one way we can continue to improve our services. Please take a few minutes to complete the written survey that you may receive in the mail after you visit with us. Thank you!        Care EveryWhere ID     This is your Care EveryWhere ID. This could be used by other organizations to access your [facility] medical records  [id number]        Equal Access to Services     SVEN MICHEL : Ayana Hodgson, pia sandoval, gagandeep dunn, jovanni bowen. So [facility] [phone].    ATENCIÓN: Si habla español, tiene a isabel disposición servicios gratuitos de asistencia lingüística. Llame al [phone].    We comply with applicable federal civil rights laws and [place] laws. We do not discriminate on the basis of race, color, national origin, age, disability, sex, sexual orientation, or gender identity.            After Visit Summary       This is your record. Keep this with you and show to your community pharmacist(s) and doctor(s) at your next visit.

## 2018-09-13 NOTE — ED PROVIDER NOTES
"  History     Chief Complaint   Patient presents with     Hip Pain     left hip pain replaced about 6 months ago     HPI  Houston Winters is a 48 year old male who with a history of type 2 diabetes, hyperlipidemia, epilepsy, erectile dysfunction, obesity hypertension who presents for chronic left hip pain and discomfort.Patient had left hip replacement by Dr. SHANNON Jorge-in January 2018.  Patient reports persistent pain since hip replacement.  Physical therapy was initially helpful but has not been helpful over the last 3 months.  He has had no relief with gabapentin or muscle relaxants. Some relief with Vicodin and Percocet.  Pain is more noticeable after his workday.  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  Patient reports he has followed with the [facility], but does not plan to continue physical therapy. No fever no chills. No extremity weakness or numbness.  No back pain and no flank pain.  \"It feels like someone squeezing my nuts\".  He reports some relief with Aleve and Advil. \" I think I am taking too much Advil and Aleve\".  .  He last received Percocet in June 2018.  Because he was unable to sleep last night due to discomfort despite trying various remedies he is here in the emergency department for further.  No difficulty urinating.  No penile discharge.  No testicular pain.    Problem List:    Patient Active Problem List    Diagnosis Date Noted     Postoperative infection, initial encounter 01/28/2018     Priority: Medium     Small bowel obstruction 12/29/2017     Priority: Medium     Type 2 diabetes mellitus without complication, without long-term current use of insulin (H) 03/27/2017     Priority: Medium     Tobacco use disorder 02/04/2015     Priority: Medium     Hypertension, goal below 140/90 02/04/2015     Priority: Medium     Obesity 07/17/2014     Priority: Medium     Hyperlipidemia with target LDL less than 100 07/17/2014     Priority: Medium     " Diagnosis updated by automated process. Provider to review and confirm.       Partial epilepsy (H) 03/12/2009     Priority: Medium     (Problem list name updated by automated process. Provider to review and confirm.)       ED (erectile dysfunction) 12/18/2008     Priority: Medium     GANGLOIN CYST /RIGHT ANKLE 06/15/2006     Priority: Medium        Past Medical History:    Past Medical History:   Diagnosis Date     DM (diabetes mellitus) (H)      Epilepsy (H)      MEDICAL HISTORY OF - Age 16      Nicotine dependence        Past Surgical History:    Past Surgical History:   Procedure Laterality Date     CL AFF SURGICAL PATHOLOGY  2005    ganglion cyst removed     EXCISE MASS LOWER EXTREMITY  2/15/2013    Procedure: EXCISE MASS LOWER EXTREMITY;  Right Ankle Excision of ganglion cyst;  Surgeon: Akbar Melo DPM;  Location: [facility] OR     HERNIA REPAIR  2000    umbilical repair     HERNIORRHAPHY UMBILICAL N/A 11/10/2015    Procedure: HERNIORRHAPHY UMBILICAL;  Surgeon: Garry Leos MD;  Location: [facility] OR       Family History:    Family History   Problem Relation Age of Onset     Arthritis Mother      Cancer Maternal Grandmother      Arthritis Maternal Grandmother      lupus     GASTROINTESTINAL DISEASE Maternal Grandfather      war injury, colostomy     Respiratory Daughter      growing out of it     Unknown/Adopted Father      Diabetes No family hx of      Coronary Artery Disease No family hx of      Hypertension No family hx of      Hyperlipidemia No family hx of      Breast Cancer No family hx of      Cancer - colorectal No family hx of      Ovarian Cancer No family hx of      Prostate Cancer No family hx of        Social History:  Marital Status:   [2]  Social History   Substance Use Topics     Smoking status: Former Smoker     Packs/day: 0.25     Years: 25.00     Types: Cigarettes     Quit date: 1/18/2016     Smokeless tobacco: Former User     Alcohol use 0.0 oz/week     0 Standard drinks or equivalent  "per week      Comment: mixed 2-4 on weekends, not every weekend        Medications:      oxyCODONE-acetaminophen (PERCOCET) 5-325 MG per tablet   aspirin 81 MG EC tablet   bisacodyl (DULCOLAX) 5 MG EC tablet   gabapentin (NEURONTIN) 300 MG capsule   HYDROcodone-acetaminophen (NORCO)  MG per tablet   insulin glargine (LANTUS VIAL) 100 UNIT/ML injection   insulin syringe-needle U-100 (BD INSULIN SYRINGE ULTRAFINE) 31G X 5/16\" 1 ML   meloxicam (MOBIC) 15 MG tablet   Multiple Vitamins-Minerals (MULTIVITAMIN OR)   order for DME         Review of Systems   Constitutional: Negative.    HENT: Negative.    Eyes: Negative.    Respiratory: Negative.    Cardiovascular: Negative.    Gastrointestinal: Negative.    Endocrine: Negative.    Genitourinary: Negative.    Musculoskeletal:        Left hip pain. Acute on chronic since surgery in January 2018. No relief with physical therapy   Skin: Negative.    Allergic/Immunologic: Negative.    Neurological: Negative.    Hematological: Negative.    Psychiatric/Behavioral: Negative.    All other systems reviewed and are negative.      Physical Exam   BP: 137/76  Heart Rate: 115  Temp: 98.7  F (37.1  C)  Resp: 16  SpO2: 97 %      Physical Exam   Constitutional: He is oriented to person, place, and time. He appears well-developed and well-nourished. No distress.   HENT:   Head: Normocephalic and atraumatic.   Eyes: Conjunctivae and EOM are normal. Pupils are equal, round, and reactive to light. Right eye exhibits no discharge. Left eye exhibits no discharge. No scleral icterus.   Neck: Normal range of motion. Neck supple. No JVD present. No tracheal deviation present. No thyromegaly present.   Cardiovascular: Normal rate and regular rhythm.  Exam reveals no gallop and no friction rub.    No murmur heard.  Pulmonary/Chest: Effort normal. No stridor.   Abdominal: Soft.   Musculoskeletal: He exhibits no deformity.        Left hip: He exhibits tenderness. He exhibits normal range of " "motion, no swelling, no crepitus and no deformity.        Legs:  Lymphadenopathy:     He has no cervical adenopathy.   Neurological: He is alert and oriented to person, place, and time. No cranial nerve deficit. Coordination normal.   Skin: No rash noted. He is not diaphoretic. No erythema. No pallor.       ED Course     ED Course     Procedures               Critical Care time:  none                   Medications - No data to display  ED medications: none      ED labs and imaging:  Results for orders placed or performed during the hospital encounter of 09/12/18   Pelvis XR w/ unilateral hip left    Narrative    PELVIS AND LEFT HIP TWO VIEWS   9/12/2018 9:46 PM     HISTORY: Chronic left hip pain.    COMPARISON: 12/29/2017 - CT abdomen and pelvis.      Impression    IMPRESSION:   1. A left hip arthroplasty is in place. No evidence of hardware  failure or malalignment. A small amount of heterotopic ossification is  present within the soft tissues at the lateral aspect of the left hip  joint.  2. No visualized acute fracture or malalignment of the pelvis or  bilateral hips.   3. Mild degenerative changes in the right hip joint.     NAOMY CHATMAN MD           ED Vitals:  Vitals:    09/12/18 2035   BP: 137/76   Resp: 16   Temp: 98.7  F (37.1  C)   TempSrc: Oral   SpO2: 97%     Assessments & Plan (with Medical Decision Making)   Clinical impression: 48-year-old male with a history of type 2 diabetes, chronic left hip pain status post left hip replacement in January 2018, hyperlipidemia, epilepsy who presented for evaluation for hip pain and discomfort. Patient reports chronic hip pain since initial replacement in January 2018 by Dr. Jemal Veliz.  Has done physical therapy with minimal improvement in his symptoms since surgery.  He has seen his primary orthopedic surgeon who recommended \"it will take time\".  Patient reports he is no longer planning to return to therapy because it has not been helpful.  He recently " changed his job as a  to work in [employer].  He admits that he has been taking a lot of Advil and Aleve which does help his pain during the workday.  At the end of the day his pain is more noticeable.  He reports he has had some relief with Vicodin and Percocet in the past.  He has not had relief wit muscle relaxants or gabapentin.  On my exam he is in no acute distress.  He was initially tachycardic in triage.  His blood pressure is 137/76.  He has a well-healed incision over the left hip.  There is no warmth around the skin.  No obvious bony deformity.  He does have some pain and discomfort with palpation around the hip.  Normal perfusion about the leg, normal sensation about the leg.      ED course and Plan:  I reviewed patient's medical records including his recent clinic visit.  He was seen June 26, 2018 and was prescribed Norco ×30 tablets, Neurontin dose was increased from 300-600 mg.  We had a discussion about his chronic hip pain and options for care.  He reports his primary surgeon Jemal Veliz plans to see him in follow-up in January 2019.  He does not think physical therapy has been helpful.  He is not reporting any systemic symptoms and it appears that his hip pain is worsened after activity most often after his workday. He  reports  last x-ray imaging was in April 2018 which showed prosthetic hardware seated properly.  He is not reporting any extremity weakness or numbness and no difficulty urinating and no pain in the abdomen flank or back.  We agreed to re-x-ray his hip today.  X-ray today showed no acute bony process.  Hardware appeared to be in place.  Some heterotopic ossification was present with soft tissue of the lateral hip.  Please see the interpreting radiologist report above.  Images were reviewed with the patient.    I reviewed his [place] prescription drug monitoring profile.  His last prescription was in June 26, 2018 when he received gabapentin and Norco.  He is  discharged to home with Percocet for pain control.  Patient was counseled on the  risk of opiaet  dependence and abuse with need to follow-up with orthopedic surgery for discussion about additional options for care with persistent pain and discomfort despite physical therapy.      Disclaimer: This note consists of symbols derived from keyboarding, dictation and/or voice recognition software. As a result, there may be errors in the script that have gone undetected. Please consider this when interpreting information found in this chart.  I have reviewed the nursing notes.    I have reviewed the findings, diagnosis, plan and need for follow up with the patient.       New Prescriptions    OXYCODONE-ACETAMINOPHEN (PERCOCET) 5-325 MG PER TABLET    Take 1-2 tablets by mouth every 4 hours as needed for pain       Final diagnoses:   Chronic left hip pain - Left hip arthroplasty January 2018 by Dr SHANNON Veliz       9/12/2018   [facility] EMERGENCY DEPARTMENT     Horacio Boykin MD  09/12/18 9504

## 2018-09-13 NOTE — ED NOTES
"Pt presents for left hip pain. Pt had hip replacement in January. Pt states he has seen Dr Gibbons from [facility], has tried gabapentin, muscle relaxer's, is using 800 mg ibuprofen multiple times daily, PRN benadryl and drinking alcohol to try to sleep. Pt states he cannot sleep due to excruciating pain. Pt reports feeling \"knot\" in left buttocks and that there is compression on left testicle. Pt states PT reports his muscles are extremely tight and difficult to stretch.  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     Pt denies numbness or tingling. Pt reports pain is throbbing in nature, tolerable during day but unbearable at night.   "

## 2018-09-14 ENCOUNTER — OFFICE VISIT (OUTPATIENT)
Dept: FAMILY MEDICINE | Facility: CLINIC | Age: 48
End: 2018-09-14
Payer: OTHER MISCELLANEOUS

## 2018-09-14 VITALS
SYSTOLIC BLOOD PRESSURE: 123 MMHG | RESPIRATION RATE: 14 BRPM | DIASTOLIC BLOOD PRESSURE: 76 MMHG | BODY MASS INDEX: 28.2 KG/M2 | TEMPERATURE: 97.9 F | HEIGHT: 70 IN | OXYGEN SATURATION: 97 % | WEIGHT: 197 LBS | HEART RATE: 95 BPM

## 2018-09-14 DIAGNOSIS — M25.552 LEFT HIP PAIN: Primary | ICD-10-CM

## 2018-09-14 PROCEDURE — 99213 OFFICE O/P EST LOW 20 MIN: CPT | Performed by: NURSE PRACTITIONER

## 2018-09-14 RX ORDER — OXYCODONE AND ACETAMINOPHEN 5; 325 MG/1; MG/1
1 TABLET ORAL EVERY 6 HOURS PRN
Qty: 40 TABLET | Refills: 0 | Status: SHIPPED | OUTPATIENT
Start: 2018-09-14 | End: 2018-10-07

## 2018-09-14 ASSESSMENT — PAIN SCALES - GENERAL: PAINLEVEL: MODERATE PAIN (5)

## 2018-09-14 NOTE — MR AVS SNAPSHOT
"              After Visit Summary   9/14/2018    Houston Winters    MRN: [id number]           Patient Information     Date Of Birth          1970        Visit Information        Provider Department      9/14/2018 7:40 AM No Santa APRN CNP [facility]        Today's Diagnoses     Left hip pain    -  1      Care Instructions    Schedule your appointment with Ortho for follow up.    Follow up with Dr. Funes.          Follow-ups after your visit        Who to contact     If you have questions or need follow up information about today's clinic visit or your schedule please contact [facility] directly at [phone].  Normal or non-critical lab and imaging results will be communicated to you by MyChart, letter or phone within 4 business days after the clinic has received the results. If you do not hear from us within 7 days, please contact the clinic through MyChart or phone. If you have a critical or abnormal lab result, we will notify you by phone as soon as possible.  Submit refill requests through [employer] or call your pharmacy and they will forward the refill request to us. Please allow 3 business days for your refill to be completed.          Additional Information About Your Visit        Care EveryWhere ID     This is your Care EveryWhere ID. This could be used by other organizations to access your [facility] medical records  [id number]        Your Vitals Were     Pulse Temperature Respirations Height Pulse Oximetry BMI (Body Mass Index)    95 97.9  F (36.6  C) (Tympanic) 14 5' 10\" (1.778 m) 97% 28.27 kg/m2       Blood Pressure from Last 3 Encounters:   09/14/18 123/76   09/12/18 137/76   06/26/18 136/84    Weight from Last 3 Encounters:   09/14/18 197 lb (89.4 kg)   06/26/18 206 lb 12.8 oz (93.8 kg)   01/28/18 201 lb 1 oz (91.2 kg)              Today, you had the following     No orders found for display         Today's Medication Changes          These " changes are accurate as of 9/14/18  8:28 AM.  If you have any questions, ask your nurse or doctor.               These medicines have changed or have updated prescriptions.        Dose/Directions    * oxyCODONE-acetaminophen 5-325 MG per tablet   Commonly known as:  PERCOCET   This may have changed:  Another medication with the same name was added. Make sure you understand how and when to take each.   Changed by:  No Santa APRN CNP        Dose:  1-2 tablet   Take 1-2 tablets by mouth every 4 hours as needed for pain   Quantity:  12 tablet   Refills:  0       * oxyCODONE-acetaminophen 5-325 MG per tablet   Commonly known as:  PERCOCET   This may have changed:  You were already taking a medication with the same name, and this prescription was added. Make sure you understand how and when to take each.   Used for:  Left hip pain   Changed by:  No Santa APRN CNP        Dose:  1 tablet   Take 1 tablet by mouth every 6 hours as needed for pain   Quantity:  40 tablet   Refills:  0       * Notice:  This list has 2 medication(s) that are the same as other medications prescribed for you. Read the directions carefully, and ask your doctor or other care provider to review them with you.         Where to get your medicines      Some of these will need a paper prescription and others can be bought over the counter.  Ask your nurse if you have questions.     Bring a paper prescription for each of these medications     oxyCODONE-acetaminophen 5-325 MG per tablet               Information about OPIOIDS     PRESCRIPTION OPIOIDS: WHAT YOU NEED TO KNOW   We gave you an opioid (narcotic) pain medicine. It is important to manage your pain, but opioids are not always the best choice. You should first try all the other options your care team gave you. Take this medicine for as short a time (and as few doses) as possible.    Some activities can increase your pain, such as bandage changes or therapy sessions. It may  help to take your pain medicine 30 to 60 minutes before these activities. Reduce your stress by getting enough sleep, working on hobbies you enjoy and practicing relaxation or meditation. Talk to your care team about ways to manage your pain beyond prescription opioids.    These medicines have risks:    DO NOT drive when on new or higher doses of pain medicine. These medicines can affect your alertness and reaction times, and you could be arrested for driving under the influence (DUI). If you need to use opioids long-term, talk to your care team about driving.    DO NOT operate heavy machinery    DO NOT do any other dangerous activities while taking these medicines.    DO NOT drink any alcohol while taking these medicines.     If the opioid prescribed includes acetaminophen, DO NOT take with any other medicines that contain acetaminophen. Read all labels carefully. Look for the word  acetaminophen  or  Tylenol.  Ask your pharmacist if you have questions or are unsure.    You can get addicted to pain medicines, especially if you have a history of addiction (chemical, alcohol or substance dependence). Talk to your care team about ways to reduce this risk.    All opioids tend to cause constipation. Drink plenty of water and eat foods that have a lot of fiber, such as fruits, vegetables, prune juice, apple juice and high-fiber cereal. Take a laxative (Miralax, milk of magnesia, Colace, Senna) if you don t move your bowels at least every other day. Other side effects include upset stomach, sleepiness, dizziness, throwing up, tolerance (needing more of the medicine to have the same effect), physical dependence and slowed breathing.    Store your pills in a secure place, locked if possible. We will not replace any lost or stolen medicine. If you don t finish your medicine, please throw away (dispose) as directed by your pharmacist. The [place] Pollution Control Agency has more information about safe disposal:  [email or web address]         Primary Care Provider Fax #    Physician No Ref-Primary [phone]       No address on file        Equal Access to Services     SVEN ANAND : Ayana pacheco bina estee Hodgson, wamelindada luqadaha, qanormata kaalmada mona, jovanni bowen. So [facility] [phone].    ATENCIÓN: Si habla español, tiene a isabel disposición servicios gratuitos de asistencia lingüística. [facility] [phone].    We comply with applicable federal civil rights laws and [place] laws. We do not discriminate on the basis of race, color, national origin, age, disability, sex, sexual orientation, or gender identity.            Thank you!     Thank you for choosing [facility]  for your care. Our goal is always to provide you with excellent care. Hearing back from our patients is one way we can continue to improve our services. Please take a few minutes to complete the written survey that you may receive in the mail after your visit with us. Thank you!             Your Updated Medication List - Protect others around you: Learn how to safely use, store and throw away your medicines at www.disposemymeds.org.          This list is accurate as of 9/14/18  8:28 AM.  Always use your most recent med list.                   Brand Name Dispense Instructions for use Diagnosis    aspirin 81 MG EC tablet     90 tablet    Take 1 tablet (81 mg) by mouth daily    Diabetes mellitus, type 2 (H)       bisacodyl 5 MG EC tablet    DULCOLAX     Take 5 mg by mouth daily as needed for constipation        gabapentin 300 MG capsule    NEURONTIN    60 capsule    Take 2 capsules (600 mg) by mouth daily    Chronic left hip pain       insulin glargine 100 UNIT/ML injection    LANTUS VIAL    10 mL    Inject 30 Units Subcutaneous every morning    Type 2 diabetes mellitus without complication, without long-term current use of insulin (H)       insulin  "syringe-needle U-100 31G X 5/16\" 1 ML    BD insulin syringe ULTRAFINE    100 each    Use one syringe 22 units daily daily or as directed.    Type 2 diabetes mellitus without complication, without long-term current use of insulin (H)       meloxicam 15 MG tablet    MOBIC          MULTIVITAMIN PO      Take 1 tablet by mouth daily    Type 2 diabetes, HbA1c goal < 7% (H)       order for DME     200 each    Test strips for pt's glucometer, brand as covered by insurance Test bid and prn.    Type 2 diabetes mellitus without complication, without long-term current use of insulin (H)       * oxyCODONE-acetaminophen 5-325 MG per tablet    PERCOCET    12 tablet    Take 1-2 tablets by mouth every 4 hours as needed for pain        * oxyCODONE-acetaminophen 5-325 MG per tablet    PERCOCET    40 tablet    Take 1 tablet by mouth every 6 hours as needed for pain    Left hip pain       * Notice:  This list has 2 medication(s) that are the same as other medications prescribed for you. Read the directions carefully, and ask your doctor or other care provider to review them with you.      "

## 2018-09-14 NOTE — PROGRESS NOTES
SUBJECTIVE:   Houston Winters is a 48 year old male who presents to clinic today for the following health issues:      Joint Pain- RECHECK    Onset: 2016    Description:   Location: left hip  Character: Sharp and Gnawing    Intensity: moderate    Progression of Symptoms: worse    Accompanying Signs & Symptoms:  Other symptoms: radiation of pain to down leg and groin    History:   Previous similar pain: YES- Recheck      Precipitating factors:   Trauma or overuse: YES- pt injured hip in 2016    Alleviating factors:  Improved by: rest/inactivity    Therapies Tried and outcome: Norco, rest      Problem list and histories reviewed & adjusted, as indicated.  Further history obtained, clarified or corrected by provider:  First surgery left micro fracture replacement, total left hip replacement this past January.  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  Tells me that Ortho doesn't think this is a problem.  No sleep 3 nights as due to pain.  He was seen in the emergency room on 9/12/2018 with complaints of chronic left hip pain.  An x-ray of the left hip and pelvis showed no fracture or malalignment.  It did show a small amount of heterotopic ossification within the soft tissues at the lateral aspect of the left hip joint.  He was discharged home with 12 tablets of Percocet  Percocet taking 2 every 4-6 hours. Yesterday 2 tabs twice daily.  He slept well last night with the Percocet.      Patient Active Problem List   Diagnosis     GANGLOIN CYST /RIGHT ANKLE     ED (erectile dysfunction)     Partial epilepsy (H)     Obesity     Hyperlipidemia with target LDL less than 100     Tobacco use disorder     Hypertension, goal below 140/90     Type 2 diabetes mellitus without complication, without long-term current use of insulin (H)     Small bowel obstruction      Postoperative infection, initial encounter     Past Surgical History:   Procedure Laterality Date     CL AFF SURGICAL PATHOLOGY  2005    ganglion cyst removed     EXCISE MASS LOWER EXTREMITY  2/15/2013    Procedure: EXCISE MASS LOWER EXTREMITY;  Right Ankle Excision of ganglion cyst;  Surgeon: Akbar Melo DPM;  Location: [facility] OR     HERNIA REPAIR  2000    umbilical repair     HERNIORRHAPHY UMBILICAL N/A 11/10/2015    Procedure: HERNIORRHAPHY UMBILICAL;  Surgeon: Garry Leos MD;  Location: [facility] OR       Social History   Substance Use Topics     Smoking status: Former Smoker     Packs/day: 0.25     Years: 25.00     Types: Cigarettes     Quit date: 1/18/2016     Smokeless tobacco: Former User     Alcohol use 0.0 oz/week     0 Standard drinks or equivalent per week      Comment: mixed 2-4 on weekends, not every weekend     Family History   Problem Relation Age of Onset     Arthritis Mother      Cancer Maternal Grandmother      Arthritis Maternal Grandmother      lupus     GASTROINTESTINAL DISEASE Maternal Grandfather      war injury, colostomy     Respiratory Daughter      growing out of it     Unknown/Adopted Father      Diabetes No family hx of      Coronary Artery Disease No family hx of      Hypertension No family hx of      Hyperlipidemia No family hx of      Breast Cancer No family hx of      Cancer - colorectal No family hx of      Ovarian Cancer No family hx of      Prostate Cancer No family hx of            Reviewed and updated as needed this visit by clinical staff  Tobacco  Allergies  Meds  Problems  Med Hx  Surg Hx  Fam Hx  Soc Hx        Reviewed and updated as needed this visit by Provider  Allergies  Meds  Problems         ROS:  Constitutional, HEENT, cardiovascular, pulmonary, GI, , musculoskeletal, neuro, skin, endocrine and psych systems are negative, except as otherwise noted.    OBJECTIVE:     /76 (BP Location: Right arm, Patient Position: Chair, Cuff Size: Adult Large)   "Pulse 95  Temp 97.9  F (36.6  C) (Tympanic)  Resp 14  Ht 5' 10\" (1.778 m)  Wt 197 lb (89.4 kg)  SpO2 97%  BMI 28.27 kg/m2  Body mass index is 28.27 kg/(m^2).  GENERAL: healthy, alert and no distress  MS: Left hip tenderness.  Normal range of motion.    Diagnostic Test Results:  none     ASSESSMENT/PLAN:     ASSESSMENT:  1. Left hip pain.  Acute on chronic.  Discussed with patient.  He needs to get back to see his orthopedic surgeon tells me he will schedule an appointment.       PLAN:  Orders Placed This Encounter     oxyCODONE-acetaminophen (PERCOCET) 5-325 MG per tablet       Patient Instructions   Schedule your appointment with Ortho for follow up.    Follow up with Dr. Funes.  Patient agrees with plan of care as outlined. Call or return to the clinic with any worsening of symptoms or no resolution. Medication side effects reviewed.      No Santa, SELENA, APRN CNP  [facility]  "

## 2018-10-07 ENCOUNTER — APPOINTMENT (OUTPATIENT)
Dept: GENERAL RADIOLOGY | Facility: CLINIC | Age: 48
End: 2018-10-07
Attending: EMERGENCY MEDICINE
Payer: COMMERCIAL

## 2018-10-07 ENCOUNTER — HOSPITAL ENCOUNTER (EMERGENCY)
Facility: CLINIC | Age: 48
Discharge: HOME | End: 2018-10-07
Attending: EMERGENCY MEDICINE | Admitting: EMERGENCY MEDICINE
Payer: COMMERCIAL

## 2018-10-07 VITALS
HEART RATE: 105 BPM | HEIGHT: 70 IN | DIASTOLIC BLOOD PRESSURE: 97 MMHG | SYSTOLIC BLOOD PRESSURE: 154 MMHG | TEMPERATURE: 97.9 F | BODY MASS INDEX: 28.2 KG/M2 | WEIGHT: 197 LBS | RESPIRATION RATE: 16 BRPM

## 2018-10-07 DIAGNOSIS — S63.267A: ICD-10-CM

## 2018-10-07 DIAGNOSIS — Z23 NEED FOR PROPHYLACTIC VACCINATION AND INOCULATION AGAINST CHOLERA ALONE: ICD-10-CM

## 2018-10-07 DIAGNOSIS — S61.217A LACERATION OF LEFT LITTLE FINGER WITHOUT FOREIGN BODY WITHOUT DAMAGE TO NAIL, INITIAL ENCOUNTER: ICD-10-CM

## 2018-10-07 DIAGNOSIS — S61.207A: ICD-10-CM

## 2018-10-07 PROCEDURE — 90471 IMMUNIZATION ADMIN: CPT

## 2018-10-07 PROCEDURE — 90715 TDAP VACCINE 7 YRS/> IM: CPT | Performed by: EMERGENCY MEDICINE

## 2018-10-07 PROCEDURE — 25000132 ZZH RX MED GY IP 250 OP 250 PS 637: Performed by: EMERGENCY MEDICINE

## 2018-10-07 PROCEDURE — 25000128 H RX IP 250 OP 636: Performed by: EMERGENCY MEDICINE

## 2018-10-07 PROCEDURE — 99283 EMERGENCY DEPT VISIT LOW MDM: CPT | Mod: 25

## 2018-10-07 PROCEDURE — 12002 RPR S/N/AX/GEN/TRNK2.6-7.5CM: CPT | Mod: Z6 | Performed by: EMERGENCY MEDICINE

## 2018-10-07 PROCEDURE — 99284 EMERGENCY DEPT VISIT MOD MDM: CPT | Mod: 25 | Performed by: EMERGENCY MEDICINE

## 2018-10-07 PROCEDURE — 73130 X-RAY EXAM OF HAND: CPT | Mod: LT

## 2018-10-07 PROCEDURE — 12002 RPR S/N/AX/GEN/TRNK2.6-7.5CM: CPT

## 2018-10-07 RX ORDER — OXYCODONE AND ACETAMINOPHEN 5; 325 MG/1; MG/1
1-2 TABLET ORAL EVERY 4 HOURS PRN
Qty: 12 TABLET | Refills: 0 | Status: SHIPPED | OUTPATIENT
Start: 2018-10-07 | End: 2019-03-27

## 2018-10-07 RX ORDER — LIDOCAINE HYDROCHLORIDE 10 MG/ML
INJECTION, SOLUTION INFILTRATION; PERINEURAL
Status: DISCONTINUED
Start: 2018-10-07 | End: 2018-10-07 | Stop reason: HOSPADM

## 2018-10-07 RX ORDER — OXYCODONE AND ACETAMINOPHEN 5; 325 MG/1; MG/1
1 TABLET ORAL ONCE
Status: COMPLETED | OUTPATIENT
Start: 2018-10-07 | End: 2018-10-07

## 2018-10-07 RX ORDER — CEPHALEXIN 500 MG/1
500 CAPSULE ORAL 4 TIMES DAILY
Qty: 20 CAPSULE | Refills: 0 | Status: SHIPPED | OUTPATIENT
Start: 2018-10-07 | End: 2019-03-27

## 2018-10-07 RX ADMIN — OXYCODONE HYDROCHLORIDE AND ACETAMINOPHEN 1 TABLET: 5; 325 TABLET ORAL at 12:43

## 2018-10-07 RX ADMIN — CLOSTRIDIUM TETANI TOXOID ANTIGEN (FORMALDEHYDE INACTIVATED), CORYNEBACTERIUM DIPHTHERIAE TOXOID ANTIGEN (FORMALDEHYDE INACTIVATED), BORDETELLA PERTUSSIS TOXOID ANTIGEN (GLUTARALDEHYDE INACTIVATED), BORDETELLA PERTUSSIS FILAMENTOUS HEMAGGLUTININ ANTIGEN (FORMALDEHYDE INACTIVATED), BORDETELLA PERTUSSIS PERTACTIN ANTIGEN, AND BORDETELLA PERTUSSIS FIMBRIAE 2/3 ANTIGEN 0.5 ML: 5; 2; 2.5; 5; 3; 5 INJECTION, SUSPENSION INTRAMUSCULAR at 12:41

## 2018-10-07 ASSESSMENT — ENCOUNTER SYMPTOMS: WOUND: 1

## 2018-10-07 NOTE — DISCHARGE INSTRUCTIONS
Return if symptoms worsen or new symptoms develop.  Follow-up with primary care physician next available.  Drink plenty of fluids.  If increased fever chills nausea vomiting or other symptoms occur please return for further evaluation and care.  Sutures should be removed in 7 days follow-up with orthopedics next available.    Extremity Laceration: Stitches, Staples, or Tape  A laceration is a cut through the skin. If it is deep, it may require stitches or staples to close so it can heal. Minor cuts may be treated with surgical tape closures, or skin glue.  X-rays may be done if something may have entered the skin through the cut. You may also need a tetanus shot if you are not up to date on this vaccine.  Home care    Follow the healthcare provider s instructions on how to care for the cut.    Wash your hands with soap and warm water before and after caring for your wound. This is to help prevent infection.    Keep the wound clean and dry. If a bandage was applied and it becomes wet or dirty, replace it. Otherwise, leave it in place for the first 24 hours, then change it once a day or as directed.    If stitches or staples were used, clean the wound daily:  ? After removing the bandage, wash the area with soap and water. Use a wet cotton swab to loosen and remove any blood or crust that forms.  ? After cleaning, keep the wound clean and dry. Talk with your healthcare provider before putting any antibiotic ointment on the wound. Reapply the bandage.    You may remove the bandage to shower as usual after the first 24 hours, but don't soak the area in water (no swimming) until the stitches or staples are removed.    If surgical tape closures were used, keep the area clean and dry. If it becomes wet, blot it dry with a towel. Let the surgical tape fall off on its own.    The healthcare provider may prescribe an antibiotic cream or ointment to prevent infection. He or she may also prescribe an antibiotic pill. Don't stop  taking this medicine until you have finished it all or the provider tells you to stop.    The provider may also prescribe medicine for pain. Follow the instructions for taking these medicines.    Don't do activities that may reopen your wound.  Follow-up care  Follow up with your healthcare provider, or as advised. Most skin wounds heal within 10 days. But an infection may sometimes occur even with proper treatment. Check the wound daily for the signs of infection listed below. Stitches and staples should be removed within 7 to14 days. If surgical tape closures were used, you may remove them after 10 days if they have not fallen off by then.   When to seek medical advice  Call your healthcare provider right away if any of these occur:    Wound bleeding not controlled by direct pressure    Signs of infection, including increasing pain in the wound, increasing wound redness or swelling, or pus or bad odor coming from the wound    Fever of 100.4 F (38 C) or higher, or as directed by your healthcare provider    Stitches or staples come apart or fall out or surgical tape falls off before 7 days    Wound edges reopen    Wound changes colors    Numbness occurs around the wound     Decreased movement around the injured area  Date Last Reviewed: 7/1/2017 2000-2017 [employer]. [address] [place] [address]. All rights reserved. This information is not intended as a substitute for professional medical care. Always follow your healthcare professional's instructions.        Finger Dislocation  A finger dislocation occurs when the tissues, or ligaments, that hold the joint together are torn. The bones then move apart, or are dislocated, out of their normal position. This causes pain, swelling, and bruising. Sometimes there is also a small chip fracture. Once the joint is put back into place again, it will take about 6 weeks for the ligaments to heal. During this time, you should protect your finger  from re-injury.     Buddy taping is a way to secure your injured finger to the one next to it. Buddy taping allows the joint to move. But it also protects it from dislocating again. Buddy tape can be left in place for up to 6 weeks.  Hand exercises may be prescribed at your follow-up visit. These can help speed healing and maintain function. In most cases you will regain full function of your finger. But it may take 12 to 18 months before all mild pain and swelling goes away and full function returns.  Home care    Keep your hand raised, or elevated, to reduce pain and swelling. When sitting or lying down, raise your arm above the level of your heart. You can do this by placing your arm on a pillow that rests on your chest. Or your arm can be on a pillow at your side. This is most important during the first 48 hours after injury.    Put an ice pack on the injured area for no more than 15 to 20 minutes every 3 to 6 hours. Do this for the first 24 to 48 hours. You can make an ice pack by wrapping a plastic Ziploc bag of ice cubes in a thin towel. As the ice melts, be careful that the tape, gauze, or splint doesn t get wet. After that, keep using ice as needed to ease pain and swelling.    If you have a removable splint, you may take it off to bathe and then put it back on. If you have a permanent splint, cover your entire hand with 2 plastic bags. Place 1 bag around the other. Tape each bag with duct tape at the top end. Water can still leak in even when your hand is covered. So it's best to keep the splint away from water. If a splint gets wet, you can dry it with a hair-dryer on a cool setting.     If you use buddy tape and it becomes wet or dirty, change it. You may replace it with paper, plastic, or cloth tape. Cloth tape and paper tapes must be kept dry. When re-applying buddy tape, use gauze or cotton padding between your fingers. This will prevent the skin from getting moist and breaking down, or macerating. It  is very important to put padding at the web space. This is the small piece of skin that joins the bases of your fingers. Keep the nadeen tape in place as instructed by your healthcare provider.    You may use over-the-counter pain medicine to control pain, unless another pain medicine was prescribed. Talk with your provider before taking these medicines if you have chronic liver or kidney disease. Also talk with your provider if you have ever had a stomach ulcer or GI (gastrointestinal) bleeding.    Do not play sports or do any physical exercise until your healthcare provider says that you can.  Follow-up care  Follow up with your healthcare provider in 1 week, or as advised. Splints should generally not be left in place longer than 3 weeks to avoid stiffness and loss of joint function. It is important that you see the referral doctor. This provider can determine how long to keep your splint in place and when to begin hand exercises.  If X-rays were taken, you will be told of any new findings that may affect your care.  When to seek medical advice  Call your healthcare provider right away if any of the following occur:    The injured finger has more pain or swelling    The injured finger becomes red or warm    The injured finger becomes cold, blue, numb, or tingly  Date Last Reviewed: 11/23/2015 2000-2017 [employer]. [address] [place] [address], [place] [id number]. All rights reserved. This information is not intended as a substitute for professional medical care. Always follow your healthcare professional's instructions.          Joint Dislocation    You have a joint dislocation. This happens when a strong force is applied to the joint, tearing ligaments and forcing the bones out of place. Often no bones are broken. But the nearby nerves and blood vessels can be damaged.  Once the joint is put back in place, it will take at least 6 weeks for the ligaments to heal. Range of motion exercises or physical  therapy may be prescribed early in your recovery. This is to prevent the joint from getting stiff. Later, strengthening exercises may be added.  In more severe cases, surgery may be needed to realign the joint and repair the torn ligaments or any broken bones. After a dislocation, the joint may not regain full range of motion or heal fully. Also, if the joint surface was fractured or broken, you are at risk of getting arthritis in that joint.  Home care    If you were given a sling or splint, wear it until your next provider visit. Don t take it off at night to sleep. It is possible to re-injure the joint while you sleep. Unless told otherwise, you may take off the sling or splint to bathe or dress.    If no bones were broken, it s important to begin moving the joint during the first few weeks after the injury. This is to prevent the joint from getting stiff. On your next visit, ask your provider when you should begin these exercises.    Apply an ice pack to the injured area for no more than 20 minutes. Do this every 3 to 6 hours for the first 24 to 48 hours. Keep using ice 4 times a day for the next few days until the pain is better. To make an ice pack, put ice cubes in a sealed zip-lock plastic bag. Wrap the bag in a clean, thin towel or cloth. Never put ice or an ice pack directly on the skin. You can place the ice pack inside the sling or over the splint. As the ice melts, be careful that the sling or splint doesn t get wet. You can place the ice pack inside the sling or over the splint.    You may use over-the-counter pain medicine to control pain, unless another pain medicine was prescribed. Talk with your provider before using these medicines if you have chronic liver or kidney disease, or ever had a stomach ulcer or GI (gastrointestinal) bleeding.  Follow-up care  Follow up with your healthcare provider in 1 week, or as advised.  If X-rays or a CT scan, were taken, you will be notified of any new findings  that may affect your care.  When to seek medical advice  Call your healthcare provider right away if any of these occur:    There is increasing swelling or pain in or around the injured joint    Your affected arm or leg becomes cold, blue, numb, or tingly  Date Last Reviewed: 11/19/2015 2000-2017 [employer]. [address] [place] [address], [place] [address]. All rights reserved. This information is not intended as a substitute for professional medical care. Always follow your healthcare professional's instructions.

## 2018-10-07 NOTE — ED AVS SNAPSHOT
[facility] Emergency Department    [address] [facility] [address]    Phone:  [phone]    Fax:  [phone]                                       Houston Winters   MRN: [id number]    Department:  [facility] Emergency Department   Date of Visit:  10/7/2018           After Visit Summary Signature Page     I have received my discharge instructions, and my questions have been answered. I have discussed any challenges I see with this plan with the nurse or doctor.    ..........................................................................................................................................  Patient/Patient Representative Signature      ..........................................................................................................................................  Patient Representative Print Name and Relationship to Patient    ..................................................               ................................................  Date                                   Time    ..........................................................................................................................................  Reviewed by Signature/Title    ...................................................              ..............................................  Date                                               Time          22EPIC Rev 08/18

## 2018-10-07 NOTE — ED NOTES
Turning crank on boat trailer, was not working, kicked handle and spun hitting hand, states pinky finger was dislocated and did put back in place, cut to top of finger

## 2018-10-07 NOTE — ED PROVIDER NOTES
"  History     Chief Complaint   Patient presents with     Hand Injury     Caught his hand in between boat trailer and something - has lac to left pinkey ? tendon involvement     HPI  Houston Winters is a 48 year old male with a history of obesity, hyperlipidemia, hypertension, and type two diabetes mellitus who presents to the emergency department today for evaluation of a hand injury. Patient was putting a boat onto his left when the wench he was cranking slipped and violently spun striking his left fifth digit causing a laceration and dislocation of the finger at the base of the MCP.  Patient reduced the finger at the scene and bleeding was controlled with pressure.  He states he has sensation in the finger and denies any other abnormality.  Currently rates his pain a 5 out of 10..      Patient Active Problem List   Diagnosis     GANGLOIN CYST /RIGHT ANKLE     ED (erectile dysfunction)     Partial epilepsy (H)     Obesity     Hyperlipidemia with target LDL less than 100     Tobacco use disorder     Hypertension, goal below 140/90     Type 2 diabetes mellitus without complication, without long-term current use of insulin (H)     Small bowel obstruction (H)     Postoperative infection, initial encounter     Current Outpatient Prescriptions   Medication Sig Dispense Refill     aspirin 81 MG EC tablet Take 1 tablet (81 mg) by mouth daily 90 tablet 3     bisacodyl (DULCOLAX) 5 MG EC tablet Take 5 mg by mouth daily as needed for constipation       gabapentin (NEURONTIN) 300 MG capsule Take 2 capsules (600 mg) by mouth daily (Patient not taking: Reported on 9/14/2018) 60 capsule 11     insulin glargine (LANTUS VIAL) 100 UNIT/ML injection Inject 30 Units Subcutaneous every morning 10 mL 3     insulin syringe-needle U-100 (BD INSULIN SYRINGE ULTRAFINE) 31G X 5/16\" 1 ML Use one syringe 22 units daily daily or as directed. 100 each 11     meloxicam (MOBIC) 15 MG tablet        Multiple Vitamins-Minerals (MULTIVITAMIN OR) Take " 1 tablet by mouth daily        order for DME Test strips for pt's glucometer, brand as covered by insurance Test bid and prn. 200 each 4     oxyCODONE-acetaminophen (PERCOCET) 5-325 MG per tablet Take 1 tablet by mouth every 6 hours as needed for pain 40 tablet 0     oxyCODONE-acetaminophen (PERCOCET) 5-325 MG per tablet Take 1-2 tablets by mouth every 4 hours as needed for pain (Patient not taking: Reported on 9/14/2018) 12 tablet 0     No Known Allergies    Problem List:    Patient Active Problem List    Diagnosis Date Noted     Postoperative infection, initial encounter 01/28/2018     Priority: Medium     Small bowel obstruction (H) 12/29/2017     Priority: Medium     Type 2 diabetes mellitus without complication, without long-term current use of insulin (H) 03/27/2017     Priority: Medium     Tobacco use disorder 02/04/2015     Priority: Medium     Hypertension, goal below 140/90 02/04/2015     Priority: Medium     Obesity 07/17/2014     Priority: Medium     Hyperlipidemia with target LDL less than 100 07/17/2014     Priority: Medium     Diagnosis updated by automated process. Provider to review and confirm.       Partial epilepsy (H) 03/12/2009     Priority: Medium     (Problem list name updated by automated process. Provider to review and confirm.)       ED (erectile dysfunction) 12/18/2008     Priority: Medium     GANGLOIN CYST /RIGHT ANKLE 06/15/2006     Priority: Medium        Past Medical History:    Past Medical History:   Diagnosis Date     DM (diabetes mellitus) (H)      Epilepsy (H)      MEDICAL HISTORY OF - Age 16      Nicotine dependence        Past Surgical History:    Past Surgical History:   Procedure Laterality Date     CL AFF SURGICAL PATHOLOGY  2005    ganglion cyst removed     EXCISE MASS LOWER EXTREMITY  2/15/2013    Procedure: EXCISE MASS LOWER EXTREMITY;  Right Ankle Excision of ganglion cyst;  Surgeon: Akbar Melo DPM;  Location: [facility] OR     HERNIA REPAIR  2000    umbilical repair  "    HERNIORRHAPHY UMBILICAL N/A 11/10/2015    Procedure: HERNIORRHAPHY UMBILICAL;  Surgeon: Garry Leos MD;  Location: [facility] OR       Family History:    Family History   Problem Relation Age of Onset     Arthritis Mother      Cancer Maternal Grandmother      Arthritis Maternal Grandmother      lupus     GASTROINTESTINAL DISEASE Maternal Grandfather      war injury, colostomy     Respiratory Daughter      growing out of it     Unknown/Adopted Father      Diabetes No family hx of      Coronary Artery Disease No family hx of      Hypertension No family hx of      Hyperlipidemia No family hx of      Breast Cancer No family hx of      Cancer - colorectal No family hx of      Ovarian Cancer No family hx of      Prostate Cancer No family hx of        Social History:  Marital Status:   [2]  Social History   Substance Use Topics     Smoking status: Former Smoker     Packs/day: 0.25     Years: 25.00     Types: Cigarettes     Quit date: 1/18/2016     Smokeless tobacco: Former User     Alcohol use 0.0 oz/week     0 Standard drinks or equivalent per week      Comment: mixed 2-4 on weekends, not every weekend        Medications:      aspirin 81 MG EC tablet   bisacodyl (DULCOLAX) 5 MG EC tablet   gabapentin (NEURONTIN) 300 MG capsule   insulin glargine (LANTUS VIAL) 100 UNIT/ML injection   insulin syringe-needle U-100 (BD INSULIN SYRINGE ULTRAFINE) 31G X 5/16\" 1 ML   meloxicam (MOBIC) 15 MG tablet   Multiple Vitamins-Minerals (MULTIVITAMIN OR)   order for DME   oxyCODONE-acetaminophen (PERCOCET) 5-325 MG per tablet   oxyCODONE-acetaminophen (PERCOCET) 5-325 MG per tablet         Review of Systems   Constitutional: Positive for activity change.   Musculoskeletal: Positive for arthralgias, joint swelling and myalgias.   Skin: Positive for wound (laceration to left pinky finger).   Neurological: Positive for weakness. Negative for numbness.   Hematological: Does not bruise/bleed easily.       Physical Exam   BP: (!) " "154/97  Pulse: 105  Temp: 97.9  F (36.6  C)  Resp: 16  Height: 177.8 cm (5' 10\")  Weight: 89.4 kg (197 lb)      Physical Exam   Constitutional: He appears well-developed and well-nourished. No distress.   Eyes: Conjunctivae are normal.   Psychiatric: He has a normal mood and affect.   Nursing note and vitals reviewed.    HENT: Oral mucosa moist. No lesions.  Neck: Supple  Pulmonary/Chest: Normal effort.  Musculoskeletal: Moving all extremities well. No peripheral edema. Base of left 5th finger with deep two centimeter laceration to palmar side with probable tendon injury. Able to flex and extend left 5th finger. capillary refill normal. Sensation in tact.   Neurological: Alert. No focal neurologic deficit.   Skin: No rash.     ED Course     ED Course   12:15 PM Patient Assessed.      Laceration repair  Date/Time: 10/8/2018 12:55 PM  Performed by: TERRENCE TURNER  Authorized by: TERRENCE TURNER   Consent: Verbal consent obtained.  Risks and benefits: risks, benefits and alternatives were discussed  Consent given by: patient  Body area: upper extremity  Location details: left small finger  Laceration length: 3 cm  Foreign bodies: no foreign bodies  Tendon involvement: Possible flexor tendon involvement.  Nerve involvement: Possible.  Vascular damage: no  Anesthesia: digital block    Anesthesia:  Local Anesthetic: bupivacaine 0.5% without epinephrine  Anesthetic total: 8 mL    Sedation:  Patient sedated: no  Irrigation solution: saline  Irrigation method: jet lavage  Amount of cleaning: extensive  Debridement: none  Degree of undermining: none  Skin closure: 4-0 nylon  Number of sutures: 8  Technique: simple  Approximation: close  Approximation difficulty: simple  Dressing: antibiotic ointment, 4x4 sterile gauze and splint  Patient tolerance: Patient tolerated the procedure well with no immediate complications                     Critical Care time:  none               Results for orders placed or performed " during the hospital encounter of 10/07/18   XR Hand Left G/E 3 Views    Narrative    LEFT HAND THREE OR MORE VIEWS  10/7/2018 12:14 PM     HISTORY: Trauma left pinky.     COMPARISON: None.      Impression    IMPRESSION: Bones are normally aligned. No acute fracture.    LEONEL BEAR MD         Medications   lidocaine 1 % injection (not administered)       Assessments & Plan (with Medical Decision Making) records were reviewed.  A digital block was performed on the left fifth finger.  X-ray of the hand was obtained.  This revealed normally aligned bones with no acute fracture no obvious dislocation per radiology.  Findings were discussed with Dr. Tierney with [facility] orthopedics. there is definite laxity of the finger joint at the MCP and it is difficult to visualize the flexor tendon although he is able flex and extend the finger without difficulty and his sensation is intact with good capillary refill..  Dr. Tierney recommended closing and splinting the wound and having close follow-up with orthopedics.  Findings were discussed in the detail the patient and they are in agreement with this decision.  I have recommended close follow-up with orthopedics over the next few days.  If increased pain numbness weakness or other symptoms present he should return for recheck.  Due to patient's trauma and possible wound contamination and we did cover the patient prophylactically with antibiotics.  He did receive a tetanus prophylactic as this was due in December.     I have reviewed the nursing notes.    I have reviewed the findings, diagnosis, plan and need for follow up with the patient.       Discharge Medication List as of 10/7/2018  2:34 PM      START taking these medications    Details   cephALEXin (KEFLEX) 500 MG capsule Take 1 capsule (500 mg) by mouth 4 times daily for 7 days, Disp-20 capsule, R-0, Local Print             Final diagnoses:   Open dislocation of metacarpophalangeal (MCP) joint of left  little finger   Laceration of left little finger without foreign body without damage to nail, initial encounter     This document serves as a record of the services and decisions personally performed and made by Manolo Salinas MD. It was created on HIS/HER behalf by Hiwot Jamil, a trained medical scribe. The creation of this document is based on the provider's statements to the medical scribe.  Hiwot Jamil 12:06 PM 10/7/2018    Provider:  The information in this document, created by the medical scribe for me, accurately reflects the services I personally performed and the decisions made by me. I have reviewed and approved this document for accuracy prior to leaving the patient care area.  Manolo Salinas MD 12:06 PM 10/7/2018    10/7/2018   [facility] EMERGENCY DEPARTMENT     Manolo Salinas MD  10/08/18 1304       Manolo Salinas MD  10/08/18 [id number]

## 2018-10-07 NOTE — ED AVS SNAPSHOT
[facility] Emergency Department    [address] [address]    Phone:  [phone]    Fax:  [phone]                                       Houston Winters   MRN: [id number]    Department:  [facility] Emergency Department   Date of Visit:  10/7/2018           Patient Information     Date Of Birth          1970        Your diagnoses for this visit were:     Open dislocation of metacarpophalangeal (MCP) joint of left little finger     Laceration of left little finger without foreign body without damage to nail, initial encounter        You were seen by Manolo Salinas MD.      Follow-up Information     Follow up with No Ref-Primary, Physician.    Why:  Next available        Follow up with [facility] Emergency Department.    Specialty:  EMERGENCY MEDICINE    Why:  If symptoms worsen    Contact information:    [address] [place] [id number] [phone]    Additional information:    The medical center is located at   [address] (between [facility] and   [facility] [address] in [place], [place] miles north   of [place]).        Discharge Instructions       Return if symptoms worsen or new symptoms develop.  Follow-up with primary care physician next available.  Drink plenty of fluids.  If increased fever chills nausea vomiting or other symptoms occur please return for further evaluation and care.  Sutures should be removed in 7 days follow-up with orthopedics next available.    Extremity Laceration: Stitches, Staples, or Tape  A laceration is a cut through the skin. If it is deep, it may require stitches or staples to close so it can heal. Minor cuts may be treated with surgical tape closures, or skin glue.  X-rays may be done if something may have entered the skin through the cut. You may also need a tetanus shot if you are not up to date on this vaccine.  Home care    Follow the healthcare provider s instructions on how to care for the cut.    Wash your hands  with soap and warm water before and after caring for your wound. This is to help prevent infection.    Keep the wound clean and dry. If a bandage was applied and it becomes wet or dirty, replace it. Otherwise, leave it in place for the first 24 hours, then change it once a day or as directed.    If stitches or staples were used, clean the wound daily:  ? After removing the bandage, wash the area with soap and water. Use a wet cotton swab to loosen and remove any blood or crust that forms.  ? After cleaning, keep the wound clean and dry. Talk with your healthcare provider before putting any antibiotic ointment on the wound. Reapply the bandage.    You may remove the bandage to shower as usual after the first 24 hours, but don't soak the area in water (no swimming) until the stitches or staples are removed.    If surgical tape closures were used, keep the area clean and dry. If it becomes wet, blot it dry with a towel. Let the surgical tape fall off on its own.    The healthcare provider may prescribe an antibiotic cream or ointment to prevent infection. He or she may also prescribe an antibiotic pill. Don't stop taking this medicine until you have finished it all or the provider tells you to stop.    The provider may also prescribe medicine for pain. Follow the instructions for taking these medicines.    Don't do activities that may reopen your wound.  Follow-up care  Follow up with your healthcare provider, or as advised. Most skin wounds heal within 10 days. But an infection may sometimes occur even with proper treatment. Check the wound daily for the signs of infection listed below. Stitches and staples should be removed within 7 to14 days. If surgical tape closures were used, you may remove them after 10 days if they have not fallen off by then.   When to seek medical advice  Call your healthcare provider right away if any of these occur:    Wound bleeding not controlled by direct pressure    Signs of infection,  including increasing pain in the wound, increasing wound redness or swelling, or pus or bad odor coming from the wound    Fever of 100.4 F (38 C) or higher, or as directed by your healthcare provider    Stitches or staples come apart or fall out or surgical tape falls off before 7 days    Wound edges reopen    Wound changes colors    Numbness occurs around the wound     Decreased movement around the injured area  Date Last Reviewed: 7/1/2017 2000-2017 [employer]. [address] [place] [address]. All rights reserved. This information is not intended as a substitute for professional medical care. Always follow your healthcare professional's instructions.        Finger Dislocation  A finger dislocation occurs when the tissues, or ligaments, that hold the joint together are torn. The bones then move apart, or are dislocated, out of their normal position. This causes pain, swelling, and bruising. Sometimes there is also a small chip fracture. Once the joint is put back into place again, it will take about 6 weeks for the ligaments to heal. During this time, you should protect your finger from re-injury.     Jay taping is a way to secure your injured finger to the one next to it. Jay taping allows the joint to move. But it also protects it from dislocating again. Jay tape can be left in place for up to 6 weeks.  Hand exercises may be prescribed at your follow-up visit. These can help speed healing and maintain function. In most cases you will regain full function of your finger. But it may take 12 to 18 months before all mild pain and swelling goes away and full function returns.  Home care    Keep your hand raised, or elevated, to reduce pain and swelling. When sitting or lying down, raise your arm above the level of your heart. You can do this by placing your arm on a pillow that rests on your chest. Or your arm can be on a pillow at your side. This is most important during the first 48  hours after injury.    Put an ice pack on the injured area for no more than 15 to 20 minutes every 3 to 6 hours. Do this for the first 24 to 48 hours. You can make an ice pack by wrapping a plastic Ziploc bag of ice cubes in a thin towel. As the ice melts, be careful that the tape, gauze, or splint doesn t get wet. After that, keep using ice as needed to ease pain and swelling.    If you have a removable splint, you may take it off to bathe and then put it back on. If you have a permanent splint, cover your entire hand with 2 plastic bags. Place 1 bag around the other. Tape each bag with duct tape at the top end. Water can still leak in even when your hand is covered. So it's best to keep the splint away from water. If a splint gets wet, you can dry it with a hair-dryer on a cool setting.     If you use buddy tape and it becomes wet or dirty, change it. You may replace it with paper, plastic, or cloth tape. Cloth tape and paper tapes must be kept dry. When re-applying buddy tape, use gauze or cotton padding between your fingers. This will prevent the skin from getting moist and breaking down, or macerating. It is very important to put padding at the web space. This is the small piece of skin that joins the bases of your fingers. Keep the buddy tape in place as instructed by your healthcare provider.    You may use over-the-counter pain medicine to control pain, unless another pain medicine was prescribed. Talk with your provider before taking these medicines if you have chronic liver or kidney disease. Also talk with your provider if you have ever had a stomach ulcer or GI (gastrointestinal) bleeding.    Do not play sports or do any physical exercise until your healthcare provider says that you can.  Follow-up care  Follow up with your healthcare provider in 1 week, or as advised. Splints should generally not be left in place longer than 3 weeks to avoid stiffness and loss of joint function. It is important that you  see the referral doctor. This provider can determine how long to keep your splint in place and when to begin hand exercises.  If X-rays were taken, you will be told of any new findings that may affect your care.  When to seek medical advice  Call your healthcare provider right away if any of the following occur:    The injured finger has more pain or swelling    The injured finger becomes red or warm    The injured finger becomes cold, blue, numb, or tingly  Date Last Reviewed: 11/23/2015 2000-2017 [employer]. [address] [place] [address]. All rights reserved. This information is not intended as a substitute for professional medical care. Always follow your healthcare professional's instructions.          Joint Dislocation    You have a joint dislocation. This happens when a strong force is applied to the joint, tearing ligaments and forcing the bones out of place. Often no bones are broken. But the nearby nerves and blood vessels can be damaged.  Once the joint is put back in place, it will take at least 6 weeks for the ligaments to heal. Range of motion exercises or physical therapy may be prescribed early in your recovery. This is to prevent the joint from getting stiff. Later, strengthening exercises may be added.  In more severe cases, surgery may be needed to realign the joint and repair the torn ligaments or any broken bones. After a dislocation, the joint may not regain full range of motion or heal fully. Also, if the joint surface was fractured or broken, you are at risk of getting arthritis in that joint.  Home care    If you were given a sling or splint, wear it until your next provider visit. Don t take it off at night to sleep. It is possible to re-injure the joint while you sleep. Unless told otherwise, you may take off the sling or splint to bathe or dress.    If no bones were broken, it s important to begin moving the joint during the first few weeks after the injury.  This is to prevent the joint from getting stiff. On your next visit, ask your provider when you should begin these exercises.    Apply an ice pack to the injured area for no more than 20 minutes. Do this every 3 to 6 hours for the first 24 to 48 hours. Keep using ice 4 times a day for the next few days until the pain is better. To make an ice pack, put ice cubes in a sealed zip-lock plastic bag. Wrap the bag in a clean, thin towel or cloth. Never put ice or an ice pack directly on the skin. You can place the ice pack inside the sling or over the splint. As the ice melts, be careful that the sling or splint doesn t get wet. You can place the ice pack inside the sling or over the splint.    You may use over-the-counter pain medicine to control pain, unless another pain medicine was prescribed. Talk with your provider before using these medicines if you have chronic liver or kidney disease, or ever had a stomach ulcer or GI (gastrointestinal) bleeding.  Follow-up care  Follow up with your healthcare provider in 1 week, or as advised.  If X-rays or a CT scan, were taken, you will be notified of any new findings that may affect your care.  When to seek medical advice  Call your healthcare provider right away if any of these occur:    There is increasing swelling or pain in or around the injured joint    Your affected arm or leg becomes cold, blue, numb, or tingly  Date Last Reviewed: 11/19/2015 2000-2017 [employer]. [address] [place] [address]. All rights reserved. This information is not intended as a substitute for professional medical care. Always follow your healthcare professional's instructions.          Your next 10 appointments already scheduled     Oct 08, 2018  9:00 AM CDT   SHORT with Manolo Funes MD   [facility]    [address] [facility] [id number] [phone]              24 Hour Appointment Hotline       To make  an appointment at any [facility] clinic, call [phone] ([phone]). If you don't have a family doctor or clinic, we will help you find one. [facility] clinics are conveniently located to serve the needs of you and your family.          ED Discharge Orders     ORTHO  REFERRAL       [facility] is referring you to the Orthopedic  Services at [facility].       The  Representative will assist you in the coordination of your Orthopedic and Musculoskeletal Care as prescribed by your physician.    The  Representative will call you within 1 business day to help schedule your appointment, or you may contact the  Representative at:    All areas ~ [phone]     Type of Referral : Surgical / Specialist       Timeframe requested: 1 - 2 days    Coverage of these services is subject to the terms and limitations of your health insurance plan.  Please call member services at your health plan with any benefit or coverage questions.      If X-rays, CT or MRI's have been performed, please contact the facility where they were done to arrange for , prior to your scheduled appointment.  Please bring this referral request to your appointment and present it to your specialist.                     Review of your medicines      START taking        Dose / Directions Last dose taken    cephALEXin 500 MG capsule   Commonly known as:  KEFLEX   Dose:  500 mg   Quantity:  20 capsule        Take 1 capsule (500 mg) by mouth 4 times daily for 7 days   Refills:  0          CONTINUE these medicines which may have CHANGED, or have new prescriptions. If we are uncertain of the size of tablets/capsules you have at home, strength may be listed as something that might have changed.        Dose / Directions Last dose taken    oxyCODONE-acetaminophen 5-325 MG per tablet   Commonly known as:  PERCOCET   Dose:  1-2 tablet   What changed:    - how much to take  -  "when to take this   Quantity:  12 tablet        Take 1-2 tablets by mouth every 4 hours as needed for pain   Refills:  0          Our records show that you are taking the medicines listed below. If these are incorrect, please call your family doctor or clinic.        Dose / Directions Last dose taken    aspirin 81 MG EC tablet   Dose:  81 mg   Quantity:  90 tablet        Take 1 tablet (81 mg) by mouth daily   Refills:  3        insulin aspart 100 UNIT/ML injection   Commonly known as:  NovoLOG PEN   Dose:  4-6 Units        Inject 4-6 Units Subcutaneous   Refills:  0        insulin glargine 100 UNIT/ML injection   Commonly known as:  LANTUS VIAL   Dose:  30 Units   Quantity:  10 mL        Inject 30 Units Subcutaneous every morning   Refills:  3        insulin syringe-needle U-100 31G X 5/16\" 1 ML   Commonly known as:  BD insulin syringe ULTRAFINE   Quantity:  100 each        Use one syringe 22 units daily daily or as directed.   Refills:  11        MULTIVITAMIN PO   Dose:  1 tablet        Take 1 tablet by mouth daily   Refills:  0        order for DME   Quantity:  200 each        Test strips for pt's glucometer, brand as covered by insurance Test bid and prn.   Refills:  4                Information about OPIOIDS     PRESCRIPTION OPIOIDS: WHAT YOU NEED TO KNOW   We gave you an opioid (narcotic) pain medicine. It is important to manage your pain, but opioids are not always the best choice. You should first try all the other options your care team gave you. Take this medicine for as short a time (and as few doses) as possible.    Some activities can increase your pain, such as bandage changes or therapy sessions. It may help to take your pain medicine 30 to 60 minutes before these activities. Reduce your stress by getting enough sleep, working on hobbies you enjoy and practicing relaxation or meditation. Talk to your care team about ways to manage your pain beyond prescription opioids.    These medicines have " risks:    DO NOT drive when on new or higher doses of pain medicine. These medicines can affect your alertness and reaction times, and you could be arrested for driving under the influence (DUI). If you need to use opioids long-term, talk to your care team about driving.    DO NOT operate heavy machinery    DO NOT do any other dangerous activities while taking these medicines.    DO NOT drink any alcohol while taking these medicines.     If the opioid prescribed includes acetaminophen, DO NOT take with any other medicines that contain acetaminophen. Read all labels carefully. Look for the word  acetaminophen  or  Tylenol.  Ask your pharmacist if you have questions or are unsure.    You can get addicted to pain medicines, especially if you have a history of addiction (chemical, alcohol or substance dependence). Talk to your care team about ways to reduce this risk.    All opioids tend to cause constipation. Drink plenty of water and eat foods that have a lot of fiber, such as fruits, vegetables, prune juice, apple juice and high-fiber cereal. Take a laxative (Miralax, milk of magnesia, Colace, Senna) if you don t move your bowels at least every other day. Other side effects include upset stomach, sleepiness, dizziness, throwing up, tolerance (needing more of the medicine to have the same effect), physical dependence and slowed breathing.    Store your pills in a secure place, locked if possible. We will not replace any lost or stolen medicine. If you don t finish your medicine, please throw away (dispose) as directed by your pharmacist. The [place] [employer] has more information about safe disposal: https://www.pca.[facility].[place].us/living-green/managing-unwanted-medications        Prescriptions were sent or printed at these locations (2 Prescriptions)                   [facility] [place], [place] - [address]   [address] [address]    Telephone:  [phone]   Fax:   "[phone]   Hours:                  Printed at Department/Unit printer (2 of 2)         cephALEXin (KEFLEX) 500 MG capsule               oxyCODONE-acetaminophen (PERCOCET) 5-325 MG per tablet                Procedures and tests performed during your visit     XR Hand Left G/E 3 Views      Orders Needing Specimen Collection     None      Pending Results     No orders found from 10/5/2018 to 10/8/2018.            Pending Culture Results     No orders found from 10/5/2018 to 10/8/2018.            Pending Results Instructions     If you had any lab results that were not finalized at the time of your Discharge, you can call the ED Lab Result RN at [phone]. You will be contacted by this team for any positive Lab results or changes in treatment. The nurses are available 7 days a week from 10A to 6:30P.  You can leave a message 24 hours per day and they will return your call.        Test Results From Your Hospital Stay        10/7/2018  1:26 PM      Narrative     LEFT HAND THREE OR MORE VIEWS  10/7/2018 12:14 PM     HISTORY: Trauma left pinky.     COMPARISON: None.        Impression     IMPRESSION: Bones are normally aligned. No acute fracture.    LEONEL BEAR MD                Thank you for choosing [facility]       Thank you for choosing [facility] for your care. Our goal is always to provide you with excellent care. Hearing back from our patients is one way we can continue to improve our services. Please take a few minutes to complete the written survey that you may receive in the mail after you visit with us. Thank you!        [employer] Information     [employer] lets you send messages to your doctor, view your test results, renew your prescriptions, schedule appointments and more. To sign up, go to [email or web address] . Click on \"Log in\" on the left side of the screen, which will take you to the Welcome page. Then click on \"Sign up Now\" on the right side of the page.     You will be asked to enter the access " code listed below, as well as some personal information. Please follow the directions to create your username and password.     Your access code is: [email or web address]  Expires: 2019  2:34 PM     Your access code will  in 90 days. If you need help or a new code, please call your [facility] clinic or [phone].        Care EveryWhere ID     This is your Care EveryWhere ID. This could be used by other organizations to access your [facility] medical records  [id number]        Equal Access to Services     [facility] : Ayana fontanez Sokyle, wajocelyn luqadaha, qayblarry kaalmakristal dunn, jovanni harrison . [facility] [phone].    ATENCIÓN: Si habla español, tiene a isabel disposición servicios gratuitos de asistencia lingüística. Llame al [phone].    We comply with applicable federal civil rights laws and [place] laws. We do not discriminate on the basis of race, color, national origin, age, disability, sex, sexual orientation, or gender identity.            After Visit Summary       This is your record. Keep this with you and show to your community pharmacist(s) and doctor(s) at your next visit.

## 2018-10-08 ENCOUNTER — OFFICE VISIT (OUTPATIENT)
Dept: FAMILY MEDICINE | Facility: CLINIC | Age: 48
End: 2018-10-08
Payer: OTHER MISCELLANEOUS

## 2018-10-08 VITALS
WEIGHT: 205 LBS | DIASTOLIC BLOOD PRESSURE: 72 MMHG | SYSTOLIC BLOOD PRESSURE: 128 MMHG | OXYGEN SATURATION: 94 % | HEART RATE: 90 BPM | BODY MASS INDEX: 29.41 KG/M2 | RESPIRATION RATE: 16 BRPM | TEMPERATURE: 98.4 F

## 2018-10-08 DIAGNOSIS — Z09 POSTOP CHECK: ICD-10-CM

## 2018-10-08 DIAGNOSIS — M79.605 PAIN OF LEFT LOWER EXTREMITY: Primary | ICD-10-CM

## 2018-10-08 PROCEDURE — 99214 OFFICE O/P EST MOD 30 MIN: CPT | Performed by: FAMILY MEDICINE

## 2018-10-08 RX ORDER — KETOROLAC TROMETHAMINE 10 MG/1
10 TABLET, FILM COATED ORAL EVERY 6 HOURS PRN
Qty: 20 TABLET | Refills: 0 | Status: SHIPPED | OUTPATIENT
Start: 2018-10-08 | End: 2019-03-27

## 2018-10-08 ASSESSMENT — ENCOUNTER SYMPTOMS
ARTHRALGIAS: 1
MYALGIAS: 1
ACTIVITY CHANGE: 1
BRUISES/BLEEDS EASILY: 0
NUMBNESS: 0
JOINT SWELLING: 1
WEAKNESS: 1

## 2018-10-08 NOTE — MR AVS SNAPSHOT
After Visit Summary   10/8/2018    Houston Winters    MRN: [id number]           Patient Information     Date Of Birth          1970        Visit Information        Provider Department      10/8/2018 9:00 AM Manolo Funes MD [facility]        Today's Diagnoses     Pain of left lower extremity    -  1    Postop check          Care Instructions    (M79.605) Pain of left lower extremity  (primary encounter diagnosis)  Comment:   Plan: MR Lumbar Spine w/o Contrast, PM & R IP         Consult, ketorolac (TORADOL) 10 MG tablet        We discussed some of the possible causes for the pain. The X-rays of the hip show no acute changes. He has had PT and has home exercises.   The referral to Physical Med and Rehabilitation is done and call for this evaluation. Use your insurance, Work Comp and our Referrals dept at [phone].   Toradol at 10 mg as needed, up to every 6 hours is ordered and there are only 20 of them, with no refills. Take with food and take no other NSAIDs such as advil or aleve with this.   We want to get you off narcotics. The MRI of the lumbar area is ordered and call [phone] to schedule. We will call the results. There may be pressure on the left L2 nerve.   .  (Z09) Postop check  Comment:   Plan: ketorolac (TORADOL) 10 MG tablet        Sea above.           Follow-ups after your visit        Additional Services     PM & R IP Consult       Call our Referrals dept at [phone] for this.                  Future tests that were ordered for you today     Open Future Orders        Priority Expected Expires Ordered    MR Lumbar Spine w/o Contrast Routine  10/8/2019 10/8/2018            Who to contact     If you have questions or need follow up information about today's clinic visit or your schedule please contact [facility] directly at [phone].  Normal or non-critical lab and imaging results will be communicated to you by MyChart, letter or phone  "within 4 business days after the clinic has received the results. If you do not hear from us within 7 days, please contact the clinic through Hymite or phone. If you have a critical or abnormal lab result, we will notify you by phone as soon as possible.  Submit refill requests through Hymite or call your pharmacy and they will forward the refill request to us. Please allow 3 business days for your refill to be completed.          Additional Information About Your Visit        Hymite Information     Hymite lets you send messages to your doctor, view your test results, renew your prescriptions, schedule appointments and more. To sign up, go to [email or web address] . Click on \"Log in\" on the left side of the screen, which will take you to the Welcome page. Then click on \"Sign up Now\" on the right side of the page.     You will be asked to enter the access code listed below, as well as some personal information. Please follow the directions to create your username and password.     Your access code is: [email or web address]  Expires: 2019  2:34 PM     Your access code will  in 90 days. If you need help or a new code, please call your [place] clinic or [phone].        Care EveryWhere ID     This is your Care EveryWhere ID. This could be used by other organizations to access your [place] medical records  [id number]        Your Vitals Were     Pulse Temperature Respirations Pulse Oximetry BMI (Body Mass Index)       90 98.4  F (36.9  C) (Tympanic) 16 94% 29.41 kg/m2        Blood Pressure from Last 3 Encounters:   10/08/18 128/72   10/07/18 (!) 154/97   18 123/76    Weight from Last 3 Encounters:   10/08/18 205 lb (93 kg)   10/07/18 197 lb (89.4 kg)   18 197 lb (89.4 kg)              We Performed the Following     PM & R IP Consult          Today's Medication Changes          These changes are accurate as of 10/8/18  9:52 AM.  If you have any questions, ask your nurse or doctor.             "   Start taking these medicines.        Dose/Directions    ketorolac 10 MG tablet   Commonly known as:  TORADOL   Used for:  Postop check, Pain of left lower extremity   Started by:  Manolo Funes MD        Dose:  10 mg   Take 1 tablet (10 mg) by mouth every 6 hours as needed   Quantity:  20 tablet   Refills:  0            Where to get your medicines      These medications were sent to [facility] - [place], [place] - [address]   [address] [facility] [address]     Phone:  [phone]     ketorolac 10 MG tablet                Primary Care Provider Fax #    Physician No Ref-Primary [phone]       No address on file        Equal Access to Services     YURY MICHEL : Ayana Hodgson, waaxda luconstantino, qacem kaalmada mona, jovanni bowen. So [facility] [phone].    ATENCIÓN: Si habla español, tiene a isabel disposición servicios gratuitos de asistencia lingüística. [facility] [phone].    We comply with applicable federal civil rights laws and [place] laws. We do not discriminate on the basis of race, color, national origin, age, disability, sex, sexual orientation, or gender identity.            Thank you!     Thank you for choosing [facility]  for your care. Our goal is always to provide you with excellent care. Hearing back from our patients is one way we can continue to improve our services. Please take a few minutes to complete the written survey that you may receive in the mail after your visit with us. Thank you!             Your Updated Medication List - Protect others around you: Learn how to safely use, store and throw away your medicines at www.disposemymeds.org.          This list is accurate as of 10/8/18  9:52 AM.  Always use your most recent med list.                   Brand Name Dispense Instructions for use Diagnosis    aspirin 81 MG EC tablet     90 tablet    Take 1 tablet (81 mg) by mouth daily    Diabetes  "mellitus, type 2 (H)       cephALEXin 500 MG capsule    KEFLEX    20 capsule    Take 1 capsule (500 mg) by mouth 4 times daily for 7 days        insulin aspart 100 UNIT/ML injection    NovoLOG PEN     Inject 4-6 Units Subcutaneous        insulin glargine 100 UNIT/ML injection    LANTUS VIAL    10 mL    Inject 30 Units Subcutaneous every morning    Type 2 diabetes mellitus without complication, without long-term current use of insulin (H)       insulin syringe-needle U-100 31G X 5/16\" 1 ML    BD insulin syringe ULTRAFINE    100 each    Use one syringe 22 units daily daily or as directed.    Type 2 diabetes mellitus without complication, without long-term current use of insulin (H)       ketorolac 10 MG tablet    TORADOL    20 tablet    Take 1 tablet (10 mg) by mouth every 6 hours as needed    Postop check, Pain of left lower extremity       MULTIVITAMIN PO      Take 1 tablet by mouth daily    Type 2 diabetes, HbA1c goal < 7% (H)       order for DME     200 each    Test strips for pt's glucometer, brand as covered by insurance Test bid and prn.    Type 2 diabetes mellitus without complication, without long-term current use of insulin (H)       oxyCODONE-acetaminophen 5-325 MG per tablet    PERCOCET    12 tablet    Take 1-2 tablets by mouth every 4 hours as needed for pain          "

## 2018-10-08 NOTE — PATIENT INSTRUCTIONS
(T66.988) Pain of left lower extremity  (primary encounter diagnosis)  Comment:   Plan: MR Lumbar Spine w/o Contrast, PM & R IP         Consult, ketorolac (TORADOL) 10 MG tablet        We discussed some of the possible causes for the pain. The X-rays of the hip show no acute changes. He has had PT and has home exercises.   The referral to Physical Med and Rehabilitation is done and call for this evaluation. Use your insurance, Work Comp and our Referrals dept at [phone].   Toradol at 10 mg as needed, up to every 6 hours is ordered and there are only 20 of them, with no refills. Take with food and take no other NSAIDs such as advil or aleve with this.   We want to get you off narcotics. The MRI of the lumbar area is ordered and call [phone] to schedule. We will call the results. There may be pressure on the left L2 nerve.   .  (Z09) Postop check  Comment:   Plan: ketorolac (TORADOL) 10 MG tablet        Sea above.

## 2018-10-08 NOTE — PROGRESS NOTES
SUBJECTIVE:   Houston Winters is a 48 year old male who presents to clinic today for the following health issues:    Hip Pain    Onset: x 10 months. Patient states he had his left hip replaced in January 2018. Patient states he is still having quite a bit of pain. Patient states he is back to work now and some days are very rough.    Description:   Location: left hip  Character: Sharp and Dull ache- off and on. Patient states the pain is the worst at night and in the morning.    Intensity: 5/10    Progression of Symptoms: same    Accompanying Signs & Symptoms:  Other symptoms: radiation of pain down into but and through groin area, numbness and tingling down the left leg.    History:   Previous similar pain: YES      Precipitating factors:   Trauma or overuse: no. Initially hurt it at work. In may 2017 they did a microfracture in his hip to try and clean it up but that didn't work.    Alleviating factors:  Improved by: ice and NSAID - Aleve, and OT- finished that a little bit ago.    Therapies Tried and outcome: Ice and Aleve.    The pelvis x rays from 9-12-18 showed:   IMPRESSION:   1. A left hip arthroplasty is in place. No evidence of hardware  failure or malalignment. A small amount of heterotopic ossification is  present within the soft tissues at the lateral aspect of the left hip  joint.  2. No visualized acute fracture or malalignment of the pelvis or  bilateral hips.   3. Mild degenerative changes in the right hip joint.        Current Outpatient Prescriptions:      aspirin 81 MG EC tablet, Take 1 tablet (81 mg) by mouth daily, Disp: 90 tablet, Rfl: 3     cephALEXin (KEFLEX) 500 MG capsule, Take 1 capsule (500 mg) by mouth 4 times daily for 7 days, Disp: 20 capsule, Rfl: 0     insulin aspart (NOVOLOG PEN) 100 UNIT/ML injection, Inject 4-6 Units Subcutaneous , Disp: , Rfl:      insulin glargine (LANTUS VIAL) 100 UNIT/ML injection, Inject 30 Units Subcutaneous every morning, Disp: 10 mL, Rfl: 3     insulin  "syringe-needle U-100 (BD INSULIN SYRINGE ULTRAFINE) 31G X 5/16\" 1 ML, Use one syringe 22 units daily daily or as directed., Disp: 100 each, Rfl: 11     Multiple Vitamins-Minerals (MULTIVITAMIN OR), Take 1 tablet by mouth daily , Disp: , Rfl:      order for DME, Test strips for pt's glucometer, brand as covered by insurance Test bid and prn., Disp: 200 each, Rfl: 4     oxyCODONE-acetaminophen (PERCOCET) 5-325 MG per tablet, Take 1-2 tablets by mouth every 4 hours as needed for pain, Disp: 12 tablet, Rfl: 0    Patient Active Problem List   Diagnosis     GANGLOIN CYST /RIGHT ANKLE     ED (erectile dysfunction)     Partial epilepsy (H)     Obesity     Hyperlipidemia with target LDL less than 100     Tobacco use disorder     Hypertension, goal below 140/90     Type 2 diabetes mellitus without complication, without long-term current use of insulin (H)     Small bowel obstruction (H)     Postoperative infection, initial encounter       Blood pressure 128/72, pulse 90, temperature 98.4  F (36.9  C), temperature source Tympanic, resp. rate 16, weight 205 lb (93 kg), SpO2 94 %.    Exam:  GENERAL APPEARANCE: healthy, alert and no distress  MS: tender to palpation over the left sciatic nerve. This is above the scar tissue in the left buttocks. The lumbar spine is not tender.   SKIN: no suspicious lesions or rashes  PSYCH: mentation appears normal and affect normal/bright      (M79.605) Pain of left lower extremity  (primary encounter diagnosis)  Comment:   Plan: MR Lumbar Spine w/o Contrast, PM & R IP         Consult, ketorolac (TORADOL) 10 MG tablet        We discussed some of the possible causes for the pain. The X-rays of the hip show no acute changes. He has had PT and has home exercises.   The referral to Physical Med and Rehabilitation is done and call for this evaluation. Use your insurance, Work Comp and our Referrals dept at [phone].   Toradol at 10 mg as needed, up to every 6 hours is ordered and there are only " 20 of them, with no refills. Take with food and take no other NSAIDs such as advil or aleve with this.   We want to get you off narcotics. The MRI of the lumbar area is ordered and call [phone] to schedule. We will call the results. There may be pressure on the left L2 nerve.   .  (Z09) Postop check  Comment:   Plan: ketorolac (TORADOL) 10 MG tablet        Sea above.         Manolo Funes

## 2018-11-25 NOTE — TELEPHONE ENCOUNTER
[facility]     Emergency Department     Faculty Attestation        I performed a history and physical examination of the patient and discussed management with the resident. I reviewed the residents note and agree with the documented findings and plan of care. Any areas of disagreement are noted on the chart. I was personally present for the key portions of any procedures. I have documented in the chart those procedures where I was not present during the key portions. I have reviewed the emergency nurses triage note. I agree with the chief complaint, past medical history, past surgical history, allergies, medications, social and family history as documented unless otherwise noted below. For Physician Assistant/ Nurse Practitioner cases/documentation I have personally evaluated this patient and have completed at least one if not all key elements of the E/M (history, physical exam, and MDM). Additional findings are as noted. Vital Signs: BP: 124/80  Pulse: 76  Resp: 17  Temp: 98.1 °F (36.7 °C) SpO2: 99 %  PCP:  Patricia Farias MD    Pertinent Comments:     Patient is a 73-year-old female who 3 days ago started with a left earlobe small abscess that will require incision and drainage. Not extensive at this time at all. Critical Care  None      (Please note that portions of this note were completed with a voice recognition program. Efforts were made to edit the dictations but occasionally words are mis-transcribed.  Whenever words are used in this note in any gender, they shall be construed as though they were used in the gender appropriate to the circumstances; and whenever words are used in this note in the singular or plural form, they shall be construed as though they were used in the form appropriate to the circumstances.)    MD Mayco Oakley  Attending Emergency Medicine Physician              Isaac Velásquez MD  11/25/18 Please call patient, please order A1c, lipids, TSH.

## 2019-02-07 ENCOUNTER — TRANSFERRED RECORDS (OUTPATIENT)
Dept: HEALTH INFORMATION MANAGEMENT | Facility: CLINIC | Age: 49
End: 2019-02-07

## 2019-03-21 ENCOUNTER — OFFICE VISIT (OUTPATIENT)
Dept: FAMILY MEDICINE | Facility: CLINIC | Age: 49
End: 2019-03-21
Payer: OTHER MISCELLANEOUS

## 2019-03-21 ENCOUNTER — TELEPHONE (OUTPATIENT)
Dept: EMERGENCY MEDICINE | Facility: CLINIC | Age: 49
End: 2019-03-21

## 2019-03-21 VITALS
DIASTOLIC BLOOD PRESSURE: 68 MMHG | BODY MASS INDEX: 27.98 KG/M2 | SYSTOLIC BLOOD PRESSURE: 112 MMHG | RESPIRATION RATE: 16 BRPM | HEART RATE: 120 BPM | TEMPERATURE: 98.2 F | WEIGHT: 195 LBS

## 2019-03-21 DIAGNOSIS — E11.9 TYPE 2 DIABETES MELLITUS WITHOUT COMPLICATION, WITHOUT LONG-TERM CURRENT USE OF INSULIN (H): ICD-10-CM

## 2019-03-21 DIAGNOSIS — M25.552 HIP PAIN, LEFT: Primary | ICD-10-CM

## 2019-03-21 PROCEDURE — 99214 OFFICE O/P EST MOD 30 MIN: CPT | Performed by: PHYSICIAN ASSISTANT

## 2019-03-21 RX ORDER — NAPROXEN 500 MG/1
500 TABLET ORAL 2 TIMES DAILY WITH MEALS
Qty: 90 TABLET | Refills: 0 | Status: SHIPPED | OUTPATIENT
Start: 2019-03-21 | End: 2021-05-18

## 2019-03-21 ASSESSMENT — ENCOUNTER SYMPTOMS
CHEST TIGHTNESS: 0
WHEEZING: 0
ADENOPATHY: 0
CONSTITUTIONAL NEGATIVE: 1
HEADACHES: 0
EYE REDNESS: 0
EYES NEGATIVE: 1
CARDIOVASCULAR NEGATIVE: 1
PALPITATIONS: 0
DIAPHORESIS: 0
FREQUENCY: 0
POLYDIPSIA: 0
RESPIRATORY NEGATIVE: 1
EYE DISCHARGE: 0
SORE THROAT: 0
LIGHT-HEADEDNESS: 0
DYSURIA: 0
WEAKNESS: 0
VOMITING: 0
EYE ITCHING: 0
SHORTNESS OF BREATH: 0
ARTHRALGIAS: 1
ABDOMINAL PAIN: 0
HEMATURIA: 0
RHINORRHEA: 0
CHILLS: 0
MYALGIAS: 0
DIARRHEA: 0
ENDOCRINE NEGATIVE: 1
DIZZINESS: 0
NAUSEA: 0
FEVER: 0
COUGH: 0
GASTROINTESTINAL NEGATIVE: 1
NEUROLOGICAL NEGATIVE: 1

## 2019-03-21 ASSESSMENT — PAIN SCALES - GENERAL: PAINLEVEL: SEVERE PAIN (7)

## 2019-03-21 NOTE — LETTER
[facility]  [address] [place] [id number]  [phone]          March 21, 2019    RE:  Houston Winters                                                                                                                                                       [address] [facility] [address]            To whom it may concern:    Houston Winters is under my professional care for illness. He  may return to work with no restrictions on Monday 3/25/2019    Sincerely,        William Silva PA-C

## 2019-03-21 NOTE — PROGRESS NOTES
Chief Complaint:    Chief Complaint   Patient presents with     Musculoskeletal Problem     Left hip pain.  Had hip surgery for a year.  Having a hard time sleeping.  Pain is worsening.  Having stabbing pain from hip bone to groin. The Meloxicam and prednisone helped a little but came back       HPI: Houston Winters is an 48 year old male who presents for evaluation and treatment of L hip pain.  This is an ongoing problem.  Patient had total hip in January of 2018.  He has had pain since the surgery.  He would like referral to ortho for 2nd opinion.  No new symptoms.      ROS:      Review of Systems   Constitutional: Negative.  Negative for chills, diaphoresis and fever.   HENT: Negative.  Negative for congestion, ear pain, rhinorrhea and sore throat.    Eyes: Negative.  Negative for discharge, redness and itching.   Respiratory: Negative.  Negative for cough, chest tightness, shortness of breath and wheezing.    Cardiovascular: Negative.  Negative for chest pain and palpitations.   Gastrointestinal: Negative.  Negative for abdominal pain, diarrhea, nausea and vomiting.   Endocrine: Negative.  Negative for polydipsia and polyuria.   Genitourinary: Negative for dysuria, frequency, hematuria and urgency.   Musculoskeletal: Positive for arthralgias. Negative for myalgias.   Skin: Negative for rash.   Allergic/Immunologic: Negative for immunocompromised state.   Neurological: Negative.  Negative for dizziness, weakness, light-headedness and headaches.   Hematological: Negative for adenopathy.        Family History   Family History   Problem Relation Age of Onset     Arthritis Mother      Cancer Maternal Grandmother      Arthritis Maternal Grandmother         lupus     Gastrointestinal Disease Maternal Grandfather         war injury, colostomy     Respiratory Daughter         growing out of it     Unknown/Adopted Father      Diabetes No family hx of      Coronary Artery Disease No family hx of      Hypertension No family  hx of      Hyperlipidemia No family hx of      Breast Cancer No family hx of      Cancer - colorectal No family hx of      Ovarian Cancer No family hx of      Prostate Cancer No family hx of        Social History  Social History     Socioeconomic History     Marital status:      Spouse name: Not on file     Number of children: Not on file     Years of education: Not on file     Highest education level: Not on file   Occupational History     Occupation: construction   Social Needs     Financial resource strain: Not on file     Food insecurity:     Worry: Not on file     Inability: Not on file     Transportation needs:     Medical: Not on file     Non-medical: Not on file   Tobacco Use     Smoking status: Former Smoker     Packs/day: 0.25     Years: 25.00     Pack years: 6.25     Types: Cigarettes     Last attempt to quit: 1/18/2016     Years since quitting: 3.1     Smokeless tobacco: Former User   Substance and Sexual Activity     Alcohol use: Yes     Alcohol/week: 0.0 oz     Comment: mixed 2-4 on weekends, not every weekend     Drug use: No     Sexual activity: Yes     Partners: Female     Birth control/protection: Surgical   Lifestyle     Physical activity:     Days per week: Not on file     Minutes per session: Not on file     Stress: Not on file   Relationships     Social connections:     Talks on phone: Not on file     Gets together: Not on file     Attends Mandaeism service: Not on file     Active member of club or organization: Not on file     Attends meetings of clubs or organizations: Not on file     Relationship status: Not on file     Intimate partner violence:     Fear of current or ex partner: Not on file     Emotionally abused: Not on file     Physically abused: Not on file     Forced sexual activity: Not on file   Other Topics Concern      Service No     Blood Transfusions No     Caffeine Concern Yes     Comment: 1-2 a day     Occupational Exposure Yes     Comment: construction work,  "demolition, heating and airconditioning     Hobby Hazards No     Sleep Concern No     Stress Concern No     Weight Concern No     Special Diet No     Back Care No     Exercise Yes     Bike Helmet No     Seat Belt Yes     Self-Exams Not Asked     Parent/sibling w/ CABG, MI or angioplasty before 65F 55M? No   Social History Narrative     Not on file        Surgical History:  Past Surgical History:   Procedure Laterality Date     CL AFF SURGICAL PATHOLOGY  2005    ganglion cyst removed     EXCISE MASS LOWER EXTREMITY  2/15/2013    Procedure: EXCISE MASS LOWER EXTREMITY;  Right Ankle Excision of ganglion cyst;  Surgeon: Akbar Melo DPM;  Location: [facility]     HERNIA REPAIR  2000    umbilical repair     HERNIORRHAPHY UMBILICAL N/A 11/10/2015    Procedure: HERNIORRHAPHY UMBILICAL;  Surgeon: Garry Leos MD;  Location: [facility] OR        Problem List:  Patient Active Problem List   Diagnosis     GANGLOIN CYST /RIGHT ANKLE     ED (erectile dysfunction)     Partial epilepsy (H)     Obesity     Hyperlipidemia with target LDL less than 100     Tobacco use disorder     Hypertension, goal below 140/90     Type 2 diabetes mellitus without complication, without long-term current use of insulin (H)     Small bowel obstruction (H)     Postoperative infection, initial encounter        Allergies:  No Known Allergies     Current Meds:    Current Outpatient Medications:      aspirin 81 MG EC tablet, Take 1 tablet (81 mg) by mouth daily, Disp: 90 tablet, Rfl: 3     insulin glargine (LANTUS VIAL) 100 UNIT/ML injection, Inject 30 Units Subcutaneous every morning, Disp: 10 mL, Rfl: 3     insulin syringe-needle U-100 (BD INSULIN SYRINGE ULTRAFINE) 31G X 5/16\" 1 ML, Use one syringe 22 units daily daily or as directed., Disp: 100 each, Rfl: 11     Multiple Vitamins-Minerals (MULTIVITAMIN OR), Take 1 tablet by mouth daily , Disp: , Rfl:      naproxen (NAPROSYN) 500 MG tablet, Take 1 tablet (500 mg) by mouth 2 times daily (with meals), Disp: " 90 tablet, Rfl: 0     order for DME, Test strips for pt's glucometer, brand as covered by insurance Test bid and prn., Disp: 200 each, Rfl: 4     ketorolac (TORADOL) 10 MG tablet, Take 1 tablet (10 mg) by mouth every 6 hours as needed (Patient not taking: Reported on 3/21/2019), Disp: 20 tablet, Rfl: 0     oxyCODONE-acetaminophen (PERCOCET) 5-325 MG per tablet, Take 1-2 tablets by mouth every 4 hours as needed for pain (Patient not taking: Reported on 3/21/2019), Disp: 12 tablet, Rfl: 0     PHYSICAL EXAM:     Vital signs noted and reviewed by William Silva  /68 (BP Location: Right arm, Patient Position: Chair, Cuff Size: Adult Regular)   Pulse 120   Temp 98.2  F (36.8  C) (Tympanic)   Resp 16   Wt 88.5 kg (195 lb)   BMI 27.98 kg/m       PEFR:    Physical Exam   Constitutional: He appears well-developed and well-nourished. He is cooperative.  Non-toxic appearance. He does not have a sickly appearance. He does not appear ill. No distress.   HENT:   Head: Normocephalic and atraumatic.   Right Ear: Hearing, tympanic membrane, external ear and ear canal normal. Tympanic membrane is not perforated, not erythematous, not retracted and not bulging.   Left Ear: Hearing, tympanic membrane, external ear and ear canal normal. Tympanic membrane is not perforated, not erythematous, not retracted and not bulging.   Nose: Nose normal. No mucosal edema or rhinorrhea.   Mouth/Throat: Oropharynx is clear and moist and mucous membranes are normal. No oropharyngeal exudate, posterior oropharyngeal edema, posterior oropharyngeal erythema or tonsillar abscesses. Tonsils are 0 on the right. Tonsils are 0 on the left. No tonsillar exudate.   Eyes: EOM are normal. Pupils are equal, round, and reactive to light.   Neck: Normal range of motion. Neck supple.   Cardiovascular: Normal rate, regular rhythm, S1 normal, S2 normal, normal heart sounds and intact distal pulses. Exam reveals no gallop, no distant heart sounds and no  friction rub.   No murmur heard.  Pulmonary/Chest: Effort normal and breath sounds normal. No respiratory distress. He has no decreased breath sounds. He has no wheezes. He has no rhonchi. He has no rales.   Abdominal: Soft. Bowel sounds are normal. He exhibits no distension. There is no tenderness.   Musculoskeletal:        Left hip: He exhibits decreased range of motion, tenderness and bony tenderness. He exhibits normal strength, no swelling and no deformity.   Lymphadenopathy:     He has no cervical adenopathy.   Neurological: He is alert. No cranial nerve deficit.   Skin: Skin is warm and dry. No rash noted. He is not diaphoretic.   Psychiatric: He has a normal mood and affect. His speech is normal and behavior is normal. Judgment and thought content normal. Cognition and memory are normal. He is attentive.   Nursing note and vitals reviewed.          ASSESSMENT:     1. Hip pain, left    2. Type 2 diabetes mellitus without complication, without long-term current use of insulin (H)           PLAN:     Patient would like 2nd opinion on why he continues to have hip pain.  Order placed for this.    Rx for Naproxen for pain.  Patient encouraged to continue to monitor blood sugars closely.  Last A1C was 12.0 on 12/29/2017.  He will follow up with ortho in the next week.  He was given a letter for work.  Return to work with restrictions according to [employer].  Worrisome symptoms discussed with instructions to go to the ED.  Patient verbalized understanding and agreed with this plan.     William Silva  3/21/2019, 11:30 AM

## 2019-03-21 NOTE — LETTER
[facility]  [address] [place] [id number]  [phone]          March 21, 2019    RE:  Houston Winters                                                                                                                                                       [id number] [facility] [address]            To whom it may concern:    Houston Winters is under my professional care for ongoing hip pain.  He may return to work with restrictions according to [employer]    Sincerely,        William Silva PA-C

## 2019-03-21 NOTE — NURSING NOTE
"Chief Complaint   Patient presents with     Musculoskeletal Problem     Left hip pain.  Had hip surgery for a year.  Having a hard time sleeping.  Pain is worsening.  Having stabbing pain from hip bone to groin. The Meloxicam and prednisone helped a little but came back       Initial /68 (BP Location: Right arm, Patient Position: Chair, Cuff Size: Adult Regular)   Pulse 120   Temp 98.2  F (36.8  C) (Tympanic)   Resp 16   Wt 88.5 kg (195 lb)   BMI 27.98 kg/m   Estimated body mass index is 27.98 kg/m  as calculated from the following:    Height as of 10/7/18: 1.778 m (5' 10\").    Weight as of this encounter: 88.5 kg (195 lb).    Patient presents to the clinic using No DME    Health Maintenance that is potentially due pending provider review:  NONE    n/a    Is there anyone who you would like to be able to receive your results? No  If yes have patient fill out MICHAEL  Farnaz Gomes M.A.    "

## 2019-03-21 NOTE — TELEPHONE ENCOUNTER
Angelo says he was just in and saw oJsse Silva. He gave him a note to return to work on Monday with no restrictions. Pt says this needs to be changed in his chart. He does have current restrictions from his hip surgeon. He says he just needs the note that is in his chart to be changed to say Return to work with restrictions according to [employer]. (His functional capacity evaluation.) He doesn't need to pick it up but just wants to make sure the chart is corrected.

## 2019-03-22 NOTE — TELEPHONE ENCOUNTER
Pt has called back regarding the note below and his restrictions.    Diana Nair  [facility]

## 2019-03-22 NOTE — TELEPHONE ENCOUNTER
Pt called. He wanted to make sure his restriction letter was done. Advised that it was. Vashti Nails RN

## 2019-03-26 NOTE — PROGRESS NOTES
"SUBJECTIVE:  Houston Winters is a 48 year old male who is seen in consultation at the request of Dr. Silva for  left hip pain. He had left  total hip, posterior approach in January of 2018 by Dr. Veliz at [facility].  This resolved the preop pains, but he has had other pains since the surgery.  He is here for 2nd opinion.  This is a work comp issue.  Had physical therapy after surgery to help getting back to work.      Symptoms: sharp stabbing pains, pain from lateral into groin.  Getting worse.  Feels shifting or a \"rubber band\" snapping, occasional popping with moving laterally.   Pain sitting too long.  Pain laying on left side.  No history of back problems   Doesn't think the left leg is longer  Feel \"clumsier\"    Poor sleep due to discomfort, so fatigued.  No fever/chills or feeling sick.    Treatment up to this point: he recently saw his orthopedist who suggested soft tissue origin or greater trochanteric bursitis.  Medrol Dose Pack was given, which helped while he took it. Said he had blood tests which showed no signs of infection apparently.  He was told by a therapist that his \"gluteus medius wasn't working\" well    Orthopedic PMH: history of hip arthroscopy which did not work.    Past medical history:   Past Medical History:   Diagnosis Date     DM (diabetes mellitus) (H)      Epilepsy (H)     Intractable epilepsy, last seizure 2008     MEDICAL HISTORY OF - Age 16     Gangrene in foot stepped on a nail hospitalized for 7 weeks     Nicotine dependence        Surgical:   Past Surgical History:   Procedure Laterality Date     CL AFF SURGICAL PATHOLOGY  2005    ganglion cyst removed     EXCISE MASS LOWER EXTREMITY  2/15/2013    Procedure: EXCISE MASS LOWER EXTREMITY;  Right Ankle Excision of ganglion cyst;  Surgeon: Akbar Melo DPM;  Location: [facility] OR     HERNIA REPAIR  2000    umbilical repair     HERNIORRHAPHY UMBILICAL N/A 11/10/2015    Procedure: HERNIORRHAPHY UMBILICAL;  Surgeon: Garry Leos MD;  " Location: [facility] OR       Family Hx:    Family History   Problem Relation Age of Onset     Arthritis Mother      Cancer Maternal Grandmother      Arthritis Maternal Grandmother         lupus     Gastrointestinal Disease Maternal Grandfather         war injury, colostomy     Respiratory Daughter         growing out of it     Unknown/Adopted Father      Diabetes No family hx of      Coronary Artery Disease No family hx of      Hypertension No family hx of      Hyperlipidemia No family hx of      Breast Cancer No family hx of      Cancer - colorectal No family hx of      Ovarian Cancer No family hx of      Prostate Cancer No family hx of        Social Hx:   Social History     Socioeconomic History     Marital status:      Spouse name: Not on file     Number of children: Not on file     Years of education: Not on file     Highest education level: Not on file   Occupational History     Occupation: construction   Social Needs     Financial resource strain: Not on file     Food insecurity:     Worry: Not on file     Inability: Not on file     Transportation needs:     Medical: Not on file     Non-medical: Not on file   Tobacco Use     Smoking status: Former Smoker     Packs/day: 0.25     Years: 25.00     Pack years: 6.25     Types: Cigarettes     Last attempt to quit: 1/18/2016     Years since quitting: 3.1     Smokeless tobacco: Former User   Substance and Sexual Activity     Alcohol use: Yes     Alcohol/week: 0.0 oz     Comment: mixed 2-4 on weekends, not every weekend     Drug use: No     Sexual activity: Yes     Partners: Female     Birth control/protection: Surgical   Lifestyle     Physical activity:     Days per week: Not on file     Minutes per session: Not on file     Stress: Not on file   Relationships     Social connections:     Talks on phone: Not on file     Gets together: Not on file     Attends Yazidi service: Not on file     Active member of club or organization: Not on file     Attends meetings of  "clubs or organizations: Not on file     Relationship status: Not on file     Intimate partner violence:     Fear of current or ex partner: Not on file     Emotionally abused: Not on file     Physically abused: Not on file     Forced sexual activity: Not on file   Other Topics Concern      Service No     Blood Transfusions No     Caffeine Concern Yes     Comment: 1-2 a day     Occupational Exposure Yes     Comment: construction work, demolition, heating and airconditioning     Hobby Hazards No     Sleep Concern No     Stress Concern No     Weight Concern No     Special Diet No     Back Care No     Exercise Yes     Bike Helmet No     Seat Belt Yes     Self-Exams Not Asked     Parent/sibling w/ CABG, MI or angioplasty before 65F 55M? No   Social History Narrative     Not on file       REVIEW OF SYSTEMS:    CONSTITUTIONAL:  NEGATIVE for fever, chills, change in weight  INTEGUMENTARY/SKIN:  NEGATIVE for worrisome rashes, moles or lesions  EYES:  NEGATIVE for vision changes or irritation  ENT/MOUTH:  NEGATIVE for ear, mouth and throat problems  RESP:  NEGATIVE for significant cough or SOB  BREAST:  NEGATIVE for masses, tenderness or discharge  CV:  NEGATIVE for chest pain, palpitations or peripheral edema  GI:  NEGATIVE for nausea, abdominal pain, heartburn, or change in bowel habits  :  Negative   MUSCULOSKELETAL:  See HPI above  NEURO:  NEGATIVE for weakness, dizziness or paresthesias  ENDOCRINE:  NEGATIVE for temperature intolerance, skin/hair changes  HEME/ALLERGY/IMMUNE:  NEGATIVE for bleeding problems  PSYCHIATRIC:  NEGATIVE for changes in mood or affect    /86 (BP Location: Right arm, Patient Position: Sitting, Cuff Size: Adult Regular)   Pulse 91   Ht 1.778 m (5' 10\")   Wt 88.5 kg (195 lb)   SpO2 95%   BMI 27.98 kg/m      EXAM:  GENERAL APPEARANCE: healthy, alert and no distress   GAIT: NORMAL  SKIN: no suspicious lesions or rashes  NEURO: normal strength and tone, sentation intact, reflexes " normal, DTR symmetrically normal in upper extremities and DTR symmetrically normal in lower extremities  PSYCH:  mentation appears normal and affect normal/bright  VASCULAR:  pulses intact, good cappillary refill  LYMPH:  no lymphadenopathy  RESP:  no overt rhonchi or weazes    MUSCULOSKELETAL:    LEFT HIP:  Transverse posterior incisional scar superior to greater troch and extending medially  Palpation: Tender:   left greater trochanter, left iliac crest, ASIS area with +tinel's over the LFCN and sciatic notch with radiation down the leg to the knee  Range of Motion:  Of the hip is adequate. Gentle rotation is somewhat painful.  A lot of pain with more rotation, flexion and abduction.  Pain is in the groin area  Strength:  flexion: 4+/5, painful, abduction: 5-/5, painful  Special tests:  no crepitation/snapping over greater trochanter,   Trendelenburg is equivocal, negative.  Femoral stretch +    Lumbar range of motion: adequate  Toe stand: able, with difficulty.    Heel stand: Able  Seated SLR: negative  Supine SLR: negative  Sensation:normal, including anterior thigh.  Motor: all normal  DTR's: normal   Pathologic reflexes: None    X-RAY INTERPRETATION:  From 9/12/18: show well fixed and placed components.  Left leg may be a few mm longer.  Small amount of H.O.    Xrays from today, 3/27/2019 of the left hip were reviewed with the patient.  They demonstrate:  No increase in the heterotopic ossification formation. Components still well fixed. X-table lateral shows excellent version and no overhang or prominence of the acetabular component.      ASSESSMENT/PLAN:  Encounter Diagnosis   Name Primary?     Chronic hip pain after total replacement of left hip joint Yes    Possible causes include:  Neuro: LFCN if lateral positioner.  Sciatic nerve based on area of tenderness and incision, but no hard sciatic nerve pathology seen on exam.  Possible gluteus medius tear or potentially denervation.  But again, the muscle  "functions but with some weakness.  Iliopsoas bursitis: possible but not due to hardware prominence  Infection: labs have been normal, and he has been asymptomatic     Plan: MARS MRI to evaluate gluteus medius or a fluid collection.  Another option is neuro evaluation, but he didn't want to do this, being uneasy about getting an EMG  He ruled out further physical therapy  Bone scan would likely still be \"hot\" at this stage  Work restrictions per his primary surgeon.  This is a work comp case, which can influence recovery itself.      VERÓNICA Gutierrez MD  Dept. Orthopedic Surgery  [facility]        "

## 2019-03-27 ENCOUNTER — OFFICE VISIT (OUTPATIENT)
Dept: ORTHOPEDICS | Facility: CLINIC | Age: 49
End: 2019-03-27
Payer: OTHER MISCELLANEOUS

## 2019-03-27 ENCOUNTER — ANCILLARY PROCEDURE (OUTPATIENT)
Dept: GENERAL RADIOLOGY | Facility: CLINIC | Age: 49
End: 2019-03-27
Attending: ORTHOPAEDIC SURGERY
Payer: OTHER MISCELLANEOUS

## 2019-03-27 VITALS
SYSTOLIC BLOOD PRESSURE: 138 MMHG | HEART RATE: 91 BPM | DIASTOLIC BLOOD PRESSURE: 86 MMHG | HEIGHT: 70 IN | WEIGHT: 195 LBS | OXYGEN SATURATION: 95 % | BODY MASS INDEX: 27.92 KG/M2

## 2019-03-27 DIAGNOSIS — Z96.642 CHRONIC HIP PAIN AFTER TOTAL REPLACEMENT OF LEFT HIP JOINT: ICD-10-CM

## 2019-03-27 DIAGNOSIS — Z96.642 CHRONIC HIP PAIN AFTER TOTAL REPLACEMENT OF LEFT HIP JOINT: Primary | ICD-10-CM

## 2019-03-27 DIAGNOSIS — M25.552 CHRONIC HIP PAIN AFTER TOTAL REPLACEMENT OF LEFT HIP JOINT: Primary | ICD-10-CM

## 2019-03-27 DIAGNOSIS — G89.29 CHRONIC HIP PAIN AFTER TOTAL REPLACEMENT OF LEFT HIP JOINT: Primary | ICD-10-CM

## 2019-03-27 DIAGNOSIS — M25.552 CHRONIC HIP PAIN AFTER TOTAL REPLACEMENT OF LEFT HIP JOINT: ICD-10-CM

## 2019-03-27 DIAGNOSIS — G89.29 CHRONIC HIP PAIN AFTER TOTAL REPLACEMENT OF LEFT HIP JOINT: ICD-10-CM

## 2019-03-27 PROCEDURE — 99244 OFF/OP CNSLTJ NEW/EST MOD 40: CPT | Performed by: ORTHOPAEDIC SURGERY

## 2019-03-27 PROCEDURE — 73502 X-RAY EXAM HIP UNI 2-3 VIEWS: CPT

## 2019-03-27 ASSESSMENT — MIFFLIN-ST. JEOR: SCORE: 1765.76

## 2019-03-27 NOTE — LETTER
"    3/27/2019         RE: Houston Winters  [address] [facility] [address]        Dear Colleague,    Thank you for referring your patient, Houston Winters, to the [facility]. Please see a copy of my visit note below.    SUBJECTIVE:  Houston Winters is a 48 year old male who is seen in consultation at the request of Dr. Silva for  left hip pain. He had left  total hip, posterior approach in January of 2018 by Dr. Veliz at [facility].  This resolved the preop pains, but he has had other pains since the surgery.   He is here for 2nd opinion.  This is a work comp issue.  Had physical therapy after surgery to help getting back to work.      Symptoms: sharp stabbing pains, pain from lateral into groin.  Getting worse.  Feels shifting or a \"rubber band\" snapping, occasional popping with moving laterally.   Pain sitting too long.  Pain laying on left side.  No history of back problems   Doesn't think the left leg is longer  Feel \"clumsier\"    Poor sleep due to discomfort, so fatigued.  No fever/chills or feeling sick.    Treatment up to this point: he recently saw his orthopedist who suggested soft tissue origin or greater trochanteric bursitis.  Medrol Dose Pack was given, which helped while he took it. Said he had blood tests which showed no signs of infection apparently.  He was told by a therapist that his \"gluteus medius wasn't working\" well    Orthopedic PMH: history of hip arthroscopy which did not work.    Past medical history:   Past Medical History:   Diagnosis Date     DM (diabetes mellitus) (H)      Epilepsy (H)     Intractable epilepsy, last seizure 2008     MEDICAL HISTORY OF - Age 16     Gangrene in foot stepped on a nail hospitalized for 7 weeks     Nicotine dependence        Surgical:   Past Surgical History:   Procedure Laterality Date     CL AFF SURGICAL PATHOLOGY  2005    ganglion cyst removed     EXCISE MASS LOWER EXTREMITY  2/15/2013    Procedure: EXCISE MASS LOWER EXTREMITY;  Right " Ankle Excision of ganglion cyst;  Surgeon: Akbar Melo DPM;  Location: [facility] OR     HERNIA REPAIR  2000    umbilical repair     HERNIORRHAPHY UMBILICAL N/A 11/10/2015    Procedure: HERNIORRHAPHY UMBILICAL;  Surgeon: Garry Leos MD;  Location: [facility] OR       Family Hx:    Family History   Problem Relation Age of Onset     Arthritis Mother      Cancer Maternal Grandmother      Arthritis Maternal Grandmother         lupus     Gastrointestinal Disease Maternal Grandfather         war injury, colostomy     Respiratory Daughter         growing out of it     Unknown/Adopted Father      Diabetes No family hx of      Coronary Artery Disease No family hx of      Hypertension No family hx of      Hyperlipidemia No family hx of      Breast Cancer No family hx of      Cancer - colorectal No family hx of      Ovarian Cancer No family hx of      Prostate Cancer No family hx of        Social Hx:   Social History     Socioeconomic History     Marital status:      Spouse name: Not on file     Number of children: Not on file     Years of education: Not on file     Highest education level: Not on file   Occupational History     Occupation: construction   Social Needs     Financial resource strain: Not on file     Food insecurity:     Worry: Not on file     Inability: Not on file     Transportation needs:     Medical: Not on file     Non-medical: Not on file   Tobacco Use     Smoking status: Former Smoker     Packs/day: 0.25     Years: 25.00     Pack years: 6.25     Types: Cigarettes     Last attempt to quit: 1/18/2016     Years since quitting: 3.1     Smokeless tobacco: Former User   Substance and Sexual Activity     Alcohol use: Yes     Alcohol/week: 0.0 oz     Comment: mixed 2-4 on weekends, not every weekend     Drug use: No     Sexual activity: Yes     Partners: Female     Birth control/protection: Surgical   Lifestyle     Physical activity:     Days per week: Not on file     Minutes per session: Not on file      Stress: Not on file   Relationships     Social connections:     Talks on phone: Not on file     Gets together: Not on file     Attends Shinto service: Not on file     Active member of club or organization: Not on file     Attends meetings of clubs or organizations: Not on file     Relationship status: Not on file     Intimate partner violence:     Fear of current or ex partner: Not on file     Emotionally abused: Not on file     Physically abused: Not on file     Forced sexual activity: Not on file   Other Topics Concern      Service No     Blood Transfusions No     Caffeine Concern Yes     Comment: 1-2 a day     Occupational Exposure Yes     Comment: construction work, demolition, heating and airconditioning     Hobby Hazards No     Sleep Concern No     Stress Concern No     Weight Concern No     Special Diet No     Back Care No     Exercise Yes     Bike Helmet No     Seat Belt Yes     Self-Exams Not Asked     Parent/sibling w/ CABG, MI or angioplasty before 65F 55M? No   Social History Narrative     Not on file       REVIEW OF SYSTEMS:    CONSTITUTIONAL:  NEGATIVE for fever, chills, change in weight  INTEGUMENTARY/SKIN:  NEGATIVE for worrisome rashes, moles or lesions  EYES:  NEGATIVE for vision changes or irritation  ENT/MOUTH:  NEGATIVE for ear, mouth and throat problems  RESP:  NEGATIVE for significant cough or SOB  BREAST:  NEGATIVE for masses, tenderness or discharge  CV:  NEGATIVE for chest pain, palpitations or peripheral edema  GI:  NEGATIVE for nausea, abdominal pain, heartburn, or change in bowel habits  :  Negative   MUSCULOSKELETAL:  See HPI above  NEURO:  NEGATIVE for weakness, dizziness or paresthesias  ENDOCRINE:  NEGATIVE for temperature intolerance, skin/hair changes  HEME/ALLERGY/IMMUNE:  NEGATIVE for bleeding problems  PSYCHIATRIC:  NEGATIVE for changes in mood or affect    /86 (BP Location: Right arm, Patient Position: Sitting, Cuff Size: Adult Regular)   Pulse 91   Ht  "1.778 m (5' 10\")   Wt 88.5 kg (195 lb)   SpO2 95%   BMI 27.98 kg/m       EXAM:  GENERAL APPEARANCE: healthy, alert and no distress   GAIT: NORMAL  SKIN: no suspicious lesions or rashes  NEURO: normal strength and tone, sentation intact, reflexes normal, DTR symmetrically normal in upper extremities and DTR symmetrically normal in lower extremities  PSYCH:  mentation appears normal and affect normal/bright  VASCULAR:  pulses intact, good cappillary refill  LYMPH:  no lymphadenopathy  RESP:  no overt rhonchi or weazes    MUSCULOSKELETAL:    LEFT HIP:  Transverse posterior incisional scar superior to greater troch and extending medially  Palpation: Tender:   left greater trochanter, left iliac crest, ASIS area with +tinel's over the LFCN and sciatic notch with radiation down the leg to the knee  Range of Motion:  Of the hip is adequate. Gentle rotation is somewhat painful.  A lot of pain with more rotation, flexion and abduction.  Pain is in the groin area  Strength:  flexion: 4+/5, painful, abduction: 5-/5, painful  Special tests:  no crepitation/snapping over greater trochanter,   Trendelenburg is equivocal, negative.  Femoral stretch +    Lumbar range of motion: adequate  Toe stand: able, with difficulty.    Heel stand: Able  Seated SLR: negative  Supine SLR: negative  Sensation:normal, including anterior thigh.  Motor: all normal  DTR's: normal   Pathologic reflexes: None    X-RAY INTERPRETATION:  From 9/12/18: show well fixed and placed components.  Left leg may be a few mm longer.  Small amount of H.O.    Xrays from today, 3/27/2019 of the left hip were reviewed with the patient.  They demonstrate:  No increase in the heterotopic ossification formation. Components still well fixed. X-table lateral shows excellent version and no overhang or prominence of the acetabular component.      ASSESSMENT/PLAN:  Encounter Diagnosis   Name Primary?     Chronic hip pain after total replacement of left hip joint Yes    " "Possible causes include:  Neuro: LFCN if lateral positioner.  Sciatic nerve based on area of tenderness and incision, but no hard sciatic nerve pathology seen on exam.  Possible gluteus medius tear or potentially denervation.  But again, the muscle functions but with some weakness.  Iliopsoas bursitis: possible but not due to hardware prominence  Infection: labs have been normal, and he has been asymptomatic     Plan: MARS MRI to evaluate gluteus medius or a fluid collection.  Another option is neuro evaluation, but he didn't want to do this, being uneasy about getting an EMG  He ruled out further physical therapy  Bone scan would likely still be \"hot\" at this stage  Work restrictions per his primary surgeon.  This is a work comp case, which can influence recovery itself.      VERÓNICA Gutierrez MD  Dept. Orthopedic Surgery  [facility]          Again, thank you for allowing me to participate in the care of your patient.        Sincerely,        Akbar Gutierrez MD    "

## 2019-03-27 NOTE — PATIENT INSTRUCTIONS
[facility] Radiology Department    Contact Us   For Adult Patients:  General inquiries: [phone] or [phone] (toll-free)  Adult appointments: [phone]  Adult referrals: [phone]    Please let the scheduling department know if this is a work comp insurance case. They will have to get prior authorization.  Thank you.         1.  Please call [facility] Radiology to schedule your MRI with one of their staff.    2. Make a follow up appointment ([phone]) with Dr. Akbar Gutierrez 3-5 days after your exam to discuss your results.  This gives the radiologist and Physician time to review the images.

## 2019-04-01 ENCOUNTER — ANCILLARY PROCEDURE (OUTPATIENT)
Dept: MRI IMAGING | Facility: CLINIC | Age: 49
End: 2019-04-01
Attending: ORTHOPAEDIC SURGERY
Payer: OTHER MISCELLANEOUS

## 2019-04-01 DIAGNOSIS — G89.29 CHRONIC HIP PAIN AFTER TOTAL REPLACEMENT OF LEFT HIP JOINT: ICD-10-CM

## 2019-04-01 DIAGNOSIS — M25.552 CHRONIC HIP PAIN AFTER TOTAL REPLACEMENT OF LEFT HIP JOINT: ICD-10-CM

## 2019-04-01 DIAGNOSIS — Z96.642 CHRONIC HIP PAIN AFTER TOTAL REPLACEMENT OF LEFT HIP JOINT: ICD-10-CM

## 2019-04-01 PROCEDURE — 73721 MRI JNT OF LWR EXTRE W/O DYE: CPT | Mod: LT | Performed by: RADIOLOGY

## 2019-04-02 ENCOUNTER — OFFICE VISIT (OUTPATIENT)
Dept: ORTHOPEDICS | Facility: CLINIC | Age: 49
End: 2019-04-02
Payer: COMMERCIAL

## 2019-04-02 VITALS
SYSTOLIC BLOOD PRESSURE: 129 MMHG | HEART RATE: 108 BPM | HEIGHT: 70 IN | DIASTOLIC BLOOD PRESSURE: 77 MMHG | BODY MASS INDEX: 27.92 KG/M2 | WEIGHT: 195 LBS | OXYGEN SATURATION: 99 %

## 2019-04-02 DIAGNOSIS — M70.62 TROCHANTERIC BURSITIS OF LEFT HIP: Primary | ICD-10-CM

## 2019-04-02 PROCEDURE — 20610 DRAIN/INJ JOINT/BURSA W/O US: CPT | Mod: LT | Performed by: ORTHOPAEDIC SURGERY

## 2019-04-02 PROCEDURE — 99213 OFFICE O/P EST LOW 20 MIN: CPT | Mod: 25 | Performed by: ORTHOPAEDIC SURGERY

## 2019-04-02 RX ORDER — METHYLPREDNISOLONE ACETATE 80 MG/ML
80 INJECTION, SUSPENSION INTRA-ARTICULAR; INTRALESIONAL; INTRAMUSCULAR; SOFT TISSUE
Status: DISCONTINUED | OUTPATIENT
Start: 2019-04-02 | End: 2021-05-18

## 2019-04-02 RX ORDER — LIDOCAINE HYDROCHLORIDE 10 MG/ML
2 INJECTION, SOLUTION INFILTRATION; PERINEURAL
Status: DISCONTINUED | OUTPATIENT
Start: 2019-04-02 | End: 2021-05-18

## 2019-04-02 RX ADMIN — METHYLPREDNISOLONE ACETATE 80 MG: 80 INJECTION, SUSPENSION INTRA-ARTICULAR; INTRALESIONAL; INTRAMUSCULAR; SOFT TISSUE at 16:17

## 2019-04-02 RX ADMIN — LIDOCAINE HYDROCHLORIDE 2 ML: 10 INJECTION, SOLUTION INFILTRATION; PERINEURAL at 16:17

## 2019-04-02 ASSESSMENT — MIFFLIN-ST. JEOR: SCORE: 1765.76

## 2019-04-02 NOTE — PATIENT INSTRUCTIONS
MR left hip without contrast 4/1/2019 7:36 AM     Techniques: Multiplanar multisequence imaging of the left hip was  obtained without  administration of intra-articular or intravenous  contrast using routing protocol.     History: Hip replacement, pain, infection suspected     Additional History from EMR: Total hip January 2018     Comparison: X-ray 3/27/2019     Findings:  Postsurgical changes of total hip arthroplasty with surrounding  metallic susceptibility artifact compromising evaluation.     Osseous structures  Osseous structures: No fracture, stress reaction, avascular necrosis,  or focal osseous lesion is seen.     Joint or bursal effusion     Joint effusion: Small effusion     Bursal effusion: Small to moderate bursal fluid over the greater  trochanter with regional soft tissue scarring from surgical change.  Small iliopsoas bursal effusion.     Muscles and tendons  Muscles and tendons: The rectus femoris, iliopsoas, proximal  hamstrings, and hip abductors are intact.  The visualized adductor  muscles are unremarkable.      Nerves:  The visualized course of the sciatic nerve is unremarkable.     Other Findings:  None.                                                                      Impression:  1. Postsurgical changes of a left total hip arthroplasty with  surrounding metallic susceptibility artifact compromising evaluation.  2. Small simple appearing left hip joint effusion.  3. Small-to-moderate trochanteric bursitis.  4. Small iliopsoas bursal fluid.    You had a cortisone injection of your hip today    Steroid injection. Injection of a corticosteroid along with a local anesthetic may also be helpful in relieving symptoms of hip bursitis. This is a simple and effective treatment that can be done in the doctor's office. It involves a single injection into the bursa. The injection may provide temporary (months) or permanent relief. If pain and inflammation return, another injection or two, given a few  months apart, may be needed. It is important to limit the number of injections, as prolonged corticosteroid injections may damage the surrounding tissues.

## 2019-04-02 NOTE — PROGRESS NOTES
"SUBJECTIVE:  Houston Winters returns for MRI results from 4/1/19 of the left hip, which are reviewed with the patient by myself and showed:    Impression:  1. Postsurgical changes of a left total hip arthroplasty with  surrounding metallic susceptibility artifact compromising evaluation.  2. Small simple appearing left hip joint effusion.  3. Small-to-moderate trochanteric bursitis.  4. Small iliopsoas bursal fluid.    Present symptoms: Pinching pain in the groin area, brought on when sitting such as with driving. Night time pain is affecting his ability to have quality sleep.  The pain is greatest at the end of the day.  He wakes every 2-3 hours due to the pain, and can get back to sleep, but after 2-3 cycles of that he cannot get back to sleep.  He has both anterior and lateral pain at night.    Review of Systems:  Constitutional/General: Negative for fever, chills, change in weight  Integumentary/Skin: Negative for worrisome rashes, moles, or lesions  Neuro: Negative for weakness, dizziness, or paresthesias   Psychiatric: negative for changes in mood or affect    OBJECTIVE:  Physical Exam:  /77 (BP Location: Left arm, Patient Position: Sitting, Cuff Size: Adult Regular)   Pulse 108   Ht 1.778 m (5' 10\")   Wt 88.5 kg (195 lb)   SpO2 99%   BMI 27.98 kg/m    General Appearance: healthy, alert and no distress   Skin: no suspicious lesions or rashes  Neuro: Normal strength and tone, mentation intact and speech normal  Vascular: good pulses, and cappillary refill   Lymph: no lymphadenopathy   Psych:  mentation appears normal and affect normal/bright  Resp: no increased work of breathing      ASSESSMENT:   1. Greater trochanteric bursitis, gluteus medius seems to be intact  2. Iliopsoas bursitis, mild  No evidence of loosening of the prosthesis or pseudotumor (not expected).  There is some mild increased signal around the rim of the acetabulum, which is not surprising at this stage postoperative, since bone " scans can still be positive at this stage.    PLAN:   Discussed US guided steroid injection in the iliopsoas bursa, but I don't feel that there is enough fluid to warrant this, and there's a risk of introducing infection.  This could be tried in the future if he is desperate. There is less likelihood of deep infection with the greater trochanteric injection, so we will try that today. I encouraged stretching of the hip flexors on a regular basis. NSAIDs  Return to clinic: as needed    Procedure Note:   Informed consent obtained. Risks, benefits and complications of the injection were discussed with the patient and the patient elected to proceed. Left hip injected in the greater trochanteric bursa with 80mg of Depomedrol and 2cc of local anesthetic after sterile prep.      Total time spent was 15 minutes; with 15 minutes spent face-to-face with patient dedicated to education, counseling and a development of a treatment plan.    VERÓNICA Gutierrez MD  Dept. Orthopedic Surgery  [facility]    This document serves as a record of the services and decisions personally performed and made by Dr. VERÓNICA Gutierrez MD. It was created on his behalf by Kenia Goss, a trained medical scribe. The creation of this record is based on the provider's personal observations and the statements of the patient. This document has been checked and approved by the attending provider.   Kenia Goss April 2, 2019 4:18 PM

## 2019-04-02 NOTE — LETTER
"    4/2/2019         RE: Houston Winters  [address] [facility] [address]        Dear Colleague,    Thank you for referring your patient, Houston Winters, to the [facility]. Please see a copy of my visit note below.    SUBJECTIVE:  Houston Winters returns for MRI results from 4/1/19 of the left hip, which are reviewed with the patient by myself and showed:    Impression:  1. Postsurgical changes of a left total hip arthroplasty with  surrounding metallic susceptibility artifact compromising evaluation.  2. Small simple appearing left hip joint effusion.  3. Small-to-moderate trochanteric bursitis.  4. Small iliopsoas bursal fluid.    Present symptoms: Pinching pain in the groin area, brought on when sitting such as with driving. Night time pain is affecting his ability to have quality sleep.  The pain is greatest at the end of the day.  He wakes every 2-3 hours due to the pain, and can get back to sleep, but after 2-3 cycles of that he cannot get back to sleep.  He has both anterior and lateral pain at night.    Review of Systems:  Constitutional/General: Negative for fever, chills, change in weight  Integumentary/Skin: Negative for worrisome rashes, moles, or lesions  Neuro: Negative for weakness, dizziness, or paresthesias   Psychiatric: negative for changes in mood or affect    OBJECTIVE:  Physical Exam:  /77 (BP Location: Left arm, Patient Position: Sitting, Cuff Size: Adult Regular)   Pulse 108   Ht 1.778 m (5' 10\")   Wt 88.5 kg (195 lb)   SpO2 99%   BMI 27.98 kg/m     General Appearance: healthy, alert and no distress   Skin: no suspicious lesions or rashes  Neuro: Normal strength and tone, mentation intact and speech normal  Vascular: good pulses, and cappillary refill   Lymph: no lymphadenopathy   Psych:  mentation appears normal and affect normal/bright  Resp: no increased work of breathing      ASSESSMENT:   1. Greater trochanteric bursitis, gluteus medius seems to be " intact  2. Iliopsoas bursitis, mild  No evidence of loosening of the prosthesis or pseudotumor (not expected).  There is some mild increased signal around the rim of the acetabulum, which is not surprising at this stage postoperative, since bone scans can still be positive at this stage.    PLAN:   Discussed US guided steroid injection in the iliopsoas bursa, but I don't feel that there is enough fluid to warrant this, and there's a risk of introducing infection.  This could be tried in the future if he is desperate. There is less likelihood of deep infection with the greater trochanteric injection, so we will try that today. I encouraged stretching of the hip flexors on a regular basis. NSAIDs  Return to clinic: as needed    Procedure Note:   Informed consent obtained. Risks, benefits and complications of the injection were discussed with the patient and the patient elected to proceed. Left hip injected in the greater trochanteric bursa with 80mg of Depomedrol and 2cc of local anesthetic after sterile prep.      Total time spent was 15 minutes; with 15 minutes spent face-to-face with patient dedicated to education, counseling and a development of a treatment plan.    VERÓNICA Gutierrez MD  Dept. Orthopedic Surgery  [facility]    This document serves as a record of the services and decisions personally performed and made by Dr. VERÓNICA Gutierrez MD. It was created on his behalf by Kenia Goss, a trained medical scribe. The creation of this record is based on the provider's personal observations and the statements of the patient. This document has been checked and approved by the attending provider.   Kenia Goss April 2, 2019 4:18 PM          Large Joint Injection/Arthocentesis: L greater trochanteric bursa  Date/Time: 4/2/2019 4:17 PM  Performed by: Akbar Gutierrez MD  Authorized by: Akbar Gutierrez MD     Indications:  Pain  Needle Size:  22 G  Approach:  Lateral  Location:  Hip  Site:  L  greater trochanteric bursa  Medications:  80 mg methylPREDNISolone 80 MG/ML; 2 mL lidocaine 1 %  Outcome:  Tolerated well, no immediate complications  Consent Given by:  Patient  Timeout: timeout called immediately prior to procedure    Prep: patient was prepped and draped in usual sterile fashion            Again, thank you for allowing me to participate in the care of your patient.        Sincerely,        Akbar Gutierrez MD

## 2019-04-02 NOTE — PROGRESS NOTES
Large Joint Injection/Arthocentesis: L greater trochanteric bursa  Date/Time: 4/2/2019 4:17 PM  Performed by: Akbar Gutierrez MD  Authorized by: Akbar Gutierrez MD     Indications:  Pain  Needle Size:  22 G  Approach:  Lateral  Location:  Hip  Site:  L greater trochanteric bursa  Medications:  80 mg methylPREDNISolone 80 MG/ML; 2 mL lidocaine 1 %  Outcome:  Tolerated well, no immediate complications  Consent Given by:  Patient  Timeout: timeout called immediately prior to procedure    Prep: patient was prepped and draped in usual sterile fashion

## 2019-11-03 ENCOUNTER — HEALTH MAINTENANCE LETTER (OUTPATIENT)
Age: 49
End: 2019-11-03

## 2020-11-16 ENCOUNTER — HEALTH MAINTENANCE LETTER (OUTPATIENT)
Age: 50
End: 2020-11-16

## 2021-05-18 ENCOUNTER — APPOINTMENT (OUTPATIENT)
Dept: GENERAL RADIOLOGY | Facility: CLINIC | Age: 51
DRG: 287 | End: 2021-05-18
Attending: INTERNAL MEDICINE
Payer: COMMERCIAL

## 2021-05-18 ENCOUNTER — HOSPITAL ENCOUNTER (INPATIENT)
Facility: CLINIC | Age: 51
LOS: 1 days | Discharge: HOME | DRG: 287 | End: 2021-05-19
Attending: INTERNAL MEDICINE | Admitting: INTERNAL MEDICINE
Payer: COMMERCIAL

## 2021-05-18 ENCOUNTER — APPOINTMENT (OUTPATIENT)
Dept: ULTRASOUND IMAGING | Facility: CLINIC | Age: 51
DRG: 287 | End: 2021-05-18
Attending: SURGERY
Payer: COMMERCIAL

## 2021-05-18 ENCOUNTER — APPOINTMENT (OUTPATIENT)
Dept: CARDIOLOGY | Facility: CLINIC | Age: 51
DRG: 287 | End: 2021-05-18
Attending: INTERNAL MEDICINE
Payer: COMMERCIAL

## 2021-05-18 ENCOUNTER — HOSPITAL ENCOUNTER (EMERGENCY)
Facility: CLINIC | Age: 51
Discharge: ANOTHER HOSPITAL | End: 2021-05-18
Attending: NURSE PRACTITIONER | Admitting: NURSE PRACTITIONER
Payer: COMMERCIAL

## 2021-05-18 VITALS
HEART RATE: 79 BPM | WEIGHT: 195 LBS | TEMPERATURE: 98.7 F | OXYGEN SATURATION: 97 % | SYSTOLIC BLOOD PRESSURE: 126 MMHG | BODY MASS INDEX: 27.98 KG/M2 | DIASTOLIC BLOOD PRESSURE: 79 MMHG | RESPIRATION RATE: 16 BRPM

## 2021-05-18 DIAGNOSIS — I20.0 UNSTABLE ANGINA (H): Primary | ICD-10-CM

## 2021-05-18 DIAGNOSIS — E11.65 TYPE 2 DIABETES MELLITUS WITH HYPERGLYCEMIA, WITHOUT LONG-TERM CURRENT USE OF INSULIN (H): ICD-10-CM

## 2021-05-18 DIAGNOSIS — E11.9 TYPE 2 DIABETES MELLITUS WITHOUT COMPLICATION, WITHOUT LONG-TERM CURRENT USE OF INSULIN (H): ICD-10-CM

## 2021-05-18 DIAGNOSIS — I25.10 CORONARY ARTERY DISEASE INVOLVING NATIVE CORONARY ARTERY OF NATIVE HEART WITHOUT ANGINA PECTORIS: ICD-10-CM

## 2021-05-18 DIAGNOSIS — R07.9 CHEST PAIN, UNSPECIFIED TYPE: ICD-10-CM

## 2021-05-18 LAB
ABO + RH BLD: NORMAL
ABO + RH BLD: NORMAL
ALBUMIN SERPL-MCNC: 3.6 G/DL (ref 3.4–5)
ALP SERPL-CCNC: 107 U/L (ref 40–150)
ALT SERPL W P-5'-P-CCNC: 31 U/L (ref 0–70)
ANION GAP SERPL CALCULATED.3IONS-SCNC: 8 MMOL/L (ref 3–14)
AST SERPL W P-5'-P-CCNC: 15 U/L (ref 0–45)
BASOPHILS # BLD AUTO: 0 10E9/L (ref 0–0.2)
BASOPHILS NFR BLD AUTO: 0.4 %
BILIRUB DIRECT SERPL-MCNC: <0.1 MG/DL (ref 0–0.2)
BILIRUB SERPL-MCNC: 0.5 MG/DL (ref 0.2–1.3)
BLD GP AB SCN SERPL QL: NORMAL
BLOOD BANK CMNT PATIENT-IMP: NORMAL
BUN SERPL-MCNC: 15 MG/DL (ref 7–30)
CALCIUM SERPL-MCNC: 8.3 MG/DL (ref 8.5–10.1)
CHLORIDE SERPL-SCNC: 102 MMOL/L (ref 94–109)
CHOLEST SERPL-MCNC: 291 MG/DL
CO2 SERPL-SCNC: 24 MMOL/L (ref 20–32)
CREAT SERPL-MCNC: 0.85 MG/DL (ref 0.66–1.25)
D DIMER PPP FEU-MCNC: 0.3 UG/ML FEU (ref 0–0.5)
DIFFERENTIAL METHOD BLD: NORMAL
EOSINOPHIL # BLD AUTO: 0 10E9/L (ref 0–0.7)
EOSINOPHIL NFR BLD AUTO: 0.7 %
ERYTHROCYTE [DISTWIDTH] IN BLOOD BY AUTOMATED COUNT: 12.8 % (ref 10–15)
ERYTHROCYTE [DISTWIDTH] IN BLOOD BY AUTOMATED COUNT: 13.1 % (ref 10–15)
GFR SERPL CREATININE-BSD FRML MDRD: >90 ML/MIN/{1.73_M2}
GLUCOSE BLDC GLUCOMTR-MCNC: 244 MG/DL (ref 70–99)
GLUCOSE BLDC GLUCOMTR-MCNC: 290 MG/DL (ref 70–99)
GLUCOSE BLDC GLUCOMTR-MCNC: 291 MG/DL (ref 70–99)
GLUCOSE BLDC GLUCOMTR-MCNC: 303 MG/DL (ref 70–99)
GLUCOSE BLDC GLUCOMTR-MCNC: 307 MG/DL (ref 70–99)
GLUCOSE SERPL-MCNC: 498 MG/DL (ref 70–99)
HBA1C MFR BLD: 11.6 % (ref 0–5.6)
HCT VFR BLD AUTO: 43.7 % (ref 40–53)
HCT VFR BLD AUTO: 45.9 % (ref 40–53)
HDLC SERPL-MCNC: 49 MG/DL
HGB BLD-MCNC: 14.1 G/DL (ref 13.3–17.7)
HGB BLD-MCNC: 14.9 G/DL (ref 13.3–17.7)
IMM GRANULOCYTES # BLD: 0 10E9/L (ref 0–0.4)
IMM GRANULOCYTES NFR BLD: 0.2 %
LABORATORY COMMENT REPORT: NORMAL
LDLC SERPL CALC-MCNC: 195 MG/DL
LYMPHOCYTES # BLD AUTO: 1.4 10E9/L (ref 0.8–5.3)
LYMPHOCYTES NFR BLD AUTO: 25.2 %
MCH RBC QN AUTO: 30.1 PG (ref 26.5–33)
MCH RBC QN AUTO: 31 PG (ref 26.5–33)
MCHC RBC AUTO-ENTMCNC: 32.3 G/DL (ref 31.5–36.5)
MCHC RBC AUTO-ENTMCNC: 32.5 G/DL (ref 31.5–36.5)
MCV RBC AUTO: 93 FL (ref 78–100)
MCV RBC AUTO: 95 FL (ref 78–100)
MONOCYTES # BLD AUTO: 0.4 10E9/L (ref 0–1.3)
MONOCYTES NFR BLD AUTO: 6.6 %
NEUTROPHILS # BLD AUTO: 3.7 10E9/L (ref 1.6–8.3)
NEUTROPHILS NFR BLD AUTO: 66.9 %
NONHDLC SERPL-MCNC: 242 MG/DL
NRBC # BLD AUTO: 0 10*3/UL
NRBC BLD AUTO-RTO: 0 /100
PLATELET # BLD AUTO: 227 10E9/L (ref 150–450)
PLATELET # BLD AUTO: 228 10E9/L (ref 150–450)
POTASSIUM SERPL-SCNC: 4.4 MMOL/L (ref 3.4–5.3)
PROT SERPL-MCNC: 7.3 G/DL (ref 6.8–8.8)
RBC # BLD AUTO: 4.68 10E12/L (ref 4.4–5.9)
RBC # BLD AUTO: 4.81 10E12/L (ref 4.4–5.9)
SARS-COV-2 RNA RESP QL NAA+PROBE: NEGATIVE
SODIUM SERPL-SCNC: 134 MMOL/L (ref 133–144)
SPECIMEN EXP DATE BLD: NORMAL
SPECIMEN SOURCE: NORMAL
TRIGL SERPL-MCNC: 235 MG/DL
TROPONIN I SERPL-MCNC: <0.015 UG/L (ref 0–0.04)
TROPONIN I SERPL-MCNC: <0.015 UG/L (ref 0–0.04)
UFH PPP CHRO-ACNC: <0.1 IU/ML
WBC # BLD AUTO: 5.6 10E9/L (ref 4–11)
WBC # BLD AUTO: 5.7 10E9/L (ref 4–11)

## 2021-05-18 PROCEDURE — 93005 ELECTROCARDIOGRAM TRACING: CPT | Performed by: FAMILY MEDICINE

## 2021-05-18 PROCEDURE — 250N000011 HC RX IP 250 OP 636: Performed by: INTERNAL MEDICINE

## 2021-05-18 PROCEDURE — 250N000013 HC RX MED GY IP 250 OP 250 PS 637: Performed by: INTERNAL MEDICINE

## 2021-05-18 PROCEDURE — 99285 EMERGENCY DEPT VISIT HI MDM: CPT | Performed by: FAMILY MEDICINE

## 2021-05-18 PROCEDURE — 83036 HEMOGLOBIN GLYCOSYLATED A1C: CPT | Performed by: FAMILY MEDICINE

## 2021-05-18 PROCEDURE — 85379 FIBRIN DEGRADATION QUANT: CPT | Performed by: FAMILY MEDICINE

## 2021-05-18 PROCEDURE — C1887 CATHETER, GUIDING: HCPCS | Performed by: INTERNAL MEDICINE

## 2021-05-18 PROCEDURE — 84484 ASSAY OF TROPONIN QUANT: CPT | Performed by: INTERNAL MEDICINE

## 2021-05-18 PROCEDURE — 85520 HEPARIN ASSAY: CPT | Performed by: INTERNAL MEDICINE

## 2021-05-18 PROCEDURE — 99223 1ST HOSP IP/OBS HIGH 75: CPT | Mod: AI | Performed by: INTERNAL MEDICINE

## 2021-05-18 PROCEDURE — 86850 RBC ANTIBODY SCREEN: CPT | Performed by: SURGERY

## 2021-05-18 PROCEDURE — 93931 UPPER EXTREMITY STUDY: CPT | Mod: RT

## 2021-05-18 PROCEDURE — 210N000002 HC R&B HEART CARE

## 2021-05-18 PROCEDURE — 99285 EMERGENCY DEPT VISIT HI MDM: CPT | Mod: 25 | Performed by: FAMILY MEDICINE

## 2021-05-18 PROCEDURE — 250N000013 HC RX MED GY IP 250 OP 250 PS 637: Performed by: FAMILY MEDICINE

## 2021-05-18 PROCEDURE — 255N000002 HC RX 255 OP 636: Performed by: INTERNAL MEDICINE

## 2021-05-18 PROCEDURE — 84484 ASSAY OF TROPONIN QUANT: CPT | Performed by: FAMILY MEDICINE

## 2021-05-18 PROCEDURE — 93971 EXTREMITY STUDY: CPT | Mod: LT

## 2021-05-18 PROCEDURE — 999N001017 HC STATISTIC GLUCOSE BY METER IP

## 2021-05-18 PROCEDURE — 71045 X-RAY EXAM CHEST 1 VIEW: CPT

## 2021-05-18 PROCEDURE — 85027 COMPLETE CBC AUTOMATED: CPT | Performed by: INTERNAL MEDICINE

## 2021-05-18 PROCEDURE — 272N000001 HC OR GENERAL SUPPLY STERILE: Performed by: INTERNAL MEDICINE

## 2021-05-18 PROCEDURE — 250N000012 HC RX MED GY IP 250 OP 636 PS 637: Performed by: INTERNAL MEDICINE

## 2021-05-18 PROCEDURE — 93880 EXTRACRANIAL BILAT STUDY: CPT

## 2021-05-18 PROCEDURE — 36415 COLL VENOUS BLD VENIPUNCTURE: CPT | Performed by: INTERNAL MEDICINE

## 2021-05-18 PROCEDURE — 93010 ELECTROCARDIOGRAM REPORT: CPT | Performed by: FAMILY MEDICINE

## 2021-05-18 PROCEDURE — 93306 TTE W/DOPPLER COMPLETE: CPT | Mod: 26 | Performed by: INTERNAL MEDICINE

## 2021-05-18 PROCEDURE — 80076 HEPATIC FUNCTION PANEL: CPT | Performed by: INTERNAL MEDICINE

## 2021-05-18 PROCEDURE — B2111ZZ FLUOROSCOPY OF MULTIPLE CORONARY ARTERIES USING LOW OSMOLAR CONTRAST: ICD-10-PCS | Performed by: INTERNAL MEDICINE

## 2021-05-18 PROCEDURE — 93458 L HRT ARTERY/VENTRICLE ANGIO: CPT | Performed by: INTERNAL MEDICINE

## 2021-05-18 PROCEDURE — C9803 HOPD COVID-19 SPEC COLLECT: HCPCS | Performed by: FAMILY MEDICINE

## 2021-05-18 PROCEDURE — 80048 BASIC METABOLIC PNL TOTAL CA: CPT | Performed by: FAMILY MEDICINE

## 2021-05-18 PROCEDURE — 80061 LIPID PANEL: CPT | Performed by: INTERNAL MEDICINE

## 2021-05-18 PROCEDURE — 99223 1ST HOSP IP/OBS HIGH 75: CPT | Performed by: INTERNAL MEDICINE

## 2021-05-18 PROCEDURE — 86901 BLOOD TYPING SEROLOGIC RH(D): CPT | Performed by: SURGERY

## 2021-05-18 PROCEDURE — C1894 INTRO/SHEATH, NON-LASER: HCPCS | Performed by: INTERNAL MEDICINE

## 2021-05-18 PROCEDURE — 4A023N7 MEASUREMENT OF CARDIAC SAMPLING AND PRESSURE, LEFT HEART, PERCUTANEOUS APPROACH: ICD-10-PCS | Performed by: INTERNAL MEDICINE

## 2021-05-18 PROCEDURE — 250N000009 HC RX 250: Performed by: INTERNAL MEDICINE

## 2021-05-18 PROCEDURE — 87635 SARS-COV-2 COVID-19 AMP PRB: CPT | Performed by: FAMILY MEDICINE

## 2021-05-18 PROCEDURE — 99152 MOD SED SAME PHYS/QHP 5/>YRS: CPT | Performed by: INTERNAL MEDICINE

## 2021-05-18 PROCEDURE — 999N000208 ECHOCARDIOGRAM COMPLETE

## 2021-05-18 PROCEDURE — 86900 BLOOD TYPING SEROLOGIC ABO: CPT | Performed by: SURGERY

## 2021-05-18 PROCEDURE — 85025 COMPLETE CBC W/AUTO DIFF WBC: CPT | Performed by: FAMILY MEDICINE

## 2021-05-18 PROCEDURE — 36415 COLL VENOUS BLD VENIPUNCTURE: CPT | Performed by: SURGERY

## 2021-05-18 RX ORDER — NALOXONE HYDROCHLORIDE 0.4 MG/ML
0.2 INJECTION, SOLUTION INTRAMUSCULAR; INTRAVENOUS; SUBCUTANEOUS
Status: DISCONTINUED | OUTPATIENT
Start: 2021-05-18 | End: 2021-05-19 | Stop reason: HOSPADM

## 2021-05-18 RX ORDER — LIDOCAINE 40 MG/G
CREAM TOPICAL
Status: CANCELLED | OUTPATIENT
Start: 2021-05-18

## 2021-05-18 RX ORDER — NALOXONE HYDROCHLORIDE 0.4 MG/ML
0.4 INJECTION, SOLUTION INTRAMUSCULAR; INTRAVENOUS; SUBCUTANEOUS
Status: DISCONTINUED | OUTPATIENT
Start: 2021-05-18 | End: 2021-05-19 | Stop reason: HOSPADM

## 2021-05-18 RX ORDER — METOPROLOL TARTRATE 25 MG/1
25 TABLET, FILM COATED ORAL 2 TIMES DAILY
Status: DISCONTINUED | OUTPATIENT
Start: 2021-05-18 | End: 2021-05-19 | Stop reason: HOSPADM

## 2021-05-18 RX ORDER — VERAPAMIL HYDROCHLORIDE 2.5 MG/ML
INJECTION, SOLUTION INTRAVENOUS
Status: DISCONTINUED | OUTPATIENT
Start: 2021-05-18 | End: 2021-05-18 | Stop reason: HOSPADM

## 2021-05-18 RX ORDER — FENTANYL CITRATE 50 UG/ML
25-50 INJECTION, SOLUTION INTRAMUSCULAR; INTRAVENOUS
Status: ACTIVE | OUTPATIENT
Start: 2021-05-18 | End: 2021-05-18

## 2021-05-18 RX ORDER — HEPARIN SODIUM 1000 [USP'U]/ML
INJECTION, SOLUTION INTRAVENOUS; SUBCUTANEOUS
Status: DISCONTINUED | OUTPATIENT
Start: 2021-05-18 | End: 2021-05-18 | Stop reason: HOSPADM

## 2021-05-18 RX ORDER — DEXTROSE MONOHYDRATE 25 G/50ML
25-50 INJECTION, SOLUTION INTRAVENOUS
Status: DISCONTINUED | OUTPATIENT
Start: 2021-05-18 | End: 2021-05-19 | Stop reason: HOSPADM

## 2021-05-18 RX ORDER — LISINOPRIL 5 MG/1
5 TABLET ORAL DAILY
Status: DISCONTINUED | OUTPATIENT
Start: 2021-05-18 | End: 2021-05-19 | Stop reason: HOSPADM

## 2021-05-18 RX ORDER — HEPARIN SODIUM 10000 [USP'U]/100ML
0-5000 INJECTION, SOLUTION INTRAVENOUS CONTINUOUS
Status: DISCONTINUED | OUTPATIENT
Start: 2021-05-18 | End: 2021-05-18 | Stop reason: HOSPADM

## 2021-05-18 RX ORDER — LORAZEPAM 0.5 MG/1
0.5 TABLET ORAL EVERY 6 HOURS PRN
Status: DISCONTINUED | OUTPATIENT
Start: 2021-05-18 | End: 2021-05-19 | Stop reason: HOSPADM

## 2021-05-18 RX ORDER — NITROGLYCERIN 5 MG/ML
VIAL (ML) INTRAVENOUS
Status: DISCONTINUED | OUTPATIENT
Start: 2021-05-18 | End: 2021-05-18 | Stop reason: HOSPADM

## 2021-05-18 RX ORDER — LORAZEPAM 0.5 MG/1
0.5 TABLET ORAL
Status: CANCELLED | OUTPATIENT
Start: 2021-05-18

## 2021-05-18 RX ORDER — LORAZEPAM 2 MG/ML
0.5 INJECTION INTRAMUSCULAR
Status: CANCELLED | OUTPATIENT
Start: 2021-05-18

## 2021-05-18 RX ORDER — ATROPINE SULFATE 0.1 MG/ML
0.5 INJECTION INTRAVENOUS
Status: ACTIVE | OUTPATIENT
Start: 2021-05-18 | End: 2021-05-19

## 2021-05-18 RX ORDER — HEPARIN SODIUM 10000 [USP'U]/100ML
0-5000 INJECTION, SOLUTION INTRAVENOUS CONTINUOUS
Status: DISCONTINUED | OUTPATIENT
Start: 2021-05-18 | End: 2021-05-19 | Stop reason: HOSPADM

## 2021-05-18 RX ORDER — NICOTINE POLACRILEX 4 MG
15-30 LOZENGE BUCCAL
Status: DISCONTINUED | OUTPATIENT
Start: 2021-05-18 | End: 2021-05-19 | Stop reason: HOSPADM

## 2021-05-18 RX ORDER — IOPAMIDOL 755 MG/ML
INJECTION, SOLUTION INTRAVASCULAR
Status: DISCONTINUED | OUTPATIENT
Start: 2021-05-18 | End: 2021-05-18 | Stop reason: HOSPADM

## 2021-05-18 RX ORDER — SODIUM CHLORIDE 9 MG/ML
INJECTION, SOLUTION INTRAVENOUS CONTINUOUS
Status: CANCELLED | OUTPATIENT
Start: 2021-05-18

## 2021-05-18 RX ORDER — FLUMAZENIL 0.1 MG/ML
0.2 INJECTION, SOLUTION INTRAVENOUS
Status: ACTIVE | OUTPATIENT
Start: 2021-05-18 | End: 2021-05-19

## 2021-05-18 RX ORDER — ASPIRIN 81 MG/1
324 TABLET, CHEWABLE ORAL ONCE
Status: COMPLETED | OUTPATIENT
Start: 2021-05-18 | End: 2021-05-18

## 2021-05-18 RX ORDER — POTASSIUM CHLORIDE 1500 MG/1
20 TABLET, EXTENDED RELEASE ORAL
Status: CANCELLED | OUTPATIENT
Start: 2021-05-18

## 2021-05-18 RX ORDER — LIDOCAINE 40 MG/G
CREAM TOPICAL
Status: DISCONTINUED | OUTPATIENT
Start: 2021-05-18 | End: 2021-05-19 | Stop reason: HOSPADM

## 2021-05-18 RX ORDER — MORPHINE SULFATE 2 MG/ML
2 INJECTION, SOLUTION INTRAMUSCULAR; INTRAVENOUS
Status: DISCONTINUED | OUTPATIENT
Start: 2021-05-18 | End: 2021-05-19 | Stop reason: HOSPADM

## 2021-05-18 RX ORDER — ACETAMINOPHEN 325 MG/1
650 TABLET ORAL EVERY 4 HOURS PRN
Status: DISCONTINUED | OUTPATIENT
Start: 2021-05-18 | End: 2021-05-19 | Stop reason: HOSPADM

## 2021-05-18 RX ORDER — ATORVASTATIN CALCIUM 40 MG/1
40 TABLET, FILM COATED ORAL DAILY
Status: DISCONTINUED | OUTPATIENT
Start: 2021-05-18 | End: 2021-05-19 | Stop reason: HOSPADM

## 2021-05-18 RX ORDER — LORAZEPAM 2 MG/ML
0.5 INJECTION INTRAMUSCULAR EVERY 6 HOURS PRN
Status: DISCONTINUED | OUTPATIENT
Start: 2021-05-18 | End: 2021-05-19 | Stop reason: HOSPADM

## 2021-05-18 RX ORDER — SODIUM CHLORIDE 9 MG/ML
INJECTION, SOLUTION INTRAVENOUS CONTINUOUS
Status: DISCONTINUED | OUTPATIENT
Start: 2021-05-18 | End: 2021-05-19 | Stop reason: HOSPADM

## 2021-05-18 RX ORDER — FENTANYL CITRATE 50 UG/ML
INJECTION, SOLUTION INTRAMUSCULAR; INTRAVENOUS
Status: DISCONTINUED | OUTPATIENT
Start: 2021-05-18 | End: 2021-05-18 | Stop reason: HOSPADM

## 2021-05-18 RX ORDER — ASPIRIN 81 MG/1
81 TABLET, CHEWABLE ORAL DAILY
Status: DISCONTINUED | OUTPATIENT
Start: 2021-05-19 | End: 2021-05-19 | Stop reason: HOSPADM

## 2021-05-18 RX ADMIN — ASPIRIN 81 MG CHEWABLE TABLET 324 MG: 81 TABLET CHEWABLE at 09:48

## 2021-05-18 RX ADMIN — INSULIN ASPART 4 UNITS: 100 INJECTION, SOLUTION INTRAVENOUS; SUBCUTANEOUS at 18:07

## 2021-05-18 RX ADMIN — HEPARIN SODIUM 1000 UNITS/HR: 10000 INJECTION, SOLUTION INTRAVENOUS at 14:07

## 2021-05-18 RX ADMIN — METOPROLOL TARTRATE 25 MG: 25 TABLET, FILM COATED ORAL at 21:45

## 2021-05-18 RX ADMIN — HUMAN ALBUMIN MICROSPHERES AND PERFLUTREN 3 ML: 10; .22 INJECTION, SOLUTION INTRAVENOUS at 15:03

## 2021-05-18 RX ADMIN — INSULIN GLARGINE 10 UNITS: 100 INJECTION, SOLUTION SUBCUTANEOUS at 21:45

## 2021-05-18 RX ADMIN — LISINOPRIL 5 MG: 5 TABLET ORAL at 14:02

## 2021-05-18 RX ADMIN — METOPROLOL TARTRATE 25 MG: 25 TABLET, FILM COATED ORAL at 14:02

## 2021-05-18 RX ADMIN — ATORVASTATIN CALCIUM 40 MG: 40 TABLET, FILM COATED ORAL at 14:02

## 2021-05-18 ASSESSMENT — ACTIVITIES OF DAILY LIVING (ADL)
ADLS_ACUITY_SCORE: 10
ADLS_ACUITY_SCORE: 11

## 2021-05-18 ASSESSMENT — MIFFLIN-ST. JEOR: SCORE: 1700.43

## 2021-05-18 NOTE — H&P
Admitted: 05/18/2021    CHIEF COMPLAINT:  Chest pain.    HISTORY OF PRESENT ILLNESS:  Houston Winters, a very pleasant 50-year-old male with past medical history significant for hypertension, hyperlipidemia, type 2 diabetes, noncompliance with medications or follow up who presents to [facility] Emergency Room this morning for evaluation of escalating chest pain.  History is obtained per discussions with the patient and his wife, Nelly, at bedside.    Per review of the medical record, the patient has a past medical history including diagnoses of hypertension, hyperlipidemia and type 2 diabetes.  He had previously been on medications for these conditions including insulin; however, he says he stopped taking medications altogether for several years ago as he did not feel that he needed them.  He notes he stopped taking his insulin because it made him feel worse and his blood sugars dropped too low.  It sounds as though his last contact with the healthcare system was in 2018 when he had a left hip replacement at [facility] through [facility].  He has no previous known history of coronary artery disease or chest pain.    Over the past 3 weeks, the patient has begun to develop worsening chest discomfort.  He initially described it as a central chest discomfort that radiated into his left arm.  He had some associated lightheadedness, which he described as a feeling faint.  In the three weeks since its initial onset, his chest discomfort has become more frequent in occurrence and more severe per his report.  Yesterday, he reports he had an episode of chest pressure and heaviness that began while he was sitting on his riding lawnmower with radiation to his left arm.  The episode resolved after about 5 minutes.  He had a similar episode today while at rest with some associated diaphoresis.  It sounds as though his daughter noted he appeared quite pale.  He has no associated shortness of breath or cough.  He  denies any recent illness.  No abdominal pain, nausea, vomiting, changes in bowel or bladder habits.  He ultimately sought evaluation in [facility] Emergency Room.    In the Emergency Room, he was seen and evaluated by Dr. Hunter Marvin.  He was afebrile and hemodynamically stable.  Initial EKG showed a normal sinus rhythm with no acute ischemic changes.  His initial troponin was negative.  Other labs were notable for a blood sugar of 498.  His electrolytes, renal function, and CBC were otherwise within normal limits.  A D-dimer was negative and COVID swab was negative.  He was given full dose aspirin.  The patient's presentation was discussed with Dr. Saravia on call for the Cardiology service at [facility].  Clinical picture was suggestive of unstable angina.  It was recommended he start on heparin drip and transfer to [facility] for further evaluation.  The patient declined initiation of a heparin drip in the Emergency Room as it would require he travel to [facility] by EMS.  Instead, his wife drove him to the hospital here.    I saw the patient shortly after his arrival, he was resting comfortably at bedside.  He has had no recurrence of his chest discomfort since his episode this morning.  He denies any complaints at present, though again relays that his episode this morning was concerning enough that ultimately prompted him to seek further medical evaluation.  He relays to me that he stopped taking his medications several years ago as he had seen a PCP once and was started on numerous medications, many of which he did not tolerate and ultimately he chose not to follow up with anyone in the years since.  On recheck of his blood sugar here, it had improved to 244 following 4 units of regular insulin given in the Emergency Room.    PAST MEDICAL HISTORY:    1.  Hypertension.  2.  Hyperlipidemia.  3.  Type 2 diabetes.  A1c is 11.6 this hospital stay.  4.  Partial epilepsy.  His last seizure was reportedly in  2008.  He is not on any medications for epilepsy.  5.  History of small-bowel obstruction.  6.  History of a ganglion cyst.    PAST SURGICAL HISTORY:    1.  Left total hip replacement in 2018.  2.  Umbilical hernia repair in 2000.  3.  Removal of ring ganglion cyst in 2005.    SOCIAL HISTORY:  History of tobacco use, smoking 1/4 packs per day for many years.  He quit smoking in 2016.  He now uses an e-cigarette daily.  He admits to alcohol use on weekends.  He denies daily use.  He works as a physically demanding job in [employer] and otherwise lives at home with his wife.    FAMILY HISTORY:  Denies any known family history of medical problems including hypertension, hyperlipidemia, heart disease, stroke, diabetes and cancer.    HOME MEDICATIONS:  None.    ALLERGIES:  NO KNOWN DRUG ALLERGIES.    REVIEW OF SYSTEMS:  Full.    REVIEW OF SYSTEMS:  A 10-point review of systems was discussed with the patient, negative otherwise stated per HPI.    PHYSICAL EXAMINATION:    VITAL SIGNS:  Temperature 98.8, pulse of 96, respirations 16, blood pressure 130/79, O2 sat 98% on room air.  GENERAL:  The patient is a well-nourished, well-developed male, appears stated age, alert, conversing appropriately, in no acute distress.  HEENT:  Pupils equal, round count, which was intact.  Mucous membranes moist.  CARDIOVASCULAR:  Heart rate and rhythm regular.  No murmurs, gallops or rubs.  Pulses are present symmetric in bilateral lower extremities.  No lower extremity edema.  LUNGS:  Clear to auscultation bilaterally.  Few rales or rhonchi.   ABDOMEN:  Soft, nontender, nondistended, positive bowel sounds throughout.  INTEGUMENTARY:  Skin is tanned, but otherwise skin is warm and dry, no rash, no jaundice or ecchymosis.  NEUROLOGIC:  Cranial nerves II-XII intact.  No focal motor or sensory deficits.  Gait was not assessed.    DIAGNOSTIC DATA:  BMP shows sodium 134, potassium 4.4, creatinine 0.85, calcium of 8.3.  Troponin is negative.   Hemoglobin A1c is 11.6.  Glucose was initially 498 on arrival on recheck here and after receiving 4 units of insulin, it is 244.  CBC showed white count of 5.6, hemoglobin 14.9, a platelet count of 227.  D-dimer is 0.3.  COVID-19 swab was negative.    EKG showed normal sinus rhythm with no acute ischemic changes.    ASSESSMENT AND PLAN:  Houston baron is a 50-year-old male with past medical history significant for hypertension, hyperlipidemia and type 2 diabetes by review of the chart, who has not followed with a physician in many years and is not currently taking medications and presents to the Emergency Room today for evaluation of worsening chest pain with a clinical picture suggestive of unstable angina.  He was transferred from [facility] to [facility] for ongoing cardiac evaluation.  1.  Unstable angina:  The patient relays escalating episodes of chest pain over the preceding 3 weeks, which he describes as a left-sided chest pain and pressure with radiation into his left arm and some associated feelings of lightheadedness.  He has had at least 2 episodes in the past 2 days, that have occurred at rest.  He has several risk factors for heart disease including previously diagnosed hypertension, hyperlipidemia, diabetes and tobacco use.  He has been off all medications for several years now as he states he did not tolerate them in the past.  At present, he is afebrile and hemodynamically stable.  He was given a full dose aspirin in the Emergency Room.  He will be started on a heparin drip.  Continue daily aspirin.  Will check a fasting lipid panel, but I have started him on atorvastatin.  Have started him on low dose of metoprolol and lisinopril, can titrate as a dose as he tolerates.  Will monitor serial troponins and obtain an echocardiogram.  Cardiology service has been consulted.  Anticipate he will need further evaluation with angiogram.  Time is to be determined per  their service.  Cardiology assistance with ongoing care is very much appreciated.  2.  Hyperlipidemia: I see he carried this diagnosis in the past, but it does not sound as though he has never been on statin therapy.  Fasting lipid panel has been ordered.  For now, I have started him on atorvastatin.  3.  Type 2 diabetes:  He had been diagnosed several years ago and from what I can see in the chart, he had previously been on a regimen of Basaglar and NovoLog.  He says he did not tolerate metformin in the past due to weight gain and the fact that it made his blood sugars drop low.  His A1c today is 11.6.  We discussed that at this point this confirms that he is a diabetic and he will need to continue treatment with insulin going forward.  For now, I have started him on Lantus 10 units at bedtime and sliding scale NovoLog.  Will titrate insulin as needed.  Will benefit from other adjunct meds in the future. We discussed that he will continue to follow with PCP going forward to optimize management of his diabetes to reduce his risk of developing further complications.  At present, he denies any symptoms suggestive of neuropathy.  Will check a baseline UA to evaluate for proteinuria.  He will also eventually need to undergo an eye exam.  4.  Tobacco use:  He reportedly stopped smoking cigarettes in 2016, but continues to use an e-cigarette.  Encouraged continued tobacco cessation.  5.  Poor sleep habits:  His spouse notes that he generally does not sleep well at night.  His wife notes that he snores often, but has not noticed any witnessed periods of apnea.  The patient says that after a few hours of sleep, he is usually awake and ready to start his day by about 3 a.m.  He denies any symptoms suggestive of restless leg that awaken him from sleep or anxiety.  Recommend that he undergo evaluation with a sleep study at some point.  This can be pursued as an outpatient.  6.  History of partial seizure disorder, remote:  It  sounds as though he has been off medications for over 10 years now.  No concerns presently.  Follow up with PCP after discharge.    DVT prophylaxis:  Heparin drip as above.    CODE STATUS:  Full code as discussed with him and his wife at bedside.     Given the nature of his admission, I anticipate he will be here for greater than 2 midnights stay and has been admitted under inpatient status.      Cristy Castillo DO        D: 2021   T: 2021   MT: ANDREW    Name:     JOSELIN OTOOLE  MRN:      [id number]        Account:     [id number]   :      1970           Admitted:    2021       Document: [id number]

## 2021-05-18 NOTE — ED PROVIDER NOTES
History     Chief Complaint   Patient presents with     Chest Pain     Pt states that he has been having chest pressure that comes and goes and when it comes he has tingling down the L arm and states that his hr goes up to 120-130 and he gets dizzy with it.      HPI    Houston Winters is a 50 year old male who comes in with chest pain.  He describes this as pressure.  It began about 3 weeks ago initially with just a feeling of lightheadedness but then progressing to pressure in the chest and then to increased pressure and increased lightheadedness primarily with exertion.  Today he was out watering and had the symptoms in his watch told him that his heart rate was about 127 bpm.  It stayed elevated for a period of time but eventually with rest it went back to normal and his symptoms resolved.  He was quite diaphoretic with the symptoms to the point that he had to go take a shower.  His daughter said that he looked flushed.  Last week he was able to shovel mulch and did not have the symptoms.  He has not had a fever, cough, sore throat or other infection symptoms.  He denies any abdominal pain, nausea, vomiting, bowel or bladder symptoms.  He noticed a little bit of left groin discomfort this morning.  He does not notice that he is wheezing.  He is not aware of any significant asthma or allergy histories.  He has no identified chronic medical problems and takes no medications.  He quit smoking long ago.  He has not had Covid infection.  He has not had Covid vaccination.    Allergies:  No Known Allergies    Problem List:    Patient Active Problem List    Diagnosis Date Noted     Unstable angina (H) 05/18/2021     Priority: Medium     Postoperative infection, initial encounter 01/28/2018     Priority: Medium     Small bowel obstruction (H) 12/29/2017     Priority: Medium     Type 2 diabetes mellitus without complication, without long-term current use of insulin (H) 03/27/2017     Priority: Medium     Tobacco use  disorder 02/04/2015     Priority: Medium     Hypertension, goal below 140/90 02/04/2015     Priority: Medium     Obesity 07/17/2014     Priority: Medium     Hyperlipidemia with target LDL less than 100 07/17/2014     Priority: Medium     Diagnosis updated by automated process. Provider to review and confirm.       Partial epilepsy (H) 03/12/2009     Priority: Medium     (Problem list name updated by automated process. Provider to review and confirm.)       ED (erectile dysfunction) 12/18/2008     Priority: Medium     GANGLOIN CYST /RIGHT ANKLE 06/15/2006     Priority: Medium        Past Medical History:    Past Medical History:   Diagnosis Date     DM (diabetes mellitus) (H)      Epilepsy (H)      MEDICAL HISTORY OF - Age 16      Nicotine dependence        Past Surgical History:    Past Surgical History:   Procedure Laterality Date     CL AFF SURGICAL PATHOLOGY  2005    ganglion cyst removed     EXCISE MASS LOWER EXTREMITY  2/15/2013    Procedure: EXCISE MASS LOWER EXTREMITY;  Right Ankle Excision of ganglion cyst;  Surgeon: Akbar Melo DPM;  Location: [facility] OR     HERNIA REPAIR  2000    umbilical repair     HERNIORRHAPHY UMBILICAL N/A 11/10/2015    Procedure: HERNIORRHAPHY UMBILICAL;  Surgeon: Garry Leos MD;  Location: [facility] OR       Family History:    Family History   Problem Relation Age of Onset     Arthritis Mother      Cancer Maternal Grandmother      Arthritis Maternal Grandmother         lupus     Gastrointestinal Disease Maternal Grandfather         war injury, colostomy     Respiratory Daughter         growing out of it     Unknown/Adopted Father      Diabetes No family hx of      Coronary Artery Disease No family hx of      Hypertension No family hx of      Hyperlipidemia No family hx of      Breast Cancer No family hx of      Cancer - colorectal No family hx of      Ovarian Cancer No family hx of      Prostate Cancer No family hx of        Social History:  Marital Status:   [2]  Social  History     Tobacco Use     Smoking status: Former Smoker     Packs/day: 0.25     Years: 25.00     Pack years: 6.25     Types: Cigarettes     Quit date: 2016     Years since quittin.3     Smokeless tobacco: Former User   Substance Use Topics     Alcohol use: Yes     Alcohol/week: 0.0 standard drinks     Comment: mixed 2-4 on weekends, not every weekend     Drug use: No        Medications:    No current outpatient medications on file.        Review of Systems  All other systems are reviewed and are negative    Physical Exam   BP: (!) 147/87  Pulse: 103  Temp: 98.7  F (37.1  C)  Resp: 18  Weight: 88.5 kg (195 lb)  SpO2: 98 %      Physical Exam  Nursing note and vitals were reviewed.  Constitutional: Awake and alert, adequately nourished and developed appearing 50-year-old in no apparent discomfort, who does not appear acutely ill, and who answers questions appropriately and cooperates with examination.  HEENT: EOMI.   Neck: Freely mobile.  Cardiovascular: Cardiac examination reveals normal heart rate and regular rhythm without murmur.  Pulmonary/Chest: Breathing is unlabored.  Breath sounds are clear and equal bilaterally.  There no retractions, tachypnea, rales, wheezes, or rhonchi.  Abdomen: Soft, nontender, no HSM or masses rebound or guarding.  Musculoskeletal: Extremities are warm and well-perfused and without edema  Neurological: Alert, oriented, thought content logical, coherent   Skin: Warm, dry, no rashes.  Psychiatric: Affect broad and appropriate.    ED Course        Procedures               EKG Interpretation:      Interpreted by Hunter Marvin MD  Time reviewed: 7:50  Symptoms at time of EKG: none   Rhythm: normal sinus   Rate: normal  Axis: Left axis deviation  Ectopy: none  Conduction: normal  ST Segments/ T Waves: No ST-T wave changes  Q Waves: none  Comparison to prior: Unchanged from 2013    Clinical Impression: normal EKG      Critical Care time:  none               Results for orders  placed or performed during the hospital encounter of 05/18/21 (from the past 24 hour(s))   CBC with platelets differential   Result Value Ref Range    WBC 5.6 4.0 - 11.0 10e9/L    RBC Count 4.81 4.4 - 5.9 10e12/L    Hemoglobin 14.9 13.3 - 17.7 g/dL    Hematocrit 45.9 40.0 - 53.0 %    MCV 95 78 - 100 fl    MCH 31.0 26.5 - 33.0 pg    MCHC 32.5 31.5 - 36.5 g/dL    RDW 12.8 10.0 - 15.0 %    Platelet Count 227 150 - 450 10e9/L    Diff Method Automated Method     % Neutrophils 66.9 %    % Lymphocytes 25.2 %    % Monocytes 6.6 %    % Eosinophils 0.7 %    % Basophils 0.4 %    % Immature Granulocytes 0.2 %    Nucleated RBCs 0 0 /100    Absolute Neutrophil 3.7 1.6 - 8.3 10e9/L    Absolute Lymphocytes 1.4 0.8 - 5.3 10e9/L    Absolute Monocytes 0.4 0.0 - 1.3 10e9/L    Absolute Eosinophils 0.0 0.0 - 0.7 10e9/L    Absolute Basophils 0.0 0.0 - 0.2 10e9/L    Abs Immature Granulocytes 0.0 0 - 0.4 10e9/L    Absolute Nucleated RBC 0.0    Basic metabolic panel   Result Value Ref Range    Sodium 134 133 - 144 mmol/L    Potassium 4.4 3.4 - 5.3 mmol/L    Chloride 102 94 - 109 mmol/L    Carbon Dioxide 24 20 - 32 mmol/L    Anion Gap 8 3 - 14 mmol/L    Glucose 498 (H) 70 - 99 mg/dL    Urea Nitrogen 15 7 - 30 mg/dL    Creatinine 0.85 0.66 - 1.25 mg/dL    GFR Estimate >90 >60 mL/min/[1.73_m2]    GFR Estimate If Black >90 >60 mL/min/[1.73_m2]    Calcium 8.3 (L) 8.5 - 10.1 mg/dL   Troponin I   Result Value Ref Range    Troponin I ES <0.015 0.000 - 0.045 ug/L   D dimer quantitative   Result Value Ref Range    D Dimer 0.3 0.0 - 0.50 ug/ml FEU   Hemoglobin A1c   Result Value Ref Range    Hemoglobin A1C 11.6 (H) 0 - 5.6 %   Asymptomatic SARS-CoV-2 COVID-19 Virus (Coronavirus) by PCR    Specimen: Nasopharyngeal   Result Value Ref Range    SARS-CoV-2 Virus Specimen Source Nasopharyngeal     SARS-CoV-2 PCR Result NEGATIVE     SARS-CoV-2 PCR Comment (Note)    Glucose by meter   Result Value Ref Range    Glucose 307 (H) 70 - 99 mg/dL   Glucose by meter    Result Value Ref Range    Glucose 303 (H) 70 - 99 mg/dL       Medications   aspirin (ASA) chewable tablet 324 mg (324 mg Oral Given 5/18/21 0948)   heparin loading dose for LOW INTENSITY TREATMENT * Give BEFORE starting heparin infusion (5,300 Units Intravenous Given 5/18/21 1055)   insulin aspart (NovoLOG) injection (RAPID ACTING) (4 Units Subcutaneous Given 5/18/21 1104)       Assessments & Plan (with Medical Decision Making)     50-year-old male presented with the above history of progressive exertional nauseated with diaphoresis and lightheadedness.  Symptoms are worrisome for unstable angina.  I think this needs to be excluded.  I discussed the case with Dr. Saravia in cardiology who is in agreement that the patient should be admitted for cardiac catheterization.  I [facility] the [facility] who agrees to accept him in transfer.  In the ED the patient received aspirin 324 mg by mouth to chew.  We recommended heparin infusion and ambulance transport.  The patient refuses ambulance transport.  He says he cannot afford it.  After discussion he tenuous to decline ambulance transport.  He understands that we can give him a heparin bolus but cannot have an infusion running when his wife is driving him.  In addition we gave him rapid acting insulin and I explained that he could have hypoglycemia during transport as well.  Nevertheless he will not agree to ambulance transport.  I advised his wife that if he develops symptoms of chest pain, lightheadedness, hypoglycemia, she should pull her vehicle off the road and call 911.  Otherwise they will proceed to [facility] for admission.    He had told me he had no medical problems.  But in reviewing his records it is clear that in the past he has had type 2 diabetes, hypertension and hyperlipidemia.  Sugar was elevated at 498 on arrival and 302 on recheck.  He received insulin aspartate, 6 units subcu in the ED.    During his time here his  vital signs remained stable after initial mild hypertension and he had no further pain.    I have reviewed the nursing notes.    I have reviewed the findings, diagnosis, plan and need for follow up with the patient.       Discharge Medication List as of 5/18/2021 11:36 AM          Final diagnoses:   Chest pain, unspecified type   Type 2 diabetes mellitus with hyperglycemia, without long-term current use of insulin (H)       5/18/2021   [facility] EMERGENCY DEPT     Hunter Marvin MD  05/18/21 [id number]

## 2021-05-18 NOTE — PLAN OF CARE
Pt admitted from [facility] this afternoon. Pt denies cx pain or left arm tingling. Pt remains asymptomatic here. Plan- chest xray, possible cath lab. NPO for possible procedure. Up independently. Heparin gtt initiated. New cardiac meds given. Trending troponins. Pt's wife updated on POC at bedside, supportive.   Angeles Herring RN

## 2021-05-18 NOTE — CONSULTS
[facility]    Cardiology Consultation     Date of Admission:  5/18/2021    Assessment & Plan   Houston Winters is a 50 year old male who was admitted on 5/18/2021. I was asked to see the patient for unstable angina.    1. Unstable angina.   2. Untreated / uncontrolled diabetes  3. Hypertension  4. Dyslipidemia  5. Partial epilepsy  6. palpitations      Recommendations  1. Coronary angiogram with possible PCI, left heart cath  2. Continue heparin  3. Agree with serial troponins  4. Follow up formal echo result, but prelim basal inferoseptal and mid to distal anterolateral hypokinesis.   5. Agree with asa, atorvastatin, metoprolol  6. If renal function stable continue lisinopril  7. Continue telemetry  8. CXR    Risks and benefits of the procedure were discussed with the patient in detail that includes but not limited to the risk of stroke, heart attack, death, cardiac or vascular perforation, emergent stenting or bypass, contrast induced allergic reaction, renal dysfunction (including risks of temporary or permanent dialysis), risk of respiratory depression with sedation, and vascular complications (including bleeding and transfusion). Patient denies any major active bleeding issues and is willing to take and comply with dual antiplatelet therapy and understands associated bleeding risks. Patient understands the overall risks of the procedure and wishes to proceed.        Sophie Milner MD    Code Status    Full Code    Reason for Consult   Reason for consult: I was asked by Jonathan to evaluate this patient for unstable angina.      Chief Complaint   Chest pain      History of Present Illness   Houston Winters is a 50 year old male who presents with intermittent and worsening chest pain for a few weeks.  He has noticed chest pressure and dyspnea episodes with mild activity for a few weeks. Symptoms typically improve with rest.  yesterday he had severe chest pain while watering his  "grass.    He has also noticed occasional short episodes of palpitations at rest.  No falls or significant dizziness. He had a one \"high heart rate\" episode on his watch with  while watching TV.      He has not been taking any medications for his HTN, HLD, DM2 because \"they made me feel like crap and gain 50 pounds.\"  He will take medications if the side effects are not too bad. We reviewed the importance of taking DAPT and statin if he receives a stent.  We also discussed that oftentimes with inititation of insulin and antihypertensives patients may note side effects such as fatigue, but the therapy should be continued and side effects should hopefully improve or resolve with time.  He agrees to DAPT and statin and will try to be compliant with all medications and follow up.     No history of abnormal bleeding  No history of adverse reaction to anesthesia or sedation  No contrast allergy    Past Medical History   I have reviewed this patient's medical history and updated it with pertinent information if needed.   Past Medical History:   Diagnosis Date     DM (diabetes mellitus) (H)      Epilepsy (H)     Intractable epilepsy, last seizure 2008     MEDICAL HISTORY OF - Age 16     Gangrene in foot stepped on a nail hospitalized for 7 weeks     Nicotine dependence            Prior to Admission Medications   None     Allergies   No Known Allergies    Social History   Former smoker  No heavy EtOh. Plays darts and [employer] and drinks with friends on Mondays    Family History   I have reviewed this patient's family history and updated it with pertinent information if needed.   No known family history CAD    Review of Systems   The 10 point Review of Systems is negative other than noted in the HPI or here.     Physical Exam   Temp: 98.8  F (37.1  C) Temp src: Oral BP: 130/79 Pulse: 96   Resp: 16 SpO2: 98 % O2 Device: None (Room air)    Vital Signs with Ranges  Temp:  [98.7  F (37.1  C)-98.8  F (37.1  C)] 98.8  F (37.1 "  C)  Pulse:  [] 96  Resp:  [12-26] 16  BP: (118-148)/() 130/79  SpO2:  [94 %-98 %] 98 %  186 lbs 4.8 oz    Constitutional: NAD  Eyes: clear sclera  ENT: Mucous membranes are moist. Normal airway. No JVD.  Respiratory: clear bilaterally  Cardiovascular: regular, no murmur  GI: soft, nontender bowel sounds   Skin: no edema  Musculoskeletal: grossly normal bulk and tone  Neurologic: moves all ext, alert  Psychiatric: normal affect    Data   I personally reviewed the following studies  EKG tall r-wave v1, LAFB, consider pulmonary disease no acute ST abnormality  Trop neg  D-dimer neg  .6  HHgb 14.9  Cr 0.85  Na 134    Glucose >400    Echo was being done at bedside during our interview. Probable basal inferior and mid distal anterolateral hypokinesis.     Other pertinent results cxr pending

## 2021-05-18 NOTE — UTILIZATION REVIEW
[facility] Utilization Review  Admission Status; Secondary Review Determination     Admission Date: 5/18/2021 12:32 PM      Under the authority of the Utilization Management Committee, the utilization review process indicated a secondary review on the above patient.  The review outcome is based on review of the medical records, discussions with staff, and applying clinical experience noted on the date of the review.        (X)      Inpatient Status Appropriate - This patient's medical care is consistent with medical management for inpatient care and reasonable inpatient medical practice.          RATIONALE FOR DETERMINATION   50-year-old male with history of hypertension, hyperlipidemia, diabetes mellitus, admitted with unstable angina. Preliminary echo shows basal inferoseptal and mid to distal anterolateral hypokinesis.  Patient started on aspirin, Lipitor, metoprolol, checking serial troponins, status post coronary angiogram which showed severe multivessel coronary artery disease, normal LV EDP.  Patient needs surgical revascularization, needs aggressive medical management and CV surgery consult for further recommendations.  Complex patient who has severe multivessel coronary artery disease needs close monitoring in the hospital with ongoing interventions including surgical intervention, agree with inpatient status      The severity of illness, intensity of service provided, expected LOS and risk for adverse outcome make the care complex, high risk and appropriate for hospital admission.The patient requires hospital based medical care which is anticipated to require a stay of 2 or more midnights; according to CMS guidelines the patient should be admitted as inpatient        The information on this document is developed by the utilization review team in order for the business office to ensure compliance.  This only denotes the appropriateness of proper admission status and does not reflect the quality  of care rendered.         The definitions of Inpatient Status and Observation Status used in making the determination above are those provided in the CMS Coverage Manual, Chapter 1 and Chapter 6, section 70.4.      Sincerely,       Cesar Wallis MD  Physician Advisor  Utilization Review-[place]    Phone: [phone]

## 2021-05-18 NOTE — PHARMACY-ADMISSION MEDICATION HISTORY
Pharmacy Medication History  Admission medication history interview status for the 5/18/2021  admission is complete. See EPIC admission navigator for prior to admission medications     Location of Interview: Phone  Medication history sources: Patient    Significant changes made to the medication list:  none    In the past week, patient estimated taking medication this percent of the time: N/A    Additional medication history information:   Pt denies taking any prescription, non-prescription meds, supplements, or vitamins.    Medication reconciliation completed by provider prior to medication history? No    Time spent in this activity: 5 minutes    Prior to Admission medications    Not on File       The information provided in this note is only as accurate as the sources available at the time of update(s)

## 2021-05-19 VITALS
HEART RATE: 79 BPM | RESPIRATION RATE: 16 BRPM | SYSTOLIC BLOOD PRESSURE: 139 MMHG | WEIGHT: 186.3 LBS | DIASTOLIC BLOOD PRESSURE: 88 MMHG | HEIGHT: 69 IN | TEMPERATURE: 98 F | BODY MASS INDEX: 27.59 KG/M2 | OXYGEN SATURATION: 98 %

## 2021-05-19 LAB
ANION GAP SERPL CALCULATED.3IONS-SCNC: 4 MMOL/L (ref 3–14)
BASOPHILS # BLD AUTO: 0 10E9/L (ref 0–0.2)
BASOPHILS NFR BLD AUTO: 0.7 %
BUN SERPL-MCNC: 17 MG/DL (ref 7–30)
CALCIUM SERPL-MCNC: 8.7 MG/DL (ref 8.5–10.1)
CHLORIDE SERPL-SCNC: 106 MMOL/L (ref 94–109)
CO2 SERPL-SCNC: 26 MMOL/L (ref 20–32)
CREAT SERPL-MCNC: 0.63 MG/DL (ref 0.66–1.25)
DIFFERENTIAL METHOD BLD: NORMAL
EOSINOPHIL # BLD AUTO: 0.1 10E9/L (ref 0–0.7)
EOSINOPHIL NFR BLD AUTO: 1.3 %
ERYTHROCYTE [DISTWIDTH] IN BLOOD BY AUTOMATED COUNT: 12.9 % (ref 10–15)
GFR SERPL CREATININE-BSD FRML MDRD: >90 ML/MIN/{1.73_M2}
GLUCOSE BLDC GLUCOMTR-MCNC: 223 MG/DL (ref 70–99)
GLUCOSE SERPL-MCNC: 289 MG/DL (ref 70–99)
HCT VFR BLD AUTO: 41.7 % (ref 40–53)
HGB BLD-MCNC: 13.7 G/DL (ref 13.3–17.7)
IMM GRANULOCYTES # BLD: 0 10E9/L (ref 0–0.4)
IMM GRANULOCYTES NFR BLD: 0.3 %
LYMPHOCYTES # BLD AUTO: 2.4 10E9/L (ref 0.8–5.3)
LYMPHOCYTES NFR BLD AUTO: 38.9 %
MCH RBC QN AUTO: 30.5 PG (ref 26.5–33)
MCHC RBC AUTO-ENTMCNC: 32.9 G/DL (ref 31.5–36.5)
MCV RBC AUTO: 93 FL (ref 78–100)
MONOCYTES # BLD AUTO: 0.5 10E9/L (ref 0–1.3)
MONOCYTES NFR BLD AUTO: 7.5 %
NEUTROPHILS # BLD AUTO: 3.1 10E9/L (ref 1.6–8.3)
NEUTROPHILS NFR BLD AUTO: 51.3 %
NRBC # BLD AUTO: 0 10*3/UL
NRBC BLD AUTO-RTO: 0 /100
PLATELET # BLD AUTO: 206 10E9/L (ref 150–450)
PLATELET # BLD EST: NORMAL 10*3/UL
POTASSIUM SERPL-SCNC: 3.9 MMOL/L (ref 3.4–5.3)
RBC # BLD AUTO: 4.49 10E12/L (ref 4.4–5.9)
RBC MORPH BLD: NORMAL
SODIUM SERPL-SCNC: 136 MMOL/L (ref 133–144)
UFH PPP CHRO-ACNC: 0.11 IU/ML
WBC # BLD AUTO: 6 10E9/L (ref 4–11)

## 2021-05-19 PROCEDURE — 99239 HOSP IP/OBS DSCHRG MGMT >30: CPT | Performed by: INTERNAL MEDICINE

## 2021-05-19 PROCEDURE — 85520 HEPARIN ASSAY: CPT | Performed by: INTERNAL MEDICINE

## 2021-05-19 PROCEDURE — 99222 1ST HOSP IP/OBS MODERATE 55: CPT | Performed by: SURGERY

## 2021-05-19 PROCEDURE — 250N000011 HC RX IP 250 OP 636: Performed by: INTERNAL MEDICINE

## 2021-05-19 PROCEDURE — 36415 COLL VENOUS BLD VENIPUNCTURE: CPT | Performed by: INTERNAL MEDICINE

## 2021-05-19 PROCEDURE — 99233 SBSQ HOSP IP/OBS HIGH 50: CPT | Performed by: NURSE PRACTITIONER

## 2021-05-19 PROCEDURE — 85025 COMPLETE CBC W/AUTO DIFF WBC: CPT | Performed by: INTERNAL MEDICINE

## 2021-05-19 PROCEDURE — 80048 BASIC METABOLIC PNL TOTAL CA: CPT | Performed by: INTERNAL MEDICINE

## 2021-05-19 PROCEDURE — 999N001017 HC STATISTIC GLUCOSE BY METER IP

## 2021-05-19 RX ORDER — GLUCOSAMINE HCL/CHONDROITIN SU 500-400 MG
CAPSULE ORAL
Qty: 100 EACH | Refills: 3 | Status: SHIPPED | OUTPATIENT
Start: 2021-05-19 | End: 2024-03-21

## 2021-05-19 RX ORDER — INSULIN GLARGINE 100 [IU]/ML
10 INJECTION, SOLUTION SUBCUTANEOUS AT BEDTIME
Qty: 1.4 ML | Refills: 0 | Status: SHIPPED | OUTPATIENT
Start: 2021-05-19 | End: 2021-05-19

## 2021-05-19 RX ORDER — ATORVASTATIN CALCIUM 80 MG/1
80 TABLET, FILM COATED ORAL DAILY
Qty: 14 TABLET | Refills: 0 | Status: SHIPPED | OUTPATIENT
Start: 2021-05-19 | End: 2021-05-19

## 2021-05-19 RX ORDER — METOPROLOL TARTRATE 25 MG/1
25 TABLET, FILM COATED ORAL 2 TIMES DAILY
Qty: 28 TABLET | Refills: 0 | Status: SHIPPED | OUTPATIENT
Start: 2021-05-19 | End: 2021-05-24

## 2021-05-19 RX ORDER — GLUCOSAMINE HCL/CHONDROITIN SU 500-400 MG
CAPSULE ORAL
Qty: 100 EACH | Refills: 3 | Status: SHIPPED | OUTPATIENT
Start: 2021-05-19 | End: 2021-05-19

## 2021-05-19 RX ORDER — LISINOPRIL 5 MG/1
5 TABLET ORAL DAILY
Qty: 14 TABLET | Refills: 0 | Status: SHIPPED | OUTPATIENT
Start: 2021-05-19 | End: 2021-05-24

## 2021-05-19 RX ORDER — LISINOPRIL 5 MG/1
5 TABLET ORAL DAILY
Qty: 14 TABLET | Refills: 0 | Status: SHIPPED | OUTPATIENT
Start: 2021-05-19 | End: 2021-05-19

## 2021-05-19 RX ORDER — ATORVASTATIN CALCIUM 40 MG/1
40 TABLET, FILM COATED ORAL DAILY
Qty: 14 TABLET | Refills: 0 | Status: SHIPPED | OUTPATIENT
Start: 2021-05-19 | End: 2021-05-19

## 2021-05-19 RX ORDER — INSULIN GLARGINE 100 [IU]/ML
10 INJECTION, SOLUTION SUBCUTANEOUS AT BEDTIME
Qty: 1.4 ML | Refills: 0 | Status: SHIPPED | OUTPATIENT
Start: 2021-05-19 | End: 2021-06-24

## 2021-05-19 RX ORDER — ATORVASTATIN CALCIUM 80 MG/1
80 TABLET, FILM COATED ORAL DAILY
Qty: 14 TABLET | Refills: 0 | Status: SHIPPED | OUTPATIENT
Start: 2021-05-19 | End: 2021-05-24

## 2021-05-19 RX ORDER — NITROGLYCERIN 0.4 MG/1
TABLET SUBLINGUAL
Qty: 20 TABLET | Refills: 0 | Status: SHIPPED | OUTPATIENT
Start: 2021-05-19 | End: 2021-05-19

## 2021-05-19 RX ORDER — LANCETS
EACH MISCELLANEOUS
Qty: 100 EACH | Refills: 0 | Status: SHIPPED | OUTPATIENT
Start: 2021-05-19 | End: 2021-10-29

## 2021-05-19 RX ORDER — LANCETS
EACH MISCELLANEOUS
Qty: 100 EACH | Refills: 0 | Status: SHIPPED | OUTPATIENT
Start: 2021-05-19 | End: 2021-05-19

## 2021-05-19 RX ORDER — ASPIRIN 81 MG/1
81 TABLET, CHEWABLE ORAL DAILY
Qty: 14 TABLET | Refills: 0 | Status: SHIPPED | OUTPATIENT
Start: 2021-05-19 | End: 2021-05-19

## 2021-05-19 RX ORDER — ASPIRIN 81 MG/1
81 TABLET, CHEWABLE ORAL DAILY
Qty: 14 TABLET | Refills: 0 | Status: SHIPPED | OUTPATIENT
Start: 2021-05-19

## 2021-05-19 RX ORDER — METOPROLOL TARTRATE 25 MG/1
25 TABLET, FILM COATED ORAL 2 TIMES DAILY
Qty: 28 TABLET | Refills: 0 | Status: SHIPPED | OUTPATIENT
Start: 2021-05-19 | End: 2021-05-19

## 2021-05-19 RX ORDER — NITROGLYCERIN 0.4 MG/1
TABLET SUBLINGUAL
Qty: 20 TABLET | Refills: 0 | Status: SHIPPED | OUTPATIENT
Start: 2021-05-19 | End: 2024-03-20

## 2021-05-19 RX ADMIN — HEPARIN SODIUM 1150 UNITS/HR: 10000 INJECTION, SOLUTION INTRAVENOUS at 05:11

## 2021-05-19 ASSESSMENT — ACTIVITIES OF DAILY LIVING (ADL)
ADLS_ACUITY_SCORE: 11

## 2021-05-19 NOTE — PLAN OF CARE
"A&OX4, RA, denies pain. Right radial site C/D/I, CMS intact. VSS, on heparin gtt infusing at 1150 units/hr. Pt anxious and unable to sleep this night. Pt requesting to go out for \"fresh air'' and asked to be disconnected from the heparin gtt.  Dr. Stanley came and spoke to the patient and pt was calm and willing to stay.  "

## 2021-05-19 NOTE — PROVIDER NOTIFICATION
MD Notification    Notified Person: MD    Notified Person Name: Dr. Stanley    Notification Date/Time:05/19/21 4219    Notification Interaction:text/page    Purpose of Notification:Pt wants to leave AMA. On heparin, risks explained. Please advice, thanks    Orders Received:    Comments:

## 2021-05-19 NOTE — PROGRESS NOTES
At 1900 the writer removed the remaining 2 ml of air from the TR ban balloon and pt stated that he is going to go and sit on the bench outside. Pt stated that he wants the IV NS at 75 ml/hr to be stopped and BP cuff removed. The writer educated pt about risk of going outside and his right radial site may start bleeding or he may go into arrhythmia or his heart may stop because of recent multivessel heart disease, pt stated that he understands the risks and this is his health and if the writer will not assist him, he will go home right now. Pt left his cell phone number in case the writer will need to reach patient and went outside. Dr. Monge was paged.   1949: pt is not back yet.

## 2021-05-19 NOTE — PROGRESS NOTES
[facility]  Cardiology Progress Note  Date of Service: 05/19/2021      Assessment & Plan    Houston Winters is a 50 year old male admitted on 5/18/2021 with unstable angina.     Assessment:  1. Severe multivessel coronary artery disease - coronary angiogram 5/18 showed 60% mid-distal LM stenosis, 75% ostial-prox LAD, 80% prox circumflex, 60% OM1, 90% OM2, prox-mid RCA 50% and 80% RPDA.    - EF 55%, Very mild hypokinesis of the distal lateral wall. Mild basal  inferior hypokinesis on some views. RV normal size/function.   2. Untreated/uncontrolled DM - HgbA1c 11.6  3. Hypertension  4. Hyperlipidemia - total cholesterol 291, HDL 49,  and triglycerides 235.   5. Partial epilepsy  6. Palpitations     Saw patient and wife at bedside. He has also been seen by Dr. Pope and Dr. Stanley early this morning due to wanting to leave the hospital now. I spent a long time with him and his wife. I reviewed coronary angiogram and echocardiogram images. I reviewed my worry/concern about the significance of his coronary artery disease. He is decisional and adamit that he is discharged now. He verbalizes understanding of risk, including death. He is tearful and states he wants to get home to see his family.     My concern about consideration for stenting is his noted noncompliance. Recommend outpatient cardiology clinic appt in [facility] to further explore management of his multivessel CAD. Recommend continuing cardiac medications.     He again voices understanding that he is leaving against our medical advise. Reviewed case with hospital provider, Dr. Castillo.     Plan:   1. Continue aspirin  2. Continue atorvastatin 40mg daily   3. Continue lisinopril 5mg daily  4. Continue metoprolol tartrate 25mg BID  5. Outpatient cardiology follow up at [facility] scheduled with Geena Nuñez CNP, on 6/3/21 at 3:45pm.   6. Outpatient CV surgery follow up    ELMIRA Rojo CNP  [facility]  Pager:  [phone]      Interval History   Seen at bedside. Wife present. Denies chest pain. Denies shortness of breath. Denies dizziness, lightheadedness or other presyncopal symptoms.     Physical Exam   Temp: 98  F (36.7  C) Temp src: Oral BP: 139/88 Pulse: 79   Resp: 16 SpO2: 98 % O2 Device: None (Room air)    Vitals:    05/18/21 1245   Weight: 84.5 kg (186 lb 4.8 oz)   GEN: well nourished, in no acute distress.  HEENT:  Pupils equal, round. Sclerae nonicteric.   NECK: Supple, no masses appreciated.  C/V:  Regular rate and rhythm, no murmur, rub or gallop.    RESP: Respirations are unlabored. Clear to auscultation bilaterally without wheezing, rales, or rhonchi.  GI: Abdomen soft, nontender.  EXTREM: No LE edema. Right radial access is soft/nontender, radial pulse +2  NEURO: Alert and oriented, cooperative.  SKIN: Warm and dry.     Medications     heparin 1,150 Units/hr (05/19/21 0511)     - MEDICATION INSTRUCTIONS -       - MEDICATION INSTRUCTIONS -       sodium chloride Stopped (05/18/21 2226)       aspirin  81 mg Oral Daily     atorvastatin  40 mg Oral Daily     insulin aspart  1-7 Units Subcutaneous TID AC     insulin aspart  1-5 Units Subcutaneous At Bedtime     insulin glargine  10 Units Subcutaneous At Bedtime     lisinopril  5 mg Oral Daily     metoprolol tartrate  25 mg Oral BID     sodium chloride (PF)  3 mL Intracatheter Q8H       Data   Last 24 hours labs reviewed   EKG: (reviewed personally) sinus rhythm    Tele: sinus rhythm    Echo: 5/18/21   1. The left ventricle is normal in size. The visual ejection fraction is  estimated at 55%. Very mild hypokinesis of the distal lateral wall. Mild basal  inferior hypokinesis on some views.  2. The right ventricle is normal in structure, function and size.  3. No valve disease.     No previous echo for comparison.    Last ischemic eval: coronary angiogram 5/18  Left Main   Mid LM to Dist LM lesion is 60% stenosed.   Left Anterior Descending   Ost LAD to Prox LAD  lesion is 75% stenosed.   Mid LAD lesion is 60% stenosed.   Dist LAD lesion is 35% stenosed.   First Diagonal Branch   1st Diag-1 lesion is 25% stenosed.   1st Diag-2 lesion is 35% stenosed.   Left Circumflex   Prox Cx lesion is 80% stenosed.   First Obtuse Marginal Branch   1st Mrg-1 lesion is 60% stenosed.   1st Mrg-2 lesion is 60% stenosed.   Second Obtuse Marginal Branch   2nd Mrg lesion is 90% stenosed.   Third Obtuse Marginal Branch   The vessel is small.   Right Coronary Artery   The vessel is moderate in size.   Prox RCA lesion is 40% stenosed.   Prox RCA to Mid RCA lesion is 50% stenosed.   Right Posterior Descending Artery   The vessel is moderate in size.   RPDA-1 lesion is 80% stenosed.   RPDA-2 lesion is 50% stenosed.   Right Posterior Atrioventricular Artery   The vessel is moderate in size. There is mild diffuse disease throughout the vessel.

## 2021-05-19 NOTE — PLAN OF CARE
Discharge instructions & safety reviewed. All questions answered. Follow up instructions provided. Patient to  discharge medication from home pharmacy. Discharged home with all belongings & wife.

## 2021-05-19 NOTE — PROVIDER NOTIFICATION
MD Notification    Notified Person: MD    Notified Person Name:Dr. Stanley    Notification Date/Time:051/19/21 0558    Notification Interaction:text/page    Purpose of Notification:Pt would like to talk to you.    Orders Received:    Comments:

## 2021-05-19 NOTE — DISCHARGE SUMMARY
[facility]    Discharge Summary  Hospitalist    Date of Admission:  5/18/2021  Date of Discharge:  5/19/2021  Discharging Provider: Cristy Castillo    Discharge Diagnoses   Multivessel CAD  Unstable Angina  Hypertension  Hyperlipidemia  DM II, poorly controlled  Hx of tobacco use  Alcohol Use  Hx of partial seizure disorder    History of Present Illness   Houston Winters is an 50 year old male with PMHx of hypertension, hyperlipidemia and type 2 diabetes by review of the chart, who has not followed with a physician in many years and is not currently taking medications who presented to [facility] ED on 5/18/2021 with unstable angina. He was transferred to [facility] for ongoing cardiac evaluation.    Hospital Course   Houston Winters was admitted on 5/18/2021.  The following problems were addressed during his hospitalization:    Multivessel CAD  Unstable Angina  Hypertension  Hyperlipidemia  Presented to ED with reports of escalating episodes of chest pain over the preceding 3 weeks, which he describes as a left-sided chest pain and pressure with radiation into his left arm and some associated feelings of lightheadedness.  He has had at least 2 episodes in the 2 days prior to admission that occurred at rest. In ED, was afebrile and hemodynamically stable. Trop neg, EKG non-ischemic. . Recommended to start heparin gtt and transfer to [facility]. Patient declined heparin gtt and wife drove patient to [facility] by private car.      On admission to [facility], was started on asa, statin, heparin gtt, bb, and acei. Cardiology consulted. Serial trops neg. FLP this stay with tot cholest 291, , HDL 49, .  Echo obtained and showed EF 55% with mild hypokinesis of the distal lateral wall and mid basal inferior hypokinesis. Underwent cardiac cath on 5/18/21 which showed findings of multivessel CAD. CABG was recommended. Workup started this stay. Patient met with CV surgery on  "5/19 AM. Declined bypass surgery and demanded to be discharged.     Numerous physicians and providers (cardiology, CV surgery, hospitalist) attempted to discuss importance of findings and need for surgery with patient but he became upset and angry during visit and demanded to leave, stating he needed \"to think about things\" and \"talk with my family\". Wanted to consider \"a second opinion\". His wife was present at bedside during these conversations.     Was ultimately agreeable to take meds at discharge and consider follow up visits, but based on his history of noncompliance, unclear at this point if he will take meds or follow up.     Discharged with 14d supply of the following meds:  - ASA 81mg daily  - Atorvastatin 80mg daily  - SL nitroglycerin prn  - Metoprol 25mg BID  - Lisinopril 5mg daily    Will follow up with new PCP, cardiology and CV surgery in clinic.     DM II, poorly controlled  Had been previously diagnosed with DM and from review of the chart had been on basaglar and metformin in the past. Says he didn't tolerate metformin dt wt gain and low BG. Stopped taking insulin, says this was also because of low BG.     In ED, . Started on basal/bolus insulin this stay. A1C 11.6 this stay. BG improved to 280s.   Discussed need for BG management at discharge. Patient refused metformin. Was familiar with insulin and agreeable to using basaglar.     Discharged with new diabetic supplies (including glucometer) and basaglar 10U HS.   Will need close follow up with PCP for ongoing diabetic education and titration of insulin/meds as DM management will be integral to cardiac health. This was explained to patient.     Hx of tobacco use  Stopped smoking cigarettes in 2016 but now uses e-cigarettes daily.    Alcohol Use  Reported drinking 2-4 drinks on weekends, denied daily use.    Hx of partial seizure disorder  Last seizure was in 2008. Not on AEDs     Cristy Castillo, DO    Significant Results and " Procedures   5/18/21 Cardiac Cath:  Left Main  Mid LM to Dist LM lesion is 60% stenosed.    Left Anterior Descending  Ost LAD to Prox LAD lesion is 75% stenosed.  Mid LAD lesion is 60% stenosed.  Dist LAD lesion is 35% stenosed.  First Diagonal Branch  -- 1st Diag-1 lesion is 25% stenosed.  -- 1st Diag-2 lesion is 35% stenosed.    Left Circumflex  Prox Cx lesion is 80% stenosed.  First Obtuse Marginal Branch  -- 1st Mrg-1 lesion is 60% stenosed.  -- 1st Mrg-2 lesion is 60% stenosed.  Second Obtuse Marginal Branch  -- 2nd Mrg lesion is 90% stenosed.  Third Obtuse Marginal Branch - The vessel is small.    Right Coronary Artery  The vessel is moderate in size.  Prox RCA lesion is 40% stenosed.  Prox RCA to Mid RCA lesion is 50% stenosed.  Right Posterior Descending Artery  The vessel is moderate in size.  --RPDA-1 lesion is 80% stenosed.  -- RPDA-2 lesion is 50% stenosed.  Right Posterior Atrioventricular Artery  The vessel is moderate in size. There is mild diffuse disease throughout the vessel.            Conclusion:  1.  Severe multivessel coronary artery disease in a diabetic patient as detailed above.    Consideration of surgical revascularization with: LIMA- LAD and vein grafts to rPDA, OM1/2, Diagonal  2.  Normal LVEDP, 9 mmHg.  No significant gradient on pullback across the aortic valve.     Code Status   Full Code       Primary Care Physician   Physician No Ref-Primary    Physical Exam   Temp: 98  F (36.7  C) Temp src: Oral BP: 139/88 Pulse: 79   Resp: 16 SpO2: 98 % O2 Device: None (Room air)    Vitals:    05/18/21 1245   Weight: 84.5 kg (186 lb 4.8 oz)     Vital Signs with Ranges  Temp:  [98  F (36.7  C)-98.8  F (37.1  C)] 98  F (36.7  C)  Pulse:  [69-96] 79  Resp:  [13-24] 16  BP: (109-142)/(64-93) 139/88  SpO2:  [91 %-98 %] 98 %  No intake/output data recorded.    General: Resting comfortably, alert, angry and upset through most of my conversation with patient on day of discharge  CVS: HRRR, no MGR, no LE  edema  Respiratory: CTAB, no wheeze/rales/rhonchi, no increased work of breathing  GI: S, NT, ND, +BS  Skin: Warm/dry  Neuro: CNs 2-12 intact, no focal motor/sensory deficits    Discharge Disposition   Discharged to home  Condition at discharge: Stable    Consultations This Hospital Stay   CARDIOLOGY IP CONSULT  CARDIOTHORACIC SURGERY IP CONSULT  -----------------------------------------  CARDIAC REHAB IP CONSULT  SMOKING CESSATION PROGRAM IP CONSULT    Time Spent on this Encounter   I, Cristy Castillo DO, personally saw the patient today and spent greater than 30 minutes discharging this patient.    Discharge Orders      Basic metabolic panel     Discharge Order: F/U with Cardiac  ANJELICA      Reason for your hospital stay    Evaluation of your chest pain, which was found to be due to coronary artery disease which was affecting multiple coronary arteries. The optimal treatment for these findings would be coronary artery bypass surgery, which was recommended by both the cardiologists and the cardiovascular surgeons that saw you this stay. Given the severity of your disease, it was recommended you undergo this surgery during this hospitalization though you ultimately declined. Additionally, you were found to have poorly controlled diabetes this stay (your A1C, the three month measure of your blood sugars, was significantly elevated at 11 which indicated you need to be started on insulin). You will need close follow up with a primary care provider, cardiology and cardiovascular surgeon.     Follow-up and recommended labs and tests     Follow up with a PCP in the next week for ongoing management of your diabetes and review your tolerance to cardiac medications started this stay.   Follow up with cardiology in clinic as advised  Follow up with cardiovascular surgery in clinic as advised.     Activity    Your activity upon discharge: activity as tolerated     Discharge Instructions    You should return to the ED if  your chest pain recurs/persists     Diet    Follow this diet upon discharge: Cardiac     Discharge Medications   Current Discharge Medication List      START taking these medications    Details   alcohol swab prep pads Use to swab area of injection/jaun as directed.  Qty: 100 each, Refills: 3    Associated Diagnoses: Type 2 diabetes mellitus without complication, without long-term current use of insulin (H)      aspirin (ASA) 81 MG chewable tablet Take 1 tablet (81 mg) by mouth daily for 14 days  Qty: 14 tablet, Refills: 0    Associated Diagnoses: Coronary artery disease involving native coronary artery of native heart without angina pectoris      atorvastatin (LIPITOR) 40 MG tablet Take 1 tablet (40 mg) by mouth daily for 14 days  Qty: 14 tablet, Refills: 0    Associated Diagnoses: Coronary artery disease involving native coronary artery of native heart without angina pectoris      blood glucose (NO BRAND SPECIFIED) test strip Use to test blood sugar 4 times daily or as directed.  Qty: 100 strip, Refills: 6    Comments: To accompany: glucometer per insurance.  Associated Diagnoses: Type 2 diabetes mellitus without complication, without long-term current use of insulin (H)      blood glucose calibration (NO BRAND SPECIFIED) solution Use to calibrate blood glucose monitor as needed as directed.  Qty: 1 each, Refills: 0    Comments: To accompany: Glucometer per insurance.  Associated Diagnoses: Type 2 diabetes mellitus without complication, without long-term current use of insulin (H)      blood glucose monitoring (NO BRAND SPECIFIED) meter device kit Use to test blood sugar 4 times daily or as directed.  Qty: 1 kit, Refills: 0    Comments: Preferred blood glucose meter OR supplies to accompany: glucometer per insurance  Associated Diagnoses: Type 2 diabetes mellitus without complication, without long-term current use of insulin (H)      insulin glargine (BASAGLAR KWIKPEN) 100 UNIT/ML pen Inject 10 Units Subcutaneous  At Bedtime for 14 days  Qty: 1.4 mL, Refills: 0    Comments: If Basaglar is not covered by insurance, may substitute Lantus at same dose and frequency.    Associated Diagnoses: Type 2 diabetes mellitus without complication, without long-term current use of insulin (H)      lisinopril (ZESTRIL) 5 MG tablet Take 1 tablet (5 mg) by mouth daily for 14 days  Qty: 14 tablet, Refills: 0    Associated Diagnoses: Coronary artery disease involving native coronary artery of native heart without angina pectoris      metoprolol tartrate (LOPRESSOR) 25 MG tablet Take 1 tablet (25 mg) by mouth 2 times daily for 14 days  Qty: 28 tablet, Refills: 0    Associated Diagnoses: Coronary artery disease involving native coronary artery of native heart without angina pectoris      nitroGLYcerin (NITROSTAT) 0.4 MG sublingual tablet For chest pain place 1 tablet under the tongue every 5 minutes for 3 doses. If symptoms persist 5 minutes after 1st dose call 911.  Qty: 20 tablet, Refills: 0    Associated Diagnoses: Coronary artery disease involving native coronary artery of native heart without angina pectoris      thin (NO BRAND SPECIFIED) lancets Use with lanceting device.  Qty: 100 each, Refills: 0    Comments: To accompany: per insurance.  Associated Diagnoses: Type 2 diabetes mellitus without complication, without long-term current use of insulin (H)           Allergies   No Known Allergies     Data   Most Recent 3 CBC's:  Recent Labs   Lab Test 05/19/21  0332 05/18/21  1442 05/18/21  0807   WBC 6.0 5.7 5.6   HGB 13.7 14.1 14.9   MCV 93 93 95    228 227      Most Recent 3 BMP's:  Recent Labs   Lab Test 05/19/21  0332 05/18/21  0807 12/30/17  0750    134 138   POTASSIUM 3.9 4.4 4.1   CHLORIDE 106 102 105   CO2 26 24 27   BUN 17 15 13   CR 0.63* 0.85 0.59*   ANIONGAP 4 8 6   BERNABE 8.7 8.3* 7.8*   * 498* 237*     Most Recent 2 LFT's:  Recent Labs   Lab Test 05/18/21  1442 12/30/17  0750   AST 15 17   ALT 31 32   ALKPHOS 107  88   BILITOTAL 0.5 0.9     Most Recent 3 Troponin's:  Recent Labs   Lab Test 05/18/21  1442 05/18/21  0807   TROPI <0.015 <0.015     Most Recent Cholesterol Panel:  Recent Labs   Lab Test 05/18/21  1442   CHOL 291*   *   HDL 49   TRIG 235*     Most Recent TSH, T4 and A1c Labs:  Recent Labs   Lab Test 05/18/21  0807 02/02/15  0805 02/02/15  0805   TSH  --   --  1.03   A1C 11.6*   < > 8.2*    < > = values in this interval not displayed.     Results for orders placed or performed during the hospital encounter of 05/18/21   XR Chest Port 1 View    Narrative    CHEST ONE VIEW  5/18/2021 4:11 PM     HISTORY: Per cath lab-pre procedure.    COMPARISON: July 20, 2015      Impression    IMPRESSION: No acute disease.    DARREL GARG MD   US Carotid Bilateral    Narrative    EXAM: US CAROTID BILATERAL  LOCATION: [facility]  DATE/TIME: 5/18/2021 8:42 PM    INDICATION: Preop coronary bypass  COMPARISON: None.  TECHNIQUE: Duplex exam performed utilizing 2D gray-scale imaging, Doppler interrogation with color-flow and spectral waveform analysis. The percent diameter stenosis is determined using NASCET criteria and Society of Radiologists in Ultrasound Consensus   Criteria.    FINDINGS:    RIGHT: Mild plaque at the bifurcation. The peak systolic velocity in the ICA is less than 125 cm/sec, consistent with less than 50% stenosis. Elevated velocities in the ECA consistent with a stenosis. Antegrade flow within the vertebral artery.     LEFT: Mild plaque at the bifurcation. The peak systolic velocity in the ICA is less than 125 cm/sec, consistent with less than 50% stenosis. Normal velocities in the ECA. Antegrade flow within the vertebral artery.    VELOCITY CHART:  CCA   Right: 76 cm/s   Left: 105 cm/s  ICA   Right: 85 cm/s   Left: 90 cm/s  ECA   Right: 198 cm/s   Left: 117 cm/s  ICA/CCA PSV Ratio   Right: 1.2   Left: 1.0      Impression    IMPRESSION:  1.  Mild plaque formation, velocities consistent with less  than 50% stenosis in the right internal carotid artery.  2.  Mild plaque formation, velocities consistent with less than 50% stenosis in the left internal carotid artery.  3.  Flow within the vertebral arteries is antegrade.   US Upper Extremity Arterial Duplex Right    Narrative    EXAM: US UPPER EXTREMITY ARTERIAL DUPLEX RIGHT  LOCATION: [facility]  DATE/TIME: 5/18/2021 8:42 PM    INDICATION: Preop prior to bypass.  COMPARISON: None.  TECHNIQUE: Venous Duplex ultrasound of the right upper extremity with (when possible) and without compression, augmentation, and duplex. Color flow and spectral Doppler with waveform analysis performed.    FINDINGS: Arterial mapping of the forearm.    Radial artery 3.4 mm proximally, 3.5 mm in its mid point and 3.3 mm distally.   Ulnar artery 2.2 mm proximally, 2.5 mm in its mid point and 2.2 mm distally.    Brisk arterial waveforms are noted within the arterial segments.           US Lower Extremity Venous Mapping Left    Narrative    EXAM: US LOWER EXTREMITY VENOUS MAPPING LEFT  LOCATION: [facility]  DATE/TIME: 5/18/2021 8:43 PM    INDICATION: Preop bypass.  COMPARISON: None.  TECHNIQUE: Venous Duplex ultrasound of the left lower extremity with and without compression, augmentation and duplex. Color flow and spectral Doppler with waveform analysis performed.    FINDINGS: Exam includes the common femoral, femoral, popliteal, and contralateral common femoral veins as well as segmentally visualized deep calf veins and greater saphenous vein.     LEFT: The course of the greater saphenous vein is mapped along the skin.    Representative measurements below.    Proximal thigh 3.1 mm    Mid thigh 3.3 mm    Knee 3.9 mm.     Mid calf 3.0 mm.     Ankle 3.0 mm.      Impression    IMPRESSION: Patent left greater saphenous vein with representative measurements above.   Echocardiogram Complete    Narrative     [id number]  [facility]  [id number]  [id number]^DANNIELLE^MINI^ROSEANN     [facility]  Echocardiography Laboratory  [address] [place] [address]     Name: JOSELIN OTOOLE  MRN: [id number]  : 1970  Study Date: 2021 02:01 PM  Age: 50 yrs  Gender: Male  Patient Location: [facility]  Reason For Study: Chest Pain, Chest Tightness, Chest Pressure  Ordering Physician: MINI SPICER  Referring Physician: ROSE KIDD  Performed By: Kristine Solis RDCS     BSA: 2.0 m2  Height: 69 in  Weight: 186 lb  HR: 76  BP: 130/79 mmHg  ______________________________________________________________________________  Procedure  Complete Portable Echo Adult. Optison (NDC #2631-1685) given intravenously.  ______________________________________________________________________________  Interpretation Summary     1. The left ventricle is normal in size. The visual ejection fraction is  estimated at 55%. Very mild hypokinesis of the distal lateral wall. Mild basal  inferior hypokinesis on some views.  2. The right ventricle is normal in structure, function and size.  3. No valve disease.     No previous echo for comparison.  ______________________________________________________________________________  Left Ventricle  The left ventricle is normal in size. There is mild eccentric left ventricular  hypertrophy. The visual ejection fraction is estimated at 55%. Left  ventricular diastolic function is normal. Very mild hypokinesis of the distal  lateral wall.     Right Ventricle  The right ventricle is normal in structure, function and size.     Atria  Normal left atrial size. Right atrial size is normal. There is no atrial shunt  seen.     Mitral Valve  The mitral valve is normal in structure and function.     Tricuspid Valve  No tricuspid regurgitation. Right ventricular systolic pressure could not be  approximated due to inadequate tricuspid regurgitation.     Aortic Valve  The aortic valve is normal  in structure and function.     Pulmonic Valve  The pulmonic valve is normal in structure and function.     Vessels  Normal ascending, transverse (arch), and descending aorta. The inferior vena  cava was normal in size with preserved respiratory variability.     Pericardium  There is no pericardial effusion.     Rhythm  Sinus rhythm was noted.  ______________________________________________________________________________  MMode/2D Measurements & Calculations  IVSd: 1.4 cm     LVIDd: 5.2 cm  LVIDs: 3.6 cm  LVPWd: 1.1 cm  FS: 31.4 %  LV mass(C)d: 259.1 grams  LV mass(C)dI: 129.4 grams/m2  Ao root diam: 3.5 cm  LA dimension: 3.9 cm  asc Aorta Diam: 3.3 cm  LA/Ao: 1.1  LVOT diam: 2.3 cm  LVOT area: 4.1 cm2  LA Volume (BP): 41.4 ml  LA Volume Index (BP): 20.7 ml/m2  RWT: 0.42     Doppler Measurements & Calculations  MV E max rogelio: 52.9 cm/sec  MV A max rogelio: 64.5 cm/sec  MV E/A: 0.82  MV dec slope: 273.0 cm/sec2  MV dec time: 0.19 sec  Ao V2 max: 126.0 cm/sec  Ao max P.0 mmHg  Ao V2 mean: 91.3 cm/sec  Ao mean PG: 3.8 mmHg  Ao V2 VTI: 23.5 cm  CIARA(I,D): 2.7 cm2  CIARA(V,D): 2.5 cm2  LV V1 max P.3 mmHg  LV V1 max: 76.2 cm/sec  LV V1 VTI: 15.1 cm  SV(LVOT): 62.4 ml  SI(LVOT): 31.2 ml/m2  AV Rogelio Ratio (DI): 0.60  CIARA Index (cm2/m2): 1.3  E/E' av.7  Lateral E/e': 6.0  Medial E/e': 9.4     ______________________________________________________________________________  Report approved by: Torres Monae 2021 03:32 PM         Cardiac Catheterization    Narrative    1.  Severe multivessel coronary artery disease in a diabetic patient as   detailed above.  Consideration of surgical revascularization with:   - LIMA- LAD and vein grafts to rPDA, OM1/2, Diagonal  2.  Normal LVEDP, 9 mmHg.  No significant gradient on pullback across the   aortic valve.    Patient/family updated at conclusion of case on plan for recommendation   for surgical intervention.

## 2021-05-19 NOTE — PROGRESS NOTES
Met with patient and spouse regarding establishing care with PCP and they prefer someone in the [place] clinic.   Writer scheduled follow up with PCP at [facility] and also scheduled telephone visit with diabetic educator.  Cardiology follow up already scheduled.  All appointments added to AVS.    Winnie Stanley RN  Care Coordinator  [facility]  [phone] (text or call)

## 2021-05-19 NOTE — CONSULTS
Cardiovascular and Thoracic Surgery Consultation      Houston Winters MRN# [id number]   YOB: 1970 Age: 50 year old   Date of Admission: 5/18/2021     Reason for consult: Multivessel coronary artery disease           Assessment and Plan:   Recommend CABG at this hospitalization.  Continue heparin gtt.  Pt is adamant that he wants to leave the hospital. Does not want to make a rushed decision.   Will attempt to bring pt in for f/u appt with CT surgery to discuss timing of surgery.             Chief Complaint:   Increasing chest pain         History of Present Illness:   This patient is a 50 year old male who presented to the ER on 5/18/21 for evaluation of escalating chest pain. Pt has a past medical history for hypertension, hyperlipidemia, DM type II and a noted history of noncompliance with medications/follow up.     Over the past 3 weeks, the patient had begun to develop worsening chest discomfort. He initially described it as a central chest discomfort that radiated into his left arm  He had some associated lightheadedness, which he described as a faint feeling. In the three weeks since its initial onset, his chest discomfort has become more frequent in occurrence and more severe per his report. Yesterday, he reports he had an episode of chest pressure and heaviness that began while he was sitting on his riding lawnmower with radiation to his left arm. The episode resolved after about 5 minutes. He had a similar episode today while at rest with some associated diaphoresis. It sounds as though his daughter noted he appeared quite pale. He had no associated shortness of breath or cough. He denies any recent illness. No abdominal pain, nausea, vomiting, changes in bowel or bladder habits. He ultimately sought evaluation in [facility] Emergency Room.     In the Emergency Room, he was seen and evaluated. He was afebrile and hemodynamically stable. Initial EKG showed a normal sinus rhythm with no  acute ischemic changes. His initial troponin was negative. Other labs were notable for a blood sugar of 498. His electrolytes, renal function, and CBC were otherwise within normal limits. A D-dimer was negative and COVID swab was negative. He was given full dose aspirin. Clinical picture was suggestive of unstable angina. He was started on a heparin drip and transferred to [facility] for further evaluation. The patient declined initiation of a heparin drip in the Emergency Room as it would require he travel to [facility] by EMS. Instead, his wife drove him to the [facility] Pt was taken for an angiogram on  which showed severe multivessel coronary artery disease. At that time CT was consulted for evaluation of need for a cabg.               Past Medical History:     Past Medical History:   Diagnosis Date     DM (diabetes mellitus) (H)      Epilepsy (H)     Intractable epilepsy, last seizure      MEDICAL HISTORY OF - Age 16     Gangrene in foot stepped on a nail hospitalized for 7 weeks     Nicotine dependence              Past Surgical History:     Past Surgical History:   Procedure Laterality Date     CL AFF SURGICAL PATHOLOGY      ganglion cyst removed     EXCISE MASS LOWER EXTREMITY  2/15/2013    Procedure: EXCISE MASS LOWER EXTREMITY;  Right Ankle Excision of ganglion cyst;  Surgeon: Akbar Melo DPM;  Location: [facility] OR     HERNIA REPAIR      umbilical repair     HERNIORRHAPHY UMBILICAL N/A 11/10/2015    Procedure: HERNIORRHAPHY UMBILICAL;  Surgeon: Garry Leos MD;  Location: [facility] OR               Social History:     Social History     Tobacco Use     Smoking status: Former Smoker     Packs/day: 0.25     Years: 25.00     Pack years: 6.25     Types: Cigarettes     Quit date: 2016     Years since quittin.3     Smokeless tobacco: Former User   Substance Use Topics     Alcohol use: Yes     Alcohol/week: 0.0 standard drinks     Comment: mixed 2-4 on weekends, not every  weekend             Family History:     Family History   Problem Relation Age of Onset     Arthritis Mother      Cancer Maternal Grandmother      Arthritis Maternal Grandmother         lupus     Gastrointestinal Disease Maternal Grandfather         war injury, colostomy     Respiratory Daughter         growing out of it     Unknown/Adopted Father      Diabetes No family hx of      Coronary Artery Disease No family hx of      Hypertension No family hx of      Hyperlipidemia No family hx of      Breast Cancer No family hx of      Cancer - colorectal No family hx of      Ovarian Cancer No family hx of      Prostate Cancer No family hx of              Immunizations:     Immunization History   Administered Date(s) Administered     Influenza (IIV3) PF 12/18/2008     Pneumococcal 23 valent 03/13/2014     TDAP Vaccine (Adacel) 12/18/2008, 10/07/2018             Allergies:   No Known Allergies          Medications:     No medications prior to admission.          Review of Systems:   The 10 point Review of Systems is negative other than noted in the HPI            Physical Exam:   Vitals were reviewed  Temp: 98  F (36.7  C) Temp src: Oral BP: 139/88 Pulse: 79   Resp: 16 SpO2: 98 % O2 Device: None (Room air)    Constitutional:   Up at side of bed, comfortable, irritated, wants to leave the hospital     Eyes:   extra-ocular muscles intact     Back:   Symmetric, no curvature     Lungs:   No increased work of breathing, good air exchange, clear to auscultation bilaterally, no crackles or wheezing     Cardiovascular:   RRR, telemetry SR 70s, -edema LE, dorsalis pedis pulses 2+ bilaterally     Abdomen:   BS+, NT/ND, soft     Musculoskeletal:   There is no redness, warmth, or swelling of the joints.  Full range of motion noted. Tone is normal.     Neurologic:   Awake, alert, oriented to name, place and time.  Cranial nerves II-XII are grossly intact.      Skin:   No rash or acne on exposed skin of chest.          Data:     Lab  Results   Component Value Date    WBC 6.0 05/19/2021    HGB 13.7 05/19/2021    HCT 41.7 05/19/2021     05/19/2021     05/19/2021    POTASSIUM 3.9 05/19/2021    CHLORIDE 106 05/19/2021    CO2 26 05/19/2021    BUN 17 05/19/2021    CR 0.63 (L) 05/19/2021     (H) 05/19/2021    DD 0.3 05/18/2021    TROPONIN <0.30 08/12/2004    TROPI <0.015 05/18/2021    AST 15 05/18/2021    ALT 31 05/18/2021    ALKPHOS 107 05/18/2021    BILITOTAL 0.5 05/18/2021    INR 0.94 01/03/2014     Carotid US (5/18/21) - bilateral ICA less than 50% stenosis    Coronary angiogram (5/18/21) - LM 60% stenosis   Ost LAD 75% stenosis, mid LAD 60% stenosis   Prox Cx 80% stenosis   OM2 90% stenosis   RPDA1 80% stenosis   RPDA2 50% stenosis    Echo (5/18/21) - EF 55%   Mild basal inferior hypokinesis   Normal RV structure, function and size   No valve disease

## 2021-05-19 NOTE — PROVIDER NOTIFICATION
Brief update:    Patient requests to leave the hospital to go outside.  Tells me that this was approved by cardiology yesterday and that he was told that his heparin drip can be disconnected for short periods of time.    Severe multivessel (4 bypass targets) coronary artery disease on coronary angiography yesterday, on heparin drip.    Met with patient to discuss.  He was offered the ability to ambulate in the hallways, though declines this.  He tells me that he wants to go outside.    Generally, he is overwhelmed by the rapid pace of evaluations which is taking place including recommendation for coronary artery bypass grafting or at least surgical evaluation for such.  He has decisional capacity currently regarding risk and benefit of hospitalization.  He is aware that he is awaiting vascular surgery evaluation for possible coronary artery bypass.  He is also aware that following bypass, he will not be able to leave the hospital at his leisure for brief breaks outside of the hospital.  He tells me that his wife, children, stepfather are all worried about him.  He wants to see his children before his procedure, though only his wife will be and allowed to visit her.  He tells me he also wants to see his stepfather as his stepfather apparently called him in tears regarding this.    I informed patient that he is not allowed to leave the hospital with medical approval.  He is on a high risk medication, heparin infusion, and has severe coronary artery disease.  Patient would need to leave AGAINST MEDICAL ADVICE if he were to do so.  It appears again that patient is overwhelmed with diagnosis and recommendations currently and would like to meet with his family.  Discussed that it is likely that patient's family is concerned with him and would want him to remain in the hospital while awaiting evaluation.  Patient tells me that this is his decision, not theirs.    I discussed my concern that hospital beds throughout the  [facility] are quite full currently.  Transfers from our emergency department have been unavailable secondary to bed status, patients have been boarding in emergency departments.  If patient leaves AGAINST MEDICAL ADVICE, he would have to return again through the emergency department and it would be difficult to find a bed for him.  If he were boarded in the emergency department, would be significantly less comfortable than his current room and bed situation.    On rare occasion, patients are able to leave/discharge for scheduled bypass grafting at a future date.  I do not believe that this would currently be applicable to patient's situation, though will attempt to expedite CV surgery evaluation.    Patient does not appear holdable at this juncture.    Discussed with CV surgery on call who is not currently in house though now aware of situation. my cell phone number was left at hospital stations if CV surgery team arrives early.  It is anticipated that CV surgery team will be available sometime around 7:30 AM.  Plan is for patient to be seen when day team arrives.  Nursing to update patient that this may be approximately 1 hour or so.    15 minutes in care, >1/2 of time at patient's bedside in discussion, remainder in coordination of care    Ernesto Stanley MD  6:45 AM

## 2021-05-19 NOTE — PLAN OF CARE
Pt is A&Ox2, transferred to room 252 - calmed down, VSS and on tele SR, LS clear and on RA, up independently, R Radial site C/D/I - band aid on, Heparin gtt restarted and Xa re-check at 0330, US for vein mapping done. Plan for CV surgery consult in AM for multivessel disease.

## 2021-05-24 ENCOUNTER — OFFICE VISIT (OUTPATIENT)
Dept: FAMILY MEDICINE | Facility: CLINIC | Age: 51
End: 2021-05-24
Payer: COMMERCIAL

## 2021-05-24 VITALS
WEIGHT: 188.2 LBS | DIASTOLIC BLOOD PRESSURE: 64 MMHG | SYSTOLIC BLOOD PRESSURE: 118 MMHG | RESPIRATION RATE: 96 BRPM | HEART RATE: 101 BPM | BODY MASS INDEX: 27.79 KG/M2 | TEMPERATURE: 99.3 F

## 2021-05-24 DIAGNOSIS — E11.65 UNCONTROLLED TYPE 2 DIABETES MELLITUS WITH HYPERGLYCEMIA (H): Primary | ICD-10-CM

## 2021-05-24 DIAGNOSIS — I25.10 CORONARY ARTERY DISEASE INVOLVING NATIVE CORONARY ARTERY OF NATIVE HEART WITHOUT ANGINA PECTORIS: ICD-10-CM

## 2021-05-24 PROCEDURE — 99214 OFFICE O/P EST MOD 30 MIN: CPT | Performed by: FAMILY MEDICINE

## 2021-05-24 RX ORDER — ATORVASTATIN CALCIUM 80 MG/1
80 TABLET, FILM COATED ORAL DAILY
Qty: 90 TABLET | Refills: 3 | Status: SHIPPED | OUTPATIENT
Start: 2021-05-24 | End: 2022-04-22

## 2021-05-24 RX ORDER — METOPROLOL TARTRATE 25 MG/1
25 TABLET, FILM COATED ORAL 2 TIMES DAILY
Qty: 180 TABLET | Refills: 3 | Status: SHIPPED | OUTPATIENT
Start: 2021-05-24 | End: 2022-03-25

## 2021-05-24 RX ORDER — LISINOPRIL 5 MG/1
5 TABLET ORAL DAILY
Qty: 90 TABLET | Refills: 3 | Status: SHIPPED | OUTPATIENT
Start: 2021-05-24 | End: 2021-07-02 | Stop reason: DRUGHIGH

## 2021-05-24 NOTE — PROGRESS NOTES
"    Assessment & Plan     Uncontrolled type 2 diabetes mellitus with hyperglycemia (H)  Patient referred to diabetic educator, also recommend that he increase his insulin by two units every few days until blood sugars are 160 or less.  - AMBULATORY ADULT DIABETES EDUCATOR REFERRAL; Future    Coronary artery disease involving native coronary artery of native heart without angina pectoris  Medication faxed, he has an appointment with [facility] on June 1st  - atorvastatin (LIPITOR) 80 MG tablet; Take 1 tablet (80 mg) by mouth daily  - lisinopril (ZESTRIL) 5 MG tablet; Take 1 tablet (5 mg) by mouth daily  - metoprolol tartrate (LOPRESSOR) 25 MG tablet; Take 1 tablet (25 mg) by mouth 2 times daily        FUTURE APPOINTMENTS:       - Follow-up visit in one month or sooner as needed.    Return in about 4 weeks (around 6/21/2021) for Follow up.    John Day MD  [facility]    Russ Conrad is a 50 year old who presents for the following health issues  accompanied by his spouse:    HPI 50 yr old male here to establish care. He was recently admitted for chest pain and hyperglycemia and was found to have multivessel CAD , it was recommended that he proceed with CABG which he refused at the hospital. He discharged against medical advice. He did say that he is getting a consult at the [facility] on June 1st. He reports that he has been diabetic for years and was supposed to be on insulin but the insulin \"does not work\". He is taking insulin now. His wife who accompanied him says his blood sugars are still running quite high. I recommended a titration of his blood sugars by two units every few days till fasting blood sugars are around 160 or less.Also faxed medication to the pharmacy for the patient.      Hospital Follow-up Visit:    Hospital/Nursing Home/IP Rehab Facility: [facility]  Date of Admission: 05/08/21  Date of Discharge: 05/19/21  Reason(s) for Admission: " "unstable chest pain      Was your hospitalization related to COVID-19? No   Problems taking medications regularly:  None  Medication changes since discharge: Insulin, lisinopril, Metoprolol ASA, Atorvastatin  Problems adhering to non-medication therapy:  \"insulin doesn't work\"    Summary of hospitalization:  [facility] discharge summary reviewed  Diagnostic Tests/Treatments reviewed.  Follow up needed: none  Other Healthcare Providers Involved in Patient s Care:         None  Update since discharge: stable. Post Discharge Medication Reconciliation: discharge medications reconciled, continue medications without change.  Plan of care communicated with patient and family              Review of Systems   Constitutional, HEENT, cardiovascular, pulmonary, gi and gu systems are negative, except as otherwise noted.      Objective    /64 (BP Location: Left arm)   Pulse 101   Temp 99.3  F (37.4  C) (Tympanic)   Resp (!) 96   Wt 85.4 kg (188 lb 3.2 oz)   BMI 27.79 kg/m    Body mass index is 27.79 kg/m .  Physical Exam   GENERAL: healthy, alert and no distress  EYES: Eyes grossly normal to inspection, PERRL and conjunctivae and sclerae normal  HENT: ear canals and TM's normal, nose and mouth without ulcers or lesions  NECK: no adenopathy, no asymmetry, masses, or scars and thyroid normal to palpation  RESP: lungs clear to auscultation - no rales, rhonchi or wheezes  CV: regular rate and rhythm, normal S1 S2, no S3 or S4, no murmur, click or rub, no peripheral edema and peripheral pulses strong  ABDOMEN: soft, nontender, no hepatosplenomegaly, no masses and bowel sounds normal  MS: no gross musculoskeletal defects noted, no edema    No results found for any visits on 05/24/21.            "

## 2021-05-24 NOTE — PATIENT INSTRUCTIONS
Titrate insulin 2 units every two days till fasting blood glucose is less than 160.    Diabetic educator referral placed.

## 2021-06-02 ENCOUNTER — TELEPHONE (OUTPATIENT)
Dept: FAMILY MEDICINE | Facility: CLINIC | Age: 51
End: 2021-06-02

## 2021-06-02 DIAGNOSIS — E11.9 TYPE 2 DIABETES MELLITUS WITHOUT COMPLICATION, WITHOUT LONG-TERM CURRENT USE OF INSULIN (H): Primary | ICD-10-CM

## 2021-06-02 RX ORDER — INSULIN GLARGINE 100 [IU]/ML
18 INJECTION, SOLUTION SUBCUTANEOUS 2 TIMES DAILY
Qty: 10.8 ML | Refills: 2 | Status: SHIPPED | OUTPATIENT
Start: 2021-06-02 | End: 2021-06-10

## 2021-06-02 NOTE — TELEPHONE ENCOUNTER
Dr. Day,    Spoke to the patient, he is reporting he is currently using 18 units of Basaglar and has been following your recommendations per office visit on 5-24-21. Patient is having heart surgery on 6-16, per patient his heart surgeon Dr. Yang at the [facility] told the him that his blood sugars need to be better managed for this surgery and recommended to ask PCP about either adding another diabetic medication, possibly splitting the dose of fast acting Novolog with meals, or splitting the dose of Basaglar to twice per day at higher doses. Patient reports his blood sugars are still over 300 when he wakes up with current dose of insulin. Patient says his heart surgeon is worried about risk of infection. Pharmacy is pended, please advise.    RONNY Good

## 2021-06-02 NOTE — TELEPHONE ENCOUNTER
Reason for Call:  Medication question:    Do you use a [facility]?  Name of the pharmacy and phone number for the current request:  [facility] [phone]    Name of the medication requested: Novolog    Other request: Patient saw Dr. Day, was put on Basaglar, told to increase 2 units daily, how high whould he go?Also,he is having heart surgery soon and heart surgeon wants him to also use Novolog or fast acting insulin to regulate his level and take it with food.    Can we leave a detailed message on this number? YES    Phone number patient can be reached at: Home number on file [phone] (home)    Best Time: Any    Call taken on 6/2/2021 at 8:46 AM by Meme Farias

## 2021-06-02 NOTE — TELEPHONE ENCOUNTER
Please ask patient to take 18 units in the morning and 18 units in the night. I will fax more to the pharmacy.

## 2021-06-03 ENCOUNTER — TELEPHONE (OUTPATIENT)
Dept: FAMILY MEDICINE | Facility: CLINIC | Age: 51
End: 2021-06-03

## 2021-06-03 NOTE — TELEPHONE ENCOUNTER
See message below.  Wouldn't a letter need to be generated from his spouse's chart? Then one for pt for his employer?     Reena LEIGH RN, BSN

## 2021-06-03 NOTE — TELEPHONE ENCOUNTER
Dr. Day,    Patient is asking you to write a letter so wife can take off from 6-4-21 till 6-28-21 to take care of patient as he is having triple bypass surgery, says he did this before when he had hip replaced in 2019. Patient is to have surgery on 6-14-21 at the [facility]. Letter needs to say that due to patient's condition and medication changes, the patient's wife is deemed appropriate to be off work as noted above to help take care of patient. Patient says this is a FMLA. Are you needing more info, can you write this?    RONNY Good

## 2021-06-03 NOTE — LETTER
[facility]  [address] [address]  Phone: [phone]    06/03/21    Houston Winters  [id number] [address] [id number]      To whom it may concern:     ***    Sincerely,      John Day MD

## 2021-06-03 NOTE — TELEPHONE ENCOUNTER
I need the FMLA from wife's company's HR. We don't write letters like that for FMLA. The patient's wife needs to contact her company's HR department collect the FMLA forms and then we fill it out. Thanks.

## 2021-06-03 NOTE — TELEPHONE ENCOUNTER
Yes I will assume some form of form or letter from his wife's employer. Especially since she is not my patient, her  is. Is this FMLA? Please can you call the wife and clarify what this letter entails?

## 2021-06-03 NOTE — TELEPHONE ENCOUNTER
Patient is notified, he will speak to wife and call back. Will keep this open for now.    RONNY Good

## 2021-06-03 NOTE — TELEPHONE ENCOUNTER
Reason for call:    Symptom or request:     Patient called stating he needs a letter for his wife so she can care for him following surgery 06/16--open heart surgery.    -- patient will  once completed.     She is planning on take leave from 06/04/2021 until 06/28.  Patient needs to take 7 days of pto, before leave is started.        Best Time:  any    Can we leave a detailed message on this number?  YES     Ashley HUTCHISON  Station

## 2021-06-10 ENCOUNTER — PATIENT OUTREACH (OUTPATIENT)
Dept: EDUCATION SERVICES | Facility: CLINIC | Age: 51
End: 2021-06-10
Payer: COMMERCIAL

## 2021-06-10 DIAGNOSIS — E11.65 UNCONTROLLED TYPE 2 DIABETES MELLITUS WITH HYPERGLYCEMIA (H): ICD-10-CM

## 2021-06-10 DIAGNOSIS — I20.0 UNSTABLE ANGINA (H): ICD-10-CM

## 2021-06-10 DIAGNOSIS — E11.9 TYPE 2 DIABETES MELLITUS WITHOUT COMPLICATION, WITHOUT LONG-TERM CURRENT USE OF INSULIN (H): ICD-10-CM

## 2021-06-10 PROCEDURE — G0108 DIAB MANAGE TRN  PER INDIV: HCPCS

## 2021-06-10 RX ORDER — INSULIN GLARGINE 100 [IU]/ML
INJECTION, SOLUTION SUBCUTANEOUS
Qty: 10.8 ML | Refills: 2
Start: 2021-06-10 | End: 2021-06-24

## 2021-06-10 NOTE — PROGRESS NOTES
"Diabetes Self-Management Education & Support  Type of Service: Telephone Visit    How would patient like to obtain AVS? Krysta    Presents for: Individual review    SUBJECTIVE/OBJECTIVE:  Presents for: Individual review  Accompanied by: Self  Diabetes education in the past 24mo: No  Focus of Visit: Taking Medication, Problem Solving  Diabetes type: Type 2  Disease course: Getting harder to manage  Transportation concerns: No  Other concerns:: None  Cultural Influences/Ethnic Background:  American      Diabetes Symptoms & Complications:     Complications assessed today?: No    Patient Problem List and Family Medical History reviewed for relevant medical history, current medical status, and diabetes risk factors.    Vitals:  There were no vitals taken for this visit.  Estimated body mass index is 27.79 kg/m  as calculated from the following:    Height as of 5/18/21: 1.753 m (5' 9\").    Weight as of 5/24/21: 85.4 kg (188 lb 3.2 oz).   Last 3 BP:   BP Readings from Last 3 Encounters:   05/24/21 118/64   05/19/21 139/88   05/18/21 126/79       History   Smoking Status     Former Smoker     Packs/day: 0.25     Years: 25.00     Types: Cigarettes     Quit date: 1/18/2016   Smokeless Tobacco     Former User       Labs:  Lab Results   Component Value Date    A1C 11.6 05/18/2021     Lab Results   Component Value Date     05/19/2021     Lab Results   Component Value Date     05/18/2021     HDL Cholesterol   Date Value Ref Range Status   05/18/2021 49 >39 mg/dL Final   ]  GFR Estimate   Date Value Ref Range Status   05/19/2021 >90 >60 mL/min/[1.73_m2] Final     Comment:     Non  GFR Calc  Starting 12/18/2018, serum creatinine based estimated GFR (eGFR) will be   calculated using the Chronic Kidney Disease Epidemiology Collaboration   (CKD-EPI) equation.       GFR Estimate If Black   Date Value Ref Range Status   05/19/2021 >90 >60 mL/min/[1.73_m2] Final     Comment:      GFR " Calc  Starting 2018, serum creatinine based estimated GFR (eGFR) will be   calculated using the Chronic Kidney Disease Epidemiology Collaboration   (CKD-EPI) equation.       Lab Results   Component Value Date    CR 0.63 2021     No results found for: MICROALBUMIN    Healthy Eating:  Healthy Eating Assessed Today: Yes  Meal planning/habits: Carb counting    Being Active:  Being Active Assessed Today: Yes  Exercise:: Yes    Monitoring:  Monitoring Assessed Today: Yes  Did patient bring glucose meter to appointment? : No  Times checking blood sugar at home (number): 4  Times checking blood sugar at home (per): Day  Blood glucose trend: Fluctuating    Fastin-275  Mid-day: 158-162 (200)  Bedtime: 170-200    Taking Medications:  Diabetes Medication(s)     Insulin       insulin glargine (BASAGLAR KWIKPEN) 100 UNIT/ML pen    Inject 18 Units Subcutaneous 2 times daily     insulin glargine (BASAGLAR KWIKPEN) 100 UNIT/ML pen    Inject 10 Units Subcutaneous At Bedtime          Taking Medication Assessed Today: Yes  Current Treatments: Insulin Injections  Dose schedule: Pre-breakfast, At bedtime  Problems taking diabetes medications regularly?: No  Diabetes medication side effects?: No    Problem Solving:  Problem Solving Assessed Today: Yes              Reducing Risks:  Reducing Risks Assessed Today: No    Healthy Coping:  Healthy Coping Assessed Today: Yes  Emotional response to diabetes: Ready to learn  Informal Support system:: Spouse  Stage of change: PREPARATION (Decided to change - considering how)  Patient Activation Measure Survey Score:  REY Score (Last Two) 10/4/2012   REY Raw Score 39   Activation Score 56.4   REY Level 3       Diabetes knowledge and skills assessment:   Patient is knowledgeable in diabetes management concepts related to: Healthy Eating, Being Active and Monitoring    Patient needs further education on the following diabetes management concepts: Monitoring, Taking Medication and  Problem Solving    Based on learning assessment above, most appropriate setting for further diabetes education would be: Individual setting.      INTERVENTIONS:    Education provided today on:  AADE Self-Care Behaviors:  Diabetes Pathophysiology  Monitoring: individual blood glucose targets and frequency of monitoring  Taking Medication: action of prescribed medication and when to take medications  Problem Solving: high blood glucose - causes, signs/symptoms, treatment and prevention    Opportunities for ongoing education and support in diabetes-self management were discussed.    Pt verbalized understanding of concepts discussed and recommendations provided today.       Education Materials Provided:  No new materials provided today      ASSESSMENT:  Angelo reports concern that BGs are too high.  He has questions about increasing the Basaglar and at what point he might need another medication.  Angelo reports following a carb controlled, heart healthy diet.  He is having heart surgery on 6/16.    Basaglar was increased last week by PCP.  Current dose is 18-0-0-18.  With fasting BGs running quite high, recommend increase bedtime dose to 23 units and hold for 3 days.  If fasting BG continues above 130, then increase Basaglar to 27 units.  I would recommend patient not increase Basaglar above 50 units DAILY.  If BGs are not well-controlled at that amount, we should consider adding a GLP-1 or mealtime insulin.  Patient expressed understanding of this information.        Patient's most recent   Lab Results   Component Value Date    A1C 11.6 05/18/2021    is not meeting goal of <7.0    PLAN  See Patient Instructions for co-developed, patient-stated behavior change goals.    JUAN MIGUEL Barraza CDE    Time Spent: 30 minutes  Encounter Type: Individual    Any diabetes medication dose changes were made via the CDE Protocol and Collaborative Practice Agreement with the patient's referring provider. A copy of this encounter was  shared with the provider.

## 2021-06-10 NOTE — PATIENT INSTRUCTIONS
1. Increase bedtime dose of Basaglar to 23 units.  Continue 18 units morning.  2. Can increase bedtime dose of Basaglar by 4 units every 3 days, but do not exceed a total of 50 units daily.  3. Consider adding a GLP-1.  4. Review diet at future visit.

## 2021-06-16 ENCOUNTER — TRANSFERRED RECORDS (OUTPATIENT)
Dept: HEALTH INFORMATION MANAGEMENT | Facility: CLINIC | Age: 51
End: 2021-06-16

## 2021-06-16 LAB — INR (EXTERNAL): 1.1 (ref 0.9–1.1)

## 2021-06-20 LAB
CREATININE (EXTERNAL): 0.72 MG/DL (ref 0.74–1.35)
GFR ESTIMATED (EXTERNAL): >90 ML/MIN/BSA
GFR ESTIMATED (IF AFRICAN AMERICAN) (EXTERNAL): >90 ML/MIN/BSA
GLUCOSE (EXTERNAL): 206 MG/DL (ref 70–140)
POTASSIUM (EXTERNAL): 4.7 MMOL/L (ref 3.6–5.2)

## 2021-06-24 ENCOUNTER — OFFICE VISIT (OUTPATIENT)
Dept: FAMILY MEDICINE | Facility: CLINIC | Age: 51
End: 2021-06-24
Payer: COMMERCIAL

## 2021-06-24 VITALS
SYSTOLIC BLOOD PRESSURE: 120 MMHG | BODY MASS INDEX: 27.35 KG/M2 | DIASTOLIC BLOOD PRESSURE: 62 MMHG | OXYGEN SATURATION: 96 % | WEIGHT: 191 LBS | HEART RATE: 110 BPM | HEIGHT: 70 IN | TEMPERATURE: 98.3 F | RESPIRATION RATE: 16 BRPM

## 2021-06-24 DIAGNOSIS — E11.9 TYPE 2 DIABETES MELLITUS WITHOUT COMPLICATION, WITHOUT LONG-TERM CURRENT USE OF INSULIN (H): Primary | ICD-10-CM

## 2021-06-24 DIAGNOSIS — I25.10 ASHD (ARTERIOSCLEROTIC HEART DISEASE): ICD-10-CM

## 2021-06-24 PROCEDURE — 99214 OFFICE O/P EST MOD 30 MIN: CPT | Performed by: FAMILY MEDICINE

## 2021-06-24 RX ORDER — SENNOSIDES 8.6 MG
1 TABLET ORAL EVERY OTHER DAY
COMMUNITY
End: 2022-08-15

## 2021-06-24 RX ORDER — CLOPIDOGREL BISULFATE 75 MG/1
75 TABLET ORAL DAILY
COMMUNITY
End: 2021-07-12

## 2021-06-24 RX ORDER — INSULIN GLARGINE 100 [IU]/ML
INJECTION, SOLUTION SUBCUTANEOUS
Qty: 10.8 ML | Refills: 2 | COMMUNITY
Start: 2021-06-24 | End: 2021-07-02

## 2021-06-24 RX ORDER — TAMSULOSIN HYDROCHLORIDE 0.4 MG/1
0.4 CAPSULE ORAL DAILY
COMMUNITY
End: 2021-07-12

## 2021-06-24 RX ORDER — POTASSIUM CHLORIDE 1500 MG/1
20 TABLET, EXTENDED RELEASE ORAL 2 TIMES DAILY
COMMUNITY
End: 2021-07-02

## 2021-06-24 RX ORDER — FUROSEMIDE 40 MG
40 TABLET ORAL 2 TIMES DAILY
COMMUNITY
End: 2021-07-02

## 2021-06-24 RX ORDER — ISOSORBIDE MONONITRATE 30 MG/1
30 TABLET, EXTENDED RELEASE ORAL DAILY
COMMUNITY
End: 2021-07-12

## 2021-06-24 RX ORDER — OXYCODONE HYDROCHLORIDE 5 MG/1
5 TABLET ORAL EVERY 6 HOURS PRN
COMMUNITY
End: 2021-07-02

## 2021-06-24 RX ORDER — INSULIN LISPRO 100 [IU]/ML
INJECTION, SOLUTION INTRAVENOUS; SUBCUTANEOUS
Refills: 3 | COMMUNITY
Start: 2021-06-24 | End: 2021-07-02

## 2021-06-24 ASSESSMENT — MIFFLIN-ST. JEOR: SCORE: 1737.62

## 2021-06-24 NOTE — LETTER
[facility]  [address] [facility] [address]  Phone: [phone]    June 24, 2021      [place] Winters  [address] [facility] [address]      Dear Mr. Winters    In regard to the FMLA leave for Nelly, your wife, the starting date for the period of incapacity for [place] on question #4 is to be 6-7-21, and not 6-16-21. She is his caretaker.     Sincerely,    / Manolo Funes MD

## 2021-06-24 NOTE — PATIENT INSTRUCTIONS
(E11.9) Type 2 diabetes mellitus without complication, without long-term current use of insulin (H)  (primary encounter diagnosis)  Comment:   Plan: **A1C FUTURE anytime, insulin lispro (HUMALOG         KWIKPEN) 100 UNIT/ML (1 unit dial) KWIKPEN,         insulin glargine (BASAGLAR KWIKPEN) 100 UNIT/ML        pen, AMBULATORY ADULT DIABETES EDUCATOR         REFERRAL, Adult Endocrinology Referral        For the insulin Basaglar doses, increase now from 24 to 27 units twice daily. Continue to monitor the glucose and record it. Make the appts with the Diabetic educator in [facility], at [phone], and our   Endocrinologist, Dr. Clifton in [place] at [phone] in [facility]. Walk daily in the am. You are starting Cardiac rehab.     (I25.10) ASHD (arteriosclerotic heart disease)  Comment:   Plan: CARDIAC REHAB REFERRAL, CARDIOLOGY EVAL ADULT         REFERRAL        The two referral are done for Cardiology and for Rehab. Call to schedule. Monitor for any recurrent cardiac symptoms

## 2021-06-24 NOTE — PROGRESS NOTES
Russ Conrad is a 50 year old who presents for the following health issues     HPI       Hospital Follow-up Visit:    Hospital/Nursing Home/IP Rehab Facility: [facility]  Date of Admission: 06/16/21  Date of Discharge: 06/20/21  Reason(s) for Admission: Coronary bypass      Was your hospitalization related to COVID-19? No   Problems taking medications regularly:  None  Medication changes since discharge: None  Problems adhering to non-medication therapy:  None    Summary of hospitalization:  [facility] discharge summary reviewed  Diagnostic Tests/Treatments reviewed.  Follow up needed: see below  Other Healthcare Providers Involved in Patient s Care:         None  Update since discharge: improved. Post Discharge Medication Reconciliation: discharge medications reconciled, continue medications without change.  Plan of care communicated with patient and family          Current Outpatient Medications   Medication Instructions     alcohol swab prep pads Use to swab area of injection/jaun as directed.     aspirin (ASA) 81 mg, Oral, DAILY     atorvastatin (LIPITOR) 80 mg, Oral, DAILY     blood glucose (NO BRAND SPECIFIED) test strip Use to test blood sugar 4 times daily or as directed.     blood glucose calibration (NO BRAND SPECIFIED) solution Use to calibrate blood glucose monitor as needed as directed.     blood glucose monitoring (NO BRAND SPECIFIED) meter device kit Use to test blood sugar 4 times daily or as directed.     clopidogrel (PLAVIX) 75 mg, Oral, DAILY     furosemide (LASIX) 40 mg, Oral, 2 TIMES DAILY     insulin glargine (BASAGLAR KWIKPEN) 100 UNIT/ML pen Inject 24 units AM and 24 units HS     insulin lispro (HUMALOG KWIKPEN) 100 UNIT/ML (1 unit dial) KWIKPEN Take 7 units TID plus the sliding scale.     isosorbide mononitrate (IMDUR) 30 mg, Oral, DAILY     lisinopril (ZESTRIL) 5 mg, Oral, DAILY     metoprolol tartrate (LOPRESSOR) 25 mg, Oral, 2 TIMES DAILY     nitroGLYcerin  "(NITROSTAT) 0.4 MG sublingual tablet For chest pain place 1 tablet under the tongue every 5 minutes for 3 doses. If symptoms persist 5 minutes after 1st dose call 911.     oxyCODONE (ROXICODONE) 5 mg, Oral, EVERY 6 HOURS PRN     potassium chloride ER (KLOR-CON M) 20 MEQ CR tablet 20 mEq, Oral, 2 TIMES DAILY     sennosides (SENOKOT) 8.6 MG tablet 1 tablet, Oral, DAILY     tamsulosin (FLOMAX) 0.4 mg, Oral, DAILY     thin (NO BRAND SPECIFIED) lancets Use with lanceting device.       Patient Active Problem List   Diagnosis     GANGLOIN CYST /RIGHT ANKLE     ED (erectile dysfunction)     Partial epilepsy (H)     Obesity     Hyperlipidemia with target LDL less than 100     Tobacco use disorder     Hypertension, goal below 140/90     Type 2 diabetes mellitus without complication, without long-term current use of insulin (H)     Small bowel obstruction (H)     Postoperative infection, initial encounter     Unstable angina (H)       Blood pressure 120/62, pulse 110, temperature 98.3  F (36.8  C), temperature source Tympanic, resp. rate 16, height 1.778 m (5' 10\"), weight 86.6 kg (191 lb), SpO2 96 %.    Exam:  GENERAL APPEARANCE: healthy, alert and no distress  EYES: EOMI,  PERRL  NECK: no adenopathy, no asymmetry, masses, or scars and thyroid normal to palpation  RESP: lungs clear to auscultation - no rales, rhonchi or wheezes  CV: regular rates and rhythm, normal S1 S2, no S3 or S4 and no murmur, click or rub -  PSYCH: mentation appears normal and affect normal/bright    (E11.9) Type 2 diabetes mellitus without complication, without long-term current use of insulin (H)  (primary encounter diagnosis)  Comment:   Plan: **A1C FUTURE anytime, insulin lispro (HUMALOG         KWIKPEN) 100 UNIT/ML (1 unit dial) KWIKPEN,         insulin glargine (BASAGLAR KWIKPEN) 100 UNIT/ML        pen, AMBULATORY ADULT DIABETES EDUCATOR         REFERRAL, Adult Endocrinology Referral        For the insulin Basaglar doses, increase now from 24 to " 27 units twice daily. Continue to monitor the glucose and record it. Make the appts with the Diabetic educator in [facility], at [phone], and our   Endocrinologist, Dr. Clifton in [place] at [phone] in [facility]. Walk daily in the am. You are starting Cardiac rehab.     (I25.10) ASHD (arteriosclerotic heart disease)  Comment:   Plan: CARDIAC REHAB REFERRAL, CARDIOLOGY EVAL ADULT         REFERRAL        The two referral are done for Cardiology and for Rehab. Call to schedule. Monitor for any recurrent cardiac symptoms      Manolo Funes MD

## 2021-06-25 ENCOUNTER — TRANSCRIBE ORDERS (OUTPATIENT)
Dept: OTHER | Age: 51
End: 2021-06-25

## 2021-06-25 DIAGNOSIS — Z95.1 S/P CABG (CORONARY ARTERY BYPASS GRAFT): Primary | ICD-10-CM

## 2021-07-02 ENCOUNTER — OFFICE VISIT (OUTPATIENT)
Dept: FAMILY MEDICINE | Facility: CLINIC | Age: 51
End: 2021-07-02
Payer: COMMERCIAL

## 2021-07-02 VITALS
WEIGHT: 197 LBS | TEMPERATURE: 98.2 F | OXYGEN SATURATION: 97 % | DIASTOLIC BLOOD PRESSURE: 74 MMHG | SYSTOLIC BLOOD PRESSURE: 122 MMHG | BODY MASS INDEX: 28.27 KG/M2 | HEART RATE: 97 BPM

## 2021-07-02 DIAGNOSIS — I10 HYPERTENSION, GOAL BELOW 140/90: ICD-10-CM

## 2021-07-02 DIAGNOSIS — E11.9 TYPE 2 DIABETES MELLITUS WITHOUT COMPLICATION, WITHOUT LONG-TERM CURRENT USE OF INSULIN (H): ICD-10-CM

## 2021-07-02 DIAGNOSIS — Z09 HOSPITAL DISCHARGE FOLLOW-UP: Primary | ICD-10-CM

## 2021-07-02 DIAGNOSIS — Z95.1 S/P CABG (CORONARY ARTERY BYPASS GRAFT): ICD-10-CM

## 2021-07-02 PROCEDURE — 99214 OFFICE O/P EST MOD 30 MIN: CPT | Performed by: FAMILY MEDICINE

## 2021-07-02 RX ORDER — INSULIN GLARGINE 100 [IU]/ML
32 INJECTION, SOLUTION SUBCUTANEOUS 2 TIMES DAILY
Qty: 57.6 ML | Refills: 3 | Status: SHIPPED | OUTPATIENT
Start: 2021-07-02 | End: 2021-10-12

## 2021-07-02 RX ORDER — LISINOPRIL 2.5 MG/1
2.5 TABLET ORAL DAILY
Qty: 90 TABLET | Refills: 3 | Status: SHIPPED | OUTPATIENT
Start: 2021-07-02 | End: 2022-07-11

## 2021-07-02 RX ORDER — OXYCODONE HYDROCHLORIDE 5 MG/1
5 TABLET ORAL EVERY 6 HOURS PRN
Qty: 20 TABLET | Refills: 0 | Status: SHIPPED | OUTPATIENT
Start: 2021-07-02 | End: 2021-07-05

## 2021-07-02 RX ORDER — INSULIN LISPRO 100 [IU]/ML
18 INJECTION, SOLUTION INTRAVENOUS; SUBCUTANEOUS
Qty: 48.6 ML | Refills: 0 | Status: SHIPPED | OUTPATIENT
Start: 2021-07-02 | End: 2021-10-12

## 2021-07-02 NOTE — PROGRESS NOTES
Assessment & Plan     Hospital discharge follow-up  Patient is a 50 yr old male here for a hospital follow up. He was seen for CABG X3 and appears to be recuperating well. He needs to transfer his care to local cardiologist. Starts rehab in a few weeks.    S/P CABG (coronary artery bypass graft)  Cardiology referral placed.   - CARDIOLOGY EVAL ADULT REFERRAL  - oxyCODONE (ROXICODONE) 5 MG tablet  Dispense: 20 tablet; Refill: 0    Type 2 diabetes mellitus without complication, without long-term current use of insulin (H)  Patient has been referred to endocrinology. Did not tolerate the metformin and only on Insulin presently.   - insulin glargine (BASAGLAR KWIKPEN) 100 UNIT/ML pen  Dispense: 57.6 mL; Refill: 3  - insulin lispro (HUMALOG KWIKPEN) 100 UNIT/ML (1 unit dial) KWIKPEN  Dispense: 48.6 mL; Refill: 0    Hypertension, goal below 140/90  - lisinopril (ZESTRIL) 2.5 MG tablet  Dispense: 90 tablet; Refill: 3          FUTURE APPOINTMENTS:       - Follow-up visit in one month or sooner as needed.  Patient Instructions     Patient Education     After Bypass Surgery: Caring for Your Incision  Your incisions may itch or feel sore, tight, or numb for a few weeks after the surgery. These are all normal signs of healing. Some bruising around the incisions is also normal.  To care for your incisions:    Wash them gently with warm water and a mild soap once it's ok with your surgeon to do so. Don't soak the incisions or keep them in water as in a bathtub, hot tub, or swimming pool.    To help prevent infection, don't use any lotions or creams near the incisions unless your healthcare provider tells you to do so.    Many incisions have small strips of tape to support the wound healing process. After several days, they may fall off as they are no longer needed. Don't remove them before this.   [employer] last reviewed this educational content on 4/1/2020 2000-2021 [employer]. All rights reserved. This  information is not intended as a substitute for professional medical care. Always follow your healthcare professional's instructions.           Patient Education     When to Call the Doctor After Bypass Surgery    Call your healthcare team if you have any of these symptoms:    Fever  of 100.4?F (38?C) or higher, or as directed by your healthcare provider    Unexplained chills or sweating    Sharp pain in the chest on taking a deep breath    Bleeding from the incision sites    Belly (abdominal) pain, nausea, constipation, or vomiting that doesn't go away    Increasing pain that doesn't get better after taking pain medicine    Swelling, redness, oozing, or cloudy discharge at the incision sites    Unexplained bruising    Continued sensation of motion or clicking sounds in your breastbone    Sudden weight gain. Tell your doctor if you gain 1 to 2 pounds (0.45 to 0.9 kg) within 24 hours or more overnight or 5 pounds (2.27 kg) or more in 1 week.    Increased swelling of the legs, especially on the side where the vein was not removed    Drainage or foul smelling odor from the incisions on the chest or leg  Call 911  Call 911 right away if you have any of these symptoms:    Shortness of breath or trouble breathing not relieved by rest    Angina or chest pain symptoms like those you felt before surgery    Sudden severe headache    Sudden weakness and numbness in the arms or legs    Dizziness or fainting spells    Fast heartbeat (150 beats per minute and short of breath), extremely slow heartbeat, or irregular heartbeat     [employer] last reviewed this educational content on 4/1/2020 2000-2021 [employer]. All rights reserved. This information is not intended as a substitute for professional medical care. Always follow your healthcare professional's instructions.               No follow-ups on file.    John Day MD  [facility]    Russ Conrad is a 50 year old who  presents for the following health issues  accompanied by his spouse:    HPI patient is a 50-year-old male presenting to the clinic today for hospital follow-up.He had open heart surgery on 6/16 CABG  X3  and appears to be doing well.     Incisions appear to be healing fine without any problems. He has had 2 follow-ups with the cardiovascular surgeon and deemed stable from their standpoint. He is starting cardiac rehab in a few weeks and wants to transition his cardiac care to the facility. Cardiology referral was done today. Also due to medication reconciliation.        Hospital Follow-up Visit:    Hospital/Nursing Home/IP Rehab Facility: [facility]  Date of Admission: 6/16/21  Date of Discharge: 6/20/21  Reason(s) for Admission: open heart surgery      Was your hospitalization related to COVID-19? No   Problems taking medications regularly:  None  Medication changes since discharge: None  Problems adhering to non-medication therapy:  None    Summary of hospitalization:  [facility] discharge summary reviewed  Diagnostic Tests/Treatments reviewed.  Follow up needed: none  Other Healthcare Providers Involved in Patient s Care:         None  Update since discharge: improved.  Post Discharge Medication Reconciliation: discharge medications reconciled, continue medications without change.  Plan of care communicated with patient            Review of Systems   Constitutional, HEENT, cardiovascular, pulmonary, gi and gu systems are negative, except as otherwise noted.      Objective    /74 (BP Location: Right arm, Patient Position: Sitting, Cuff Size: Adult Large)   Pulse 97   Temp 98.2  F (36.8  C) (Tympanic)   Wt 89.4 kg (197 lb)   SpO2 97%   BMI 28.27 kg/m    Body mass index is 28.27 kg/m .  Physical Exam   GENERAL: healthy, alert and no distress  EYES: Eyes grossly normal to inspection, PERRL and conjunctivae and sclerae normal  HENT: ear canals and TM's normal, nose and mouth without ulcers or  lesions  NECK: no adenopathy, no asymmetry, masses, or scars and thyroid normal to palpation  RESP: lungs clear to auscultation - no rales, rhonchi or wheezes  CV: regular rate and rhythm, normal S1 S2, no S3 or S4, no murmur, click or rub, no peripheral edema and peripheral pulses strong  ABDOMEN: soft, nontender, no hepatosplenomegaly, no masses and bowel sounds normal  MS: no gross musculoskeletal defects noted, no edema  SKIN: scar - and upper chest    Admission on 05/18/2021, Discharged on 05/19/2021   Component Date Value Ref Range Status     Troponin I ES 05/18/2021 <0.015  0.000 - 0.045 ug/L Final    Comment: The 99th percentile for upper reference range is 0.045 ug/L.  Troponin values   in the range of 0.045 - 0.120 ug/L may be associated with risks of adverse   clinical events.       Cholesterol 05/18/2021 291* <200 mg/dL Final    Desirable:       <200 mg/dl     Triglycerides 05/18/2021 235* <150 mg/dL Final    Comment: Borderline high:  150-199 mg/dl  High:             200-499 mg/dl  Very high:       >499 mg/dl       HDL Cholesterol 05/18/2021 49  >39 mg/dL Final     LDL Cholesterol Calculated 05/18/2021 195* <100 mg/dL Final    Comment: Above desirable:  100-129 mg/dl  Borderline High:  130-159 mg/dL  High:             160-189 mg/dL  Very high:       >189 mg/dl       Non HDL Cholesterol 05/18/2021 242* <130 mg/dL Final    Comment: Above Desirable:  130-159 mg/dl  Borderline high:  160-189 mg/dl  High:             190-219 mg/dl  Very high:       >219 mg/dl       WBC 05/18/2021 5.7  4.0 - 11.0 10e9/L Final     RBC Count 05/18/2021 4.68  4.4 - 5.9 10e12/L Final     Hemoglobin 05/18/2021 14.1  13.3 - 17.7 g/dL Final     Hematocrit 05/18/2021 43.7  40.0 - 53.0 % Final     MCV 05/18/2021 93  78 - 100 fl Final     MCH 05/18/2021 30.1  26.5 - 33.0 pg Final     MCHC 05/18/2021 32.3  31.5 - 36.5 g/dL Final     RDW 05/18/2021 13.1  10.0 - 15.0 % Final     Platelet Count 05/18/2021 228  150 - 450 10e9/L Final      Glucose 05/18/2021 244* 70 - 99 mg/dL Final     Heparin Unfractionated Anti Xa Lev* 05/18/2021 <0.10  IU/mL Final    Comment: Therapeutic Range:                UFH: 0.25-0.50 IU/mL for low                     intensity dosing                     0.30-0.70 IU/mL for high                     intensity dosing for                     DVT and PE  This test is not validated for other direct factor X inhibitors (e.g.   rivaroxaban, apixaban, edoxaban, betrixaban, fondaparinux) and should not be   used for monitoring of other medications.       Glucose 05/18/2021 290* 70 - 99 mg/dL Final     ABO 05/18/2021 O   Final     RH(D) 05/18/2021 Pos   Final     Antibody Screen 05/18/2021 Neg   Final     Test Valid Only At 05/18/2021 [facility]      Final     Specimen Expires 05/18/2021 05/21/2021   Final     Bilirubin Direct 05/18/2021 <0.1  0.0 - 0.2 mg/dL Final     Bilirubin Total 05/18/2021 0.5  0.2 - 1.3 mg/dL Final     Albumin 05/18/2021 3.6  3.4 - 5.0 g/dL Final     Protein Total 05/18/2021 7.3  6.8 - 8.8 g/dL Final     Alkaline Phosphatase 05/18/2021 107  40 - 150 U/L Final     ALT 05/18/2021 31  0 - 70 U/L Final     AST 05/18/2021 15  0 - 45 U/L Final     Glucose 05/18/2021 291* 70 - 99 mg/dL Final     Heparin Unfractionated Anti Xa Lev* 05/19/2021 0.11  IU/mL Final    Comment: Therapeutic Range:                UFH: 0.25-0.50 IU/mL for low                     intensity dosing                     0.30-0.70 IU/mL for high                     intensity dosing for                     DVT and PE  This test is not validated for other direct factor X inhibitors (e.g.   rivaroxaban, apixaban, edoxaban, betrixaban, fondaparinux) and should not be   used for monitoring of other medications.       Glucose 05/19/2021 223* 70 - 99 mg/dL Final     Sodium 05/19/2021 136  133 - 144 mmol/L Final     Potassium 05/19/2021 3.9  3.4 - 5.3 mmol/L Final     Chloride 05/19/2021 106  94 - 109 mmol/L Final     Carbon Dioxide  05/19/2021 26  20 - 32 mmol/L Final     Anion Gap 05/19/2021 4  3 - 14 mmol/L Final     Glucose 05/19/2021 289* 70 - 99 mg/dL Final     Urea Nitrogen 05/19/2021 17  7 - 30 mg/dL Final     Creatinine 05/19/2021 0.63* 0.66 - 1.25 mg/dL Final     GFR Estimate 05/19/2021 >90  >60 mL/min/[1.73_m2] Final    Comment: Non  GFR Calc  Starting 12/18/2018, serum creatinine based estimated GFR (eGFR) will be   calculated using the Chronic Kidney Disease Epidemiology Collaboration   (CKD-EPI) equation.       GFR Estimate If Black 05/19/2021 >90  >60 mL/min/[1.73_m2] Final    Comment:  GFR Calc  Starting 12/18/2018, serum creatinine based estimated GFR (eGFR) will be   calculated using the Chronic Kidney Disease Epidemiology Collaboration   (CKD-EPI) equation.       Calcium 05/19/2021 8.7  8.5 - 10.1 mg/dL Final     WBC 05/19/2021 6.0  4.0 - 11.0 10e9/L Final     RBC Count 05/19/2021 4.49  4.4 - 5.9 10e12/L Final     Hemoglobin 05/19/2021 13.7  13.3 - 17.7 g/dL Final     Hematocrit 05/19/2021 41.7  40.0 - 53.0 % Final     MCV 05/19/2021 93  78 - 100 fl Final     MCH 05/19/2021 30.5  26.5 - 33.0 pg Final     MCHC 05/19/2021 32.9  31.5 - 36.5 g/dL Final     RDW 05/19/2021 12.9  10.0 - 15.0 % Final     Platelet Count 05/19/2021 206  150 - 450 10e9/L Final     Diff Method 05/19/2021 Automated Method   Final     % Neutrophils 05/19/2021 51.3  % Final     % Lymphocytes 05/19/2021 38.9  % Final     % Monocytes 05/19/2021 7.5  % Final     % Eosinophils 05/19/2021 1.3  % Final     % Basophils 05/19/2021 0.7  % Final     % Immature Granulocytes 05/19/2021 0.3  % Final     Nucleated RBCs 05/19/2021 0  0 /100 Final     Absolute Neutrophil 05/19/2021 3.1  1.6 - 8.3 10e9/L Final     Absolute Lymphocytes 05/19/2021 2.4  0.8 - 5.3 10e9/L Final     Absolute Monocytes 05/19/2021 0.5  0.0 - 1.3 10e9/L Final     Absolute Eosinophils 05/19/2021 0.1  0.0 - 0.7 10e9/L Final     Absolute Basophils 05/19/2021 0.0  0.0 -  0.2 10e9/L Final     Abs Immature Granulocytes 05/19/2021 0.0  0 - 0.4 10e9/L Final     Absolute Nucleated RBC 05/19/2021 0.0   Final     RBC Morphology 05/19/2021 Normal   Final     Platelet Estimate 05/19/2021 Automated count confirmed.  Platelet morphology is normal.   Final

## 2021-07-02 NOTE — PATIENT INSTRUCTIONS
Patient Education     After Bypass Surgery: Caring for Your Incision  Your incisions may itch or feel sore, tight, or numb for a few weeks after the surgery. These are all normal signs of healing. Some bruising around the incisions is also normal.  To care for your incisions:    Wash them gently with warm water and a mild soap once it's ok with your surgeon to do so. Don't soak the incisions or keep them in water as in a bathtub, hot tub, or swimming pool.    To help prevent infection, don't use any lotions or creams near the incisions unless your healthcare provider tells you to do so.    Many incisions have small strips of tape to support the wound healing process. After several days, they may fall off as they are no longer needed. Don't remove them before this.   [employer] last reviewed this educational content on 4/1/2020 2000-2021 [employer]. All rights reserved. This information is not intended as a substitute for professional medical care. Always follow your healthcare professional's instructions.           Patient Education     When to Call the Doctor After Bypass Surgery    Call your healthcare team if you have any of these symptoms:    Fever  of 100.4?F (38?C) or higher, or as directed by your healthcare provider    Unexplained chills or sweating    Sharp pain in the chest on taking a deep breath    Bleeding from the incision sites    Belly (abdominal) pain, nausea, constipation, or vomiting that doesn't go away    Increasing pain that doesn't get better after taking pain medicine    Swelling, redness, oozing, or cloudy discharge at the incision sites    Unexplained bruising    Continued sensation of motion or clicking sounds in your breastbone    Sudden weight gain. Tell your doctor if you gain 1 to 2 pounds (0.45 to 0.9 kg) within 24 hours or more overnight or 5 pounds (2.27 kg) or more in 1 week.    Increased swelling of the legs, especially on the side where the vein was not  removed    Drainage or foul smelling odor from the incisions on the chest or leg  Call 911  Call 911 right away if you have any of these symptoms:    Shortness of breath or trouble breathing not relieved by rest    Angina or chest pain symptoms like those you felt before surgery    Sudden severe headache    Sudden weakness and numbness in the arms or legs    Dizziness or fainting spells    Fast heartbeat (150 beats per minute and short of breath), extremely slow heartbeat, or irregular heartbeat     [employer] last reviewed this educational content on 4/1/2020 2000-2021 [employer]. All rights reserved. This information is not intended as a substitute for professional medical care. Always follow your healthcare professional's instructions.

## 2021-07-12 ENCOUNTER — TELEPHONE (OUTPATIENT)
Dept: FAMILY MEDICINE | Facility: CLINIC | Age: 51
End: 2021-07-12

## 2021-07-12 DIAGNOSIS — I20.0 UNSTABLE ANGINA (H): Primary | ICD-10-CM

## 2021-07-12 DIAGNOSIS — N52.9 ERECTILE DYSFUNCTION, UNSPECIFIED ERECTILE DYSFUNCTION TYPE: ICD-10-CM

## 2021-07-12 RX ORDER — CLOPIDOGREL BISULFATE 75 MG/1
75 TABLET ORAL DAILY
Qty: 90 TABLET | Refills: 3 | Status: SHIPPED | OUTPATIENT
Start: 2021-07-12 | End: 2022-07-11

## 2021-07-12 RX ORDER — ISOSORBIDE MONONITRATE 30 MG/1
30 TABLET, EXTENDED RELEASE ORAL DAILY
Qty: 90 TABLET | Refills: 3 | Status: SHIPPED | OUTPATIENT
Start: 2021-07-12 | End: 2022-07-11

## 2021-07-12 RX ORDER — TAMSULOSIN HYDROCHLORIDE 0.4 MG/1
0.4 CAPSULE ORAL DAILY
Qty: 90 CAPSULE | Refills: 3 | Status: SHIPPED | OUTPATIENT
Start: 2021-07-12 | End: 2021-07-15

## 2021-07-12 NOTE — TELEPHONE ENCOUNTER
Dr. Day,    Patient needing some meds refilled after hospitalization.  I did verify these meds with patient.    Routing refill request to provider for review/approval because:  Not prescribed by you prior.  Is still awaiting for cardiology appt. Diana GARCIA RN

## 2021-07-15 ENCOUNTER — OFFICE VISIT (OUTPATIENT)
Dept: ENDOCRINOLOGY | Facility: CLINIC | Age: 51
End: 2021-07-15
Attending: FAMILY MEDICINE
Payer: COMMERCIAL

## 2021-07-15 VITALS
DIASTOLIC BLOOD PRESSURE: 70 MMHG | HEIGHT: 70 IN | BODY MASS INDEX: 28.2 KG/M2 | RESPIRATION RATE: 16 BRPM | WEIGHT: 197 LBS | HEART RATE: 85 BPM | SYSTOLIC BLOOD PRESSURE: 106 MMHG

## 2021-07-15 DIAGNOSIS — E78.5 HYPERLIPIDEMIA WITH TARGET LDL LESS THAN 100: Primary | ICD-10-CM

## 2021-07-15 DIAGNOSIS — E11.9 TYPE 2 DIABETES MELLITUS WITHOUT COMPLICATION, WITHOUT LONG-TERM CURRENT USE OF INSULIN (H): ICD-10-CM

## 2021-07-15 PROCEDURE — 99204 OFFICE O/P NEW MOD 45 MIN: CPT | Performed by: INTERNAL MEDICINE

## 2021-07-15 ASSESSMENT — MIFFLIN-ST. JEOR: SCORE: 1764.84

## 2021-07-15 NOTE — NURSING NOTE
"Chief Complaint   Patient presents with     Establish Care     Diabetes       Initial /70 (BP Location: Left arm, Patient Position: Sitting, Cuff Size: Adult Large)   Pulse 85   Resp 16   Ht 1.778 m (5' 10\")   Wt 89.4 kg (197 lb)   BMI 28.27 kg/m   Estimated body mass index is 28.27 kg/m  as calculated from the following:    Height as of this encounter: 1.778 m (5' 10\").    Weight as of this encounter: 89.4 kg (197 lb).  BP completed using cuff size: large  Medications and allergies reviewed.      Norma NIXON MA    "

## 2021-07-15 NOTE — LETTER
7/15/2021         RE: Houston Winters  [address] [address]  [place] [address]        Dear Colleague,    Thank you for referring your patient, Houston Winters, to the [facility] ENDOCRINOLOGY. Please see a copy of my visit note below.    CC: DM.     HPI:   Patient presents for management of DM.   Diagnosed ~ 5 years ago.     Notes he stopped drinking pop a year ago.   He would like to be more active but remains on restrictions s/p CABG.     He is taking 32 U basaglar BID.   Humalog 18 U TID AC   For blood glucose: 140-179=2 units; 180-219=4 units; 220-259=6 units; 260-299=8 units; 300-339=10 units; 340-379=12 units; 380-399=14 units.    Logs:  Recent increase in AM readings.   No hypoglycemia.  Pre-dinner highs. Snacks on beef sticks, cheese, cucumbers between meals.       ROS: 10 point ROS neg other than the symptoms noted above in the HPI.    PMH:   Patient Active Problem List   Diagnosis     GANGLOIN CYST /RIGHT ANKLE     ED (erectile dysfunction)     Partial epilepsy (H)     Obesity     Hyperlipidemia with target LDL less than 100     Tobacco use disorder     Hypertension, goal below 140/90     Type 2 diabetes mellitus without complication, without long-term current use of insulin (H)     Small bowel obstruction (H)     Postoperative infection, initial encounter     Unstable angina (H)      Meds:  Current Outpatient Medications   Medication     alcohol swab prep pads     aspirin (ASA) 81 MG chewable tablet     atorvastatin (LIPITOR) 80 MG tablet     blood glucose (NO BRAND SPECIFIED) test strip     blood glucose calibration (NO BRAND SPECIFIED) solution     blood glucose monitoring (NO BRAND SPECIFIED) meter device kit     clopidogrel (PLAVIX) 75 MG tablet     insulin glargine (BASAGLAR KWIKPEN) 100 UNIT/ML pen     insulin lispro (HUMALOG KWIKPEN) 100 UNIT/ML (1 unit dial) KWIKPEN     isosorbide mononitrate (IMDUR) 30 MG 24 hr tablet     lisinopril (ZESTRIL) 2.5 MG tablet     metoprolol  "tartrate (LOPRESSOR) 25 MG tablet     nitroGLYcerin (NITROSTAT) 0.4 MG sublingual tablet     oxyCODONE (ROXICODONE) 5 MG tablet     sennosides (SENOKOT) 8.6 MG tablet     thin (NO BRAND SPECIFIED) lancets     No current facility-administered medications for this visit.      FHX:   Grandfather and uncle have DM.     SHX:  Former smoker.   Works [employer].    Exam:   Vital signs:      BP: 106/70 Pulse: 85   Resp: 16       Height: 177.8 cm (5' 10\") Weight: 89.4 kg (197 lb)  Estimated body mass index is 28.27 kg/m  as calculated from the following:    Height as of this encounter: 1.778 m (5' 10\").    Weight as of this encounter: 89.4 kg (197 lb).  Gen: In NAD.   HEENT: no proptosis or lid lag, EOMI, no palpable thyroid tissue.  Card: S1 S2 RRR no m/r/g. no LE edema.   Pulm: CTA b/l.   GI: NT ND +BS.   MSK: no gross deformities.   Derm: no rashes or lesions.   Neuro: no tremor, +2 DTR's. Normal monofilament.     A/P:   Type 2 DM - Chronic and uncontrolled. Complicated by recent CABGx4. Discussed SGLT-2i's. Not interested in using other agents. He is concerned about lows and would like to just change the basaglar today but not the meal time.   -Schedule eye exam when able.   -Continue basaglar 32 U in the AM.   -Increase basaglar to 36 units at night.   -No change to meal time doses today.   -Follow up with diabetes education as planned.   -Labs in 3 months and see me after.   -ASA taking.  -BP: controlled.   -NAFL/BUCHANAN: normal ALT and AST in 6/2021. However, fatty liver seen on CT in 2017.   Reviewed significance.   -Lipids: Trg 81, HDL 42,  in 6/2021. Now on atorvastatin.   -Microalbumin due. On lisinopril.   -Eyes: overdue for exam. He is delaying having an exam 2/2 cost. Discussed importance.   -Smoking: former.      Dyslipidemia - as above.     Fatty liver - as above.     Pratik Clifton M.D          Again, thank you for allowing me to participate in the care of your patient.  "       Sincerely,        Pratik Clifton MD

## 2021-07-15 NOTE — PATIENT INSTRUCTIONS
-Schedule eye exam when able.     -Continue basaglar 32 U in the AM.   -Increase basaglar to 36 units at night.     -No change to meal time doses today.     -Follow up with diabetes education as planned.   -Labs in 3 months and see me after.

## 2021-07-15 NOTE — PROGRESS NOTES
CC: DM.     HPI:   Patient presents for management of DM.   Diagnosed ~ 5 years ago.     Notes he stopped drinking pop a year ago.   He would like to be more active but remains on restrictions s/p CABG.     He is taking 32 U basaglar BID.   Humalog 18 U TID AC   For blood glucose: 140-179=2 units; 180-219=4 units; 220-259=6 units; 260-299=8 units; 300-339=10 units; 340-379=12 units; 380-399=14 units.    Logs:  Recent increase in AM readings.   No hypoglycemia.  Pre-dinner highs. Snacks on beef sticks, cheese, cucumbers between meals.       ROS: 10 point ROS neg other than the symptoms noted above in the HPI.    PMH:   Patient Active Problem List   Diagnosis     GANGLOIN CYST /RIGHT ANKLE     ED (erectile dysfunction)     Partial epilepsy (H)     Obesity     Hyperlipidemia with target LDL less than 100     Tobacco use disorder     Hypertension, goal below 140/90     Type 2 diabetes mellitus without complication, without long-term current use of insulin (H)     Small bowel obstruction (H)     Postoperative infection, initial encounter     Unstable angina (H)      Meds:  Current Outpatient Medications   Medication     alcohol swab prep pads     aspirin (ASA) 81 MG chewable tablet     atorvastatin (LIPITOR) 80 MG tablet     blood glucose (NO BRAND SPECIFIED) test strip     blood glucose calibration (NO BRAND SPECIFIED) solution     blood glucose monitoring (NO BRAND SPECIFIED) meter device kit     clopidogrel (PLAVIX) 75 MG tablet     insulin glargine (BASAGLAR KWIKPEN) 100 UNIT/ML pen     insulin lispro (HUMALOG KWIKPEN) 100 UNIT/ML (1 unit dial) KWIKPEN     isosorbide mononitrate (IMDUR) 30 MG 24 hr tablet     lisinopril (ZESTRIL) 2.5 MG tablet     metoprolol tartrate (LOPRESSOR) 25 MG tablet     nitroGLYcerin (NITROSTAT) 0.4 MG sublingual tablet     oxyCODONE (ROXICODONE) 5 MG tablet     sennosides (SENOKOT) 8.6 MG tablet     thin (NO BRAND SPECIFIED) lancets     No current facility-administered medications for this  "visit.      FHX:   Grandfather and uncle have DM.     SHX:  Former smoker.   Works [employer]    Exam:   Vital signs:      BP: 106/70 Pulse: 85   Resp: 16       Height: 177.8 cm (5' 10\") Weight: 89.4 kg (197 lb)  Estimated body mass index is 28.27 kg/m  as calculated from the following:    Height as of this encounter: 1.778 m (5' 10\").    Weight as of this encounter: 89.4 kg (197 lb).  Gen: In NAD.   HEENT: no proptosis or lid lag, EOMI, no palpable thyroid tissue.  Card: S1 S2 RRR no m/r/g. no LE edema.   Pulm: CTA b/l.   GI: NT ND +BS.   MSK: no gross deformities.   Derm: no rashes or lesions.   Neuro: no tremor, +2 DTR's. Normal monofilament.     A/P:   Type 2 DM - Chronic and uncontrolled. Complicated by recent CABGx4. Discussed SGLT-2i's. Not interested in using other agents. He is concerned about lows and would like to just change the basaglar today but not the meal time.   -Schedule eye exam when able.   -Continue basaglar 32 U in the AM.   -Increase basaglar to 36 units at night.   -No change to meal time doses today.   -Follow up with diabetes education as planned.   -Labs in 3 months and see me after.   -ASA taking.  -BP: controlled.   -NAFL/BUCHANAN: normal ALT and AST in 6/2021. However, fatty liver seen on CT in 2017.   Reviewed significance.   -Lipids: Trg 81, HDL 42,  in 6/2021. Now on atorvastatin.   -Microalbumin due. On lisinopril.   -Eyes: overdue for exam. He is delaying having an exam 2/2 cost. Discussed importance.   -Smoking: former.      Dyslipidemia - as above.     Fatty liver - as above.     Pratik Clifton M.D      "

## 2021-07-19 ENCOUNTER — PATIENT OUTREACH (OUTPATIENT)
Dept: EDUCATION SERVICES | Facility: CLINIC | Age: 51
End: 2021-07-19
Payer: COMMERCIAL

## 2021-07-19 ENCOUNTER — TELEPHONE (OUTPATIENT)
Dept: EDUCATION SERVICES | Facility: CLINIC | Age: 51
End: 2021-07-19

## 2021-07-19 DIAGNOSIS — E11.9 TYPE 2 DIABETES MELLITUS WITHOUT COMPLICATION, WITHOUT LONG-TERM CURRENT USE OF INSULIN (H): ICD-10-CM

## 2021-07-19 DIAGNOSIS — E11.9 TYPE 2 DIABETES MELLITUS WITHOUT COMPLICATION, WITHOUT LONG-TERM CURRENT USE OF INSULIN (H): Primary | ICD-10-CM

## 2021-07-19 DIAGNOSIS — E11.9 TYPE 2 DIABETES MELLITUS (H): ICD-10-CM

## 2021-07-19 PROCEDURE — G0108 DIAB MANAGE TRN  PER INDIV: HCPCS | Mod: 95

## 2021-07-19 RX ORDER — FLASH GLUCOSE SENSOR
1 KIT MISCELLANEOUS
Qty: 2 EACH | Refills: 12 | Status: CANCELLED | OUTPATIENT
Start: 2021-07-19

## 2021-07-19 NOTE — PROGRESS NOTES
"Diabetes Self-Management Education & Support    Presents for: Individual review.  Type of Service: Telephone Visit  How would patient like to obtain AVS? Reviewed verbally     SUBJECTIVE/OBJECTIVE:  Presents for: Individual review  Accompanied by: Self  Diabetes education in the past 24mo: Yes  Focus of Visit: Taking Medication, Problem Solving  Diabetes type: Type 2  Disease course: Improving  Other concerns:: None  Cultural Influences/Ethnic Background:  American    Diabetes Symptoms & Complications:  Complications assessed today?: No    Patient Problem List and Family Medical History reviewed for relevant medical history, current medical status, and diabetes risk factors.    Vitals:  There were no vitals taken for this visit.  Estimated body mass index is 28.27 kg/m  as calculated from the following:    Height as of 7/15/21: 1.778 m (5' 10\").    Weight as of 7/15/21: 89.4 kg (197 lb).   Last 3 BP:   BP Readings from Last 3 Encounters:   07/15/21 106/70   07/02/21 122/74   06/24/21 120/62       History   Smoking Status     Former Smoker     Packs/day: 0.25     Years: 25.00     Types: Cigarettes     Quit date: 1/18/2016   Smokeless Tobacco     Former User       Labs:  Lab Results   Component Value Date    A1C 11.6 05/18/2021     Lab Results   Component Value Date     05/19/2021     Lab Results   Component Value Date     05/18/2021     HDL Cholesterol   Date Value Ref Range Status   05/18/2021 49 >39 mg/dL Final   ]  GFR Estimate   Date Value Ref Range Status   05/19/2021 >90 >60 mL/min/[1.73_m2] Final     Comment:     Non  GFR Calc  Starting 12/18/2018, serum creatinine based estimated GFR (eGFR) will be   calculated using the Chronic Kidney Disease Epidemiology Collaboration   (CKD-EPI) equation.       GFR Estimate If Black   Date Value Ref Range Status   05/19/2021 >90 >60 mL/min/[1.73_m2] Final     Comment:      GFR Calc  Starting 12/18/2018, serum creatinine based " estimated GFR (eGFR) will be   calculated using the Chronic Kidney Disease Epidemiology Collaboration   (CKD-EPI) equation.       Lab Results   Component Value Date    CR 0.63 05/19/2021     No results found for: MICROALBUMIN    Healthy Eating:  Healthy Eating Assessed Today: Yes  Meal planning/habits: Carb counting    Being Active:  Being Active Assessed Today: Yes  Exercise:: Yes    Monitoring:  Monitoring Assessed Today: Yes  Did patient bring glucose meter to appointment? : No  Times checking blood sugar at home (per): Day  Blood glucose trend: Decreasing    Taking Medications:  Diabetes Medication(s)     Insulin       insulin glargine (BASAGLAR KWIKPEN) 100 UNIT/ML pen    Inject 32 Units Subcutaneous 2 times daily     insulin lispro (HUMALOG KWIKPEN) 100 UNIT/ML (1 unit dial) KWIKPEN    Inject 18 Units Subcutaneous 3 times daily (before meals)        Cost of insulin - 150$ for 3 months   5 boxes of each type   (lantus temporarily, then will resume Basaglar    Taking Medication Assessed Today: Yes  Current Treatments: Insulin Injections  Problems taking diabetes medications regularly?: No  Diabetes medication side effects?: No    Problem Solving:  Problem Solving Assessed Today: Yes    Reducing Risks:  Reducing Risks Assessed Today: No    Healthy Coping:  Healthy Coping Assessed Today: Yes  Emotional response to diabetes: Ready to learn  Informal Support system:: Spouse  Stage of change: ACTION (Actively working towards change)  Patient Activation Measure Survey Score:  REY Score (Last Two) 10/4/2012   REY Raw Score 39   Activation Score 56.4   REY Level 3       Diabetes knowledge and skills assessment:   Patient is knowledgeable in diabetes management concepts related to: Healthy Eating, Being Active, Monitoring, Taking Medication, Problem Solving, Reducing Risks and Healthy Coping  Patient needs further education on the following diabetes management concepts: continue to review continuous glucose monitoring /  blood sugar data / insulin action and dosing   Based on learning assessment above, most appropriate setting for further diabetes education would be: Individual setting.      INTERVENTIONS:  Education provided today on:  AADE Self-Care Behaviors:  Diabetes Pathophysiology  Healthy Eating: consistency in amount, composition, and timing of food intake  Being Active: relationship to blood glucose  Monitoring: log and interpret results and individual blood glucose targets  Taking Medication: action of prescribed medication and when to take medications  Problem Solving: high blood glucose - causes, signs/symptoms, treatment and prevention, low blood glucose - causes, signs/symptoms, treatment and prevention and when to call health care provider    Opportunities for ongoing education and support in diabetes-self management were discussed.  Pt verbalized understanding of concepts discussed and recommendations provided today.         ASSESSMENT:  Discussed the complexities of insulin dosing and insulin action, influencing factors; timing of dose, types of foods / carbohydrates, activity, life etc and the intuition that goes into dosing.  Blood sugars much improved compared to last A1c.   14 day average decreased to 169.   Notices some numbers climbing at night. Discussed pathophysiology of Diabetes.     Medications:   Basaglar at 36-0-0-36  With higher fasting blood sugars, OK to increase Basaglar by 10% (4 units) up to 40 units at night (36-0-0-40)     Activity   Still sleeping a lot, stamina is down, big surgery   Doesn't go back to work until September   Cardiac rehab Thursday   Up early  - walking dogs     Diet / food;   Has been doing what he can; increasing salads   Low carbohydrate snacks; cheese / meat / cucumbers   Low carbohydrate chips and a fresh veggie salsa   Pizza for enjoyment once a week   Increasing nonstartchy vegetables   Low fat meat   Sweets - quit pop, G2 instead of regular, crystal light, ton of water      Blood sugars   162 pre breakfast, 118 lunch, 172 pre dinner   200 after eating breakfast, missed insulin dose and took faer  157, 138   132, 135, 189   119, 153, 122  225, 101, 166   193, 200, 327  (forgot insulin, bad day)   202 193, 177, 155, 153, 150, 99, 154,     Technology   Reviewed continuous glucose monitors.  Will send Freestyle stefany 14 day system to endocrinologist.    Talked about pod a little     Patient's most recent   Lab Results   Component Value Date    A1C 11.6 05/18/2021    is not meeting goal of <7.0    PLAN  Follow up 7/30   Begin FreeStyle Stefany or will review at next appointment     Mary Orourke RD, LD, [facility]  Diabetes Education    Time Spent: 52 minutes  Encounter Type: Individual    Any diabetes medication dose changes were made via the CDE Protocol and Collaborative Practice Agreement with the patient's referring provider. A copy of this encounter was shared with the provider.

## 2021-07-19 NOTE — TELEPHONE ENCOUNTER
Hi Dr. Clifton,     I talked with clemencia about continuous glucose monitors and he would like to try the FreeStyle Stefany 14 day system so I pended those with you if you agree.  He is spending a fair amount of finger stick supplies and will cash pay for this if needed.       Thanks!  Elva Orourke RD, LD, [facility]  Diabetes Education

## 2021-07-21 ENCOUNTER — TELEPHONE (OUTPATIENT)
Dept: ENDOCRINOLOGY | Facility: CLINIC | Age: 51
End: 2021-07-21

## 2021-07-21 DIAGNOSIS — E11.9 TYPE 2 DIABETES MELLITUS WITHOUT COMPLICATION, WITHOUT LONG-TERM CURRENT USE OF INSULIN (H): Primary | ICD-10-CM

## 2021-07-21 NOTE — TELEPHONE ENCOUNTER
Reason for Call:  Other prescription    Detailed comments: Pt had a phone visit earlier this week - diabetic educator put in rx for him on Mon and pt states the pharmacy still has not received anything - [facility]    Phone Number Patient can be reached at: Home number on file [phone] (home)    Best Time:     Can we leave a detailed message on this number? YES    Call taken on 7/21/2021 at 4:17 PM by Pauline Mccollum

## 2021-07-22 ENCOUNTER — HOSPITAL ENCOUNTER (OUTPATIENT)
Dept: CARDIAC REHAB | Facility: CLINIC | Age: 51
End: 2021-07-22
Attending: THORACIC SURGERY (CARDIOTHORACIC VASCULAR SURGERY)
Payer: COMMERCIAL

## 2021-07-22 PROCEDURE — 93798 PHYS/QHP OP CAR RHAB W/ECG: CPT

## 2021-07-22 PROCEDURE — 93797 PHYS/QHP OP CAR RHAB WO ECG: CPT

## 2021-07-22 RX ORDER — FLASH GLUCOSE SENSOR
1 KIT MISCELLANEOUS
Qty: 2 EACH | Refills: 5 | Status: SHIPPED | OUTPATIENT
Start: 2021-07-22 | End: 2021-12-17

## 2021-07-22 NOTE — TELEPHONE ENCOUNTER
Called patient to inquire which prescription is needed. Patient stated he would like a prescription for the freestyle mike sensor sent to the [facility] in [place]. Sent prescription as requested.    Quincy GARCIA RN....7/22/2021 9:43 AM

## 2021-07-23 NOTE — TELEPHONE ENCOUNTER
Separate telephone encounter sent by RN 7/21.  Had pended order and this encounter, however had not been routed to Terrance [employer] 14 day sensors sent to [facility].     Mary Orourke RD, LD, [facility]  Diabetes Education

## 2021-07-28 ENCOUNTER — OFFICE VISIT (OUTPATIENT)
Dept: CARDIOLOGY | Facility: CLINIC | Age: 51
End: 2021-07-28
Attending: FAMILY MEDICINE
Payer: COMMERCIAL

## 2021-07-28 VITALS
SYSTOLIC BLOOD PRESSURE: 113 MMHG | WEIGHT: 204.8 LBS | DIASTOLIC BLOOD PRESSURE: 76 MMHG | OXYGEN SATURATION: 98 % | HEART RATE: 95 BPM | BODY MASS INDEX: 29.39 KG/M2 | TEMPERATURE: 98 F

## 2021-07-28 DIAGNOSIS — I25.10 CORONARY ARTERY DISEASE INVOLVING NATIVE CORONARY ARTERY OF NATIVE HEART WITHOUT ANGINA PECTORIS: ICD-10-CM

## 2021-07-28 DIAGNOSIS — Z95.1 S/P CABG (CORONARY ARTERY BYPASS GRAFT): ICD-10-CM

## 2021-07-28 PROCEDURE — 99214 OFFICE O/P EST MOD 30 MIN: CPT | Performed by: INTERNAL MEDICINE

## 2021-07-28 NOTE — PROGRESS NOTES
CARDIOLOGY CLINIC CONSULTATION    REASON FOR CONSULT: Coronary artery disease    PRIMARY CARE PHYSICIAN:  John Day    HISTORY OF PRESENT ILLNESS:  This a pleasant 50-year-old gentleman who was seen in consultation at [facility] heart clinic.  The patient has a history of uncontrolled diabetes hypertension and prior history of smoking which he quit many years ago.  He has uncontrolled hyperlipidemia with his last LDL in May 2021 measuring 195, although not sure if that was fasting.  Prior to May 2021, he denies having any cardiovascular issues.    In any case the patient presented in May 2021 to [facility] with chest discomfort.  He had a coronary angiogram that showed severe multivessel coronary disease.  He was seen by Dr. Milner and bypass surgery was recommended.  Patient refused during that admission and eventually went to [facility] as an outpatient and underwent bypass surgery there.  Echocardiogram showed normal LV function without major valve disease.  Subsequent to that he has been put on guideline directed medical therapy with dual antiplatelet therapy consisting of aspirin and Plavix, Imdur 30 mg daily, lisinopril to 1/2 mg daily, metoprolol tartrate 25 mg twice daily, and Lipitor 80 mg daily.    Since bypass surgery the patient has been doing okay without any symptoms of angina.  He occasionally experiences stabbing chest discomfort on the right side of his chest that is not anginal in nature.  Sometimes he experiences shoulder and back pain which he attributes to muscle pain and gets worse with movement.  Otherwise no orthopnea syncope presyncope edema reported.  He has been working out in the gym and increasing aerobic activity slowly.  Noncan walk a mile without any cardiovascular limitations or symptoms.    PAST MEDICAL HISTORY:  Past Medical History:   Diagnosis Date     DM (diabetes mellitus) (H)      Epilepsy (H)     Intractable epilepsy, last seizure 2008      MEDICAL HISTORY OF - Age 16     Gangrene in foot stepped on a nail hospitalized for 7 weeks     Nicotine dependence        MEDICATIONS:  Current Outpatient Medications   Medication     alcohol swab prep pads     aspirin (ASA) 81 MG chewable tablet     atorvastatin (LIPITOR) 80 MG tablet     blood glucose (NO BRAND SPECIFIED) test strip     blood glucose calibration (NO BRAND SPECIFIED) solution     blood glucose monitoring (NO BRAND SPECIFIED) meter device kit     clopidogrel (PLAVIX) 75 MG tablet     Continuous Blood Gluc Sensor (FREESTYLE MARY 14 DAY SENSOR) MISC     insulin glargine (BASAGLAR KWIKPEN) 100 UNIT/ML pen     insulin lispro (HUMALOG KWIKPEN) 100 UNIT/ML (1 unit dial) KWIKPEN     isosorbide mononitrate (IMDUR) 30 MG 24 hr tablet     lisinopril (ZESTRIL) 2.5 MG tablet     metoprolol tartrate (LOPRESSOR) 25 MG tablet     nitroGLYcerin (NITROSTAT) 0.4 MG sublingual tablet     oxyCODONE (ROXICODONE) 5 MG tablet     sennosides (SENOKOT) 8.6 MG tablet     thin (NO BRAND SPECIFIED) lancets     No current facility-administered medications for this visit.       ALLERGIES:  Allergies   Allergen Reactions     Metformin Other (See Comments)     Very low blood sugars    Blood sugar issue        SOCIAL HISTORY:  I have reviewed this patient's social history and updated it with pertinent information if needed. Houston Winters  reports that he quit smoking about 5 years ago. His smoking use included cigarettes. He has a 6.25 pack-year smoking history. He has quit using smokeless tobacco. He reports current alcohol use. He reports that he does not use drugs.    FAMILY HISTORY:  I have reviewed this patient's family history and updated it with pertinent information if needed.   Family History   Problem Relation Age of Onset     Arthritis Mother      Cancer Maternal Grandmother      Arthritis Maternal Grandmother         lupus     Gastrointestinal Disease Maternal Grandfather         war injury, colostomy      Respiratory Daughter         growing out of it     Unknown/Adopted Father      Diabetes No family hx of      Coronary Artery Disease No family hx of      Hypertension No family hx of      Hyperlipidemia No family hx of      Breast Cancer No family hx of      Cancer - colorectal No family hx of      Ovarian Cancer No family hx of      Prostate Cancer No family hx of        REVIEW OF SYSTEMS:  Skin:  Negative     Eyes:  Negative    ENT:  Negative    Respiratory:  Positive for dyspnea on exertion;shortness of breath  Cardiovascular:    Positive for;chest pain;lightheadedness  Gastroenterology: Negative    Genitourinary:  Negative    Musculoskeletal:  Negative    Neurologic:  Positive for numbness or tingling of hands  Psychiatric:  Positive for sleep disturbances  Heme/Lymph/Imm:  Negative    Endocrine:  Positive for diabetes      PHYSICAL EXAM:  Temp: 98  F (36.7  C) Temp src: Tympanic BP: 113/76 Pulse: 95     SpO2: 98 %      Vital Signs with Ranges  Temp:  [98  F (36.7  C)] 98  F (36.7  C)  Pulse:  [95] 95  BP: (113)/(76) 113/76  SpO2:  [98 %] 98 %  204 lbs 12.8 oz    Constitutional: awake, alert, no distress  Respiratory: Clear to auscultation  Cardiovascular: Normal heart sounds normal JVP no edema  GI: nondistended  Neuropsychiatric: appropriate affact    DATA:  Labs: Pertinent cardiac labs reviewed    Echocardiogram: Normal LV function    ASSESSMENT:  Severe premature coronary artery disease status post bypass surgery with LIMA to diagonal LAD sequential graft, LOUIS to ramus and vein graft to the PDA in June 2021  Uncontrolled diabetes last hemoglobin A1c measuring 11  Hyperlipidemia last LDL measuring 195  Prior history of smoking  Controlled hypertension    RECOMMENDATIONS:  1. The patient is doing well from a cardiac standpoint without any new cardiovascular symptoms.  2. At this time aggressive secondary risk factor modification is required.  3. I recommend continuing dual antiplatelet therapy until December  and after that he could stop Plavix and continue aspirin monotherapy for the rest of his life.  4. He is complaining of myalgias in his shoulders and back which is worse with movement.  This has been getting worse slowly.  This may be statin induced.  I recommend cutting down the dose of atorvastatin from 80 mg down to 40 mg and see how he feels.  5. If his pain persists he is going to call our clinic in about 2 to 3 weeks.  If that happens, we may give him a statin holiday to see if that is truly the reason for his symptoms.  6. Otherwise I will plan on seeing him back in about 4 months with an echocardiogram and labs.  Assuming he is doing okay then we will space out visits.  7. I have advised him healthy diet which is low in carbohydrates and saturated fats.  Healthy lifestyle exercise and call us with any change in clinical symptoms.    VIKI Chirinos, [facility]  Cardiology - [facility]  July 28, 2021

## 2021-07-28 NOTE — LETTER
7/28/2021    John Day MD  [address] [facility] [address]    RE: Houston Winters       Dear Colleague,    I had the pleasure of seeing Houston Maddoxbo in the [facility].    CARDIOLOGY CLINIC CONSULTATION    REASON FOR CONSULT: Coronary artery disease    PRIMARY CARE PHYSICIAN:  John Day    HISTORY OF PRESENT ILLNESS:  This a pleasant 50-year-old gentleman who was seen in consultation at [facility] heart clinic.  The patient has a history of uncontrolled diabetes hypertension and prior history of smoking which he quit many years ago.  He has uncontrolled hyperlipidemia with his last LDL in May 2021 measuring 195, although not sure if that was fasting.  Prior to May 2021, he denies having any cardiovascular issues.    In any case the patient presented in May 2021 to [facility] with chest discomfort.  He had a coronary angiogram that showed severe multivessel coronary disease.  He was seen by Dr. Milner and bypass surgery was recommended.  Patient refused during that admission and eventually went to [facility] as an outpatient and underwent bypass surgery there.  Echocardiogram showed normal LV function without major valve disease.  Subsequent to that he has been put on guideline directed medical therapy with dual antiplatelet therapy consisting of aspirin and Plavix, Imdur 30 mg daily, lisinopril to 1/2 mg daily, metoprolol tartrate 25 mg twice daily, and Lipitor 80 mg daily.    Since bypass surgery the patient has been doing okay without any symptoms of angina.  He occasionally experiences stabbing chest discomfort on the right side of his chest that is not anginal in nature.  Sometimes he experiences shoulder and back pain which he attributes to muscle pain and gets worse with movement.  Otherwise no orthopnea syncope presyncope edema reported.  He has been working out in the gym and increasing  aerobic activity slowly.  Noncan walk a mile without any cardiovascular limitations or symptoms.    PAST MEDICAL HISTORY:  Past Medical History:   Diagnosis Date     DM (diabetes mellitus) (H)      Epilepsy (H)     Intractable epilepsy, last seizure 2008     MEDICAL HISTORY OF - Age 16     Gangrene in foot stepped on a nail hospitalized for 7 weeks     Nicotine dependence        MEDICATIONS:  Current Outpatient Medications   Medication     alcohol swab prep pads     aspirin (ASA) 81 MG chewable tablet     atorvastatin (LIPITOR) 80 MG tablet     blood glucose (NO BRAND SPECIFIED) test strip     blood glucose calibration (NO BRAND SPECIFIED) solution     blood glucose monitoring (NO BRAND SPECIFIED) meter device kit     clopidogrel (PLAVIX) 75 MG tablet     Continuous Blood Gluc Sensor (FREESTYLE MARY 14 DAY SENSOR) MISC     insulin glargine (BASAGLAR KWIKPEN) 100 UNIT/ML pen     insulin lispro (HUMALOG KWIKPEN) 100 UNIT/ML (1 unit dial) KWIKPEN     isosorbide mononitrate (IMDUR) 30 MG 24 hr tablet     lisinopril (ZESTRIL) 2.5 MG tablet     metoprolol tartrate (LOPRESSOR) 25 MG tablet     nitroGLYcerin (NITROSTAT) 0.4 MG sublingual tablet     oxyCODONE (ROXICODONE) 5 MG tablet     sennosides (SENOKOT) 8.6 MG tablet     thin (NO BRAND SPECIFIED) lancets     No current facility-administered medications for this visit.       ALLERGIES:  Allergies   Allergen Reactions     Metformin Other (See Comments)     Very low blood sugars    Blood sugar issue        SOCIAL HISTORY:  I have reviewed this patient's social history and updated it with pertinent information if needed. Houston Winters  reports that he quit smoking about 5 years ago. His smoking use included cigarettes. He has a 6.25 pack-year smoking history. He has quit using smokeless tobacco. He reports current alcohol use. He reports that he does not use drugs.    FAMILY HISTORY:  I have reviewed this patient's family history and updated it with pertinent information  if needed.   Family History   Problem Relation Age of Onset     Arthritis Mother      Cancer Maternal Grandmother      Arthritis Maternal Grandmother         lupus     Gastrointestinal Disease Maternal Grandfather         war injury, colostomy     Respiratory Daughter         growing out of it     Unknown/Adopted Father      Diabetes No family hx of      Coronary Artery Disease No family hx of      Hypertension No family hx of      Hyperlipidemia No family hx of      Breast Cancer No family hx of      Cancer - colorectal No family hx of      Ovarian Cancer No family hx of      Prostate Cancer No family hx of        REVIEW OF SYSTEMS:  Skin:  Negative     Eyes:  Negative    ENT:  Negative    Respiratory:  Positive for dyspnea on exertion;shortness of breath  Cardiovascular:    Positive for;chest pain;lightheadedness  Gastroenterology: Negative    Genitourinary:  Negative    Musculoskeletal:  Negative    Neurologic:  Positive for numbness or tingling of hands  Psychiatric:  Positive for sleep disturbances  Heme/Lymph/Imm:  Negative    Endocrine:  Positive for diabetes      PHYSICAL EXAM:  Temp: 98  F (36.7  C) Temp src: Tympanic BP: 113/76 Pulse: 95     SpO2: 98 %      Vital Signs with Ranges  Temp:  [98  F (36.7  C)] 98  F (36.7  C)  Pulse:  [95] 95  BP: (113)/(76) 113/76  SpO2:  [98 %] 98 %  204 lbs 12.8 oz    Constitutional: awake, alert, no distress  Respiratory: Clear to auscultation  Cardiovascular: Normal heart sounds normal JVP no edema  GI: nondistended  Neuropsychiatric: appropriate affact    DATA:  Labs: Pertinent cardiac labs reviewed    Echocardiogram: Normal LV function    ASSESSMENT:  Severe premature coronary artery disease status post bypass surgery with LIMA to diagonal LAD sequential graft, LOUIS to ramus and vein graft to the PDA in June 2021  Uncontrolled diabetes last hemoglobin A1c measuring 11  Hyperlipidemia last LDL measuring 195  Prior history of smoking  Controlled  hypertension    RECOMMENDATIONS:  1. The patient is doing well from a cardiac standpoint without any new cardiovascular symptoms.  2. At this time aggressive secondary risk factor modification is required.  3. I recommend continuing dual antiplatelet therapy until December and after that he could stop Plavix and continue aspirin monotherapy for the rest of his life.  4. He is complaining of myalgias in his shoulders and back which is worse with movement.  This has been getting worse slowly.  This may be statin induced.  I recommend cutting down the dose of atorvastatin from 80 mg down to 40 mg and see how he feels.  5. If his pain persists he is going to call our clinic in about 2 to 3 weeks.  If that happens, we may give him a statin holiday to see if that is truly the reason for his symptoms.  6. Otherwise I will plan on seeing him back in about 4 months with an echocardiogram and labs.  Assuming he is doing okay then we will space out visits.  7. I have advised him healthy diet which is low in carbohydrates and saturated fats.  Healthy lifestyle exercise and call us with any change in clinical symptoms.    VIKI Chirinos, [facility]  Cardiology - [facility]  July 28, 2021            Thank you for allowing me to participate in the care of your patient.      Sincerely,     Drake Montiel MD     [facility]  cc:   John Day MD  [address] [place] [address]

## 2021-07-30 ENCOUNTER — VIRTUAL VISIT (OUTPATIENT)
Dept: EDUCATION SERVICES | Facility: CLINIC | Age: 51
End: 2021-07-30
Payer: COMMERCIAL

## 2021-07-30 DIAGNOSIS — E11.9 TYPE 2 DIABETES MELLITUS WITHOUT COMPLICATION, WITHOUT LONG-TERM CURRENT USE OF INSULIN (H): Primary | ICD-10-CM

## 2021-07-30 PROCEDURE — 98967 PH1 ASSMT&MGMT NQHP 11-20: CPT | Mod: 95

## 2021-07-30 NOTE — PROGRESS NOTES
Diabetes Follow-up    Subjective/Objective:  Type of Service: Telephone Visit  How would patient like to obtain AVS?  Reviewed verbally     Diabetes is being managed with   Lifestyle (diet/activity), Diabetes Medications   Diabetes Medication(s)     Insulin       insulin glargine (BASAGLAR KWIKPEN) 100 UNIT/ML pen    Inject 32 Units Subcutaneous 2 times daily     insulin lispro (HUMALOG KWIKPEN) 100 UNIT/ML (1 unit dial) KWIKPEN    Inject 18 Units Subcutaneous 3 times daily (before meals)          BG/Food Log:   Started the FreeStyle Stefany last week, has had some false lows.   Has done finger sticks with every low and it has been > 70, no signs/symptoms hypoglycemia.   Discussed sensor glucose versus blood glucose.          Assessment:  Reviewed more about the continuous glucose monitor, Ambulatory Glucose Profile, time in target and how to use it for pattern evaluation. Usually within 10 to 15mg/dl for accuracy, except when low (see how next sensor reads, sometimes they vary a little).   Reviewed time in target  - 70 to 160 65%  In this range (change to 70 to 180).  Currently on 18 units for each meal - likely work towards variable dosing to match meal size.   Recommend decreasing breakfast to 15 units due to drop without any post prandial rise.  Rise pre breakfast is coffee with a small amount of sugar / morning rise.    Plan/Response:  Decrease breakfast Humalog as an experiment to 15 units to see if this lessens the drop  Continue to add notes on food / activity / blood sugars etc.   Follow up in 4 weeks      Mary Orourke RD, LD, [facility]  Diabetes Education  Time spent: 20 minutes     Any diabetes medication dose changes were made via the CDE Protocol and Collaborative Practice Agreement with the patient's endocrinology provider. A copy of this encounter was shared with the provider.

## 2021-08-06 ENCOUNTER — HOSPITAL ENCOUNTER (OUTPATIENT)
Dept: CARDIAC REHAB | Facility: CLINIC | Age: 51
End: 2021-08-06
Attending: THORACIC SURGERY (CARDIOTHORACIC VASCULAR SURGERY)
Payer: COMMERCIAL

## 2021-08-06 PROCEDURE — 93798 PHYS/QHP OP CAR RHAB W/ECG: CPT

## 2021-08-23 ENCOUNTER — VIRTUAL VISIT (OUTPATIENT)
Dept: EDUCATION SERVICES | Facility: CLINIC | Age: 51
End: 2021-08-23
Payer: COMMERCIAL

## 2021-08-23 DIAGNOSIS — Z53.9 NO SHOW: Primary | ICD-10-CM

## 2021-08-23 NOTE — PROGRESS NOTES
Called out to Houston Winters 2:02 PM 8/23/2021 to review blood sugars. Voicemail left requesting return phone call to update diabetes education, [phone].    Mary Orourke RD, LD, [facility]  Diabetes Education

## 2021-09-02 ENCOUNTER — HOSPITAL ENCOUNTER (EMERGENCY)
Facility: CLINIC | Age: 51
Discharge: HOME | End: 2021-09-02
Attending: EMERGENCY MEDICINE | Admitting: EMERGENCY MEDICINE
Payer: COMMERCIAL

## 2021-09-02 ENCOUNTER — NURSE TRIAGE (OUTPATIENT)
Dept: FAMILY MEDICINE | Facility: CLINIC | Age: 51
End: 2021-09-02

## 2021-09-02 ENCOUNTER — APPOINTMENT (OUTPATIENT)
Dept: GENERAL RADIOLOGY | Facility: CLINIC | Age: 51
End: 2021-09-02
Attending: EMERGENCY MEDICINE
Payer: COMMERCIAL

## 2021-09-02 VITALS
TEMPERATURE: 98.1 F | HEIGHT: 69 IN | RESPIRATION RATE: 23 BRPM | WEIGHT: 210 LBS | OXYGEN SATURATION: 99 % | SYSTOLIC BLOOD PRESSURE: 132 MMHG | BODY MASS INDEX: 31.1 KG/M2 | DIASTOLIC BLOOD PRESSURE: 80 MMHG | HEART RATE: 80 BPM

## 2021-09-02 DIAGNOSIS — Z95.1 S/P CABG (CORONARY ARTERY BYPASS GRAFT): ICD-10-CM

## 2021-09-02 DIAGNOSIS — I10 HYPERTENSION, GOAL BELOW 140/90: ICD-10-CM

## 2021-09-02 DIAGNOSIS — M25.512 ACUTE PAIN OF LEFT SHOULDER: ICD-10-CM

## 2021-09-02 DIAGNOSIS — R07.9 CHEST PAIN, UNSPECIFIED TYPE: ICD-10-CM

## 2021-09-02 DIAGNOSIS — E11.9 TYPE 2 DIABETES MELLITUS WITHOUT COMPLICATION, WITHOUT LONG-TERM CURRENT USE OF INSULIN (H): ICD-10-CM

## 2021-09-02 LAB
ALBUMIN SERPL-MCNC: 3.6 G/DL (ref 3.4–5)
ALP SERPL-CCNC: 76 U/L (ref 40–150)
ALT SERPL W P-5'-P-CCNC: 19 U/L (ref 0–70)
ANION GAP SERPL CALCULATED.3IONS-SCNC: 6 MMOL/L (ref 3–14)
AST SERPL W P-5'-P-CCNC: 8 U/L (ref 0–45)
BASOPHILS # BLD AUTO: 0 10E3/UL (ref 0–0.2)
BASOPHILS NFR BLD AUTO: 1 %
BILIRUB SERPL-MCNC: 0.3 MG/DL (ref 0.2–1.3)
BUN SERPL-MCNC: 17 MG/DL (ref 7–30)
CALCIUM SERPL-MCNC: 8.4 MG/DL (ref 8.5–10.1)
CHLORIDE BLD-SCNC: 108 MMOL/L (ref 94–109)
CO2 SERPL-SCNC: 25 MMOL/L (ref 20–32)
CREAT SERPL-MCNC: 0.7 MG/DL (ref 0.66–1.25)
EOSINOPHIL # BLD AUTO: 0.2 10E3/UL (ref 0–0.7)
EOSINOPHIL NFR BLD AUTO: 3 %
ERYTHROCYTE [DISTWIDTH] IN BLOOD BY AUTOMATED COUNT: 13.9 % (ref 10–15)
GFR SERPL CREATININE-BSD FRML MDRD: >90 ML/MIN/1.73M2
GLUCOSE BLD-MCNC: 163 MG/DL (ref 70–99)
HCT VFR BLD AUTO: 42.6 % (ref 40–53)
HGB BLD-MCNC: 13.9 G/DL (ref 13.3–17.7)
HOLD SPECIMEN: NORMAL
IMM GRANULOCYTES # BLD: 0 10E3/UL
IMM GRANULOCYTES NFR BLD: 0 %
LYMPHOCYTES # BLD AUTO: 1.7 10E3/UL (ref 0.8–5.3)
LYMPHOCYTES NFR BLD AUTO: 29 %
MCH RBC QN AUTO: 29.4 PG (ref 26.5–33)
MCHC RBC AUTO-ENTMCNC: 32.6 G/DL (ref 31.5–36.5)
MCV RBC AUTO: 90 FL (ref 78–100)
MONOCYTES # BLD AUTO: 0.4 10E3/UL (ref 0–1.3)
MONOCYTES NFR BLD AUTO: 7 %
NEUTROPHILS # BLD AUTO: 3.6 10E3/UL (ref 1.6–8.3)
NEUTROPHILS NFR BLD AUTO: 60 %
NRBC # BLD AUTO: 0 10E3/UL
NRBC BLD AUTO-RTO: 0 /100
PLATELET # BLD AUTO: 214 10E3/UL (ref 150–450)
POTASSIUM BLD-SCNC: 4 MMOL/L (ref 3.4–5.3)
PROT SERPL-MCNC: 7.5 G/DL (ref 6.8–8.8)
RBC # BLD AUTO: 4.73 10E6/UL (ref 4.4–5.9)
SODIUM SERPL-SCNC: 139 MMOL/L (ref 133–144)
TROPONIN I SERPL-MCNC: <0.015 UG/L (ref 0–0.04)
TROPONIN I SERPL-MCNC: <0.015 UG/L (ref 0–0.04)
WBC # BLD AUTO: 6 10E3/UL (ref 4–11)

## 2021-09-02 PROCEDURE — 36415 COLL VENOUS BLD VENIPUNCTURE: CPT | Performed by: EMERGENCY MEDICINE

## 2021-09-02 PROCEDURE — 93308 TTE F-UP OR LMTD: CPT | Performed by: EMERGENCY MEDICINE

## 2021-09-02 PROCEDURE — 84484 ASSAY OF TROPONIN QUANT: CPT | Performed by: EMERGENCY MEDICINE

## 2021-09-02 PROCEDURE — 99285 EMERGENCY DEPT VISIT HI MDM: CPT | Mod: 25 | Performed by: EMERGENCY MEDICINE

## 2021-09-02 PROCEDURE — 93308 TTE F-UP OR LMTD: CPT | Mod: 26 | Performed by: EMERGENCY MEDICINE

## 2021-09-02 PROCEDURE — 80053 COMPREHEN METABOLIC PANEL: CPT | Performed by: EMERGENCY MEDICINE

## 2021-09-02 PROCEDURE — 85025 COMPLETE CBC W/AUTO DIFF WBC: CPT | Performed by: EMERGENCY MEDICINE

## 2021-09-02 PROCEDURE — 93010 ELECTROCARDIOGRAM REPORT: CPT | Performed by: EMERGENCY MEDICINE

## 2021-09-02 PROCEDURE — 71046 X-RAY EXAM CHEST 2 VIEWS: CPT

## 2021-09-02 PROCEDURE — 93005 ELECTROCARDIOGRAM TRACING: CPT | Performed by: EMERGENCY MEDICINE

## 2021-09-02 RX ORDER — OXYCODONE HYDROCHLORIDE 5 MG/1
5 TABLET ORAL EVERY 6 HOURS PRN
Qty: 12 TABLET | Refills: 0 | Status: SHIPPED | OUTPATIENT
Start: 2021-09-02 | End: 2021-10-29

## 2021-09-02 ASSESSMENT — MIFFLIN-ST. JEOR: SCORE: 1802.93

## 2021-09-02 NOTE — ED PROVIDER NOTES
History     Chief Complaint   Patient presents with     Chest Pain     HPI  Houston Winters is a 51 year old male with history significant for hypertension, diabetes, hyperlipidemia, coronary artery disease status post CABG x3 on 6/16//21 who presents emergency department for evaluation of chest and shoulder pain. Patient has had intermittent chest pain on the left side and right side of his chest which last for 1 to 2 minutes.  Pain has been present over the past month.  Was having 1-2 episodes per day and more recently is having 1 to 2/h.  Not associated dyspnea, nausea, or vomiting.  Pain is nonexertional.  Happens at any time and no known provocative or palliating features.  Reports this pain does not feel similar to pain he had previously before his CABG.  He is also having pain in his left posterior shoulder.  This is unrelated to the pain in his left and right anterior chest.  It occurs independently.  Does not report pain with moving his arms.  Says his pain is becoming worse and interferes with him sleeping.  Says it is hard for him to lie down and find a comfortable position.  Is more comfortable sitting up.  No known injury.  Is otherwise feeling well.  No fevers, chills, cough, sore throat, headache, abdominal pain, nausea, vomiting or diarrhea.  No lower extremity edema.    Chart review shows that patient was seen here on 5/18/2021 and concern for unstable angina.  Patient was transferred to [facility] where he will underwent angiogram, results detailed below.  Operative management recommended.  Patient not agreeable to plan wanted second opinion.  Patient left hospital ultimately followed up with the [facility].        The patient's PMHx, Surgical Hx, Allergies, and Medications were all reviewed with the patient.    Coronary Angiogram 5/18/2021  1.  Severe multivessel coronary artery disease in a diabetic patient as detailed above.  Consideration of surgical revascularization with:                 - LIMA- LAD and vein grafts to rPDA, OM1/2, Diagonal  2.  Normal LVEDP, 9 mmHg.  No significant gradient on pullback across the aortic valve.    ECHO 5/18/2021  Interpretation Summary     1. The left ventricle is normal in size. The visual ejection fraction is  estimated at 55%. Very mild hypokinesis of the distal lateral wall. Mild basal  inferior hypokinesis on some views.  2. The right ventricle is normal in structure, function and size.  3. No valve disease.     No previous echo for comparison.    Allergies:  Allergies   Allergen Reactions     Metformin Other (See Comments)     Very low blood sugars    Blood sugar issue        Problem List:    Patient Active Problem List    Diagnosis Date Noted     Unstable angina (H) 05/18/2021     Priority: Medium     Postoperative infection, initial encounter 01/28/2018     Priority: Medium     Small bowel obstruction (H) 12/29/2017     Priority: Medium     Type 2 diabetes mellitus without complication, without long-term current use of insulin (H) 03/27/2017     Priority: Medium     Tobacco use disorder 02/04/2015     Priority: Medium     Hypertension, goal below 140/90 02/04/2015     Priority: Medium     Obesity 07/17/2014     Priority: Medium     Hyperlipidemia with target LDL less than 100 07/17/2014     Priority: Medium     Diagnosis updated by automated process. Provider to review and confirm.       Partial epilepsy (H) 03/12/2009     Priority: Medium     (Problem list name updated by automated process. Provider to review and confirm.)       ED (erectile dysfunction) 12/18/2008     Priority: Medium     GANGLOIN CYST /RIGHT ANKLE 06/15/2006     Priority: Medium        Past Medical History:    Past Medical History:   Diagnosis Date     DM (diabetes mellitus) (H)      Epilepsy (H)      MEDICAL HISTORY OF - Age 16      Nicotine dependence        Past Surgical History:    Past Surgical History:   Procedure Laterality Date     CL AFF SURGICAL PATHOLOGY  2005    ganglion  cyst removed     CV HEART CATHETERIZATION WITH POSSIBLE INTERVENTION N/A 2021    Procedure: Heart Catheterization with Possible Intervention;  Surgeon: Andrew Madrid MD;  Location:  HEART CARDIAC CATH LAB     CV LEFT HEART CATH N/A 2021    Procedure: Left Heart Cath;  Surgeon: Andrew Madrid MD;  Location:  HEART CARDIAC CATH LAB     EXCISE MASS LOWER EXTREMITY  2/15/2013    Procedure: EXCISE MASS LOWER EXTREMITY;  Right Ankle Excision of ganglion cyst;  Surgeon: Akbar Melo DPM;  Location: [facility] OR     HERNIA REPAIR      umbilical repair     HERNIORRHAPHY UMBILICAL N/A 11/10/2015    Procedure: HERNIORRHAPHY UMBILICAL;  Surgeon: Garry Leos MD;  Location: [facility] OR       Family History:    Family History   Problem Relation Age of Onset     Arthritis Mother      Cancer Maternal Grandmother      Arthritis Maternal Grandmother         lupus     Gastrointestinal Disease Maternal Grandfather         war injury, colostomy     Respiratory Daughter         growing out of it     Unknown/Adopted Father      Diabetes No family hx of      Coronary Artery Disease No family hx of      Hypertension No family hx of      Hyperlipidemia No family hx of      Breast Cancer No family hx of      Cancer - colorectal No family hx of      Ovarian Cancer No family hx of      Prostate Cancer No family hx of        Social History:  Marital Status:   [2]  Social History     Tobacco Use     Smoking status: Former Smoker     Packs/day: 0.25     Years: 25.00     Pack years: 6.25     Types: Cigarettes     Quit date: 2016     Years since quittin.6     Smokeless tobacco: Former User   Substance Use Topics     Alcohol use: Yes     Alcohol/week: 0.0 standard drinks     Comment: mixed 2-4 on weekends, not every weekend     Drug use: No        Medications:    oxyCODONE (ROXICODONE) 5 MG tablet  alcohol swab prep pads  aspirin (ASA) 81 MG chewable tablet  atorvastatin (LIPITOR) 80 MG tablet  blood glucose (NO  "BRAND SPECIFIED) test strip  blood glucose calibration (NO BRAND SPECIFIED) solution  blood glucose monitoring (NO BRAND SPECIFIED) meter device kit  clopidogrel (PLAVIX) 75 MG tablet  Continuous Blood Gluc Sensor (FREESTYLE MARY 14 DAY SENSOR) MISC  insulin glargine (BASAGLAR KWIKPEN) 100 UNIT/ML pen  insulin lispro (HUMALOG KWIKPEN) 100 UNIT/ML (1 unit dial) KWIKPEN  isosorbide mononitrate (IMDUR) 30 MG 24 hr tablet  lisinopril (ZESTRIL) 2.5 MG tablet  metoprolol tartrate (LOPRESSOR) 25 MG tablet  nitroGLYcerin (NITROSTAT) 0.4 MG sublingual tablet  oxyCODONE (ROXICODONE) 5 MG tablet  sennosides (SENOKOT) 8.6 MG tablet  thin (NO BRAND SPECIFIED) lancets          Review of Systems   A complete review of systems performed and is otherwise negative except as noted in the HPI.      Physical Exam   BP: (!) 151/125  Pulse: 106  Temp: 98.1  F (36.7  C)  Resp: 20  Height: 175.3 cm (5' 9\")  Weight: 95.3 kg (210 lb)  SpO2: 97 %    Physical Exam  GEN: Awake, alert, and cooperative.  Appears mildly distressed.  Sitting upright on cart.  HENT: MMM. External ears and nose normal bilaterally.  EYES: EOM intact. Conjunctiva clear. No discharge.   NECK: Symmetric, freely mobile.  No JVD  CV : Regular rate and rhythm.  No cardiac murmurs appreciated  CHEST: well healing midline sternotomy scar consistent with surgical history.   PULM: Normal effort. No wheezes, rales, or rhonchi bilaterally.  ABD: Soft, non-tender, non-distended. No rebound or guarding.   NEURO: Normal speech. Following commands. CN II-XII grossly intact. Answering questions and interacting appropriately.   EXT: no shoulder discomfort with passive range of motion. Tenderness to palpation medial to left scapula. No ecchymosis, crepitus, erythema.  No pitting pedal or pretibial edema.   INT: Warm. No diaphoresis. Normal color.        ED Course        Procedures       EKG: sinus rhythm with tachycardic rate of 102. Incomplete RBBB. TWI V2 however preceding P wave " appears biphasic which could indicate lead misplacement. No ectopy. No significant change compared to 5/18/21. No ectopy.   Sinus tachycardia with non specific changes    Critical Care time:  none              POC US ECHO LIMITED    Impression  [facility] Procedure Note    Limited Bedside ED Cardiac Ultrasound:    PROCEDURE: PERFORMED BY: Dr. Bobo Contreras MD  INDICATIONS/SYMPTOM:  Chest Pain  PROBE: Cardiac phased array probe  BODY LOCATION: Chest  FINDINGS:  The ultrasound was performed utilizing the subcostal, parasternal long axis, parasternal short axis and apical 4 chamber views.  Cardiac contractility:  Present  Gross estimation of cardiac kinesis: normal  Pericardial Effusion:  None  RV:LV ratio: LV > RV    INTERPRETATION:    Chamber size and motion were grossly normal with LV > RV, normal cardiac kinesis.  No pericardial effusion was found.    IMAGE DOCUMENTATION: Images were archived to PACs system.      Results for orders placed or performed during the hospital encounter of 09/02/21 (from the past 24 hour(s))   [place] Draw    Narrative    The following orders were created for panel order [place] Draw.  Procedure                               Abnormality         Status                     ---------                               -----------         ------                     Extra Blue Top Tube[[id number]]                              Final result               Extra Red Top Tube[[id number]]                               Final result               Extra Green Top (Lithium...[[id number]]                      Final result               Extra Purple Top Tube[[id number]]                            Final result                 Please view results for these tests on the individual orders.   CBC with platelets, differential    Narrative    The following orders were created for panel order CBC with platelets, differential.  Procedure                               Abnormality         Status                      ---------                               -----------         ------                     CBC with platelets and d...[[id number]]                      Final result                 Please view results for these tests on the individual orders.   Comprehensive metabolic panel   Result Value Ref Range    Sodium 139 133 - 144 mmol/L    Potassium 4.0 3.4 - 5.3 mmol/L    Chloride 108 94 - 109 mmol/L    Carbon Dioxide (CO2) 25 20 - 32 mmol/L    Anion Gap 6 3 - 14 mmol/L    Urea Nitrogen 17 7 - 30 mg/dL    Creatinine 0.70 0.66 - 1.25 mg/dL    Calcium 8.4 (L) 8.5 - 10.1 mg/dL    Glucose 163 (H) 70 - 99 mg/dL    Alkaline Phosphatase 76 40 - 150 U/L    AST 8 0 - 45 U/L    ALT 19 0 - 70 U/L    Protein Total 7.5 6.8 - 8.8 g/dL    Albumin 3.6 3.4 - 5.0 g/dL    Bilirubin Total 0.3 0.2 - 1.3 mg/dL    GFR Estimate >90 >60 mL/min/1.73m2   Troponin I   Result Value Ref Range    Troponin I <0.015 0.000 - 0.045 ug/L   Extra Blue Top Tube   Result Value Ref Range    Hold Specimen JIC    Extra Red Top Tube   Result Value Ref Range    Hold Specimen JIC    Extra Green Top (Lithium Heparin) Tube   Result Value Ref Range    Hold Specimen JIC    Extra Purple Top Tube   Result Value Ref Range    Hold Specimen JIC    CBC with platelets and differential   Result Value Ref Range    WBC Count 6.0 4.0 - 11.0 10e3/uL    RBC Count 4.73 4.40 - 5.90 10e6/uL    Hemoglobin 13.9 13.3 - 17.7 g/dL    Hematocrit 42.6 40.0 - 53.0 %    MCV 90 78 - 100 fL    MCH 29.4 26.5 - 33.0 pg    MCHC 32.6 31.5 - 36.5 g/dL    RDW 13.9 10.0 - 15.0 %    Platelet Count 214 150 - 450 10e3/uL    % Neutrophils 60 %    % Lymphocytes 29 %    % Monocytes 7 %    % Eosinophils 3 %    % Basophils 1 %    % Immature Granulocytes 0 %    NRBCs per 100 WBC 0 <1 /100    Absolute Neutrophils 3.6 1.6 - 8.3 10e3/uL    Absolute Lymphocytes 1.7 0.8 - 5.3 10e3/uL    Absolute Monocytes 0.4 0.0 - 1.3 10e3/uL    Absolute Eosinophils 0.2 0.0 - 0.7 10e3/uL    Absolute Basophils 0.0 0.0 - 0.2 10e3/uL     Absolute Immature Granulocytes 0.0 <=0.0 10e3/uL    Absolute NRBCs 0.0 10e3/uL   Troponin I (second draw)   Result Value Ref Range    Troponin I <0.015 0.000 - 0.045 ug/L   Chest XR,  PA & LAT    Narrative    CHEST TWO VIEWS 9/2/2021 2:20 PM     HISTORY: Intermittent chest pain on right and left anterior chest.  Three months status post coronary artery bypass graft.    COMPARISON: May 18, 2021       Impression    IMPRESSION:  There are no acute infiltrates. The cardiac silhouette is  not enlarged. Pulmonary vasculature is unremarkable.     DARREL GARG MD         SYSTEM ID:  [id number]       Medications - No data to display    Assessments & Plan (with Medical Decision Making)   51 year old male with past medical history significant for CAD s/p CABG x 3 on 6/16/21 with nonexertional, intermittent chest pain and left shoulder pain with increasing frequency over the past month.  ECG without acute ischemic changes. There is T wave inversion lead V2 however preceding P wave is biphasic which is suggestive of lead misplacement.  Troponin below level of detection x2.  Point-of-care ultrasound grossly normal.  Chest x-ray unremarkable.     Shoulder pain seems to be position and is reproducible. It is also position and changes with body position. He is having difficulty sleeping. Does not seem to be related to chest pain. Sternotomy seems to be healing well and no signs of infection. 6 roxicodone for pain control.     Symptom could be related to unstable angina but I feel this is less likely. Given his history observation in hospital recommended. I discussed the case with Dr. Pavon with cardiology at [facility] who recommended observation at [facility] with stress testing in morning.  Patient declined admission.  He was able to express to me his understanding that ACS cannot be ruled out and may have bad outcome including death.  He would prefer to follow-up with his cardiologist at the [facility].    I have  reviewed the nursing notes.         Discharge Medication List as of 9/2/2021  3:11 PM      START taking these medications    Details   !! oxyCODONE (ROXICODONE) 5 MG tablet Take 1 tablet (5 mg) by mouth every 6 hours as needed for severe pain, Disp-12 tablet, R-0, Local Print       !! - Potential duplicate medications found. Please discuss with provider.          Final diagnoses:   Chest pain, unspecified type   S/P CABG (coronary artery bypass graft)   Type 2 diabetes mellitus without complication, without long-term current use of insulin (H)   Hypertension, goal below 140/90   Acute pain of left shoulder     Bobo Contreras MD    9/2/2021   [facility] EMERGENCY DEPT    Disclaimer: This note consists of words and symbols derived from keyboarding and dictation using voice recognition software.  As a result, there may be errors that have gone undetected.  Please consider this when interpreting information found in this note.             Bobo Contreras MD  09/03/21 [id number]

## 2021-09-02 NOTE — DISCHARGE INSTRUCTIONS
Your evaluation the emergency department for chest and shoulder pain.  We recommended admission to the hospital for further testing.  You chose to be discharged from the emergency department with plans to follow-up with your cardiologist at the [facility].    If you have  worsening pain, increasing frequency of pain, shortness of breath, any symptoms similar to your prior coronary event, or other new symptoms you find concerning, you should return to the emergency department immediately.    For your shoulder pain, you may take Roxicodone as directed.  Do not drive a vehicle or operate machinery while taking this medication.  I would recommend that you take it in the evening when you are having trouble sleeping due to your shoulder pain.

## 2021-09-02 NOTE — TELEPHONE ENCOUNTER
"Pt calling with sx of continued chest pain. Had CABG 6/16 & states has had pain \"the whole time\" since surg & that  pain getting worse over time. States he saw the cardiologist 1month ago & was told to reduce his statin to 1/2 dose x 2 weeks & if that did not help with the pain to stop the statin completely. Pt thinks he stopped the atorvastatin around 8/20.  Pt describes pain as intermittent stabbing, jabbing pain thru muscle to both sides of chest at the nipple line. States this pain wraps around to back & under bilat shoulder blades. States has intermittent pressure in bilat shoulders \"like someone is standing on my shoulders.\" states has constant burning sensation in center of chest at incision site, \"sensitive to touch or when shower water hits this area.\"  denies nausea, SOA, dizziness, jaw pain, arm pain.  Pt states he went to cardiac rehab a few times but prefers to go to the gym to walk, do stairs & ride bike. Pt has lifting limit of 8-10#. Pt states he is following recommendations.     Pt advised to go to the ER. Pt verbalized understanding & is agreeable to plan.     Darshana Welch RN      Reason for Disposition    Pain also in shoulder(s) or arm(s) or jaw     Pain to shoulder blades    Chest pain lasting longer than 5 minutes and occurred in last 3 days (72 hours) (Exception: feels exactly the same as previously diagnosed heartburn and has accompanying sour taste in mouth)    Answer Assessment - Initial Assessment Questions  1. LOCATION: \"Where does it hurt?\"        \"stabbing, jabbing pain thru muscle both sides of chest at nipple level\" pt   2. RADIATION: \"Does the pain go anywhere else?\" (e.g., into neck, jaw, arms, back)      Pt states pain wraps around to back & under shoulder blades  3. ONSET: \"When did the chest pain begin?\" (Minutes, hours or days)       Has had this pain since CABG on 6/16 & states pain has gotten worse over time  4. PATTERN \"Does the pain come and go, or has it been constant since " "it started?\"  \"Does it get worse with exertion?\"       States has constant pain in center of chest \"at incision site\" & describes as burning sensation. States hurts to touch or when shower water hits this area. The shoulder pain is an \"annoyance\". Cant get comfortable, moves around a lot.   5. DURATION: \"How long does it last\" (e.g., seconds, minutes, hours)      Several minutes  6. SEVERITY: \"How bad is the pain?\"  (e.g., Scale 1-10; mild, moderate, or severe)     - MILD (1-3): doesn't interfere with normal activities      - MODERATE (4-7): interferes with normal activities or awakens from sleep     - SEVERE (8-10): excruciating pain, unable to do any normal activities        States moderate pain, has trouble getting comfortable. Taking \"a lot of tylenol-12 per day\"  7. CARDIAC RISK FACTORS: \"Do you have any history of heart problems or risk factors for heart disease?\" (e.g., angina, prior heart attack; diabetes, high blood pressure, high cholesterol, smoker, or strong family history of heart disease)      Had CABG on 6/16  8. PULMONARY RISK FACTORS: \"Do you have any history of lung disease?\"  (e.g., blood clots in lung, asthma, emphysema, birth control pills)      no  9. CAUSE: \"What do you think is causing the chest pain?\"      Pt states he feels like this is more of the incision  10. OTHER SYMPTOMS: \"Do you have any other symptoms?\" (e.g., dizziness, nausea, vomiting, sweating, fever, difficulty breathing, cough)        no  11. PREGNANCY: \"Is there any chance you are pregnant?\" \"When was your last menstrual period?\"        n/a    Protocols used: CHEST PAIN-A-[place]      "

## 2021-09-02 NOTE — ED TRIAGE NOTES
Patient had a bypass on June 16 th of this year. He has had transient chest pain since. He is here today for chest pain  And left shoulder pain

## 2021-09-02 NOTE — ED NOTES
"intermittent chest pressure since open CABG x4 in June  Today while watching TV developed midsternal chest pressure, states is different, more of a stabbing pain radiating into his left shoulder. \"like it's on fire\"  No dyspnea, nausea, diaphoresis, or light-headedness associated with this  Taking tylenol 1500mg Q6H around the clock for the pain with no relief   Takes aspirin and plavix  Pt is A&O x4, NAD  BG is 154  Requesting A1C check, will defer to ED MD  "

## 2021-09-18 ENCOUNTER — HEALTH MAINTENANCE LETTER (OUTPATIENT)
Age: 51
End: 2021-09-18

## 2021-10-12 ENCOUNTER — VIRTUAL VISIT (OUTPATIENT)
Dept: EDUCATION SERVICES | Facility: CLINIC | Age: 51
End: 2021-10-12
Payer: COMMERCIAL

## 2021-10-12 DIAGNOSIS — E11.9 TYPE 2 DIABETES MELLITUS WITHOUT COMPLICATION, WITHOUT LONG-TERM CURRENT USE OF INSULIN (H): ICD-10-CM

## 2021-10-12 PROCEDURE — G0108 DIAB MANAGE TRN  PER INDIV: HCPCS | Mod: 95

## 2021-10-12 RX ORDER — INSULIN GLARGINE 100 [IU]/ML
INJECTION, SOLUTION SUBCUTANEOUS
Qty: 75 ML | Refills: 3 | Status: SHIPPED | OUTPATIENT
Start: 2021-10-12 | End: 2022-05-25

## 2021-10-12 RX ORDER — INSULIN LISPRO 100 [IU]/ML
18 INJECTION, SOLUTION INTRAVENOUS; SUBCUTANEOUS
Qty: 48.6 ML | Refills: 0 | Status: SHIPPED | OUTPATIENT
Start: 2021-10-12 | End: 2022-03-25

## 2021-10-12 NOTE — PROGRESS NOTES
"Diabetes Self-Management Education & Support    Presents for: CGM Review.  Type of Service: Telephone Visit    SUBJECTIVE/OBJECTIVE:  Presents for: CGM Review  Accompanied by: Self  Diabetes education in the past 24mo: Yes  Diabetes type: Type 2  Disease course: Improving  Other concerns:: None  Cultural Influences/Ethnic Background:  American    Diabetes Symptoms & Complications:  Not assessed at this visit      Patient Problem List and Family Medical History reviewed for relevant medical history, current medical status, and diabetes risk factors.    Vitals:  There were no vitals taken for this visit.  Estimated body mass index is 31.01 kg/m  as calculated from the following:    Height as of 9/2/21: 1.753 m (5' 9\").    Weight as of 9/2/21: 95.3 kg (210 lb).   Last 3 BP:   BP Readings from Last 3 Encounters:   09/02/21 132/80   07/28/21 113/76   07/15/21 106/70       History   Smoking Status     Former Smoker     Packs/day: 0.25     Years: 25.00     Types: Cigarettes     Quit date: 1/18/2016   Smokeless Tobacco     Former User       Labs:  Lab Results   Component Value Date    A1C 11.6 05/18/2021     Lab Results   Component Value Date     09/02/2021     05/19/2021     Lab Results   Component Value Date     05/18/2021     HDL Cholesterol   Date Value Ref Range Status   05/18/2021 49 >39 mg/dL Final   ]  GFR Estimate   Date Value Ref Range Status   09/02/2021 >90 >60 mL/min/1.73m2 Final     Comment:     As of July 11, 2021, eGFR is calculated by the CKD-EPI creatinine equation, without race adjustment. eGFR can be influenced by muscle mass, exercise, and diet. The reported eGFR is an estimation only and is only applicable if the renal function is stable.   05/19/2021 >90 >60 mL/min/[1.73_m2] Final     Comment:     Non  GFR Calc  Starting 12/18/2018, serum creatinine based estimated GFR (eGFR) will be   calculated using the Chronic Kidney Disease Epidemiology Collaboration "   (CKD-EPI) equation.       GFR Estimate If Black   Date Value Ref Range Status   05/19/2021 >90 >60 mL/min/[1.73_m2] Final     Comment:      GFR Calc  Starting 12/18/2018, serum creatinine based estimated GFR (eGFR) will be   calculated using the Chronic Kidney Disease Epidemiology Collaboration   (CKD-EPI) equation.       Lab Results   Component Value Date    CR 0.70 09/02/2021    CR 0.63 05/19/2021     No results found for: MICROALBUMIN    Healthy Eating:  Healthy Eating Assessed Today: Yes    Being Active:  Being Active Assessed Today: Yes    Monitoring:  Monitoring Assessed Today: Yes  Times checking blood sugar at home (number): 5+  Times checking blood sugar at home (per): Day            Taking Medications:  Diabetes Medication(s)     Insulin       insulin glargine (BASAGLAR KWIKPEN) 100 UNIT/ML pen    Inject 32 Units Subcutaneous 2 times daily     insulin lispro (HUMALOG KWIKPEN) 100 UNIT/ML (1 unit dial) KWIKPEN    Inject 18 Units Subcutaneous 3 times daily (before meals)          Taking Medication Assessed Today: Yes    Problem Solving:  Hypoglycemia treatment     Reducing Risks:  Reducing Risks Assessed Today: No    Healthy Coping:  Healthy Coping Assessed Today: Yes  Emotional response to diabetes: Ready to learn  Stage of change: MAINTENANCE (Working to maintain change, with risk of relapse)  Patient Activation Measure Survey Score:  REY Score (Last Two) 10/4/2012   REY Raw Score 39   Activation Score 56.4   REY Level 3     Diabetes knowledge and skills assessment:   Patient is knowledgeable in diabetes management concepts related to: Healthy Eating, Being Active, Monitoring, Taking Medication, Problem Solving, Reducing Risks and Healthy Coping  Continue education on the following diabetes management concepts: general review as needed  Based on learning assessment above, most appropriate setting for further diabetes education would be: Individual setting.    INTERVENTIONS:  Education  provided today on:  DERRELLE Self-Care Behaviors:  Diabetes Pathophysiology  Healthy Eating: balancing meal size and insulin   Being Active: relationship to blood glucose, describe appropriate activity program and precautions to take  Monitoring: continuous glucose monitor review   Taking Medication: action of prescribed medication  Problem Solving: high blood glucose - causes, signs/symptoms, treatment and prevention, low blood glucose - causes, signs/symptoms, treatment and prevention, carrying a carbohydrate source at all times and alcohol and insulin     Opportunities for ongoing education and support in diabetes-self management were discussed.  Pt verbalized understanding of concepts discussed and recommendations provided today.       ASSESSMENT:  Blood sugars well controlled with continued improvements with time in target.   No changes to insulin dosing today, recommend new A1c lab to be drawn - patient will go to [facility] to make an appointment.  Reviewed dosing; 10 to 16 units per meal depending on meal size and this adjustment range has been working well.  Going to the gym 3-4 times weekly and notes lower blood sugars on these days.  Lantus stable at 32-0-0-36.   Follow-up via [facility] after next A1c / PCP follow up.    Patient's most recent  is not meeting goal of <7.0   Lab Results   Component Value Date    A1C 11.6 05/18/2021    A1C 12.0 12/29/2017    A1C 12.0 04/08/2017    A1C 12.6 03/27/2017    A1C 8.2 02/02/2015       PLAN  New labs   [place] [facility] for communication and mike reviews    Mary Orourke RD, LD, [facility]  Diabetes Education    Time Spent: 30 minutes  Encounter Type: Individual

## 2021-10-13 ENCOUNTER — LAB (OUTPATIENT)
Dept: LAB | Facility: CLINIC | Age: 51
End: 2021-10-13
Payer: COMMERCIAL

## 2021-10-13 DIAGNOSIS — I25.10 CORONARY ARTERY DISEASE INVOLVING NATIVE CORONARY ARTERY OF NATIVE HEART WITHOUT ANGINA PECTORIS: ICD-10-CM

## 2021-10-13 DIAGNOSIS — E11.9 TYPE 2 DIABETES MELLITUS WITHOUT COMPLICATION, WITHOUT LONG-TERM CURRENT USE OF INSULIN (H): ICD-10-CM

## 2021-10-13 LAB
ANION GAP SERPL CALCULATED.3IONS-SCNC: 4 MMOL/L (ref 3–14)
BUN SERPL-MCNC: 18 MG/DL (ref 7–30)
CALCIUM SERPL-MCNC: 8.6 MG/DL (ref 8.5–10.1)
CHLORIDE BLD-SCNC: 106 MMOL/L (ref 94–109)
CO2 SERPL-SCNC: 28 MMOL/L (ref 20–32)
CREAT SERPL-MCNC: 0.99 MG/DL (ref 0.66–1.25)
GFR SERPL CREATININE-BSD FRML MDRD: 88 ML/MIN/1.73M2
GLUCOSE BLD-MCNC: 114 MG/DL (ref 70–99)
HBA1C MFR BLD: 6.9 % (ref 0–5.6)
POTASSIUM BLD-SCNC: 4.5 MMOL/L (ref 3.4–5.3)
SODIUM SERPL-SCNC: 138 MMOL/L (ref 133–144)

## 2021-10-13 PROCEDURE — 36415 COLL VENOUS BLD VENIPUNCTURE: CPT

## 2021-10-13 PROCEDURE — 83036 HEMOGLOBIN GLYCOSYLATED A1C: CPT

## 2021-10-13 PROCEDURE — 80048 BASIC METABOLIC PNL TOTAL CA: CPT

## 2021-10-29 ENCOUNTER — OFFICE VISIT (OUTPATIENT)
Dept: FAMILY MEDICINE | Facility: CLINIC | Age: 51
End: 2021-10-29
Payer: COMMERCIAL

## 2021-10-29 ENCOUNTER — ANCILLARY PROCEDURE (OUTPATIENT)
Dept: GENERAL RADIOLOGY | Facility: CLINIC | Age: 51
End: 2021-10-29
Attending: FAMILY MEDICINE
Payer: COMMERCIAL

## 2021-10-29 VITALS
OXYGEN SATURATION: 98 % | DIASTOLIC BLOOD PRESSURE: 80 MMHG | BODY MASS INDEX: 32.29 KG/M2 | SYSTOLIC BLOOD PRESSURE: 126 MMHG | TEMPERATURE: 99.6 F | HEART RATE: 98 BPM | HEIGHT: 69 IN | WEIGHT: 218 LBS

## 2021-10-29 DIAGNOSIS — N52.9 ERECTILE DYSFUNCTION, UNSPECIFIED ERECTILE DYSFUNCTION TYPE: ICD-10-CM

## 2021-10-29 DIAGNOSIS — R07.89 CHEST TIGHTNESS: ICD-10-CM

## 2021-10-29 DIAGNOSIS — G47.00 INSOMNIA, UNSPECIFIED TYPE: ICD-10-CM

## 2021-10-29 DIAGNOSIS — R61 EXCESSIVE SWEATING: ICD-10-CM

## 2021-10-29 DIAGNOSIS — Z12.12 SCREENING FOR MALIGNANT NEOPLASM OF THE RECTUM: ICD-10-CM

## 2021-10-29 DIAGNOSIS — E11.9 TYPE 2 DIABETES MELLITUS WITHOUT COMPLICATION, WITHOUT LONG-TERM CURRENT USE OF INSULIN (H): Primary | ICD-10-CM

## 2021-10-29 LAB — TSH SERPL DL<=0.005 MIU/L-ACNC: 1.56 MU/L (ref 0.4–4)

## 2021-10-29 PROCEDURE — 71046 X-RAY EXAM CHEST 2 VIEWS: CPT | Performed by: RADIOLOGY

## 2021-10-29 PROCEDURE — 36415 COLL VENOUS BLD VENIPUNCTURE: CPT | Performed by: FAMILY MEDICINE

## 2021-10-29 PROCEDURE — 99214 OFFICE O/P EST MOD 30 MIN: CPT | Performed by: FAMILY MEDICINE

## 2021-10-29 PROCEDURE — 84443 ASSAY THYROID STIM HORMONE: CPT | Performed by: FAMILY MEDICINE

## 2021-10-29 RX ORDER — TRAZODONE HYDROCHLORIDE 50 MG/1
25 TABLET, FILM COATED ORAL AT BEDTIME
Qty: 30 TABLET | Refills: 0 | Status: SHIPPED | OUTPATIENT
Start: 2021-10-29 | End: 2022-04-22 | Stop reason: SINTOL

## 2021-10-29 ASSESSMENT — ENCOUNTER SYMPTOMS
ENDOCRINE NEGATIVE: 1
SLEEP DISTURBANCE: 1
COUGH: 0
GASTROINTESTINAL NEGATIVE: 1
PALPITATIONS: 0
FATIGUE: 1
NUMBNESS: 1
HEMATOLOGIC/LYMPHATIC NEGATIVE: 1
EYES NEGATIVE: 1
MUSCULOSKELETAL NEGATIVE: 1
SHORTNESS OF BREATH: 0
ACTIVITY CHANGE: 1
CHEST TIGHTNESS: 1

## 2021-10-29 ASSESSMENT — MIFFLIN-ST. JEOR: SCORE: 1839.22

## 2021-10-29 NOTE — LETTER
October 29, 2021      [place]  [id number] [facility] [address]        Dear ,    We are writing to inform you of your test results.    Your test result was within normal parameters.    Resulted Orders   TSH with free T4 reflex   Result Value Ref Range    TSH 1.56 0.40 - 4.00 mU/L       If you have any questions or concerns, please call the clinic at the number listed above.       Sincerely,      John Day MD

## 2021-10-29 NOTE — PATIENT INSTRUCTIONS
Thank you for choosing [facility].  You may be receiving an email and/or telephone survey request from [facility] Customer Experience regarding your visit today.  Please take a few minutes to respond to the survey to let us know how we are doing.      If you have questions or concerns, please contact us via [employer] or you can contact your care team at [phone] option [phone].    Our Clinic hours are:  Monday - Thursday 7am-6pm  Friday 7am-5pm    The [place] outpatient lab hours are:  Monday - Friday 7am-4:30pm    Appointments are required, call [phone]    If you have clinical questions after hours or would like to schedule an appointment,  call the clinic at [phone].

## 2021-10-29 NOTE — RESULT ENCOUNTER NOTE
Please inform patient that test result was within normal parameters.   Thank you.     John Day M.D.

## 2021-10-29 NOTE — PROGRESS NOTES
"  Assessment & Plan     Type 2 diabetes mellitus without complication, without long-term current use of insulin (H)  A1C doing much better, patient follows with endocrinology and Diabetic education.     Erectile dysfunction, unspecified erectile dysfunction type  Due to his vast cardiac history , it may not be safe to start ED medication and there was a drug - drug interaction with the Isosorbide mononitrate. Recommend seeing Urology for other options for the ED  - Adult Urology Referral; Future    Screening for malignant neoplasm of the rectum  Patient due for colonoscopy.   - Adult Gastro Ref - Procedure Only; Future    Chest tightness  Chest x-rays were normal. Recommend that he continue to exercise.   - XR Chest 2 Views; Future    Excessive sweating  Patient will be notified of results.   - TSH with free T4 reflex    Insomnia, unspecified type  Medication faxed.   - traZODone (DESYREL) 50 MG tablet; Take 0.5 tablets (25 mg) by mouth At Bedtime    Review of prior external note(s) from [facility] information from [place] reviewed  Ordering of each unique test  Prescription drug management  25 minutes spent on the date of the encounter doing chart review, review of outside records, review of test results, interpretation of tests, patient visit and documentation        BMI:   Estimated body mass index is 32.19 kg/m  as calculated from the following:    Height as of this encounter: 1.753 m (5' 9\").    Weight as of this encounter: 98.9 kg (218 lb).   Weight management plan: Discussed healthy diet and exercise guidelines    FUTURE APPOINTMENTS:       - Follow-up visit in 2-3 months or sooner as needed.    Return in about 4 weeks (around 11/26/2021) for Follow up.    John Day MD  [facility] 51 year [facility] who presents for the following health issues  accompanied by his self.    HPI Patient is a 51-year-old  male here for concerns about fatigue, excessive " sweats and erectile dysfunction and numbness in his hands. He has also had insomnia .Patient has also been expereicning chest tightness especially with exertion.       He had open heart surgery in June.He follows with cardiology for surveillance. He was seen and evaluated after the surgery and he says that he was deemed stable for their stand point.  He reports that he has had no energy like he used to before his surgery. He did not complete his cardiac rehab citing that he felt like it was a waste of time and money as he felt like he was doing more in the Gym on his own.   For the chest tightness- he reports no pressure and denies any shortness of breath. He used to smoke but quit over twenty years ago. Denies any leg swelling.   He was wondering if he could get something for the erectile dysfunction.   He has also had excessive sweating and generalized fatigue.He does not recall if he has had his thyroid checked in the past.       Concern - Post surgery symptoms  Onset: He had surgery on 6-, Bypass Coronary Artery Graft Status.  Description: Since surgery he has noticed that his lungs feel tight.  He used to be able to work more and is now very fatigue.  He is not sleeping well still.  He is scheduled to go back to work on Monday.  That is a miguelito environment.  He is not sure if that is a good idea.    When laying down the last 3 fingers of both hands will feel numb and have pain.  His hands feel cold. He will have to pump his hands or shake them.    Decrease with sex drive since surgery.    Intensity: moderate-daily symptoms  Progression of Symptoms:  Seems to be activity based.  When exercising he will have to start out slow.  Has his diabetes under control.    Wanting to know if any of the new medications could be causing his symptoms.  Accompanying Signs & Symptoms: Seems to sweat more.  Previous history of similar problem: None  Precipitating factors:        Worsened by: Activity  Alleviating factors:  "       Improved by: See above.  Therapies tried and outcome: Tylenol as needed, rest.        Review of Systems   Constitutional: Positive for activity change and fatigue.   HENT: Negative.    Eyes: Negative.    Respiratory: Positive for chest tightness. Negative for cough and shortness of breath.    Cardiovascular: Negative for chest pain and palpitations.   Gastrointestinal: Negative.    Endocrine: Negative.    Genitourinary: Positive for impotence.   Musculoskeletal: Negative.    Skin: Negative.    Neurological: Positive for numbness.   Hematological: Negative.    Psychiatric/Behavioral: Positive for sleep disturbance.            Objective    /80   Pulse 98   Temp 99.6  F (37.6  C) (Tympanic)   Ht 1.753 m (5' 9\")   Wt 98.9 kg (218 lb)   SpO2 98%   BMI 32.19 kg/m    Body mass index is 32.19 kg/m .  Physical Exam  Constitutional:       Appearance: Normal appearance.   HENT:      Head: Normocephalic and atraumatic.   Eyes:      Extraocular Movements: Extraocular movements intact.      Pupils: Pupils are equal, round, and reactive to light.   Cardiovascular:      Rate and Rhythm: Normal rate and regular rhythm.      Pulses: Normal pulses.      Heart sounds: Normal heart sounds.   Pulmonary:      Effort: Pulmonary effort is normal.      Breath sounds: Normal breath sounds.   Abdominal:      General: Abdomen is flat. Bowel sounds are normal.      Palpations: Abdomen is soft.   Musculoskeletal:         General: Normal range of motion.      Cervical back: Normal range of motion and neck supple.   Neurological:      Mental Status: He is alert and oriented to person, place, and time.   Psychiatric:         Mood and Affect: Mood normal.         Behavior: Behavior normal.        Labs pending             "

## 2021-11-29 ENCOUNTER — VIRTUAL VISIT (OUTPATIENT)
Dept: UROLOGY | Facility: CLINIC | Age: 51
End: 2021-11-29
Attending: FAMILY MEDICINE
Payer: COMMERCIAL

## 2021-11-29 DIAGNOSIS — N52.9 ERECTILE DYSFUNCTION, UNSPECIFIED ERECTILE DYSFUNCTION TYPE: ICD-10-CM

## 2021-11-29 PROCEDURE — 99203 OFFICE O/P NEW LOW 30 MIN: CPT | Mod: 95 | Performed by: UROLOGY

## 2021-11-29 NOTE — PROGRESS NOTES
Angelo is a 51 year old who is being evaluated via a billable telephone visit.      What phone number would you like to be contacted at?   How would you like to obtain your AVS? [facility]    Assessment & Plan   Problem List Items Addressed This Visit     ED (erectile dysfunction)           Review of external notes as documented elsewhere in note  30 minutes spent on the date of the encounter doing chart review, history and exam, documentation and further activities per the note       See Patient Instructions    No follow-ups on file.    Pratik Ledbetter MD  [facility] BROOK Stephens is a 51 year old who presents for the following health issues ED    HPI     Patient is a pleasant 51-year-old male who is requested to be seen by Dr. Day for a consultation with regard to patient's erectile dysfunction.  Patient complains of erectile dysfunction since his bypass surgery in September of this year.  Since then he has minimal erections and firm enough for penetration.  Erection is rated 3 out of 10.  He has history of smoking in the past along with long history of diabetes and hypertension.  He is currently on Imdur.    Review of Systems   Constitutional, HEENT, cardiovascular, pulmonary, gi and gu systems are negative, except as otherwise noted.      Objective           Vitals:  No vitals were obtained today due to virtual visit.    Physical Exam   healthy, alert and no distress  PSYCH: Alert and oriented times 3; coherent speech, normal   rate and volume, able to articulate logical thoughts, able   to abstract reason, no tangential thoughts, no hallucinations   or delusions  His affect is normal  RESP: No cough, no audible wheezing, able to talk in full sentences  Remainder of exam unable to be completed due to telephone visits    ED:  Due to vascular disease.  tx options discussed.  He is not a candidate for oral medication.  We discussed penile injection/vacuum pump/surgery.  Patient to  call back if interested in penile injection.            Phone call duration: 11 minutes

## 2021-12-17 DIAGNOSIS — E11.9 TYPE 2 DIABETES MELLITUS WITHOUT COMPLICATION, WITHOUT LONG-TERM CURRENT USE OF INSULIN (H): ICD-10-CM

## 2021-12-17 RX ORDER — FLASH GLUCOSE SENSOR
KIT MISCELLANEOUS
Qty: 2 EACH | Refills: 2 | Status: SHIPPED | OUTPATIENT
Start: 2021-12-17 | End: 2022-03-25

## 2021-12-17 NOTE — TELEPHONE ENCOUNTER
Prescription approved per [facility] Refill Protocol. Patient seen by MD 7/15/2021, overdue for follow up appointment. Gave jessica refill and advised patient needs to see MD to receive additional refills.    Artur HARPER [facility]

## 2021-12-21 ENCOUNTER — APPOINTMENT (OUTPATIENT)
Dept: CT IMAGING | Facility: CLINIC | Age: 51
End: 2021-12-21
Attending: EMERGENCY MEDICINE
Payer: COMMERCIAL

## 2021-12-21 ENCOUNTER — HOSPITAL ENCOUNTER (EMERGENCY)
Facility: CLINIC | Age: 51
Discharge: HOME | End: 2021-12-21
Attending: EMERGENCY MEDICINE | Admitting: EMERGENCY MEDICINE
Payer: COMMERCIAL

## 2021-12-21 VITALS
HEIGHT: 69 IN | DIASTOLIC BLOOD PRESSURE: 91 MMHG | SYSTOLIC BLOOD PRESSURE: 106 MMHG | RESPIRATION RATE: 8 BRPM | BODY MASS INDEX: 31.1 KG/M2 | WEIGHT: 210 LBS | HEART RATE: 100 BPM | TEMPERATURE: 98.8 F | OXYGEN SATURATION: 99 %

## 2021-12-21 DIAGNOSIS — S20.212A CONTUSION OF LEFT CHEST WALL, INITIAL ENCOUNTER: ICD-10-CM

## 2021-12-21 DIAGNOSIS — R91.8 PULMONARY NODULES: ICD-10-CM

## 2021-12-21 LAB
ANION GAP SERPL CALCULATED.3IONS-SCNC: 5 MMOL/L (ref 3–14)
BASOPHILS # BLD AUTO: 0 10E3/UL (ref 0–0.2)
BASOPHILS NFR BLD AUTO: 0 %
BUN SERPL-MCNC: 17 MG/DL (ref 7–30)
CALCIUM SERPL-MCNC: 9.7 MG/DL (ref 8.5–10.1)
CHLORIDE BLD-SCNC: 104 MMOL/L (ref 94–109)
CO2 SERPL-SCNC: 30 MMOL/L (ref 20–32)
CREAT SERPL-MCNC: 0.99 MG/DL (ref 0.66–1.25)
EOSINOPHIL # BLD AUTO: 0.1 10E3/UL (ref 0–0.7)
EOSINOPHIL NFR BLD AUTO: 1 %
ERYTHROCYTE [DISTWIDTH] IN BLOOD BY AUTOMATED COUNT: 12.9 % (ref 10–15)
GFR SERPL CREATININE-BSD FRML MDRD: >90 ML/MIN/1.73M2
GLUCOSE BLD-MCNC: 269 MG/DL (ref 70–99)
HCT VFR BLD AUTO: 47.5 % (ref 40–53)
HGB BLD-MCNC: 15.5 G/DL (ref 13.3–17.7)
HOLD SPECIMEN: NORMAL
HOLD SPECIMEN: NORMAL
IMM GRANULOCYTES # BLD: 0 10E3/UL
IMM GRANULOCYTES NFR BLD: 0 %
LYMPHOCYTES # BLD AUTO: 1.8 10E3/UL (ref 0.8–5.3)
LYMPHOCYTES NFR BLD AUTO: 19 %
MCH RBC QN AUTO: 30.4 PG (ref 26.5–33)
MCHC RBC AUTO-ENTMCNC: 32.6 G/DL (ref 31.5–36.5)
MCV RBC AUTO: 93 FL (ref 78–100)
MONOCYTES # BLD AUTO: 0.7 10E3/UL (ref 0–1.3)
MONOCYTES NFR BLD AUTO: 7 %
NEUTROPHILS # BLD AUTO: 6.8 10E3/UL (ref 1.6–8.3)
NEUTROPHILS NFR BLD AUTO: 73 %
NRBC # BLD AUTO: 0 10E3/UL
NRBC BLD AUTO-RTO: 0 /100
PLATELET # BLD AUTO: 268 10E3/UL (ref 150–450)
POTASSIUM BLD-SCNC: 4.3 MMOL/L (ref 3.4–5.3)
RBC # BLD AUTO: 5.1 10E6/UL (ref 4.4–5.9)
SODIUM SERPL-SCNC: 139 MMOL/L (ref 133–144)
TROPONIN I SERPL HS-MCNC: 11 NG/L
WBC # BLD AUTO: 9.4 10E3/UL (ref 4–11)

## 2021-12-21 PROCEDURE — 71260 CT THORAX DX C+: CPT

## 2021-12-21 PROCEDURE — 93005 ELECTROCARDIOGRAM TRACING: CPT | Performed by: EMERGENCY MEDICINE

## 2021-12-21 PROCEDURE — 250N000011 HC RX IP 250 OP 636: Performed by: EMERGENCY MEDICINE

## 2021-12-21 PROCEDURE — 99285 EMERGENCY DEPT VISIT HI MDM: CPT | Mod: 25 | Performed by: EMERGENCY MEDICINE

## 2021-12-21 PROCEDURE — 84484 ASSAY OF TROPONIN QUANT: CPT | Performed by: EMERGENCY MEDICINE

## 2021-12-21 PROCEDURE — 85025 COMPLETE CBC W/AUTO DIFF WBC: CPT | Performed by: EMERGENCY MEDICINE

## 2021-12-21 PROCEDURE — 36415 COLL VENOUS BLD VENIPUNCTURE: CPT | Performed by: EMERGENCY MEDICINE

## 2021-12-21 PROCEDURE — 96361 HYDRATE IV INFUSION ADD-ON: CPT | Performed by: EMERGENCY MEDICINE

## 2021-12-21 PROCEDURE — 93010 ELECTROCARDIOGRAM REPORT: CPT | Performed by: EMERGENCY MEDICINE

## 2021-12-21 PROCEDURE — 80048 BASIC METABOLIC PNL TOTAL CA: CPT | Performed by: EMERGENCY MEDICINE

## 2021-12-21 PROCEDURE — 258N000003 HC RX IP 258 OP 636: Performed by: EMERGENCY MEDICINE

## 2021-12-21 PROCEDURE — 250N000009 HC RX 250: Performed by: EMERGENCY MEDICINE

## 2021-12-21 PROCEDURE — 96374 THER/PROPH/DIAG INJ IV PUSH: CPT | Mod: 59 | Performed by: EMERGENCY MEDICINE

## 2021-12-21 RX ORDER — HYDROMORPHONE HYDROCHLORIDE 1 MG/ML
0.5 INJECTION, SOLUTION INTRAMUSCULAR; INTRAVENOUS; SUBCUTANEOUS ONCE
Status: COMPLETED | OUTPATIENT
Start: 2021-12-21 | End: 2021-12-21

## 2021-12-21 RX ORDER — IOPAMIDOL 755 MG/ML
100 INJECTION, SOLUTION INTRAVASCULAR ONCE
Status: COMPLETED | OUTPATIENT
Start: 2021-12-21 | End: 2021-12-21

## 2021-12-21 RX ORDER — OXYCODONE AND ACETAMINOPHEN 5; 325 MG/1; MG/1
1 TABLET ORAL EVERY 4 HOURS PRN
Qty: 6 TABLET | Refills: 0 | Status: SHIPPED | OUTPATIENT
Start: 2021-12-21 | End: 2022-04-22

## 2021-12-21 RX ADMIN — SODIUM CHLORIDE 65 ML: 9 INJECTION, SOLUTION INTRAVENOUS at 12:09

## 2021-12-21 RX ADMIN — SODIUM CHLORIDE 1000 ML: 9 INJECTION, SOLUTION INTRAVENOUS at 11:50

## 2021-12-21 RX ADMIN — IOPAMIDOL 100 ML: 755 INJECTION, SOLUTION INTRAVENOUS at 12:09

## 2021-12-21 RX ADMIN — HYDROMORPHONE HYDROCHLORIDE 0.5 MG: 1 INJECTION, SOLUTION INTRAMUSCULAR; INTRAVENOUS; SUBCUTANEOUS at 11:51

## 2021-12-21 ASSESSMENT — MIFFLIN-ST. JEOR: SCORE: 1802.93

## 2021-12-21 NOTE — ED PROVIDER NOTES
History     Chief Complaint   Patient presents with     Chest Pain     chest pain for a couple of days.  pt fell out of truck landing on left side and has been having pain since.      HPI  Houston Winters is a 51 year old male with past medical history significant for type 2 diabetes hypertension coronary artery disease with CABG in June 2021 who presents the emergency department complaining of left-sided chest wall pain.  Patient states this Sunday midafternoon patient slipped while getting out of his truck and fell on his left side.  Landing on his left chest.  Patient states pain was not too significant on Sunday but yesterday he was having increasing pain especially with deep breathing movement.  Patient went to play darts last night and had difficulty doing it secondary to the pain woke up this morning and was still feeling significantly discomfort in the left sternal and left anterior chest regions rates pain a 5 out of 10 at rest and worsened to 9 out of 10 with movement.  It hurts to take a deep breath but he denies any significant shortness of breath breath he has not had any visual changes denies any neck pain.  He did not hit his head.  He denies any midline back pain has not had any abdominal pain.  He denies any hip pain has not had any focal numbness weakness in extremity denies any bowel or bladder dysfunction.    Allergies:  Allergies   Allergen Reactions     Metformin Other (See Comments)     Very low blood sugars    Blood sugar issue        Problem List:    Patient Active Problem List    Diagnosis Date Noted     Unstable angina (H) 05/18/2021     Priority: Medium     Postoperative infection, initial encounter 01/28/2018     Priority: Medium     Small bowel obstruction (H) 12/29/2017     Priority: Medium     Type 2 diabetes mellitus without complication, without long-term current use of insulin (H) 03/27/2017     Priority: Medium     Tobacco use disorder 02/04/2015     Priority: Medium      Hypertension, goal below 140/90 02/04/2015     Priority: Medium     Obesity 07/17/2014     Priority: Medium     Hyperlipidemia with target LDL less than 100 07/17/2014     Priority: Medium     Diagnosis updated by automated process. Provider to review and confirm.       Partial epilepsy (H) 03/12/2009     Priority: Medium     (Problem list name updated by automated process. Provider to review and confirm.)       ED (erectile dysfunction) 12/18/2008     Priority: Medium     GANGLOIN CYST /RIGHT ANKLE 06/15/2006     Priority: Medium        Past Medical History:    Past Medical History:   Diagnosis Date     DM (diabetes mellitus) (H)      Epilepsy (H)      MEDICAL HISTORY OF - Age 16      Nicotine dependence        Past Surgical History:    Past Surgical History:   Procedure Laterality Date     CL AFF SURGICAL PATHOLOGY  2005    ganglion cyst removed     CV HEART CATHETERIZATION WITH POSSIBLE INTERVENTION N/A 5/18/2021    Procedure: Heart Catheterization with Possible Intervention;  Surgeon: Andrew Madrid MD;  Location:  HEART CARDIAC CATH LAB     CV LEFT HEART CATH N/A 5/18/2021    Procedure: Left Heart Cath;  Surgeon: Andrew Madrid MD;  Location:  HEART CARDIAC CATH LAB     EXCISE MASS LOWER EXTREMITY  2/15/2013    Procedure: EXCISE MASS LOWER EXTREMITY;  Right Ankle Excision of ganglion cyst;  Surgeon: Akbar Melo DPM;  Location: [facility]     HERNIA REPAIR  2000    umbilical repair     HERNIORRHAPHY UMBILICAL N/A 11/10/2015    Procedure: HERNIORRHAPHY UMBILICAL;  Surgeon: Garry Leos MD;  Location: [facility] OR       Family History:    Family History   Problem Relation Age of Onset     Arthritis Mother      Cancer Maternal Grandmother      Arthritis Maternal Grandmother         lupus     Gastrointestinal Disease Maternal Grandfather         war injury, colostomy     Respiratory Daughter         growing out of it     Unknown/Adopted Father      Diabetes No family hx of      Coronary Artery Disease No  "family hx of      Hypertension No family hx of      Hyperlipidemia No family hx of      Breast Cancer No family hx of      Cancer - colorectal No family hx of      Ovarian Cancer No family hx of      Prostate Cancer No family hx of        Social History:  Marital Status:   [2]  Social History     Tobacco Use     Smoking status: Former Smoker     Packs/day: 0.25     Years: 25.00     Pack years: 6.25     Types: Cigarettes     Quit date: 2016     Years since quittin.9     Smokeless tobacco: Former User   Vaping Use     Vaping Use: Never used   Substance Use Topics     Alcohol use: Yes     Alcohol/week: 0.0 standard drinks     Comment: mixed 2-4 on weekends, not every weekend     Drug use: No        Medications:    alcohol swab prep pads  aspirin (ASA) 81 MG chewable tablet  atorvastatin (LIPITOR) 80 MG tablet  blood glucose (NO BRAND SPECIFIED) test strip  blood glucose calibration (NO BRAND SPECIFIED) solution  blood glucose monitoring (NO BRAND SPECIFIED) meter device kit  clopidogrel (PLAVIX) 75 MG tablet  Continuous Blood Gluc Sensor (TakeCareSTYLE MARY 14 DAY SENSOR) MISC  insulin glargine (BASAGLAR KWIKPEN) 100 UNIT/ML pen  insulin lispro (HUMALOG KWIKPEN) 100 UNIT/ML (1 unit dial) KWIKPEN  isosorbide mononitrate (IMDUR) 30 MG 24 hr tablet  lisinopril (ZESTRIL) 2.5 MG tablet  metoprolol tartrate (LOPRESSOR) 25 MG tablet  nitroGLYcerin (NITROSTAT) 0.4 MG sublingual tablet  sennosides (SENOKOT) 8.6 MG tablet  traZODone (DESYREL) 50 MG tablet          Review of Systems  All systems reviewed and other than pertinent positives and negatives in HPI all other systems are negative.  Physical Exam   BP: 123/76  Pulse: (!) 125  Temp: 98.8  F (37.1  C)  Resp: 18  Height: 175.3 cm (5' 9\")  Weight: 95.3 kg (210 lb)  SpO2: 99 %      Physical Exam  Vitals and nursing note reviewed.   Constitutional:       Appearance: He is well-developed. He is not ill-appearing, toxic-appearing or diaphoretic.      Comments: " Moderate distress secondary to chest wall pain.   HENT:      Head: Normocephalic and atraumatic.      Nose: Nose normal.   Eyes:      General:         Right eye: No discharge.      Conjunctiva/sclera: Conjunctivae normal.   Cardiovascular:      Rate and Rhythm: Normal rate and regular rhythm.      Pulses: Normal pulses.      Heart sounds: Normal heart sounds. No murmur heard.      Pulmonary:      Comments: Patient with mild tachypnea.  Breath sounds are clear to auscultation bilaterally with decreased at bases bilaterally left greater than right.  Patient has postsurgical changes in his chest with midline incision tenderness to palpation along the sternal region especially in the lower sternal region and left anterior chest wall.  No obvious crepitance swelling or erythema is present.  Abdominal:      General: Abdomen is flat. Bowel sounds are normal. There is no distension.      Palpations: Abdomen is soft.      Tenderness: There is no abdominal tenderness. There is no right CVA tenderness or left CVA tenderness.      Hernia: No hernia is present.   Musculoskeletal:         General: Normal range of motion.      Cervical back: Normal range of motion and neck supple. No rigidity or tenderness.      Right lower leg: No edema.      Left lower leg: No edema.   Skin:     General: Skin is warm and dry.      Capillary Refill: Capillary refill takes less than 2 seconds.      Findings: No rash.   Neurological:      General: No focal deficit present.      Mental Status: He is alert and oriented to person, place, and time.      Sensory: No sensory deficit.      Motor: No weakness.      Coordination: Coordination normal.   Psychiatric:         Mood and Affect: Mood normal.         ED Course                 Procedures              EKG Interpretation:      Interpreted by Manolo Salinas MD  Rhythm: sinus tachycardia  Rate: 100-110  Axis: Left Axis Deviation  Ectopy: none  Conduction: left anterior fasciclar block  ST  Segments/ T Waves: Non-specific ST-T wave changes  Q Waves: none  Comparison to prior: Unchanged from 9/2/21    Clinical Impression: Sinus tachycardia with left axis and left anterior fascicular block and nonspecific ST-T wave changes.      Critical Care time:  none               Results for orders placed or performed during the hospital encounter of 12/21/21 (from the past 24 hour(s))   CBC with Platelets & Differential    Narrative    The following orders were created for panel order CBC with Platelets & Differential.  Procedure                               Abnormality         Status                     ---------                               -----------         ------                     CBC with platelets and d...[[id number]]                      Final result                 Please view results for these tests on the individual orders.   [facility] Draw    Narrative    The following orders were created for panel order [facility] Draw.  Procedure                               Abnormality         Status                     ---------                               -----------         ------                     Extra Blue Top Tube[[id number]]                              In process                 Extra Red Top Tube[[id number]]                               In process                   Please view results for these tests on the individual orders.   CBC with platelets and differential   Result Value Ref Range    WBC Count 9.4 4.0 - 11.0 10e3/uL    RBC Count 5.10 4.40 - 5.90 10e6/uL    Hemoglobin 15.5 13.3 - 17.7 g/dL    Hematocrit 47.5 40.0 - 53.0 %    MCV 93 78 - 100 fL    MCH 30.4 26.5 - 33.0 pg    MCHC 32.6 31.5 - 36.5 g/dL    RDW 12.9 10.0 - 15.0 %    Platelet Count 268 150 - 450 10e3/uL    % Neutrophils 73 %    % Lymphocytes 19 %    % Monocytes 7 %    % Eosinophils 1 %    % Basophils 0 %    % Immature Granulocytes 0 %    NRBCs per 100 WBC 0 <1 /100    Absolute Neutrophils 6.8 1.6 - 8.3 10e3/uL    Absolute Lymphocytes 1.8  0.8 - 5.3 10e3/uL    Absolute Monocytes 0.7 0.0 - 1.3 10e3/uL    Absolute Eosinophils 0.1 0.0 - 0.7 10e3/uL    Absolute Basophils 0.0 0.0 - 0.2 10e3/uL    Absolute Immature Granulocytes 0.0 <=0.4 10e3/uL    Absolute NRBCs 0.0 10e3/uL       Medications - No data to display  Results for orders placed or performed during the hospital encounter of 12/21/21   CT Chest w Contrast    Narrative    CT CHEST WITH CONTRAST December 21, 2021 12:20 PM    CLINICAL HISTORY: Left-sided chest pain. Trauma.    TECHNIQUE: CT chest with IV contrast. Multiplanar reformats were  obtained. Dose reduction techniques were used.  CONTRAST: 100ml Isovue-370.    COMPARISON: None.    FINDINGS:   LUNGS AND PLEURA: No pleural effusions. No pneumothorax. Indeterminate  pleural nodule at the right lung base posteriorly (series 4 image 208)  measures 1.3 cm. There are scattered smaller indeterminate pulmonary  nodules in the right lung, measuring 0.3 cm or smaller (for example,  series 4 image 152).    MEDIASTINUM/AXILLAE: There is mild mediastinal and right hilar  adenopathy. For example, an enlarged right hilar lymph node (series 3  image 65) measures 2 x 1.7 cm. A few mildly prominent right axillary  lymph nodes are also noted. No pericardial effusion. Sternotomy.    CORONARY ARTERY CALCIFICATION: Previous intervention (stents or CABG).    UPPER ABDOMEN: Small hiatal hernia. Limited views of the upper abdomen  are otherwise unremarkable.    MUSCULOSKELETAL: Unremarkable.      Impression    IMPRESSION:   1.  No acute posttraumatic abnormality in the chest. No cause for  left-sided chest pain is identified.  2.  Indeterminate 1.3 cm pleural nodule in the right lower lobe, with  mild mediastinal and right hilar adenopathy. There are also scattered  smaller indeterminate pulmonary nodules in the right lung. These  findings could be benign or malignant. Comparison to any available  prior chest CT may be helpful. Lung nodules were not present in  the  visualized portions of the lungs on the prior CT of the abdomen and  pelvis performed 12/29/2017. Please refer to pulmonary nodule  follow-up guidelines below.    Recommendations for multiple incidental lung nodules = or > 6mm:    Low risk patients: CT at 3-6 months, then consider CT at 18-24  months if no change.    High risk patients: CT at 3-6 months, then CT at 18-24 months if no  change.    *Low Risk: Minimal or absent history of smoking or other known risk  factors.  *Nonsolid (ground-glass) or partly solid nodules may require longer  follow-up to exclude indolent adenocarcinoma.  *Recommendations based on Guidelines for the Management of Incidental  Pulmonary Nodules Detected at CT: From the Fleischner Society 2017,  Radiology 2017.        Assessments & Plan (with Medical Decision Making) records were reviewed.  EKG revealed a sinus tachycardia with left axis anterior fascicular block no significant change from previous EKG.  Patient was given IV fluids and Dilaudid for his pain.  CBC was unremarkable.  His metabolic panel significant for glucose of 269.  Opponent was within normal limits.  CT scan scan of the chest with contrast was obtained to rule out any injury to his ribs or sternal incision.  This revealed no evidence of trauma no pneumothorax rib fracture.  There was some medial Steinmann axillary lymphadenopathy and a 1.3 cm lung nodule noted in the right lower lung.  This was not previously seen.  Patient is a previous smoker and will need follow-up on this for a repeat CT scan in 3 to 6 months.  Patient's pain improved with medications.  Findings discussed in detail with him he will return if symptoms worsen or new symptoms develop and follow-up with primary care for follow-up of pulmonary nodule.     I have reviewed the nursing notes.    I have reviewed the findings, diagnosis, plan and need for follow up with the patient.       Discharge Medication List as of 12/21/2021  2:09 PM      START  taking these medications    Details   oxyCODONE-acetaminophen (PERCOCET) 5-325 MG tablet Take 1 tablet by mouth every 4 hours as needed for severe pain, Disp-6 tablet, R-0, Local Print             Final diagnoses:   Contusion of left chest wall, initial encounter   Pulmonary nodules       12/21/2021   [facility] EMERGENCY DEPT     Manolo Salinas MD  12/23/21 [id number]

## 2021-12-21 NOTE — ED NOTES
Pt presents to ED with concerns of left anterior chest pain. States Sun he slipped and fell out of his truck landing on his chest. Pt has Hx of open heart surgery. Was hoping pain would improve, but no improvement.

## 2021-12-27 ENCOUNTER — TELEPHONE (OUTPATIENT)
Dept: FAMILY MEDICINE | Facility: CLINIC | Age: 51
End: 2021-12-27
Payer: COMMERCIAL

## 2021-12-27 NOTE — TELEPHONE ENCOUNTER
Pt called and states accidentally took 32 units of novolog instead of normal 12-16 units. Current bs 400 as he keeps drinking gatorade to counteract insulin. Attempted to use protocol. None found. Spoke with dr barajas. Stated to check bs at least 1x/hr and eat more complex carbs. Should be ok in 6 hrs. Call with any questions.       Gene Shafer RN

## 2022-01-02 ENCOUNTER — HEALTH MAINTENANCE LETTER (OUTPATIENT)
Age: 52
End: 2022-01-02

## 2022-02-09 ENCOUNTER — APPOINTMENT (OUTPATIENT)
Dept: CT IMAGING | Facility: CLINIC | Age: 52
End: 2022-02-09
Attending: EMERGENCY MEDICINE
Payer: COMMERCIAL

## 2022-02-09 ENCOUNTER — HOSPITAL ENCOUNTER (EMERGENCY)
Facility: CLINIC | Age: 52
Discharge: HOME | End: 2022-02-09
Attending: EMERGENCY MEDICINE | Admitting: EMERGENCY MEDICINE
Payer: COMMERCIAL

## 2022-02-09 ENCOUNTER — APPOINTMENT (OUTPATIENT)
Dept: GENERAL RADIOLOGY | Facility: CLINIC | Age: 52
End: 2022-02-09
Attending: EMERGENCY MEDICINE
Payer: COMMERCIAL

## 2022-02-09 VITALS
HEART RATE: 93 BPM | BODY MASS INDEX: 31.1 KG/M2 | SYSTOLIC BLOOD PRESSURE: 135 MMHG | OXYGEN SATURATION: 96 % | DIASTOLIC BLOOD PRESSURE: 88 MMHG | WEIGHT: 210 LBS | RESPIRATION RATE: 18 BRPM | HEIGHT: 69 IN

## 2022-02-09 DIAGNOSIS — R07.89 CHEST WALL PAIN: ICD-10-CM

## 2022-02-09 DIAGNOSIS — L73.9 FOLLICULITIS: ICD-10-CM

## 2022-02-09 LAB
ALBUMIN SERPL-MCNC: 4 G/DL (ref 3.4–5)
ALP SERPL-CCNC: 84 U/L (ref 40–150)
ALT SERPL W P-5'-P-CCNC: 23 U/L (ref 0–70)
ANION GAP SERPL CALCULATED.3IONS-SCNC: 3 MMOL/L (ref 3–14)
AST SERPL W P-5'-P-CCNC: 6 U/L (ref 0–45)
BASOPHILS # BLD AUTO: 0 10E3/UL (ref 0–0.2)
BASOPHILS NFR BLD AUTO: 0 %
BILIRUB SERPL-MCNC: 0.5 MG/DL (ref 0.2–1.3)
BUN SERPL-MCNC: 17 MG/DL (ref 7–30)
CALCIUM SERPL-MCNC: 10 MG/DL (ref 8.5–10.1)
CHLORIDE BLD-SCNC: 105 MMOL/L (ref 94–109)
CO2 SERPL-SCNC: 30 MMOL/L (ref 20–32)
CREAT SERPL-MCNC: 0.88 MG/DL (ref 0.66–1.25)
EOSINOPHIL # BLD AUTO: 0.1 10E3/UL (ref 0–0.7)
EOSINOPHIL NFR BLD AUTO: 2 %
ERYTHROCYTE [DISTWIDTH] IN BLOOD BY AUTOMATED COUNT: 13.1 % (ref 10–15)
FLUAV RNA SPEC QL NAA+PROBE: NEGATIVE
FLUBV RNA RESP QL NAA+PROBE: NEGATIVE
GFR SERPL CREATININE-BSD FRML MDRD: >90 ML/MIN/1.73M2
GLUCOSE BLD-MCNC: 154 MG/DL (ref 70–99)
HCT VFR BLD AUTO: 47.4 % (ref 40–53)
HGB BLD-MCNC: 15.5 G/DL (ref 13.3–17.7)
HOLD SPECIMEN: NORMAL
IMM GRANULOCYTES # BLD: 0 10E3/UL
IMM GRANULOCYTES NFR BLD: 0 %
LIPASE SERPL-CCNC: 58 U/L (ref 73–393)
LYMPHOCYTES # BLD AUTO: 2.3 10E3/UL (ref 0.8–5.3)
LYMPHOCYTES NFR BLD AUTO: 31 %
MCH RBC QN AUTO: 30.5 PG (ref 26.5–33)
MCHC RBC AUTO-ENTMCNC: 32.7 G/DL (ref 31.5–36.5)
MCV RBC AUTO: 93 FL (ref 78–100)
MONOCYTES # BLD AUTO: 0.7 10E3/UL (ref 0–1.3)
MONOCYTES NFR BLD AUTO: 10 %
NEUTROPHILS # BLD AUTO: 4.2 10E3/UL (ref 1.6–8.3)
NEUTROPHILS NFR BLD AUTO: 57 %
NRBC # BLD AUTO: 0 10E3/UL
NRBC BLD AUTO-RTO: 0 /100
PLATELET # BLD AUTO: 273 10E3/UL (ref 150–450)
POTASSIUM BLD-SCNC: 3.9 MMOL/L (ref 3.4–5.3)
PROT SERPL-MCNC: 7.4 G/DL (ref 6.8–8.8)
RBC # BLD AUTO: 5.09 10E6/UL (ref 4.4–5.9)
SARS-COV-2 RNA RESP QL NAA+PROBE: NEGATIVE
SODIUM SERPL-SCNC: 138 MMOL/L (ref 133–144)
TROPONIN I SERPL HS-MCNC: 6 NG/L
WBC # BLD AUTO: 7.4 10E3/UL (ref 4–11)

## 2022-02-09 PROCEDURE — 85004 AUTOMATED DIFF WBC COUNT: CPT | Performed by: EMERGENCY MEDICINE

## 2022-02-09 PROCEDURE — 250N000013 HC RX MED GY IP 250 OP 250 PS 637: Performed by: EMERGENCY MEDICINE

## 2022-02-09 PROCEDURE — 99285 EMERGENCY DEPT VISIT HI MDM: CPT | Mod: 25

## 2022-02-09 PROCEDURE — 93010 ELECTROCARDIOGRAM REPORT: CPT | Performed by: EMERGENCY MEDICINE

## 2022-02-09 PROCEDURE — 93005 ELECTROCARDIOGRAM TRACING: CPT

## 2022-02-09 PROCEDURE — 84484 ASSAY OF TROPONIN QUANT: CPT | Performed by: EMERGENCY MEDICINE

## 2022-02-09 PROCEDURE — 80053 COMPREHEN METABOLIC PANEL: CPT | Performed by: EMERGENCY MEDICINE

## 2022-02-09 PROCEDURE — 99284 EMERGENCY DEPT VISIT MOD MDM: CPT | Mod: 25 | Performed by: EMERGENCY MEDICINE

## 2022-02-09 PROCEDURE — 70450 CT HEAD/BRAIN W/O DYE: CPT

## 2022-02-09 PROCEDURE — 87636 SARSCOV2 & INF A&B AMP PRB: CPT | Performed by: EMERGENCY MEDICINE

## 2022-02-09 PROCEDURE — 71046 X-RAY EXAM CHEST 2 VIEWS: CPT

## 2022-02-09 PROCEDURE — 36415 COLL VENOUS BLD VENIPUNCTURE: CPT | Performed by: EMERGENCY MEDICINE

## 2022-02-09 PROCEDURE — 83690 ASSAY OF LIPASE: CPT | Performed by: EMERGENCY MEDICINE

## 2022-02-09 PROCEDURE — C9803 HOPD COVID-19 SPEC COLLECT: HCPCS

## 2022-02-09 RX ORDER — CEPHALEXIN 500 MG/1
500 CAPSULE ORAL 4 TIMES DAILY
Qty: 28 CAPSULE | Refills: 0 | Status: SHIPPED | OUTPATIENT
Start: 2022-02-09 | End: 2022-02-16

## 2022-02-09 RX ORDER — OXYCODONE AND ACETAMINOPHEN 5; 325 MG/1; MG/1
2 TABLET ORAL ONCE
Status: COMPLETED | OUTPATIENT
Start: 2022-02-09 | End: 2022-02-09

## 2022-02-09 RX ADMIN — OXYCODONE HYDROCHLORIDE AND ACETAMINOPHEN 2 TABLET: 5; 325 TABLET ORAL at 17:25

## 2022-02-09 ASSESSMENT — MIFFLIN-ST. JEOR: SCORE: 1802.93

## 2022-02-09 NOTE — ED NOTES
Has had a head ache for about a week tried tylenol and ibuprofen without relief   Occasional dizziness

## 2022-02-09 NOTE — ED PROVIDER NOTES
History     Chief Complaint   Patient presents with     Chest Pain     hx of quad bypass in june     Headache     Derm Problem     HPI  Houston Winters is a 51 year old male who presents with multiple complaints, is developed some sores over his scalp mainly parietal temporal and occipital.  No clear cause, he has not been in a hot tub.  No exposure to insects.  Describes generalized scalp tenderness, has developed global headache for the past week described as a pressure sensation no associated nausea vomiting fever chills or sweats.  No change in vision, difficulty speaking or swallowing, numbness or weakness.  No recent trauma.  He underwent coronary artery bypass graft in 6/16/2021 at male, he is maintained on clopidogrel and aspirin taking as prescribed.  He denies history of migraine or chronic headache.    Complains of left lower anterior chest pain that is waxed and waned for the past couple of days, he has had some sneezing and describes central sternal discomfort this been ongoing since surgery.  He denies any dyspnea on exertion, no cough.  He reportedly stopped breathing earlier this morning for about 30 seconds while asleep, his wife awakened him to get him to breathe again.  He is unknown chronic snorer, no prior sleep study.  He does not smoke, not currently using alcohol no illicit substance use.  He is not vaccinated for COVID.  Denies leg pain or swelling, no history of VTE.    Allergies:  Allergies   Allergen Reactions     Metformin Other (See Comments)     Very low blood sugars, Blood sugar issue        Problem List:    Patient Active Problem List    Diagnosis Date Noted     Unstable angina (H) 05/18/2021     Priority: Medium     Postoperative infection, initial encounter 01/28/2018     Priority: Medium     Small bowel obstruction (H) 12/29/2017     Priority: Medium     Type 2 diabetes mellitus without complication, without long-term current use of insulin (H) 03/27/2017     Priority: Medium      Tobacco use disorder 02/04/2015     Priority: Medium     Hypertension, goal below 140/90 02/04/2015     Priority: Medium     Obesity 07/17/2014     Priority: Medium     Hyperlipidemia with target LDL less than 100 07/17/2014     Priority: Medium     Diagnosis updated by automated process. Provider to review and confirm.       Partial epilepsy (H) 03/12/2009     Priority: Medium     (Problem list name updated by automated process. Provider to review and confirm.)       ED (erectile dysfunction) 12/18/2008     Priority: Medium     GANGLOIN CYST /RIGHT ANKLE 06/15/2006     Priority: Medium        Past Medical History:    Past Medical History:   Diagnosis Date     DM (diabetes mellitus) (H)      Epilepsy (H)      MEDICAL HISTORY OF - Age 16      Nicotine dependence        Past Surgical History:    Past Surgical History:   Procedure Laterality Date     CL AFF SURGICAL PATHOLOGY  2005    ganglion cyst removed     CV HEART CATHETERIZATION WITH POSSIBLE INTERVENTION N/A 5/18/2021    Procedure: Heart Catheterization with Possible Intervention;  Surgeon: Andrew Madrid MD;  Location:  HEART CARDIAC CATH LAB     CV LEFT HEART CATH N/A 5/18/2021    Procedure: Left Heart Cath;  Surgeon: Andrew Madrid MD;  Location:  HEART CARDIAC CATH LAB     EXCISE MASS LOWER EXTREMITY  2/15/2013    Procedure: EXCISE MASS LOWER EXTREMITY;  Right Ankle Excision of ganglion cyst;  Surgeon: Akbar Melo DPM;  Location: [facility]     HERNIA REPAIR  2000    umbilical repair     HERNIORRHAPHY UMBILICAL N/A 11/10/2015    Procedure: HERNIORRHAPHY UMBILICAL;  Surgeon: Garry Leos MD;  Location: [facility] OR       Family History:    Family History   Problem Relation Age of Onset     Arthritis Mother      Cancer Maternal Grandmother      Arthritis Maternal Grandmother         lupus     Gastrointestinal Disease Maternal Grandfather         war injury, colostomy     Respiratory Daughter         growing out of it     Unknown/Adopted Father   "    Diabetes No family hx of      Coronary Artery Disease No family hx of      Hypertension No family hx of      Hyperlipidemia No family hx of      Breast Cancer No family hx of      Cancer - colorectal No family hx of      Ovarian Cancer No family hx of      Prostate Cancer No family hx of        Social History:  Marital Status:   [2]  Social History     Tobacco Use     Smoking status: Former Smoker     Packs/day: 0.25     Years: 25.00     Pack years: 6.25     Types: Cigarettes     Quit date: 2016     Years since quittin.0     Smokeless tobacco: Former User   Vaping Use     Vaping Use: Never used   Substance Use Topics     Alcohol use: Yes     Alcohol/week: 0.0 standard drinks     Comment: mixed 2-4 on weekends, not every weekend     Drug use: No        Medications:    cephALEXin (KEFLEX) 500 MG capsule  alcohol swab prep pads  aspirin (ASA) 81 MG chewable tablet  atorvastatin (LIPITOR) 80 MG tablet  blood glucose (NO BRAND SPECIFIED) test strip  blood glucose calibration (NO BRAND SPECIFIED) solution  blood glucose monitoring (NO BRAND SPECIFIED) meter device kit  clopidogrel (PLAVIX) 75 MG tablet  Continuous Blood Gluc Sensor (AmaruYLE MARY 14 DAY SENSOR) MISC  insulin glargine (BASAGLAR KWIKPEN) 100 UNIT/ML pen  insulin lispro (HUMALOG KWIKPEN) 100 UNIT/ML (1 unit dial) KWIKPEN  isosorbide mononitrate (IMDUR) 30 MG 24 hr tablet  lisinopril (ZESTRIL) 2.5 MG tablet  metoprolol tartrate (LOPRESSOR) 25 MG tablet  nitroGLYcerin (NITROSTAT) 0.4 MG sublingual tablet  oxyCODONE-acetaminophen (PERCOCET) 5-325 MG tablet  sennosides (SENOKOT) 8.6 MG tablet  traZODone (DESYREL) 50 MG tablet          Review of Systems  All other systems reviewed and are negative.    Physical Exam   BP: 130/78  Pulse: 69  Resp: 18  Height: 175.3 cm (5' 9\")  Weight: 95.3 kg (210 lb)  SpO2: 97 %      Physical Exam  Nontoxic appearing no respiratory distress alert and oriented ×3  Head atraumatic normocephalic diffuse scalp " scabs, they are sparse, associated diffuse tenderness, no significant scalp erythema or fluctuance, no obvious abscesses.  Neck supple full active painless range of motion  Lungs clear to auscultation  Heart regular no murmur  Chest wall tenderness left anterior medial inferior costal margin point tenderness reproduces pain complaint.  No associated redness or swelling or rash  Abdomen soft nontender bowel sounds positive   Strength and sensation grossly intact throughout the extremities, gait and station normal  Speech is fluent, good eye contact, thought processes are rational  Lower extremities without swelling, redness or tenderness  Pedal pulses symmetrical and strong    ED Course          EKG time 1630, symptoms chest pain, sinus tachycardia rate 103, partial right bundle branch block, axes intervals otherwise normal, no Q waves, no acute ST or T wave changes, unchanged from previous, read by Dr. Kobe Ramirez       Procedures              Critical Care time:  none               Results for orders placed or performed during the hospital encounter of 02/09/22 (from the past 24 hour(s))   [place] Draw    Narrative    The following orders were created for panel order [place] Draw.  Procedure                               Abnormality         Status                     ---------                               -----------         ------                     Extra Blue Top Tube[[id number]]                              Final result               Extra Red Top Tube[[id number]]                               Final result               Extra Green Top (Lithium...[[id number]]                      Final result               Extra Purple Top Tube[[id number]]                            Final result                 Please view results for these tests on the individual orders.   Extra Blue Top Tube   Result Value Ref Range    Hold Specimen JIC    Extra Red Top Tube   Result Value Ref Range    Hold Specimen JIC    Extra Green Top  (Lithium Heparin) Tube   Result Value Ref Range    Hold Specimen JIC    Extra Purple Top Tube   Result Value Ref Range    Hold Specimen JIC    CBC with platelets differential    Narrative    The following orders were created for panel order CBC with platelets differential.  Procedure                               Abnormality         Status                     ---------                               -----------         ------                     CBC with platelets and d...[[id number]]                      Final result                 Please view results for these tests on the individual orders.   Comprehensive metabolic panel   Result Value Ref Range    Sodium 138 133 - 144 mmol/L    Potassium 3.9 3.4 - 5.3 mmol/L    Chloride 105 94 - 109 mmol/L    Carbon Dioxide (CO2) 30 20 - 32 mmol/L    Anion Gap 3 3 - 14 mmol/L    Urea Nitrogen 17 7 - 30 mg/dL    Creatinine 0.88 0.66 - 1.25 mg/dL    Calcium 10.0 8.5 - 10.1 mg/dL    Glucose 154 (H) 70 - 99 mg/dL    Alkaline Phosphatase 84 40 - 150 U/L    AST 6 0 - 45 U/L    ALT 23 0 - 70 U/L    Protein Total 7.4 6.8 - 8.8 g/dL    Albumin 4.0 3.4 - 5.0 g/dL    Bilirubin Total 0.5 0.2 - 1.3 mg/dL    GFR Estimate >90 >60 mL/min/1.73m2   Lipase   Result Value Ref Range    Lipase 58 (L) 73 - 393 U/L   Troponin I   Result Value Ref Range    Troponin I High Sensitivity 6 <79 ng/L   CBC with platelets and differential   Result Value Ref Range    WBC Count 7.4 4.0 - 11.0 10e3/uL    RBC Count 5.09 4.40 - 5.90 10e6/uL    Hemoglobin 15.5 13.3 - 17.7 g/dL    Hematocrit 47.4 40.0 - 53.0 %    MCV 93 78 - 100 fL    MCH 30.5 26.5 - 33.0 pg    MCHC 32.7 31.5 - 36.5 g/dL    RDW 13.1 10.0 - 15.0 %    Platelet Count 273 150 - 450 10e3/uL    % Neutrophils 57 %    % Lymphocytes 31 %    % Monocytes 10 %    % Eosinophils 2 %    % Basophils 0 %    % Immature Granulocytes 0 %    NRBCs per 100 WBC 0 <1 /100    Absolute Neutrophils 4.2 1.6 - 8.3 10e3/uL    Absolute Lymphocytes 2.3 0.8 - 5.3 10e3/uL    Absolute  Monocytes 0.7 0.0 - 1.3 10e3/uL    Absolute Eosinophils 0.1 0.0 - 0.7 10e3/uL    Absolute Basophils 0.0 0.0 - 0.2 10e3/uL    Absolute Immature Granulocytes 0.0 <=0.4 10e3/uL    Absolute NRBCs 0.0 10e3/uL   Symptomatic; Yes; 2/2/2022 Influenza A/B & SARS-CoV2 (COVID-19) Virus PCR Multiplex Nasopharyngeal    Specimen: Nasopharyngeal; Swab   Result Value Ref Range    Influenza A PCR Negative Negative    Influenza B PCR Negative Negative    SARS CoV2 PCR Negative Negative    Narrative    Testing was performed using the jhon SARS-CoV-2 & Influenza A/B Assay on the jhon Suzie System. This test should be ordered for the detection of SARS-CoV-2 and influenza viruses in individuals who meet clinical and/or epidemiological criteria. Test performance is unknown in asymptomatic patients. This test is for in vitro diagnostic use under the FDA EUA for laboratories certified under CLIA to perform moderate and/or high complexity testing. This test has not been FDA cleared or approved. A negative result does not rule out the presence of PCR inhibitors in the specimen or target RNA in concentration below the limit of detection for the assay. If only one viral target is positive but coinfection with multiple targets is suspected, the sample should be re-tested with another FDA cleared, approved or authorized test, if coinfection would change clinical management. [facility] are certified under the Clinical Laboratory Improvement Amendments of 1988 (CLIA-88) as  qualified to perform moderate and/or high complexity laboratory testing.   CT Head w/o Contrast    Narrative    EXAM: CT HEAD W/O CONTRAST  LOCATION: [facility]  DATE/TIME: 2/9/2022 5:40 PM    INDICATION: Headache, intracranial hemorrhage suspected  COMPARISON: None.  TECHNIQUE: Routine CT Head without IV contrast. Multiplanar reformats. Dose reduction techniques were used.    FINDINGS:  INTRACRANIAL CONTENTS: No intracranial  hemorrhage, extraaxial collection, or mass effect.  No CT evidence of acute infarct. Normal parenchymal attenuation. Normal ventricles and sulci.     VISUALIZED ORBITS/SINUSES/MASTOIDS: No intraorbital abnormality. No paranasal sinus mucosal disease. No middle ear or mastoid effusion.    BONES/SOFT TISSUES: No acute abnormality.      Impression    IMPRESSION:  1.  No CT evidence of a mass, hemorrhage or focal area suggestive of acute infarct.   XR Chest 2 Views    Narrative    EXAM: XR CHEST 2 VW  LOCATION: [facility]  DATE/TIME: 2/9/2022 6:08 PM    INDICATION: chest pain  COMPARISON: 12/21/2021      Impression    IMPRESSION: Postop CABG. Otherwise negative chest. The lungs are clear.       Medications   oxyCODONE-acetaminophen (PERCOCET) 5-325 MG per tablet 2 tablet (2 tablets Oral Given 2/9/22 1725)       Assessments & Plan (with Medical Decision Making)  Chest wall tenderness, ECG without acute ischemic change unchanged from previous, troponin remains within normal limits.  Findings consistent with costochondral chest wall pain.  Tylenol for discomfort.  Return criteria reviewed    Folliculitis of the scalp with associated headache.  Patient is not immunocompromised.  No insect exposure or exposure to new products.  Recommend treatment with cephalexin x7 days.  Follow-up primary care, return criteria reviewed     I have reviewed the nursing notes.    I have reviewed the findings, diagnosis, plan and need for follow up with the patient.          Discharge Medication List as of 2/9/2022  6:42 PM      START taking these medications    Details   cephALEXin (KEFLEX) 500 MG capsule Take 1 capsule (500 mg) by mouth 4 times daily for 7 days, Disp-28 capsule, R-0, E-Prescribe             Final diagnoses:   Folliculitis   Chest wall pain       2/9/2022   [facility] EMERGENCY DEPT     Kobe Ramirez MD  02/09/22 1639

## 2022-02-09 NOTE — ED TRIAGE NOTES
Here with medial chest pain that started while lying in bed last night, Intermittent since but also notes mild headache & multiple scabs to back of his head that are new. Has hx of quadruple bypass last June, did not experience chest pain prior to knowing of his heart blockages.

## 2022-02-10 NOTE — DISCHARGE INSTRUCTIONS
Tylenol for chest discomfort    Cephalexin for scalp rash, recheck for fever, progressive pain swelling of the scalp or any other concern.

## 2022-03-24 DIAGNOSIS — E11.9 TYPE 2 DIABETES MELLITUS WITHOUT COMPLICATION, WITHOUT LONG-TERM CURRENT USE OF INSULIN (H): ICD-10-CM

## 2022-03-24 DIAGNOSIS — I25.10 CORONARY ARTERY DISEASE INVOLVING NATIVE CORONARY ARTERY OF NATIVE HEART WITHOUT ANGINA PECTORIS: ICD-10-CM

## 2022-03-25 RX ORDER — METOPROLOL TARTRATE 25 MG/1
TABLET, FILM COATED ORAL
Qty: 180 TABLET | Refills: 0 | Status: SHIPPED | OUTPATIENT
Start: 2022-03-25 | End: 2022-04-22

## 2022-03-25 RX ORDER — FLASH GLUCOSE SENSOR
KIT MISCELLANEOUS
Qty: 6 EACH | Refills: 1 | Status: SHIPPED | OUTPATIENT
Start: 2022-03-25 | End: 2022-09-08

## 2022-03-25 RX ORDER — INSULIN LISPRO 100 [IU]/ML
INJECTION, SOLUTION INTRAVENOUS; SUBCUTANEOUS
Qty: 48 ML | Refills: 1 | Status: SHIPPED | OUTPATIENT
Start: 2022-03-25 | End: 2022-09-26

## 2022-03-25 NOTE — TELEPHONE ENCOUNTER
HumaLOG KwikPen 100 UNIT/ML Subcutaneous Solution Pen-injector      Last Written Prescription Date:  10-  Last Fill Quantity: 48.6 ml,   # refills: 0  Last Office Visit : 7-  Future Office visit:  none    Routing refill request to provider for review/approval because:  DR NÚÑEZ PATIENT    REQUEST AND MEDICATION LIST DO NOT MATCH    Medication list:  Route: Inject 18 Units Subcutaneous 3 times daily (before meals) Inject 10 to 16 units before meals. (max dose per 24 hours 50 units) -     Request:  INJECT 10 TO 18 UNITS UNDER THE SKIN 3 TIMES DAILY BEFORE MEALS.  MAX DOSE PER 24 HOURS IS 50 UNITS      Kathleen M Doege RN

## 2022-03-25 NOTE — TELEPHONE ENCOUNTER
Forwarding refill request for metoprolol to PCP due to  status.  Last Rx was 21 for 180 tabs and 3 refills, but appears was ended/ on 21. Patient obviously still taking this, as he's called to request a refill today due to being out of med.     Estela Pete RN  [facility]

## 2022-04-22 ENCOUNTER — OFFICE VISIT (OUTPATIENT)
Dept: FAMILY MEDICINE | Facility: CLINIC | Age: 52
End: 2022-04-22
Payer: COMMERCIAL

## 2022-04-22 VITALS
RESPIRATION RATE: 18 BRPM | WEIGHT: 217 LBS | SYSTOLIC BLOOD PRESSURE: 108 MMHG | HEART RATE: 82 BPM | TEMPERATURE: 98.3 F | HEIGHT: 69 IN | DIASTOLIC BLOOD PRESSURE: 72 MMHG | BODY MASS INDEX: 32.14 KG/M2 | OXYGEN SATURATION: 97 %

## 2022-04-22 DIAGNOSIS — L73.9 FOLLICULITIS: ICD-10-CM

## 2022-04-22 DIAGNOSIS — E11.9 TYPE 2 DIABETES MELLITUS WITHOUT COMPLICATION, WITH LONG-TERM CURRENT USE OF INSULIN (H): Primary | ICD-10-CM

## 2022-04-22 DIAGNOSIS — I25.10 CORONARY ARTERY DISEASE INVOLVING NATIVE CORONARY ARTERY OF NATIVE HEART WITHOUT ANGINA PECTORIS: ICD-10-CM

## 2022-04-22 DIAGNOSIS — Z79.4 TYPE 2 DIABETES MELLITUS WITHOUT COMPLICATION, WITH LONG-TERM CURRENT USE OF INSULIN (H): Primary | ICD-10-CM

## 2022-04-22 DIAGNOSIS — L40.9 PSORIASIS OF SCALP: ICD-10-CM

## 2022-04-22 DIAGNOSIS — Z12.11 SCREEN FOR COLON CANCER: ICD-10-CM

## 2022-04-22 DIAGNOSIS — R91.8 PULMONARY NODULES: ICD-10-CM

## 2022-04-22 LAB — HBA1C MFR BLD: 6.3 % (ref 0–5.6)

## 2022-04-22 PROCEDURE — 83036 HEMOGLOBIN GLYCOSYLATED A1C: CPT | Performed by: FAMILY MEDICINE

## 2022-04-22 PROCEDURE — 36415 COLL VENOUS BLD VENIPUNCTURE: CPT | Performed by: FAMILY MEDICINE

## 2022-04-22 PROCEDURE — 99214 OFFICE O/P EST MOD 30 MIN: CPT | Performed by: FAMILY MEDICINE

## 2022-04-22 RX ORDER — CEPHALEXIN 500 MG/1
500 CAPSULE ORAL 4 TIMES DAILY
Qty: 28 CAPSULE | Refills: 0 | Status: SHIPPED | OUTPATIENT
Start: 2022-04-22 | End: 2022-04-29

## 2022-04-22 RX ORDER — ATORVASTATIN CALCIUM 80 MG/1
80 TABLET, FILM COATED ORAL DAILY
Qty: 90 TABLET | Refills: 3 | Status: SHIPPED | OUTPATIENT
Start: 2022-04-22 | End: 2023-02-27

## 2022-04-22 RX ORDER — METOPROLOL TARTRATE 25 MG/1
25 TABLET, FILM COATED ORAL 2 TIMES DAILY
Qty: 180 TABLET | Refills: 3 | Status: SHIPPED | OUTPATIENT
Start: 2022-04-22 | End: 2023-02-27

## 2022-04-22 RX ORDER — FLUOCINONIDE TOPICAL SOLUTION USP, 0.05% 0.5 MG/ML
SOLUTION TOPICAL 2 TIMES DAILY
Qty: 200 ML | Refills: 1 | Status: SHIPPED | OUTPATIENT
Start: 2022-04-22 | End: 2022-08-15

## 2022-04-22 RX ORDER — KETOCONAZOLE 20 MG/ML
SHAMPOO TOPICAL DAILY PRN
Qty: 200 ML | Refills: 1 | Status: SHIPPED | OUTPATIENT
Start: 2022-04-22 | End: 2022-08-15

## 2022-04-22 ASSESSMENT — ENCOUNTER SYMPTOMS
MUSCULOSKELETAL NEGATIVE: 1
CARDIOVASCULAR NEGATIVE: 1
EYES NEGATIVE: 1
CONSTITUTIONAL NEGATIVE: 1
GASTROINTESTINAL NEGATIVE: 1
RESPIRATORY NEGATIVE: 1
HEMATOLOGIC/LYMPHATIC NEGATIVE: 1
ALLERGIC/IMMUNOLOGIC NEGATIVE: 1
PSYCHIATRIC NEGATIVE: 1
ENDOCRINE NEGATIVE: 1
HEADACHES: 1

## 2022-04-22 ASSESSMENT — PAIN SCALES - GENERAL: PAINLEVEL: MODERATE PAIN (5)

## 2022-04-22 NOTE — RESULT ENCOUNTER NOTE
Please inform patient that test result was within normal parameters. A1C went down from 6.9 to 6.3. Patient to continue his medications, recheck in six months.  Thank you.     John Day M.D.

## 2022-04-22 NOTE — PATIENT INSTRUCTIONS
Thank you for choosing [facility].  You may be receiving an email and/or telephone survey request from [facility] Customer Experience regarding your visit today.  Please take a few minutes to respond to the survey to let us know how we are doing.      If you have questions or concerns, please contact us via [employer] or you can contact your care team at [phone] option [phone].    Our Clinic hours are:  Monday - Thursday 7am-6pm  Friday 7am-5pm    The [place] outpatient lab hours are:  Monday - Friday 7am-4:30pm    Appointments are required, call [phone]    If you have clinical questions after hours or would like to schedule an appointment,  call the clinic at [phone].

## 2022-04-22 NOTE — PROGRESS NOTES
Assessment & Plan     Type 2 diabetes mellitus without complication, with long-term current use of insulin (H)  A1C is improved from last check. Patient to continue with present medication   - HEMOGLOBIN A1C  - Albumin Random Urine Quantitative with Creat Ratio; Future    Screen for colon cancer  Colonoscopy ordered.   - Adult Gastro Ref - Procedure Only; Future  - Fecal colorectal cancer screen (FIT); Future    Folliculitis  - cephALEXin (KEFLEX) 500 MG capsule; Take 1 capsule (500 mg) by mouth 4 times daily for 7 days    Psoriasis of scalp  Scalp irritation likely psoriasis. Shampoo and lotion faxed.  - ketoconazole (NIZORAL) 2 % external shampoo; Apply topically daily as needed for itching or irritation  - fluocinonide (LIDEX) 0.05 % external solution; Apply topically 2 times daily    Coronary artery disease involving native coronary artery of native heart without angina pectoris  Medication refilled. Stable.   - atorvastatin (LIPITOR) 80 MG tablet; Take 1 tablet (80 mg) by mouth daily  - metoprolol tartrate (LOPRESSOR) 25 MG tablet; Take 1 tablet (25 mg) by mouth 2 times daily    Pulmonary nodules  CT ordered, patient will be notified of results.  - CT Chest w Contrast; Future        FUTURE APPOINTMENTS:       - Follow-up visit in six months or sooner as needed.    Return in about 6 months (around 10/22/2022) for Follow up.    John Day MD  [facility] 51 [facility] who presents for the following health issues  accompanied by his self.    51 yr old male here for diabetes recheck, he reports that his blood sugars have been doing really well.  Patient reports fasting blood sugars between 130-140. He has been compliant with his medications.   Also complained of scalp irritation. Was seen in the ED a couple of months ago and prescribed antibiotics for folliculitis. Patient states that the antibiotics helped but the scabs came back. They cause severe  "itching and irritation which causes headaches for him.  Patient also wants a follow up CT for a lung nodule seen on CT a couple of months ago. He smoked a long time ago and he is at moderate risk .  Patient was reminded of colonoscopy.       Headache     History of Present Illness       Migraines:   Since the patient's last clinic visit, headaches are: worsened  The patient is getting headaches:  To many  He is not able to do normal daily activities when he has a migraine.  The patient is taking the following rescue/relief medications:  Tylenol   Patient states \"I get no relief\" from the rescue/relief medications.   The patient is taking the following medications to prevent migraines:  No medications to prevent migraines  In the past 4 weeks, the patient has gone to an Urgent Care or Emergency Room 0 times times due to headaches.    Reason for visit:  Check up  Symptom onset:  More than a month  Symptoms include:  Left heel pain head pain  Symptom intensity:  Moderate  Symptom progression:  Worsening  Had these symptoms before:  No  What makes it worse:  Exercise  What makes it better:  Rest    He eats 0-1 servings of fruits and vegetables daily.He consumes 1 sweetened beverage(s) daily.He exercises with enough effort to increase his heart rate 30 to 60 minutes per day.  He exercises with enough effort to increase his heart rate 3 or less days per week.   He is taking medications regularly.     Chief Complaint   Patient presents with     Headache     Here to discuss about headaches.  States there are some red areas on the scalp that itch and cause the pain. Certain areas will heal then more areas will appear. He has tried changing shampoo and soaps.  Was given treatment in the past, not sure if he needs a longer treatment.     Heel pain     Left heel pain.  When he is active the pain is not as bad.  When sitting around the pain can get worse.  He has tried taking Tylenol and that is not helping.     Chest x-ray     " "Was told he needs to follow up and have a chest x-ray done every 3-6 months.  There were some areas noted on a CT scan following his surgery.             Review of Systems   Constitutional: Negative.    HENT: Negative.    Eyes: Negative.    Respiratory: Negative.    Cardiovascular: Negative.    Gastrointestinal: Negative.    Endocrine: Negative.    Genitourinary: Negative.    Musculoskeletal: Negative.    Skin: Positive for rash.   Allergic/Immunologic: Negative.    Neurological: Positive for headaches.   Hematological: Negative.    Psychiatric/Behavioral: Negative.             Objective    /72   Pulse 82   Temp 98.3  F (36.8  C) (Tympanic)   Resp 18   Ht 1.753 m (5' 9\")   Wt 98.4 kg (217 lb)   SpO2 97%   BMI 32.05 kg/m    Body mass index is 32.05 kg/m .  Physical Exam  Constitutional:       Appearance: Normal appearance.   HENT:      Head: Normocephalic and atraumatic.      Right Ear: Tympanic membrane normal.      Left Ear: Tympanic membrane normal.      Nose: Nose normal.   Eyes:      Pupils: Pupils are equal, round, and reactive to light.   Cardiovascular:      Rate and Rhythm: Normal rate and regular rhythm.      Pulses: Normal pulses.      Heart sounds: Normal heart sounds.   Pulmonary:      Effort: Pulmonary effort is normal.      Breath sounds: Normal breath sounds.   Abdominal:      General: Abdomen is flat. Bowel sounds are normal.      Palpations: Abdomen is soft.   Musculoskeletal:         General: Normal range of motion.      Cervical back: Normal range of motion and neck supple.   Skin:     Findings: Erythema and rash present.      Comments: Scalp with small red spots scabby all over the scalp.   Neurological:      Mental Status: He is alert and oriented to person, place, and time.   Psychiatric:         Mood and Affect: Mood normal.         Behavior: Behavior normal.            Results for orders placed or performed in visit on 04/22/22 (from the past 24 hour(s))   HEMOGLOBIN A1C   Result " Value Ref Range    Hemoglobin A1C 6.3 (H) 0.0 - 5.6 %

## 2022-04-25 ENCOUNTER — HOSPITAL ENCOUNTER (OUTPATIENT)
Dept: CT IMAGING | Facility: CLINIC | Age: 52
Discharge: HOME | End: 2022-04-25
Attending: FAMILY MEDICINE | Admitting: FAMILY MEDICINE
Payer: COMMERCIAL

## 2022-04-25 DIAGNOSIS — R91.8 PULMONARY NODULES: ICD-10-CM

## 2022-04-25 PROCEDURE — 71250 CT THORAX DX C-: CPT

## 2022-04-25 NOTE — LETTER
May 9, 2022      Angelo Winters  [address] [address] [address]        Dear ,    We are writing to inform you of your test results.    CT scan showed that the pulmonary nodules are stable. It was recommended that this be rechecked in a year.    Resulted Orders   CT Chest w/o Contrast    Narrative    CT CHEST WITHOUT CONTRAST 4/25/2022 7:55 AM    CLINICAL HISTORY: Lung nodule, < 6mm, high cancer risk. Pulmonary  nodules.    TECHNIQUE: CT chest without IV contrast. Multiplanar reformats were  obtained. Dose reduction techniques were used.  CONTRAST: None.    COMPARISON: 12/21/2021    FINDINGS:   LUNGS AND PLEURA: There is a stable 14 mm nodule in the posterior  right lower lobe that abuts the pleura. Adjacent 3 mm pleural nodule  is seen on image 213 of series 4 that is unchanged as well. Stable 2  mm nodule along the right major fissure on image 133. No evidence of  new or enlarging nodules. No infiltrate or effusion. The central  airways are patent.    MEDIASTINUM/AXILLAE: Mildly enlarged 12 mm short axis right  paratracheal node is minimally increased from previous exam where it  measured 10 mm. Numerous additional smaller mediastinal lymph nodes  are present. Severe coronary artery calcification is present. No  pericardial effusion.    UPPER ABDOMEN: No significant finding.    MUSCULOSKELETAL: Unremarkable.      Impression    IMPRESSION:   1.  Stable nodules throughout the right lung. The largest measures 14  mm in the right lower lobe. These have been stable for 4 months.  Continued follow-up is recommended.  2.  No evidence of new or enlarging nodules.  3.  Minimal increase in size of a mildly enlarged right paratracheal  node measuring 12 mm in short axis at station 4R.    TRENT HERNADEZ MD         SYSTEM ID:  [id number]       If you have any questions or concerns, please call the clinic at the number listed above.       Sincerely,      John Day MD

## 2022-05-04 ENCOUNTER — HOSPITAL ENCOUNTER (EMERGENCY)
Facility: CLINIC | Age: 52
Discharge: HOME | End: 2022-05-04
Attending: FAMILY MEDICINE | Admitting: FAMILY MEDICINE
Payer: COMMERCIAL

## 2022-05-04 ENCOUNTER — APPOINTMENT (OUTPATIENT)
Dept: CT IMAGING | Facility: CLINIC | Age: 52
End: 2022-05-04
Attending: FAMILY MEDICINE
Payer: COMMERCIAL

## 2022-05-04 VITALS
OXYGEN SATURATION: 96 % | TEMPERATURE: 99.3 F | SYSTOLIC BLOOD PRESSURE: 118 MMHG | RESPIRATION RATE: 16 BRPM | DIASTOLIC BLOOD PRESSURE: 80 MMHG | WEIGHT: 220 LBS | BODY MASS INDEX: 32.49 KG/M2 | HEART RATE: 89 BPM

## 2022-05-04 DIAGNOSIS — R07.89 ATYPICAL CHEST PAIN: ICD-10-CM

## 2022-05-04 DIAGNOSIS — U07.1 INFECTION DUE TO 2019 NOVEL CORONAVIRUS: ICD-10-CM

## 2022-05-04 LAB
ALBUMIN SERPL-MCNC: 3.8 G/DL (ref 3.4–5)
ALP SERPL-CCNC: 84 U/L (ref 40–150)
ALT SERPL W P-5'-P-CCNC: 28 U/L (ref 0–70)
ANION GAP SERPL CALCULATED.3IONS-SCNC: 6 MMOL/L (ref 3–14)
AST SERPL W P-5'-P-CCNC: 13 U/L (ref 0–45)
BASOPHILS # BLD AUTO: 0 10E3/UL (ref 0–0.2)
BASOPHILS NFR BLD AUTO: 0 %
BILIRUB SERPL-MCNC: 0.6 MG/DL (ref 0.2–1.3)
BUN SERPL-MCNC: 14 MG/DL (ref 7–30)
CALCIUM SERPL-MCNC: 8.8 MG/DL (ref 8.5–10.1)
CHLORIDE BLD-SCNC: 108 MMOL/L (ref 94–109)
CO2 SERPL-SCNC: 26 MMOL/L (ref 20–32)
CREAT SERPL-MCNC: 0.87 MG/DL (ref 0.66–1.25)
D DIMER PPP FEU-MCNC: 0.84 UG/ML FEU (ref 0–0.5)
EOSINOPHIL # BLD AUTO: 0 10E3/UL (ref 0–0.7)
EOSINOPHIL NFR BLD AUTO: 0 %
ERYTHROCYTE [DISTWIDTH] IN BLOOD BY AUTOMATED COUNT: 13 % (ref 10–15)
FLUAV RNA SPEC QL NAA+PROBE: NEGATIVE
FLUBV RNA RESP QL NAA+PROBE: NEGATIVE
GFR SERPL CREATININE-BSD FRML MDRD: >90 ML/MIN/1.73M2
GLUCOSE BLD-MCNC: 174 MG/DL (ref 70–99)
HCT VFR BLD AUTO: 44.3 % (ref 40–53)
HGB BLD-MCNC: 14.3 G/DL (ref 13.3–17.7)
HOLD SPECIMEN: NORMAL
IMM GRANULOCYTES # BLD: 0 10E3/UL
IMM GRANULOCYTES NFR BLD: 0 %
LIPASE SERPL-CCNC: 43 U/L (ref 73–393)
LYMPHOCYTES # BLD AUTO: 0.8 10E3/UL (ref 0.8–5.3)
LYMPHOCYTES NFR BLD AUTO: 16 %
MCH RBC QN AUTO: 30.4 PG (ref 26.5–33)
MCHC RBC AUTO-ENTMCNC: 32.3 G/DL (ref 31.5–36.5)
MCV RBC AUTO: 94 FL (ref 78–100)
MONOCYTES # BLD AUTO: 0.6 10E3/UL (ref 0–1.3)
MONOCYTES NFR BLD AUTO: 12 %
NEUTROPHILS # BLD AUTO: 3.6 10E3/UL (ref 1.6–8.3)
NEUTROPHILS NFR BLD AUTO: 72 %
NRBC # BLD AUTO: 0 10E3/UL
NRBC BLD AUTO-RTO: 0 /100
PLATELET # BLD AUTO: 226 10E3/UL (ref 150–450)
POTASSIUM BLD-SCNC: 4.4 MMOL/L (ref 3.4–5.3)
PROT SERPL-MCNC: 7.2 G/DL (ref 6.8–8.8)
RBC # BLD AUTO: 4.71 10E6/UL (ref 4.4–5.9)
SARS-COV-2 RNA RESP QL NAA+PROBE: POSITIVE
SODIUM SERPL-SCNC: 140 MMOL/L (ref 133–144)
TROPONIN I SERPL HS-MCNC: 4 NG/L
TROPONIN I SERPL HS-MCNC: 4 NG/L
WBC # BLD AUTO: 5.1 10E3/UL (ref 4–11)

## 2022-05-04 PROCEDURE — 93010 ELECTROCARDIOGRAM REPORT: CPT | Performed by: FAMILY MEDICINE

## 2022-05-04 PROCEDURE — 99285 EMERGENCY DEPT VISIT HI MDM: CPT | Mod: CS | Performed by: FAMILY MEDICINE

## 2022-05-04 PROCEDURE — 96361 HYDRATE IV INFUSION ADD-ON: CPT | Performed by: FAMILY MEDICINE

## 2022-05-04 PROCEDURE — 85379 FIBRIN DEGRADATION QUANT: CPT | Performed by: FAMILY MEDICINE

## 2022-05-04 PROCEDURE — 84484 ASSAY OF TROPONIN QUANT: CPT | Performed by: FAMILY MEDICINE

## 2022-05-04 PROCEDURE — C9803 HOPD COVID-19 SPEC COLLECT: HCPCS | Performed by: FAMILY MEDICINE

## 2022-05-04 PROCEDURE — 258N000003 HC RX IP 258 OP 636: Performed by: FAMILY MEDICINE

## 2022-05-04 PROCEDURE — 250N000013 HC RX MED GY IP 250 OP 250 PS 637: Performed by: FAMILY MEDICINE

## 2022-05-04 PROCEDURE — 250N000009 HC RX 250: Performed by: FAMILY MEDICINE

## 2022-05-04 PROCEDURE — 99285 EMERGENCY DEPT VISIT HI MDM: CPT | Mod: 25 | Performed by: FAMILY MEDICINE

## 2022-05-04 PROCEDURE — 87636 SARSCOV2 & INF A&B AMP PRB: CPT | Performed by: FAMILY MEDICINE

## 2022-05-04 PROCEDURE — 71275 CT ANGIOGRAPHY CHEST: CPT

## 2022-05-04 PROCEDURE — 36415 COLL VENOUS BLD VENIPUNCTURE: CPT | Performed by: FAMILY MEDICINE

## 2022-05-04 PROCEDURE — 83690 ASSAY OF LIPASE: CPT | Performed by: FAMILY MEDICINE

## 2022-05-04 PROCEDURE — 250N000011 HC RX IP 250 OP 636: Performed by: FAMILY MEDICINE

## 2022-05-04 PROCEDURE — 93005 ELECTROCARDIOGRAM TRACING: CPT | Performed by: FAMILY MEDICINE

## 2022-05-04 PROCEDURE — 85025 COMPLETE CBC W/AUTO DIFF WBC: CPT | Performed by: FAMILY MEDICINE

## 2022-05-04 PROCEDURE — 80053 COMPREHEN METABOLIC PANEL: CPT | Performed by: FAMILY MEDICINE

## 2022-05-04 PROCEDURE — 96360 HYDRATION IV INFUSION INIT: CPT | Mod: 59 | Performed by: FAMILY MEDICINE

## 2022-05-04 RX ORDER — OXYCODONE HYDROCHLORIDE 5 MG/1
5 TABLET ORAL EVERY 4 HOURS PRN
Status: DISCONTINUED | OUTPATIENT
Start: 2022-05-04 | End: 2022-05-05 | Stop reason: HOSPADM

## 2022-05-04 RX ORDER — IOPAMIDOL 755 MG/ML
89 INJECTION, SOLUTION INTRAVASCULAR ONCE
Status: COMPLETED | OUTPATIENT
Start: 2022-05-04 | End: 2022-05-04

## 2022-05-04 RX ADMIN — SODIUM CHLORIDE, POTASSIUM CHLORIDE, SODIUM LACTATE AND CALCIUM CHLORIDE 1000 ML: 600; 310; 30; 20 INJECTION, SOLUTION INTRAVENOUS at 20:14

## 2022-05-04 RX ADMIN — SODIUM CHLORIDE 100 ML: 9 INJECTION, SOLUTION INTRAVENOUS at 18:03

## 2022-05-04 RX ADMIN — OXYCODONE HYDROCHLORIDE 5 MG: 5 TABLET ORAL at 20:25

## 2022-05-04 RX ADMIN — IOPAMIDOL 89 ML: 755 INJECTION, SOLUTION INTRAVENOUS at 18:02

## 2022-05-04 NOTE — ED PROVIDER NOTES
"  History     Chief Complaint   Patient presents with     Chest Wall Pain     Cough     Covid Concern     HPI  Houston Winters is a 51 year old male, past medical history is significant for unstable angina, SBO, type 2 diabetes, tobacco use disorder, hypertension, obesity, hyperlipidemia, erectile dysfunction, presents to the emergency department with concerns of COVID-positive, cough and chest pain.  History is obtained from the patient who is COVID unvaccinated as is his wife, began with body aches and chills about 10 days ago tested positive early last week for COVID at home test as did his wife.  Since that time he has had cough runny nose sore throat decreased appetite nausea no vomiting, loose in stool which has improved.  No abdominal pain.  Today while at rest he developed severe left-sided chest pain of approximately 1 minute duration for which he took 2 nitroglycerin and resolved his discomfort.  He is concerned that this represents \"heart pain\".  Associated with this was shortness of breath lightheadedness palpitations and intensification of the nausea.  Him in for evaluation.  He did take another COVID test today and it was positive at home.      Allergies:  Allergies   Allergen Reactions     Metformin Other (See Comments)     Very low blood sugars, Blood sugar issue        Problem List:    Patient Active Problem List    Diagnosis Date Noted     Unstable angina (H) 05/18/2021     Priority: Medium     Postoperative infection, initial encounter 01/28/2018     Priority: Medium     Small bowel obstruction (H) 12/29/2017     Priority: Medium     Type 2 diabetes mellitus without complication, without long-term current use of insulin (H) 03/27/2017     Priority: Medium     Tobacco use disorder 02/04/2015     Priority: Medium     Hypertension, goal below 140/90 02/04/2015     Priority: Medium     Obesity 07/17/2014     Priority: Medium     Hyperlipidemia with target LDL less than 100 07/17/2014     Priority: " Medium     Diagnosis updated by automated process. Provider to review and confirm.       Partial epilepsy (H) 03/12/2009     Priority: Medium     (Problem list name updated by automated process. Provider to review and confirm.)       ED (erectile dysfunction) 12/18/2008     Priority: Medium     GANGLOIN CYST /RIGHT ANKLE 06/15/2006     Priority: Medium        Past Medical History:    Past Medical History:   Diagnosis Date     DM (diabetes mellitus) (H)      Epilepsy (H)      MEDICAL HISTORY OF - Age 16      Nicotine dependence        Past Surgical History:    Past Surgical History:   Procedure Laterality Date     CL AFF SURGICAL PATHOLOGY  2005    ganglion cyst removed     CV HEART CATHETERIZATION WITH POSSIBLE INTERVENTION N/A 5/18/2021    Procedure: Heart Catheterization with Possible Intervention;  Surgeon: Andrew Madrid MD;  Location:  HEART CARDIAC CATH LAB     CV LEFT HEART CATH N/A 5/18/2021    Procedure: Left Heart Cath;  Surgeon: Andrew Madrid MD;  Location:  HEART CARDIAC CATH LAB     EXCISE MASS LOWER EXTREMITY  2/15/2013    Procedure: EXCISE MASS LOWER EXTREMITY;  Right Ankle Excision of ganglion cyst;  Surgeon: Akbar Melo DPM;  Location: [facility]     HERNIA REPAIR  2000    umbilical repair     HERNIORRHAPHY UMBILICAL N/A 11/10/2015    Procedure: HERNIORRHAPHY UMBILICAL;  Surgeon: Garry Leos MD;  Location: [facility] OR       Family History:    Family History   Problem Relation Age of Onset     Arthritis Mother      Cancer Maternal Grandmother      Arthritis Maternal Grandmother         lupus     Gastrointestinal Disease Maternal Grandfather         war injury, colostomy     Respiratory Daughter         growing out of it     Unknown/Adopted Father      Diabetes No family hx of      Coronary Artery Disease No family hx of      Hypertension No family hx of      Hyperlipidemia No family hx of      Breast Cancer No family hx of      Cancer - colorectal No family hx of      Ovarian Cancer No  family hx of      Prostate Cancer No family hx of        Social History:  Marital Status:   [2]  Social History     Tobacco Use     Smoking status: Former Smoker     Packs/day: 0.25     Years: 25.00     Pack years: 6.25     Types: Cigarettes     Quit date: 2016     Years since quittin.2     Smokeless tobacco: Former User   Vaping Use     Vaping Use: Never used   Substance Use Topics     Alcohol use: Yes     Alcohol/week: 0.0 standard drinks     Comment: mixed 2-4 on weekends, not every weekend     Drug use: No        Medications:    alcohol swab prep pads  aspirin (ASA) 81 MG chewable tablet  atorvastatin (LIPITOR) 80 MG tablet  blood glucose (NO BRAND SPECIFIED) test strip  blood glucose calibration (NO BRAND SPECIFIED) solution  blood glucose monitoring (NO BRAND SPECIFIED) meter device kit  clopidogrel (PLAVIX) 75 MG tablet  Continuous Blood Gluc Sensor (FREESTYLE MARY 14 DAY SENSOR) MISC  fluocinonide (LIDEX) 0.05 % external solution  HUMALOG KWIKPEN 100 UNIT/ML soln  insulin glargine (BASAGLAR KWIKPEN) 100 UNIT/ML pen  isosorbide mononitrate (IMDUR) 30 MG 24 hr tablet  ketoconazole (NIZORAL) 2 % external shampoo  lisinopril (ZESTRIL) 2.5 MG tablet  metoprolol tartrate (LOPRESSOR) 25 MG tablet  nitroGLYcerin (NITROSTAT) 0.4 MG sublingual tablet  sennosides (SENOKOT) 8.6 MG tablet          Review of Systems   All other systems reviewed and are negative.      Physical Exam   BP: 134/80  Pulse: (!) 130  Temp: 99.3  F (37.4  C)  Resp: 18  Weight: 99.8 kg (220 lb)  SpO2: 98 %      Physical Exam  Vitals and nursing note reviewed.   Constitutional:       General: He is not in acute distress.     Appearance: Normal appearance. He is obese. He is not ill-appearing.   HENT:      Head: Normocephalic and atraumatic.      Right Ear: Tympanic membrane, ear canal and external ear normal.      Left Ear: Tympanic membrane, ear canal and external ear normal.      Nose: Nose normal.      Mouth/Throat:      Mouth:  Mucous membranes are moist.      Pharynx: Oropharynx is clear.   Eyes:      Extraocular Movements: Extraocular movements intact.      Conjunctiva/sclera: Conjunctivae normal.      Pupils: Pupils are equal, round, and reactive to light.   Cardiovascular:      Rate and Rhythm: Regular rhythm. Tachycardia present.      Pulses: Normal pulses.      Heart sounds: Normal heart sounds.   Pulmonary:      Effort: Pulmonary effort is normal.      Breath sounds: Normal breath sounds.   Abdominal:      General: Bowel sounds are normal.      Palpations: Abdomen is soft.   Musculoskeletal:         General: Normal range of motion.      Cervical back: Normal range of motion and neck supple.   Skin:     General: Skin is warm and dry.      Capillary Refill: Capillary refill takes less than 2 seconds.   Neurological:      General: No focal deficit present.      Mental Status: He is alert and oriented to person, place, and time.   Psychiatric:         Mood and Affect: Mood normal.         Behavior: Behavior normal.         ED Course                 Procedures              EKG Interpretation:      Interpreted by Brendan Yoo MD  Time reviewed: Time obtained 1633 time interpreted 1636 127 bpm sinus tachycardia incomplete right bundle branch block.  Comparison cardiogram dated 2/9/2022 also demonstrates an incomplete right bundle branch block and tachycardia.  There is no definite significant change between the 2 cardiograms.  I do not appreciate any acute ischemia or infarct on today's EKG.  Patient is pain-free at the time of the EKG        Critical Care time:  none               Results for orders placed or performed during the hospital encounter of 05/04/22 (from the past 24 hour(s))   [place] Draw    Narrative    The following orders were created for panel order [place] Draw.  Procedure                               Abnormality         Status                     ---------                               -----------          ------                     Extra Blue Top Tube[[id number]]                              Final result               Extra Red Top Tube[[id number]]                               Final result               Extra Green Top (Lithium...[[id number]]                      Final result               Extra Purple Top Tube[[id number]]                            Final result                 Please view results for these tests on the individual orders.   Extra Blue Top Tube   Result Value Ref Range    Hold Specimen JIC    Extra Red Top Tube   Result Value Ref Range    Hold Specimen JIC    Extra Green Top (Lithium Heparin) Tube   Result Value Ref Range    Hold Specimen JIC    Extra Purple Top Tube   Result Value Ref Range    Hold Specimen JIC    CBC with platelets, differential    Narrative    The following orders were created for panel order CBC with platelets, differential.  Procedure                               Abnormality         Status                     ---------                               -----------         ------                     CBC with platelets and d...[[id number]]                      Final result                 Please view results for these tests on the individual orders.   Comprehensive metabolic panel   Result Value Ref Range    Sodium 140 133 - 144 mmol/L    Potassium 4.4 3.4 - 5.3 mmol/L    Chloride 108 94 - 109 mmol/L    Carbon Dioxide (CO2) 26 20 - 32 mmol/L    Anion Gap 6 3 - 14 mmol/L    Urea Nitrogen 14 7 - 30 mg/dL    Creatinine 0.87 0.66 - 1.25 mg/dL    Calcium 8.8 8.5 - 10.1 mg/dL    Glucose 174 (H) 70 - 99 mg/dL    Alkaline Phosphatase 84 40 - 150 U/L    AST 13 0 - 45 U/L    ALT 28 0 - 70 U/L    Protein Total 7.2 6.8 - 8.8 g/dL    Albumin 3.8 3.4 - 5.0 g/dL    Bilirubin Total 0.6 0.2 - 1.3 mg/dL    GFR Estimate >90 >60 mL/min/1.73m2   Troponin I   Result Value Ref Range    Troponin I High Sensitivity 4 <79 ng/L   Lipase   Result Value Ref Range    Lipase 43 (L) 73 - 393 U/L   D dimer quantitative    Result Value Ref Range    D-Dimer Quantitative 0.84 (H) 0.00 - 0.50 ug/mL FEU    Narrative    This D-dimer assay is intended for use in conjunction with a clinical pretest probability assessment model to exclude pulmonary embolism (PE) and deep venous thrombosis (DVT) in outpatients suspected of PE or DVT. The cut-off value is 0.50 ug/mL FEU.   CBC with platelets and differential   Result Value Ref Range    WBC Count 5.1 4.0 - 11.0 10e3/uL    RBC Count 4.71 4.40 - 5.90 10e6/uL    Hemoglobin 14.3 13.3 - 17.7 g/dL    Hematocrit 44.3 40.0 - 53.0 %    MCV 94 78 - 100 fL    MCH 30.4 26.5 - 33.0 pg    MCHC 32.3 31.5 - 36.5 g/dL    RDW 13.0 10.0 - 15.0 %    Platelet Count 226 150 - 450 10e3/uL    % Neutrophils 72 %    % Lymphocytes 16 %    % Monocytes 12 %    % Eosinophils 0 %    % Basophils 0 %    % Immature Granulocytes 0 %    NRBCs per 100 WBC 0 <1 /100    Absolute Neutrophils 3.6 1.6 - 8.3 10e3/uL    Absolute Lymphocytes 0.8 0.8 - 5.3 10e3/uL    Absolute Monocytes 0.6 0.0 - 1.3 10e3/uL    Absolute Eosinophils 0.0 0.0 - 0.7 10e3/uL    Absolute Basophils 0.0 0.0 - 0.2 10e3/uL    Absolute Immature Granulocytes 0.0 <=0.4 10e3/uL    Absolute NRBCs 0.0 10e3/uL   Symptomatic; Yes; 4/28/2022 Influenza A/B & SARS-CoV2 (COVID-19) Virus PCR Multiplex Nasopharyngeal    Specimen: Nasopharyngeal; Swab   Result Value Ref Range    Influenza A PCR Negative Negative    Influenza B PCR Negative Negative    SARS CoV2 PCR Positive (A) Negative    Narrative    Testing was performed using the jhon SARS-CoV-2 & Influenza A/B Assay on the jhon Suzie System. This test should be ordered for the detection of SARS-CoV-2 and influenza viruses in individuals who meet clinical and/or epidemiological criteria. Test performance is unknown in asymptomatic patients. This test is for in vitro diagnostic use under the FDA EUA for laboratories certified under CLIA to perform moderate and/or high complexity testing. This test has not been FDA cleared or  approved. A negative result does not rule out the presence of PCR inhibitors in the specimen or target RNA in concentration below the limit of detection for the assay. If only one viral target is positive but coinfection with multiple targets is suspected, the sample should be re-tested with another FDA cleared, approved or authorized test, if coinfection would change clinical management. [facility] are certified under the Clinical Laboratory Improvement Amendments of 1988 (CLIA-88) as  qualified to perform moderate and/or high complexity laboratory testing.   CT Chest Pulmonary Embolism w Contrast    Narrative    EXAM: CT CHEST PULMONARY EMBOLISM W CONTRAST  LOCATION: [facility]  DATE/TIME: 5/4/2022 6:01 PM    INDICATION: SOA,chest pain,covid positive  COMPARISON: 4/25/2022  TECHNIQUE: CT chest pulmonary angiogram during arterial phase injection of IV contrast. Multiplanar reformats and MIP reconstructions were performed. Dose reduction techniques were used.   CONTRAST: 89 ml Isovue 370    FINDINGS:  ANGIOGRAM CHEST: Pulmonary arteries are normal caliber and negative for pulmonary emboli. Thoracic aorta is negative for dissection. No CT evidence of right heart strain.    LUNGS AND PLEURA: No change in the 12 x 9 mm pleural-based nodule posterior right lung base. Lungs otherwise clear.    MEDIASTINUM/AXILLAE: Minimal daniel prominence persists,, largest is a right paratracheal nodes stable measuring 1.8 x 1.2 cm. Small hiatal hernia.    CORONARY ARTERY CALCIFICATION: Previous CABG    UPPER ABDOMEN: Normal.    MUSCULOSKELETAL: Normal.      Impression    IMPRESSION:  1.  No pulmonary emboli identified. No etiology for patient's symptoms.  2.  Postoperative changes from CABG.  3.  No change in right lower lobe pulmonary nodule and mild mediastinal adenopathy.   Troponin I   Result Value Ref Range    Troponin I High Sensitivity 4 <79 ng/L       Medications   oxyCODONE  (ROXICODONE) tablet 5 mg (5 mg Oral Given 5/4/22 2025)   iopamidol (ISOVUE-370) solution 89 mL (89 mLs Intravenous Given 5/4/22 1802)   sodium chloride 0.9 % bag 500mL for CT scan flush use (100 mLs As instructed Given 5/4/22 1803)   lactated ringers BOLUS 1,000 mL (1,000 mLs Intravenous New Bag 5/4/22 2014)     9:48 PM  Asymptomatic.  We reviewed all lab diagnostics including initial cardiac troponin negative and repeat cardiac troponin negative.  CT findings reviewed with the patient.  We will plan for disposition to home.  Return criteria were reviewed, quarantine plan discussed and return to work plan.  COVID-19 care loop referral placed.  Assessments & Plan (with Medical Decision Making)   51-year-old male past medical reviewed as above who presents to the emergency department with concerns of COVID-positive, cough and chest pain as discussed in the HPI and documented with respect abnormals on exam.  EKG is unchanged from previous with previously noted right bundle branch block and no acute features today for acute ischemia or infarct.  Tachycardia.  Lab diagnostics are similarly reassuring with cardiac troponins negative x2, no significant abnormals on the CBC, D-dimer elevated lipase nonelevated, glucose is modestly elevated at 174.  Otherwise CMP is within normal limits.  Chest CT reviewed as above and without evidence of PE or evidence of COVID-19 pneumonia.  Discussion for time stamp above.  Patient will be dispositioned to home for quarantine plan return criteria reviewed.  COVID-19 care loop referral placed.    Disclaimer: This note consists of symbols derived from keyboarding, dictation and/or voice recognition software. As a result, there may be errors in the script that have gone undetected. Please consider this when interpreting information found in this chart.      I have reviewed the nursing notes.    I have reviewed the findings, diagnosis, plan and need for follow up with the patient.             New Prescriptions    No medications on file       Final diagnoses:   Infection due to 2019 novel coronavirus   Atypical chest pain       5/4/2022   [facility] EMERGENCY DEPT     Brendan Yoo MD  05/04/22 0932

## 2022-05-04 NOTE — ED TRIAGE NOTES
Tested positive last Tuesday, cough, bodyaches, had an episode of severe chest pain that took 2 nitro for with relief, hx triple bypass     Triage Assessment     Row Name 05/04/22 1621       Triage Assessment (Adult)    Airway WDL WDL       Respiratory WDL    Respiratory WDL WDL       Skin Circulation/Temperature WDL    Skin Circulation/Temperature WDL WDL       Cardiac WDL    Cardiac WDL chest pain       Peripheral/Neurovascular WDL    Peripheral Neurovascular WDL WDL       Cognitive/Neuro/Behavioral WDL    Cognitive/Neuro/Behavioral WDL WDL

## 2022-05-05 NOTE — DISCHARGE INSTRUCTIONS
Push fluids, rest.  Tylenol/ibuprofen as needed for comfort.  Return to the emergency department as discussed.  Return criteria reviewed.  COVID-19 care loop referral.

## 2022-05-06 NOTE — RESULT ENCOUNTER NOTE
Please inform patient that his CT scan showed that the pulmonary nodules are stable. It was recommended that this be rechecked in a year.  Thank you.     John Day M.D.

## 2022-05-16 NOTE — PROGRESS NOTES
Panel management, no longer needed to send info for health maintenance as it was discussed at recent visit. Erroneous encounter - disregard    VERÓNICA Porter LPN on 5/16/2022 at 3:02 PM

## 2022-05-25 ENCOUNTER — OFFICE VISIT (OUTPATIENT)
Dept: FAMILY MEDICINE | Facility: CLINIC | Age: 52
End: 2022-05-25
Payer: COMMERCIAL

## 2022-05-25 VITALS
TEMPERATURE: 98.4 F | SYSTOLIC BLOOD PRESSURE: 132 MMHG | HEIGHT: 69 IN | BODY MASS INDEX: 32.58 KG/M2 | HEART RATE: 86 BPM | RESPIRATION RATE: 16 BRPM | OXYGEN SATURATION: 98 % | WEIGHT: 220 LBS | DIASTOLIC BLOOD PRESSURE: 74 MMHG

## 2022-05-25 DIAGNOSIS — M79.89 MASS OF SOFT TISSUE OF SHOULDER: ICD-10-CM

## 2022-05-25 DIAGNOSIS — M54.50 ACUTE LEFT-SIDED LOW BACK PAIN WITHOUT SCIATICA: ICD-10-CM

## 2022-05-25 DIAGNOSIS — E11.9 TYPE 2 DIABETES MELLITUS WITHOUT COMPLICATION, WITHOUT LONG-TERM CURRENT USE OF INSULIN (H): ICD-10-CM

## 2022-05-25 DIAGNOSIS — M89.8X1 PAIN OF RIGHT SCAPULA: Primary | ICD-10-CM

## 2022-05-25 DIAGNOSIS — M54.12 CERVICAL RADICULOPATHY: ICD-10-CM

## 2022-05-25 DIAGNOSIS — M54.2 CERVICALGIA: ICD-10-CM

## 2022-05-25 PROCEDURE — 99214 OFFICE O/P EST MOD 30 MIN: CPT | Performed by: FAMILY MEDICINE

## 2022-05-25 RX ORDER — INSULIN GLARGINE 100 [IU]/ML
INJECTION, SOLUTION SUBCUTANEOUS
Qty: 75 ML | Refills: 3 | COMMUNITY
Start: 2022-05-25 | End: 2022-09-26

## 2022-05-25 RX ORDER — GABAPENTIN 300 MG/1
CAPSULE ORAL
Qty: 81 CAPSULE | Refills: 0 | Status: SHIPPED | OUTPATIENT
Start: 2022-05-25 | End: 2022-06-14

## 2022-05-25 RX ORDER — METHOCARBAMOL 750 MG/1
750 TABLET, FILM COATED ORAL 4 TIMES DAILY PRN
Qty: 40 TABLET | Refills: 0 | Status: SHIPPED | OUTPATIENT
Start: 2022-05-25 | End: 2022-09-26

## 2022-05-25 RX ORDER — NAPROXEN 500 MG/1
500 TABLET ORAL 2 TIMES DAILY WITH MEALS
Qty: 60 TABLET | Refills: 0 | Status: SHIPPED | OUTPATIENT
Start: 2022-05-25 | End: 2022-12-15

## 2022-05-25 ASSESSMENT — PAIN SCALES - GENERAL: PAINLEVEL: EXTREME PAIN (8)

## 2022-05-25 NOTE — PROGRESS NOTES
Assessment & Plan     Pain of right scapula  Maybe just MSK pain, however with scapular mass/fullness and pain would get MRI to r/o mass/tumor  - XR Shoulder Right G/E 3 Views; Future  - methocarbamol (ROBAXIN) 750 MG tablet; Take 1 tablet (750 mg) by mouth 4 times daily as needed for muscle spasms  - Physical Therapy Referral; Future  - naproxen (NAPROSYN) 500 MG tablet; Take 1 tablet (500 mg) by mouth 2 times daily (with meals)  - gabapentin (NEURONTIN) 300 MG capsule; Take 1 capsule (300 mg) by mouth At Bedtime for 3 days, THEN 1 capsule (300 mg) 2 times daily for 3 days, THEN 1 capsule (300 mg) 3 times daily for 24 days.    Cervicalgia   no indication that MRI is necessary yet.  NSAIDS - controlled and prescribed  Muscle relaxant  PT  Gabapentin for the pain  - methocarbamol (ROBAXIN) 750 MG tablet; Take 1 tablet (750 mg) by mouth 4 times daily as needed for muscle spasms  - Physical Therapy Referral; Future  - naproxen (NAPROSYN) 500 MG tablet; Take 1 tablet (500 mg) by mouth 2 times daily (with meals)  - gabapentin (NEURONTIN) 300 MG capsule; Take 1 capsule (300 mg) by mouth At Bedtime for 3 days, THEN 1 capsule (300 mg) 2 times daily for 3 days, THEN 1 capsule (300 mg) 3 times daily for 24 days.    Acute left-sided low back pain without sciatica     - Physical Therapy Referral; Future  - naproxen (NAPROSYN) 500 MG tablet; Take 1 tablet (500 mg) by mouth 2 times daily (with meals)    Mass of soft tissue of shoulder     - MR Shoulder Right w/o Contrast; Future    Cervical radiculopathy     - gabapentin (NEURONTIN) 300 MG capsule; Take 1 capsule (300 mg) by mouth At Bedtime for 3 days, THEN 1 capsule (300 mg) 2 times daily for 3 days, THEN 1 capsule (300 mg) 3 times daily for 24 days.    Type 2 diabetes mellitus without complication, without long-term current use of insulin (H)  Follow up with PCP  Labs UTD  - insulin glargine (BASAGLAR KWIKPEN) 100 UNIT/ML pen; Inject 34 units in the morning and 36 units in  "the evening.      Patient Instructions   Acetaminophen 1000 mg three times daily    Naproxn 500 mg twice daily    Do not take additional ibuprofen or aleve      Physical Therapy    MRI of the shoulder region on the right due to soft tissue mass    Gabapentin- follow directions on bottle    Methocarbamol 750 mg up to four times daily -this is a muscle relaxant                 BMI:   Estimated body mass index is 32.49 kg/m  as calculated from the following:    Height as of this encounter: 1.753 m (5' 9\").    Weight as of this encounter: 99.8 kg (220 lb).           No follow-ups on file.    Yue Ruvalcaba MD  [facility]    Russ Stephens is a 51 year old who presents for the following health issues     History of Present Illness       Back Pain:  He presents for follow up of back pain. Patient's back pain is a new problem.    Original cause of back pain: not sure  First noticed back pain: in the last week  Patient feels back pain: constantlyLocation of back pain:  Left lower back, left side of neck and right shoulder  Description of back pain: sharp, shooting and stabbing  Back pain spreads: left buttocks    Since patient first noticed back pain, pain is: gradually worsening  Does back pain interfere with his job:  Yes  On a scale of 1-10 (10 being the worst), patient describes pain as:  6  What makes back pain worse: certain positions, sitting and twisting  Chiropractor:  Not helpful  Cold: not helpful  Heat: not helpful  Massage: not helpful  Muscle relaxants: not tried  NSAIDS: not helpful  Opioids: not tried  Physical Therapy: not helpful  Rest: helpful  Steroid Injection: not tried  Stretching: not helpful  Surgery: not tried  TENS unit: not helpful  Topical pain relievers: helpful  Other healthcare providers patient is seeing for back pain: Chiropractor    Headaches:   Since the patient's last clinic visit, headaches are: worsened  The patient is getting headaches:  All day  He is " "able to do normal daily activities when he has a migraine.  The patient is taking the following rescue/relief medications:  Ibuprofen (Advil, Motrin), Tylenol and Excedrin   Patient states \"I get no relief\" from the rescue/relief medications.   The patient is taking the following medications to prevent migraines:  No medications to prevent migraines  In the past 4 weeks, the patient has gone to an Urgent Care or Emergency Room 0 times times due to headaches.    Reason for visit:  Neck and back and shoulder  Symptom onset:  1-2 weeks ago    He eats 0-1 servings of fruits and vegetables daily.He consumes 1 sweetened beverage(s) daily.He exercises with enough effort to increase his heart rate 30 to 60 minutes per day.  He exercises with enough effort to increase his heart rate 5 days per week.   He is taking medications regularly.             Review of Systems         Objective    /74   Pulse 86   Temp 98.4  F (36.9  C) (Tympanic)   Resp 16   Ht 1.753 m (5' 9\")   Wt 99.8 kg (220 lb)   SpO2 98%   BMI 32.49 kg/m    Body mass index is 32.49 kg/m .  Physical Exam   GENERAL APPEARANCE: healthy, alert and moderate distress  ORTHO:   SHOULDER Exam-Right   Inspection: swelling- YES- right shoulder over scapula, redness- no, bruising- no, asymmetry- YES, glenohumeral joint anterior bulge- no, distal clavicle elevation- no, muscle atrophy- none and scapular winging- no   Tenderness of:right periscapular muscles, scapular mass/swelling   Range of Motion: Active- forward flexion- 90 degreesl rotation- normal,   Strength: forward flexion- 5/5, abduction- 5/5, internal rotation- 5/5, external rotation- 5/5 and bicep- full   Special tests:         Comprehensive back pain exam:  Tenderness of left upper trapezius muscle, limited rom in flexion/extension and lateral rotation of the neck, Lower extremity strength functional and equal on both sides, Lower extremity reflexes within normal limits bilaterally and Lower " extremity sensation normal and equal on both sides    No results found for this or any previous visit (from the past 24 hour(s)).

## 2022-05-25 NOTE — PATIENT INSTRUCTIONS
Acetaminophen 1000 mg three times daily    Naproxn 500 mg twice daily    Do not take additional ibuprofen or aleve      Physical Therapy    MRI of the shoulder region on the right due to soft tissue mass    Gabapentin- follow directions on bottle    Methocarbamol 750 mg up to four times daily -this is a muscle relaxant      Our Clinic hours are:  Mondays    7:20 am - 7 pm  Tues -  Fri  7:20 am - 5 pm    Clinic Phone: [phone]    The clinic lab opens at 7:30 am Mon - Fri and appointments are required.    [facility]  Ph. [phone]  Monday  8 am - 7pm  Tues - Fri 8 am - 5:30 pm

## 2022-06-02 ENCOUNTER — NURSE TRIAGE (OUTPATIENT)
Dept: FAMILY MEDICINE | Facility: CLINIC | Age: 52
End: 2022-06-02
Payer: COMMERCIAL

## 2022-06-02 ENCOUNTER — HOSPITAL ENCOUNTER (EMERGENCY)
Facility: CLINIC | Age: 52
Discharge: HOME | End: 2022-06-02
Attending: PHYSICIAN ASSISTANT | Admitting: PHYSICIAN ASSISTANT
Payer: COMMERCIAL

## 2022-06-02 VITALS
HEART RATE: 89 BPM | SYSTOLIC BLOOD PRESSURE: 131 MMHG | WEIGHT: 220 LBS | TEMPERATURE: 97.6 F | DIASTOLIC BLOOD PRESSURE: 79 MMHG | RESPIRATION RATE: 18 BRPM | HEIGHT: 70 IN | OXYGEN SATURATION: 96 % | BODY MASS INDEX: 31.5 KG/M2

## 2022-06-02 DIAGNOSIS — M54.2 NECK PAIN: ICD-10-CM

## 2022-06-02 DIAGNOSIS — L98.9 SKIN LESION: ICD-10-CM

## 2022-06-02 DIAGNOSIS — G56.01 CARPAL TUNNEL SYNDROME OF RIGHT WRIST: ICD-10-CM

## 2022-06-02 DIAGNOSIS — R51.9 HEADACHE: ICD-10-CM

## 2022-06-02 DIAGNOSIS — R20.2 PARESTHESIA OF RIGHT ARM: ICD-10-CM

## 2022-06-02 PROCEDURE — G0463 HOSPITAL OUTPT CLINIC VISIT: HCPCS | Performed by: PHYSICIAN ASSISTANT

## 2022-06-02 PROCEDURE — 99214 OFFICE O/P EST MOD 30 MIN: CPT | Performed by: PHYSICIAN ASSISTANT

## 2022-06-02 RX ORDER — PREDNISONE 20 MG/1
TABLET ORAL
Qty: 10 TABLET | Refills: 0 | Status: SHIPPED | OUTPATIENT
Start: 2022-06-02 | End: 2022-09-26

## 2022-06-02 ASSESSMENT — ENCOUNTER SYMPTOMS
FEVER: 1
EYES NEGATIVE: 1
CHILLS: 0
RESPIRATORY NEGATIVE: 1
WEAKNESS: 0
CARDIOVASCULAR NEGATIVE: 1
HEADACHES: 1
NUMBNESS: 1
GASTROINTESTINAL NEGATIVE: 1
ACTIVITY CHANGE: 0
APPETITE CHANGE: 0
PHOTOPHOBIA: 0

## 2022-06-02 NOTE — TELEPHONE ENCOUNTER
Nurse Triage SBAR    Is this a 2nd Level Triage? YES, LICENSED PRACTITIONER REVIEW IS REQUIRED    Situation: numbness in right hand.     Background: He was at the [facility] last week because he had headache for a couple weeks and told he has a pinched nerve.      Right hand is swollen and his fingers are numb. It is cold as well. No discoloration. 3 days on and off. Worse the last couple days. No weakness. Just painful and annoying.     He does not have numbness anywhere else.     No elevated BP, well hydrated. No other sickness. No other symptoms.     Assessment: numbness needs evaluation    Protocol Recommended Disposition:   Go To Office Now    Recommendation: He has appt with you tomorrow at 8 am. OK to wait?     Routed to provider     Mónica Roach RN on 6/2/2022 at 10:22 AM    This encounter is being handled by a team outside your facility.  If action needs to be taken, please route the encounter back to your team.      Does the patient meet one of the following criteria for ADS visit consideration? 16+ years old, no PCP (internal or external) but seen at [facility]     TIP  Providers, please consider if this condition is appropriate for management at one of our Acute and Diagnostic Services sites.     If patient is a good candidate, please use dotphrase <dot>triageresponse and select Refer to ADS to document.  Reason for Disposition    Neurologic deficit that was brief (now gone), ANY of the following: * Weakness of the face, arm, or leg on one side of the body * Numbness of the face, arm, or leg on one side of the body * Loss of speech or garbled speech    Additional Information    Negative: Difficult to awaken or acting confused (e.g., disoriented, slurred speech)    Negative: New neurologic deficit that is present NOW, sudden onset of ANY of the following: * Weakness of the face, arm, or leg on one side of the body* Numbness of the face, arm, or leg on one side of the body* Loss of speech or garbled  "speech    Negative: Sounds like a life-threatening emergency to the triager    Negative: Confusion, disorientation, or hallucinations is the main symptom    Negative: Dizziness is the main symptom    Negative: Followed a head injury within last 3 days    Negative: Headache (with neurologic deficit)    Negative: Unable to urinate (or only a few drops) and bladder feels very full    Negative: Loss of control of bowel or bladder (i.e., incontinence) of new onset    Negative: Back pain with numbness (loss of sensation) in groin or rectal area    Negative: Patient sounds very sick or weak to the triager    Answer Assessment - Initial Assessment Questions  1. SYMPTOM: \"What is the main symptom you are concerned about?\" (e.g., weakness, numbness)      Right hand nubness  2. ONSET: \"When did this start?\" (minutes, hours, days; while sleeping)      A few days ago  3. LAST NORMAL: \"When was the last time you were normal (no symptoms)?\"      Few days ago  4. PATTERN \"Does this come and go, or has it been constant since it started?\"  \"Is it present now?\"      Comes and goes  5. CARDIAC SYMPTOMS: \"Have you had any of the following symptoms: chest pain, difficulty breathing, palpitations?\"      none  6. NEUROLOGIC SYMPTOMS: \"Have you had any of the following symptoms: headache, dizziness, vision loss, double vision, changes in speech, unsteady on your feet?\"      none  7. OTHER SYMPTOMS: \"Do you have any other symptoms?\"      headache  8. PREGNANCY: \"Is there any chance you are pregnant?\" \"When was your last menstrual period?\"      none    Protocols used: NEUROLOGIC DEFICIT-A-OH        "

## 2022-06-02 NOTE — ED TRIAGE NOTES
Headache for 3 weeks, pain in right shoulder for approximately 1 week, and right hand pain for a few days.  Patient uses lawnmower all day.  Patient requested urgent care.     Triage Assessment     Row Name 06/02/22 7751       Triage Assessment (Adult)    Airway WDL WDL       Respiratory WDL    Respiratory WDL WDL       Skin Circulation/Temperature WDL    Skin Circulation/Temperature WDL WDL       Cardiac WDL    Cardiac WDL WDL       Peripheral/Neurovascular WDL    Peripheral Neurovascular WDL WDL       Cognitive/Neuro/Behavioral WDL    Cognitive/Neuro/Behavioral WDL WDL

## 2022-06-02 NOTE — TELEPHONE ENCOUNTER
Provider Recommendation Follow Up:   Reached patient. Informed of provider's recommendations. Patient verbalized understanding and agrees with the plan.     Jenny Cruz RN  [facility]

## 2022-06-03 ENCOUNTER — TELEPHONE (OUTPATIENT)
Dept: DERMATOLOGY | Facility: CLINIC | Age: 52
End: 2022-06-03
Payer: COMMERCIAL

## 2022-06-03 NOTE — DISCHARGE INSTRUCTIONS
Use medication as directed.  Make sure you monitor your blood sugars since steroids can elevate your blood sugars.      Ice, elevate, wrist brace to use while sleeping.    Orthopedic referral placed for follow-up  Keep your MRI imaging appointment  Dermatology referral placed    Return to the emergency department if worse headache of life, confusion, visual changes, stiff neck, numbness to the fingers, change or worsening of symptoms occur.

## 2022-06-03 NOTE — ED PROVIDER NOTES
History     Chief Complaint   Patient presents with     Hand Pain     HPI  Houston Winters is a 51 year old male who presents the urgent care with right hand pain and swelling that started the past 3 days.  Patient states the swelling waxes and wanes in the tingling and pain to the hand wax and wane.  He states he has been dealing with headaches for the past 3 weeks and they are usually at the base of the skull right at the trapezius insertion bilaterally.  He states that he has noticed some tenderness and pain along the scapular border on the right side and some radiation of pain into the arm.  He was seen and evaluated by provider last week and has an MRI scheduled on Laura 10.  He denies any chest pain, shortness of breath, confusion, dizziness, facial weakness, difficulty talking, visual changes, dizziness, weakness in the upper extremities or lower extremities, abdominal pain, nausea or vomiting, recent illness or neck stiffness.  He also states he saw chiropractor a few times with no improvement of symptoms.  He has been taking Robaxin, Naprosyn, Neurontin with no improvement of symptoms and states it does cause some nausea so he has not really been taking it consistently.  He denies any known injury.    Allergies:  Allergies   Allergen Reactions     Metformin Other (See Comments)     Very low blood sugars, Blood sugar issue        Problem List:    Patient Active Problem List    Diagnosis Date Noted     Unstable angina (H) 05/18/2021     Priority: Medium     Postoperative infection, initial encounter 01/28/2018     Priority: Medium     Small bowel obstruction (H) 12/29/2017     Priority: Medium     Type 2 diabetes mellitus without complication, without long-term current use of insulin (H) 03/27/2017     Priority: Medium     Tobacco use disorder 02/04/2015     Priority: Medium     Hypertension, goal below 140/90 02/04/2015     Priority: Medium     Obesity 07/17/2014     Priority: Medium     Hyperlipidemia with  target LDL less than 100 07/17/2014     Priority: Medium     Diagnosis updated by automated process. Provider to review and confirm.       Partial epilepsy (H) 03/12/2009     Priority: Medium     (Problem list name updated by automated process. Provider to review and confirm.)       ED (erectile dysfunction) 12/18/2008     Priority: Medium     GANGLOIN CYST /RIGHT ANKLE 06/15/2006     Priority: Medium        Past Medical History:    Past Medical History:   Diagnosis Date     DM (diabetes mellitus) (H)      Epilepsy (H)      MEDICAL HISTORY OF - Age 16      Nicotine dependence        Past Surgical History:    Past Surgical History:   Procedure Laterality Date     CL AFF SURGICAL PATHOLOGY  2005    ganglion cyst removed     CV HEART CATHETERIZATION WITH POSSIBLE INTERVENTION N/A 5/18/2021    Procedure: Heart Catheterization with Possible Intervention;  Surgeon: Andrew Madrid MD;  Location:  HEART CARDIAC CATH LAB     CV LEFT HEART CATH N/A 5/18/2021    Procedure: Left Heart Cath;  Surgeon: Andrew Madrid MD;  Location:  HEART CARDIAC CATH LAB     EXCISE MASS LOWER EXTREMITY  2/15/2013    Procedure: EXCISE MASS LOWER EXTREMITY;  Right Ankle Excision of ganglion cyst;  Surgeon: Akbar Melo DPM;  Location: [facility] OR     HERNIA REPAIR  2000    umbilical repair     HERNIORRHAPHY UMBILICAL N/A 11/10/2015    Procedure: HERNIORRHAPHY UMBILICAL;  Surgeon: Garry Leos MD;  Location: [facility] OR       Family History:    Family History   Problem Relation Age of Onset     Arthritis Mother      Cancer Maternal Grandmother      Arthritis Maternal Grandmother         lupus     Gastrointestinal Disease Maternal Grandfather         war injury, colostomy     Respiratory Daughter         growing out of it     Unknown/Adopted Father      Diabetes No family hx of      Coronary Artery Disease No family hx of      Hypertension No family hx of      Hyperlipidemia No family hx of      Breast Cancer No family hx of      Cancer -  colorectal No family hx of      Ovarian Cancer No family hx of      Prostate Cancer No family hx of        Social History:  Marital Status:   [2]  Social History     Tobacco Use     Smoking status: Former Smoker     Packs/day: 0.25     Years: 25.00     Pack years: 6.25     Types: Cigarettes     Quit date: 2016     Years since quittin.3     Smokeless tobacco: Former User   Vaping Use     Vaping Use: Never used   Substance Use Topics     Alcohol use: Yes     Alcohol/week: 0.0 standard drinks     Comment: mixed 2-4 on weekends, not every weekend     Drug use: No        Medications:    predniSONE (DELTASONE) 20 MG tablet  alcohol swab prep pads  aspirin (ASA) 81 MG chewable tablet  atorvastatin (LIPITOR) 80 MG tablet  blood glucose (NO BRAND SPECIFIED) test strip  blood glucose calibration (NO BRAND SPECIFIED) solution  blood glucose monitoring (NO BRAND SPECIFIED) meter device kit  clopidogrel (PLAVIX) 75 MG tablet  Continuous Blood Gluc Sensor (SoundFitSTYLE MARY 14 DAY SENSOR) MISC  fluocinonide (LIDEX) 0.05 % external solution  gabapentin (NEURONTIN) 300 MG capsule  HUMALOG KWIKPEN 100 UNIT/ML soln  insulin glargine (BASAGLAR KWIKPEN) 100 UNIT/ML pen  isosorbide mononitrate (IMDUR) 30 MG 24 hr tablet  ketoconazole (NIZORAL) 2 % external shampoo  lisinopril (ZESTRIL) 2.5 MG tablet  methocarbamol (ROBAXIN) 750 MG tablet  metoprolol tartrate (LOPRESSOR) 25 MG tablet  naproxen (NAPROSYN) 500 MG tablet  nitroGLYcerin (NITROSTAT) 0.4 MG sublingual tablet  sennosides (SENOKOT) 8.6 MG tablet          Review of Systems   Constitutional: Positive for fever. Negative for activity change, appetite change and chills.   Eyes: Negative.  Negative for photophobia and visual disturbance.   Respiratory: Negative.    Cardiovascular: Negative.    Gastrointestinal: Negative.    Musculoskeletal:        Neck, shoulder, upper extremity pain.    Skin:        Skin lesions to back of neck for a while    Neurological: Positive for  "numbness and headaches. Negative for weakness.   All other systems reviewed and are negative.      Physical Exam   BP: 131/79  Pulse: 89  Temp: 97.6  F (36.4  C)  Resp: 18  Height: 177.8 cm (5' 10\")  Weight: 99.8 kg (220 lb)  SpO2: 96 %      Physical Exam  Vitals and nursing note reviewed.   Constitutional:       General: He is not in acute distress.     Appearance: Normal appearance. He is normal weight. He is not ill-appearing or toxic-appearing.   HENT:      Head: Normocephalic and atraumatic.      Right Ear: Tympanic membrane and ear canal normal.      Left Ear: Tympanic membrane and ear canal normal.      Nose: Nose normal.      Mouth/Throat:      Mouth: Mucous membranes are moist.   Eyes:      General: No scleral icterus.     Extraocular Movements: Extraocular movements intact.      Conjunctiva/sclera: Conjunctivae normal.      Pupils: Pupils are equal, round, and reactive to light.      Comments: No nystagmus   Neck:      Comments: Patient does have some decreased range of motion with looking over both shoulders, but no meningeal signs.  Cardiovascular:      Rate and Rhythm: Normal rate and regular rhythm.      Pulses: Normal pulses.      Heart sounds: Normal heart sounds.   Pulmonary:      Effort: Pulmonary effort is normal.      Breath sounds: Normal breath sounds.   Musculoskeletal:         General: Normal range of motion.      Cervical back: Normal range of motion. Tenderness (Tenderness to insertion of trapezius muscles to the back of the neck.  No cervical vertebral tenderness) present. No rigidity.      Comments: Patient with full range of motion in upper extremity.  Muscle strength 5 out of 5 in bilateral upper extremities other than left  strength being 4 out of 5 to the right hand.  Normal pulses and patient neurovascularly intact other than some decreased sensation to the fourth finger with superficial touch   Lymphadenopathy:      Cervical: No cervical adenopathy.   Skin:     General: Skin is " warm.      Capillary Refill: Capillary refill takes less than 2 seconds.      Comments: Several superficial skin lesions to the posterior aspect of the neck with no surrounding erythema, warmth, or tenderness and no vesicles or pustules noted.   Neurological:      General: No focal deficit present.      Mental Status: He is alert and oriented to person, place, and time.      GCS: GCS eye subscore is 4. GCS verbal subscore is 5. GCS motor subscore is 6.      Cranial Nerves: Cranial nerves are intact.      Coordination: Coordination is intact. Romberg sign negative. Coordination normal. Finger-Nose-Finger Test and Heel to Shin Test normal.      Gait: Gait is intact.      Deep Tendon Reflexes:      Reflex Scores:       Bicep reflexes are 2+ on the right side and 2+ on the left side.       Brachioradialis reflexes are 2+ on the right side and 2+ on the left side.       Patellar reflexes are 2+ on the right side and 2+ on the left side.     Comments: Positive Tinel's sign.   Psychiatric:         Mood and Affect: Mood normal.         Behavior: Behavior normal.         Thought Content: Thought content normal.         Judgment: Judgment normal.         ED Course                 Procedures             Critical Care time:  none               No results found for this or any previous visit (from the past 24 hour(s)).    Medications - No data to display    Assessments & Plan (with Medical Decision Making)     I have reviewed the nursing notes.    I have reviewed the findings, diagnosis, plan and need for follow up with the patient.    Houston Winters is a 51 year old male who presents the urgent care with right hand pain and swelling that started the past 3 days.  Patient states the swelling waxes and wanes in the tingling and pain to the hand wax and wane.  He states he has been dealing with headaches for the past 3 weeks and they are usually at the base of the skull right at the trapezius insertion bilaterally.  He states  that he has noticed some tenderness and pain along the scapular border on the right side and some radiation of pain into the arm.  He was seen and evaluated by provider last week and has an MRI scheduled on Laura 10.  He denies any chest pain, shortness of breath, confusion, dizziness, facial weakness, difficulty talking, visual changes, dizziness, weakness in the upper extremities or lower extremities, abdominal pain, nausea or vomiting, recent illness or neck stiffness.  He also states he saw chiropractor a few times with no improvement of symptoms.  He has been taking Robaxin, Naprosyn, Neurontin with no improvement of symptoms and states it does cause some nausea so he has not really been taking it consistently.  He denies any known injury.    See exam findings above.  Patient does have some decrease sensation in the fourth finger of the right hand, but has full range of motion in fingers, wrist, elbow and shoulder.  He does have 4 out of 5  strength in the right hand compared to the 5 out of 5 on the left.  No discoloration to the skin and normal distal pulses.  Patient has normal capillary refills and no significant swelling.  Positive Tinel sign noted.  Patient does have some tenderness to the trapezius muscle on the right side and findings consistent with possible radicular nerve pain and possible carpal tunnel.  Patient does have a wrist brace at home that he states he can wear.  He also notes that he has had these sores and skin lesions to the back of his neck for a while now and was treated initially with some antibiotics but never improved this sores.  He denies worsening of the sores but states that they have not gone away.  Referral for dermatology placed.  Ortho referral also placed and patient sent home with prescription prednisone to use for the next 5 days to see if this improves nerve pain and radicular pain.  Informed patient that he can continue the other medications that were prescribed if  they are causing some nausea he can hold off on this.  Patient may need further work-up with EMG and a do think he needs MRI imaging which is already scheduled through previous provider.  No red flag findings today that would indicate emergent MRI imaging.  No concerns for cardiac findings at this time.  Patient in agreement with plan and discharged in stable condition.  No concerning findings for stroke, cellulitis, DVT, or ischemia.    Discharge Medication List as of 6/2/2022  7:04 PM      START taking these medications    Details   predniSONE (DELTASONE) 20 MG tablet Take two tablets (= 40mg) each day for 5 (five) days, Disp-10 tablet, R-0, E-Prescribe             Final diagnoses:   Neck pain   Headache   Paresthesia of right arm   Skin lesion   Carpal tunnel syndrome of right wrist       6/2/2022   [facility] EMERGENCY DEPT     Payal Miller PA-C  06/02/22 [id number]

## 2022-06-03 NOTE — TELEPHONE ENCOUNTER
[facility] Call Center    Phone Message    May a detailed message be left on voicemail: yes     Reason for Call: Appointment Intake      Referring Provider Name: Payal Miller PA-C    Diagnosis and/or Symptoms: Skin Lesion    New Dermatology Pt with Urgent Referral Order that says Priority: 1-2 Weeks    Since Pt lives in [place] he does not want to travel to any other [facility] location so only wants to be added on please in [place]    Please call to schedule - Declined Sept/wait list and said his lesion was having issues so needs to be seen if you can help him out please    Please call him back either way to discuss as he is hoping to be seen at this location sometime sooner than Sept - Thank you!        Action Taken: Message routed to:  Other: [place] [facility]    Travel Screening: Not Applicable

## 2022-06-07 ENCOUNTER — OFFICE VISIT (OUTPATIENT)
Dept: DERMATOLOGY | Facility: CLINIC | Age: 52
End: 2022-06-07
Payer: COMMERCIAL

## 2022-06-07 VITALS — OXYGEN SATURATION: 97 % | SYSTOLIC BLOOD PRESSURE: 103 MMHG | HEART RATE: 83 BPM | DIASTOLIC BLOOD PRESSURE: 66 MMHG

## 2022-06-07 DIAGNOSIS — L28.1 PRURIGO NODULARIS: Primary | ICD-10-CM

## 2022-06-07 DIAGNOSIS — L30.9 DERMATITIS: ICD-10-CM

## 2022-06-07 PROCEDURE — 88305 TISSUE EXAM BY PATHOLOGIST: CPT | Performed by: PATHOLOGY

## 2022-06-07 PROCEDURE — 11102 TANGNTL BX SKIN SINGLE LES: CPT | Performed by: PHYSICIAN ASSISTANT

## 2022-06-07 PROCEDURE — 99203 OFFICE O/P NEW LOW 30 MIN: CPT | Mod: 25 | Performed by: PHYSICIAN ASSISTANT

## 2022-06-07 PROCEDURE — 11900 INJECT SKIN LESIONS </W 7: CPT | Performed by: PHYSICIAN ASSISTANT

## 2022-06-07 NOTE — LETTER
6/7/2022         RE: Houston Winters  [id number] [facility] [id number]        Dear Colleague,    Thank you for referring your patient, Houston Winters, to the [facility]. Please see a copy of my visit note below.    Houston Winters is an extremely pleasant 51 year old year old male patient here today for bumps on scalp and neck. Present since fall, comes and goes. He notes that they itch and hurt. He has tried ketoconazole, lidex, and keflex without improvements. He denies any pimple developing. No one else has similar rash. Remainder of the HPI, Meds, PMH, Allergies, FH, and SH was reviewed in chart.    Pertinent Hx:  No personal history of skin cancer.   Past Medical History:   Diagnosis Date     DM (diabetes mellitus) (H)      Epilepsy (H)     Intractable epilepsy, last seizure 2008     MEDICAL HISTORY OF - Age 16     Gangrene in foot stepped on a nail hospitalized for 7 weeks     Nicotine dependence        Past Surgical History:   Procedure Laterality Date     CL AFF SURGICAL PATHOLOGY  2005    ganglion cyst removed     CV HEART CATHETERIZATION WITH POSSIBLE INTERVENTION N/A 5/18/2021    Procedure: Heart Catheterization with Possible Intervention;  Surgeon: Andrew Madrid MD;  Location:  HEART CARDIAC CATH LAB     CV LEFT HEART CATH N/A 5/18/2021    Procedure: Left Heart Cath;  Surgeon: Andrew Madrid MD;  Location:  HEART CARDIAC CATH LAB     EXCISE MASS LOWER EXTREMITY  2/15/2013    Procedure: EXCISE MASS LOWER EXTREMITY;  Right Ankle Excision of ganglion cyst;  Surgeon: Akbar Melo DPM;  Location: [facility] OR     HERNIA REPAIR  2000    umbilical repair     HERNIORRHAPHY UMBILICAL N/A 11/10/2015    Procedure: HERNIORRHAPHY UMBILICAL;  Surgeon: Garry Leos MD;  Location: [facility] OR        Family History   Problem Relation Age of Onset     Arthritis Mother      Cancer Maternal Grandmother      Arthritis Maternal Grandmother         lupus     Gastrointestinal Disease  Maternal Grandfather         war injury, colostomy     Respiratory Daughter         growing out of it     Unknown/Adopted Father      Diabetes No family hx of      Coronary Artery Disease No family hx of      Hypertension No family hx of      Hyperlipidemia No family hx of      Breast Cancer No family hx of      Cancer - colorectal No family hx of      Ovarian Cancer No family hx of      Prostate Cancer No family hx of        Social History     Socioeconomic History     Marital status:      Spouse name: Not on file     Number of children: Not on file     Years of education: Not on file     Highest education level: Not on file   Occupational History     Occupation: [employer]   Tobacco Use     Smoking status: Former Smoker     Packs/day: 0.25     Years: 25.00     Pack years: 6.25     Types: Cigarettes     Quit date: 2016     Years since quittin.3     Smokeless tobacco: Former User   Vaping Use     Vaping Use: Never used   Substance and Sexual Activity     Alcohol use: Yes     Alcohol/week: 0.0 standard drinks     Comment: mixed 2-4 on weekends, not every weekend     Drug use: No     Sexual activity: Yes     Partners: Female     Birth control/protection: Surgical   Other Topics Concern      Service No     Blood Transfusions No     Caffeine Concern Yes     Comment: 1-2 a day     Occupational Exposure Yes     Comment: construction work, demolition, heating and airconditioning     Hobby Hazards No     Sleep Concern No     Stress Concern No     Weight Concern No     Special Diet No     Back Care No     Exercise Yes     Bike Helmet No     Seat Belt Yes     Self-Exams Not Asked     Parent/sibling w/ CABG, MI or angioplasty before 65F 55M? No   Social History Narrative     Not on file     Social Determinants of Health     Financial Resource Strain: Not on file   Food Insecurity: Not on file   Transportation Needs: Not on file   Physical Activity: Not on file   Stress: Not on file   Social  Connections: Not on file   Intimate Partner Violence: Not on file   Housing Stability: Not on file       Outpatient Encounter Medications as of 6/7/2022   Medication Sig Dispense Refill     alcohol swab prep pads Use to swab area of injection/jaun as directed. 100 each 3     aspirin (ASA) 81 MG chewable tablet Take 1 tablet (81 mg) by mouth daily 14 tablet 0     atorvastatin (LIPITOR) 80 MG tablet Take 1 tablet (80 mg) by mouth daily 90 tablet 3     blood glucose (NO BRAND SPECIFIED) test strip Use to test blood sugar 4 times daily or as directed. 100 strip 6     blood glucose calibration (NO BRAND SPECIFIED) solution Use to calibrate blood glucose monitor as needed as directed. 1 each 0     blood glucose monitoring (NO BRAND SPECIFIED) meter device kit Use to test blood sugar 4 times daily or as directed. 1 kit 0     clopidogrel (PLAVIX) 75 MG tablet Take 1 tablet (75 mg) by mouth daily 90 tablet 3     Continuous Blood Gluc Sensor (FREESTYLE MARY 14 DAY SENSOR) [facility] [place] [facility] CHANGE SENSOR EVERY 14 DAYS, **SCHEDULE APPOINTMENT TO ESTABLISH CARE WITH NEW PROVIDER** 6 each 1     gabapentin (NEURONTIN) 300 MG capsule Take 1 capsule (300 mg) by mouth At Bedtime for 3 days, THEN 1 capsule (300 mg) 2 times daily for 3 days, THEN 1 capsule (300 mg) 3 times daily for 24 days. 81 capsule 0     HUMALOG KWIKPEN 100 UNIT/ML soln INJECT 10 TO 18 UNITS UNDER THE SKIN 3 TIMES DAILY BEFORE MEALS.  MAX DOSE PER 24 HOURS IS 50 UNITS 48 mL 1     insulin glargine (BASAGLAR KWIKPEN) 100 UNIT/ML pen Inject 34 units in the morning and 36 units in the evening. 75 mL 3     lisinopril (ZESTRIL) 2.5 MG tablet Take 1 tablet (2.5 mg) by mouth daily 90 tablet 3     methocarbamol (ROBAXIN) 750 MG tablet Take 1 tablet (750 mg) by mouth 4 times daily as needed for muscle spasms 40 tablet 0     metoprolol tartrate (LOPRESSOR) 25 MG tablet Take 1 tablet (25 mg) by mouth 2 times daily 180 tablet 3     naproxen (NAPROSYN) 500 MG tablet Take 1 tablet (500 mg)  by mouth 2 times daily (with meals) 60 tablet 0     predniSONE (DELTASONE) 20 MG tablet Take two tablets (= 40mg) each day for 5 (five) days 10 tablet 0     fluocinonide (LIDEX) 0.05 % external solution Apply topically 2 times daily 200 mL 1     isosorbide mononitrate (IMDUR) 30 MG 24 hr tablet Take 1 tablet (30 mg) by mouth daily 90 tablet 3     ketoconazole (NIZORAL) 2 % external shampoo Apply topically daily as needed for itching or irritation 200 mL 1     nitroGLYcerin (NITROSTAT) 0.4 MG sublingual tablet For chest pain place 1 tablet under the tongue every 5 minutes for 3 doses. If symptoms persist 5 minutes after 1st dose call 911. (Patient not taking: Reported on 6/7/2022) 20 tablet 0     sennosides (SENOKOT) 8.6 MG tablet Take 1 tablet by mouth every other day        No facility-administered encounter medications on file as of 6/7/2022.             O:   NAD, WDWN, Alert & Oriented, Mood & Affect wnl, Vitals stable   Here today alone   /66 (BP Location: Left arm, Patient Position: Sitting, Cuff Size: Adult Large)   Pulse 83   SpO2 97%    General appearance normal   Vitals stable   Alert, oriented and in no acute distress    Pink excoriated papules on neck, scalp and right arm   0.6 cm pink scabbed papule on posterior neck     Eyes: Conjunctivae/lids:Normal     ENT: Lips: normal    MSK:Normal    Pulm: Breathing Normal    Neuro/Psych: Orientation:Alert and Orientedx3 ; Mood/Affect:normal     A/P:  1. R/O prurigo nodularis on posterior neck  TANGENTIAL BIOPSY SENT OUT:  After consent, anesthesia with LEC and prep, tangential excision performed and specimen sent out for permanent section histology.  No complications and routine wound care. Patient told to call our office in 1-2 weeks for result.      Recommend intralesional steroid injections, he would like to proceed.  IL TAC: PGACAC discussed.  Risks including but not limited to injection site reaction, bruising, no resolution.  All questions answered  and entertained to patient s satisfaction.  Informed consent obtained.  IL TAC in concentration of 10mg/ml was injected ID to prurigo on right arm, scalp, neck.  Total injected was  1 ml.  Patient tolerated without complications and given wound care instructions, including not to move product around.  Return in 4 weeks for follow-up and possible additional IL TAC.          Again, thank you for allowing me to participate in the care of your patient.        Sincerely,        Padmini Moore PA-C

## 2022-06-07 NOTE — PROGRESS NOTES
Houston Winters is an extremely pleasant 51 year old year old male patient here today for bumps on scalp and neck. Present since fall, comes and goes. He notes that they itch and hurt. He has tried ketoconazole, lidex, and keflex without improvements. He denies any pimple developing. No one else has similar rash. Remainder of the HPI, Meds, PMH, Allergies, FH, and SH was reviewed in chart.    Pertinent Hx:  No personal history of skin cancer.   Past Medical History:   Diagnosis Date     DM (diabetes mellitus) (H)      Epilepsy (H)     Intractable epilepsy, last seizure 2008     MEDICAL HISTORY OF - Age 16     Gangrene in foot stepped on a nail hospitalized for 7 weeks     Nicotine dependence        Past Surgical History:   Procedure Laterality Date     CL AFF SURGICAL PATHOLOGY  2005    ganglion cyst removed     CV HEART CATHETERIZATION WITH POSSIBLE INTERVENTION N/A 5/18/2021    Procedure: Heart Catheterization with Possible Intervention;  Surgeon: Andrew Madrid MD;  Location:  HEART CARDIAC CATH LAB     CV LEFT HEART CATH N/A 5/18/2021    Procedure: Left Heart Cath;  Surgeon: Andrew Madrid MD;  Location:  HEART CARDIAC CATH LAB     EXCISE MASS LOWER EXTREMITY  2/15/2013    Procedure: EXCISE MASS LOWER EXTREMITY;  Right Ankle Excision of ganglion cyst;  Surgeon: Akbar Meol DPM;  Location: [facility]     HERNIA REPAIR  2000    umbilical repair     HERNIORRHAPHY UMBILICAL N/A 11/10/2015    Procedure: HERNIORRHAPHY UMBILICAL;  Surgeon: Garry Leos MD;  Location: [facility] OR        Family History   Problem Relation Age of Onset     Arthritis Mother      Cancer Maternal Grandmother      Arthritis Maternal Grandmother         lupus     Gastrointestinal Disease Maternal Grandfather         war injury, colostomy     Respiratory Daughter         growing out of it     Unknown/Adopted Father      Diabetes No family hx of      Coronary Artery Disease No family hx of      Hypertension No family hx of       Hyperlipidemia No family hx of      Breast Cancer No family hx of      Cancer - colorectal No family hx of      Ovarian Cancer No family hx of      Prostate Cancer No family hx of        Social History     Socioeconomic History     Marital status:      Spouse name: Not on file     Number of children: Not on file     Years of education: Not on file     Highest education level: Not on file   Occupational History     Occupation: construction   Tobacco Use     Smoking status: Former Smoker     Packs/day: 0.25     Years: 25.00     Pack years: 6.25     Types: Cigarettes     Quit date: 2016     Years since quittin.3     Smokeless tobacco: Former User   Vaping Use     Vaping Use: Never used   Substance and Sexual Activity     Alcohol use: Yes     Alcohol/week: 0.0 standard drinks     Comment: mixed 2-4 on weekends, not every weekend     Drug use: No     Sexual activity: Yes     Partners: Female     Birth control/protection: Surgical   Other Topics Concern      Service No     Blood Transfusions No     Caffeine Concern Yes     Comment: 1-2 a day     Occupational Exposure Yes     Comment: construction work, demolition, heating and airconditioning     Hobby Hazards No     Sleep Concern No     Stress Concern No     Weight Concern No     Special Diet No     Back Care No     Exercise Yes     Bike Helmet No     Seat Belt Yes     Self-Exams Not Asked     Parent/sibling w/ CABG, MI or angioplasty before 65F 55M? No   Social History Narrative     Not on file     Social Determinants of Health     Financial Resource Strain: Not on file   Food Insecurity: Not on file   Transportation Needs: Not on file   Physical Activity: Not on file   Stress: Not on file   Social Connections: Not on file   Intimate Partner Violence: Not on file   Housing Stability: Not on file       Outpatient Encounter Medications as of 2022   Medication Sig Dispense Refill     alcohol swab prep pads Use to swab area of injection/jaun as  directed. 100 each 3     aspirin (ASA) 81 MG chewable tablet Take 1 tablet (81 mg) by mouth daily 14 tablet 0     atorvastatin (LIPITOR) 80 MG tablet Take 1 tablet (80 mg) by mouth daily 90 tablet 3     blood glucose (NO BRAND SPECIFIED) test strip Use to test blood sugar 4 times daily or as directed. 100 strip 6     blood glucose calibration (NO BRAND SPECIFIED) solution Use to calibrate blood glucose monitor as needed as directed. 1 each 0     blood glucose monitoring (NO BRAND SPECIFIED) meter device kit Use to test blood sugar 4 times daily or as directed. 1 kit 0     clopidogrel (PLAVIX) 75 MG tablet Take 1 tablet (75 mg) by mouth daily 90 tablet 3     Continuous Blood Gluc Sensor (FREESTYLE MARY 14 DAY SENSOR) [facility] [place] EVERY 14 DAYS, **SCHEDULE APPOINTMENT TO ESTABLISH CARE WITH NEW PROVIDER** 6 each 1     gabapentin (NEURONTIN) 300 MG capsule Take 1 capsule (300 mg) by mouth At Bedtime for 3 days, THEN 1 capsule (300 mg) 2 times daily for 3 days, THEN 1 capsule (300 mg) 3 times daily for 24 days. 81 capsule 0     HUMALOG KWIKPEN 100 UNIT/ML soln INJECT 10 TO 18 UNITS UNDER THE SKIN 3 TIMES DAILY BEFORE MEALS.  MAX DOSE PER 24 HOURS IS 50 UNITS 48 mL 1     insulin glargine (BASAGLAR KWIKPEN) 100 UNIT/ML pen Inject 34 units in the morning and 36 units in the evening. 75 mL 3     lisinopril (ZESTRIL) 2.5 MG tablet Take 1 tablet (2.5 mg) by mouth daily 90 tablet 3     methocarbamol (ROBAXIN) 750 MG tablet Take 1 tablet (750 mg) by mouth 4 times daily as needed for muscle spasms 40 tablet 0     metoprolol tartrate (LOPRESSOR) 25 MG tablet Take 1 tablet (25 mg) by mouth 2 times daily 180 tablet 3     naproxen (NAPROSYN) 500 MG tablet Take 1 tablet (500 mg) by mouth 2 times daily (with meals) 60 tablet 0     predniSONE (DELTASONE) 20 MG tablet Take two tablets (= 40mg) each day for 5 (five) days 10 tablet 0     fluocinonide (LIDEX) 0.05 % external solution Apply topically 2 times daily 200 mL 1      isosorbide mononitrate (IMDUR) 30 MG 24 hr tablet Take 1 tablet (30 mg) by mouth daily 90 tablet 3     ketoconazole (NIZORAL) 2 % external shampoo Apply topically daily as needed for itching or irritation 200 mL 1     nitroGLYcerin (NITROSTAT) 0.4 MG sublingual tablet For chest pain place 1 tablet under the tongue every 5 minutes for 3 doses. If symptoms persist 5 minutes after 1st dose call 911. (Patient not taking: Reported on 6/7/2022) 20 tablet 0     sennosides (SENOKOT) 8.6 MG tablet Take 1 tablet by mouth every other day        No facility-administered encounter medications on file as of 6/7/2022.             O:   NAD, WDWN, Alert & Oriented, Mood & Affect wnl, Vitals stable   Here today alone   /66 (BP Location: Left arm, Patient Position: Sitting, Cuff Size: Adult Large)   Pulse 83   SpO2 97%    General appearance normal   Vitals stable   Alert, oriented and in no acute distress    Pink excoriated papules on neck, scalp and right arm   0.6 cm pink scabbed papule on posterior neck     Eyes: Conjunctivae/lids:Normal     ENT: Lips: normal    MSK:Normal    Pulm: Breathing Normal    Neuro/Psych: Orientation:Alert and Orientedx3 ; Mood/Affect:normal     A/P:  1. R/O prurigo nodularis on posterior neck  TANGENTIAL BIOPSY SENT OUT:  After consent, anesthesia with LEC and prep, tangential excision performed and specimen sent out for permanent section histology.  No complications and routine wound care. Patient told to call our office in 1-2 weeks for result.      Recommend intralesional steroid injections, he would like to proceed.  IL TAC: PGACAC discussed.  Risks including but not limited to injection site reaction, bruising, no resolution.  All questions answered and entertained to patient s satisfaction.  Informed consent obtained.  IL TAC in concentration of 10mg/ml was injected ID to prurigo on right arm, scalp, neck.  Total injected was  1 ml.  Patient tolerated without complications and given wound  care instructions, including not to move product around.  Return in 4 weeks for follow-up and possible additional IL TAC.

## 2022-06-07 NOTE — PATIENT INSTRUCTIONS
Wound Care Instructions     FOR SUPERFICIAL WOUNDS     [facility] [phone]    [facility] [phone]                       AFTER 24 HOURS YOU SHOULD REMOVE THE BANDAGE AND BEGIN DAILY DRESSING CHANGES AS FOLLOWS:     1) Remove Dressing.     2) Clean and dry the area with tap water using a Q-tip or sterile gauze pad.     3) Apply Vaseline, Aquaphor, Polysporin ointment or Bacitracin ointment over entire wound.  Do NOT use Neosporin ointment.     4) Cover the wound with a band-aid, or a sterile non-stick gauze pad and micropore paper tape      REPEAT THESE INSTRUCTIONS AT LEAST ONCE A DAY UNTIL THE WOUND HAS COMPLETELY HEALED.    It is an old wives tale that a wound heals better when it is exposed to air and allowed to dry out. The wound will heal faster with a better cosmetic result if it is kept moist with ointment and covered with a bandage.    **Do not let the wound dry out.**      Supplies Needed:      *Cotton tipped applicators (Q-tips)    *Polysporin Ointment or Bacitracin Ointment (NOT NEOSPORIN)    *Band-aids or non-stick gauze pads and micropore paper tape.      PATIENT INFORMATION:    During the healing process you will notice a number of changes. All wounds develop a small halo of redness surrounding the wound.  This means healing is occurring. Severe itching with extensive redness usually indicates sensitivity to the ointment or bandage tape used to dress the wound.  You should call our office if this develops.      Swelling  and/or discoloration around your surgical site is common, particularly when performed around the eye.    All wounds normally drain.  The larger the wound the more drainage there will be.  After 7-10 days, you will notice the wound beginning to shrink and new skin will begin to grow.  The wound is healed when you can see skin has formed over the entire area.  A healed wound has a healthy, shiny look to the surface and is red to dark pink in color  to normalize.  Wounds may take approximately 4-6 weeks to heal.  Larger wounds may take 6-8 weeks.  After the wound is healed you may discontinue dressing changes.    You may experience a sensation of tightness as your wound heals. This is normal and will gradually subside.    Your healed wound may be sensitive to temperature changes. This sensitivity improves with time, but if you re having a lot of discomfort, try to avoid temperature extremes.    Patients frequently experience itching after their wound appears to have healed because of the continue healing under the skin.  Plain Vaseline will help relieve the itching.        POSSIBLE COMPLICATIONS    BLEEDING:    Leave the bandage in place.  Use tightly rolled up gauze or a cloth to apply direct pressure over the bandage for 30  minutes.  Reapply pressure for an additional 30 minutes if necessary  Use additional gauze and tape to maintain pressure once the bleeding has stopped.

## 2022-06-07 NOTE — NURSING NOTE
"Initial /66 (BP Location: Left arm, Patient Position: Sitting, Cuff Size: Adult Large)   Pulse 83   SpO2 97%  Estimated body mass index is 31.57 kg/m  as calculated from the following:    Height as of 6/2/22: 1.778 m (5' 10\").    Weight as of 6/2/22: 99.8 kg (220 lb). .      "

## 2022-06-08 LAB
PATH REPORT.COMMENTS IMP SPEC: NORMAL
PATH REPORT.COMMENTS IMP SPEC: NORMAL
PATH REPORT.FINAL DX SPEC: NORMAL
PATH REPORT.GROSS SPEC: NORMAL
PATH REPORT.MICROSCOPIC SPEC OTHER STN: NORMAL
PATH REPORT.RELEVANT HX SPEC: NORMAL

## 2022-06-10 ENCOUNTER — HOSPITAL ENCOUNTER (OUTPATIENT)
Dept: MRI IMAGING | Facility: CLINIC | Age: 52
Discharge: HOME | End: 2022-06-10
Attending: FAMILY MEDICINE | Admitting: FAMILY MEDICINE
Payer: COMMERCIAL

## 2022-06-10 DIAGNOSIS — M79.89 MASS OF SOFT TISSUE OF SHOULDER: ICD-10-CM

## 2022-06-10 PROCEDURE — 73218 MRI UPPER EXTREMITY W/O DYE: CPT | Mod: 26 | Performed by: RADIOLOGY

## 2022-06-10 PROCEDURE — 73218 MRI UPPER EXTREMITY W/O DYE: CPT | Mod: RT

## 2022-06-13 ENCOUNTER — TRANSFERRED RECORDS (OUTPATIENT)
Dept: HEALTH INFORMATION MANAGEMENT | Facility: CLINIC | Age: 52
End: 2022-06-13

## 2022-06-28 ENCOUNTER — TRANSFERRED RECORDS (OUTPATIENT)
Dept: FAMILY MEDICINE | Facility: CLINIC | Age: 52
End: 2022-06-28

## 2022-08-15 ENCOUNTER — OFFICE VISIT (OUTPATIENT)
Dept: FAMILY MEDICINE | Facility: CLINIC | Age: 52
End: 2022-08-15
Payer: COMMERCIAL

## 2022-08-15 VITALS
OXYGEN SATURATION: 97 % | BODY MASS INDEX: 31.55 KG/M2 | RESPIRATION RATE: 20 BRPM | WEIGHT: 220.4 LBS | TEMPERATURE: 98.3 F | SYSTOLIC BLOOD PRESSURE: 120 MMHG | HEART RATE: 103 BPM | HEIGHT: 70 IN | DIASTOLIC BLOOD PRESSURE: 64 MMHG

## 2022-08-15 DIAGNOSIS — M79.89 PALPABLE MASS OF SOFT TISSUE OF SHOULDER: ICD-10-CM

## 2022-08-15 DIAGNOSIS — L28.1 PRURIGO NODULARIS: ICD-10-CM

## 2022-08-15 DIAGNOSIS — Z13.220 SCREENING FOR HYPERLIPIDEMIA: ICD-10-CM

## 2022-08-15 DIAGNOSIS — G40.109 PARTIAL EPILEPSY (H): Primary | ICD-10-CM

## 2022-08-15 PROCEDURE — 99213 OFFICE O/P EST LOW 20 MIN: CPT | Performed by: NURSE PRACTITIONER

## 2022-08-15 RX ORDER — CLOBETASOL PROPIONATE 0.5 MG/G
CREAM TOPICAL 2 TIMES DAILY
Qty: 60 G | Refills: 1 | Status: SHIPPED | OUTPATIENT
Start: 2022-08-15 | End: 2024-03-21

## 2022-08-15 ASSESSMENT — PAIN SCALES - GENERAL: PAINLEVEL: NO PAIN (1)

## 2022-08-15 NOTE — PROGRESS NOTES
Assessment & Plan     Partial epilepsy (H)  No seizure activity since 2008.  Filled out the paperwork for 4 years.    Prurigo nodularis  Ordered topical high potency clobetasol to the areas on the scalp twice daily.  I recommend follow-up with dermatology to discuss other options for treatment.  - Adult Dermatology Referral; Future  - clobetasol (TEMOVATE) 0.05 % external cream; Apply topically 2 times daily    Palpable mass of soft tissue of shoulder  Recommend working with Ortho and getting repeat MRI as directed by their office with follow-up pending this result.    Screening for hyperlipidemia  - Lipid panel reflex to direct LDL Non-fasting; Future    See Patient Instructions    Germania Almeida NP  [facility] EVAN Stephens is a 52 year old, presenting for the following health issues:  Letter Request (Forms for DMV) and Referral (For a mass in his shoulder for biopsy)    Patient has history of seizures in 1995.  Last seizure was in 2008.  [facility] wants us to provide updates every 4 years that he has not had any seizures for him to renew his 's license.  He states that he has not hand any since that time.  There are no seen visits for this on his chart.      History of Present Illness       Reason for visit:  Referral and paper work    He eats 2-3 servings of fruits and vegetables daily.He consumes 1 sweetened beverage(s) daily.He exercises with enough effort to increase his heart rate 30 to 60 minutes per day.  He exercises with enough effort to increase his heart rate 4 days per week.   He is taking medications regularly.       Skin Lesion  Onset/Duration: 3 months or so  Description  Location: Right side, near shoulder on his back  Color: skin colored  Character: painful, pressure, uncomfortable to sleep  Itching: no  Bleeding:  No  Intensity:  mild  Progression of Symptoms:  worsening  Accompanying signs and symptoms:   Bleeding: No  Scaling: No  Excessive sun  "exposure/tanning: No  Sunscreen used: No  History:           Any previous history of skin cancer: No  Any family history of melanoma: No  Previous episodes of similar lesion: No, has had chest xray in 4/25/2022 and found lung nodules, not sure if it is related to the mass  Precipitating or alleviating factors: none  Therapies tried and outcome: none    Patient states that the symptoms are the same and his orthopedic note states that they would like to recheck MRI in 4-6 months which brings it from Oct-Dec.  The MRI shows a fat containing area.  Orthopedics felt this was muscular and nothing \"sinister\" on the report.    Patient also has a condition, prurigo nodularis, which was treated by dermatology in the past with steroid injections into the nodules.  This improved them on the arms but not on the scalp.  He has used shampoo's and creams and these have not improved symptoms.    Review of Systems   CONSTITUTIONAL: NEGATIVE for fever, chills, change in weight  INTEGUMENTARY/SKIN: POSITIVE for lump in the top of the right scapula and nodules on the arms that are improving and a couple on the scalp which are itchy and not improving.  RESP: NEGATIVE for significant cough or SOB  CV: NEGATIVE for chest pain, palpitations or peripheral edema  PSYCHIATRIC: NEGATIVE for changes in mood or affect  ROS otherwise negative      Objective    /64 (BP Location: Left arm, Patient Position: Sitting, Cuff Size: Adult Large)   Pulse 103   Temp 98.3  F (36.8  C) (Tympanic)   Resp 20   Ht 1.778 m (5' 10\")   Wt 100 kg (220 lb 6.4 oz)   SpO2 97%   BMI 31.62 kg/m    Body mass index is 31.62 kg/m .  Physical Exam   GENERAL: healthy, alert and no distress  MS: mass appreciated about the size of a ping pong ball on the top of the right scapula, mildly tender  SKIN: papular lesions on the arm that area healing, papular lesions on the scalp that are itchy, no signs or symptoms of infection in any of these areas.  PSYCH: mentation " appears normal, affect normal/bright

## 2022-08-15 NOTE — PATIENT INSTRUCTIONS
Follow-up with MASOUD Beck in dermatology  Use topical steroid cream twice daily.  You will be having MRI around October or so with Ortho.  I would wait until this is done and follow-up with ortho on results and plan at that time.  Paperwork completed today.

## 2022-08-22 ENCOUNTER — APPOINTMENT (OUTPATIENT)
Dept: CT IMAGING | Facility: CLINIC | Age: 52
End: 2022-08-22
Attending: FAMILY MEDICINE
Payer: COMMERCIAL

## 2022-08-22 ENCOUNTER — HOSPITAL ENCOUNTER (EMERGENCY)
Facility: CLINIC | Age: 52
Discharge: HOME | End: 2022-08-22
Attending: FAMILY MEDICINE | Admitting: FAMILY MEDICINE
Payer: COMMERCIAL

## 2022-08-22 ENCOUNTER — APPOINTMENT (OUTPATIENT)
Dept: GENERAL RADIOLOGY | Facility: CLINIC | Age: 52
End: 2022-08-22
Attending: FAMILY MEDICINE
Payer: COMMERCIAL

## 2022-08-22 VITALS
OXYGEN SATURATION: 97 % | BODY MASS INDEX: 30.78 KG/M2 | HEART RATE: 76 BPM | DIASTOLIC BLOOD PRESSURE: 71 MMHG | RESPIRATION RATE: 8 BRPM | WEIGHT: 215 LBS | HEIGHT: 70 IN | SYSTOLIC BLOOD PRESSURE: 123 MMHG | TEMPERATURE: 98.8 F

## 2022-08-22 DIAGNOSIS — S70.01XA CONTUSION OF RIGHT HIP, INITIAL ENCOUNTER: ICD-10-CM

## 2022-08-22 LAB
ANION GAP SERPL CALCULATED.3IONS-SCNC: 7 MMOL/L (ref 3–14)
BASOPHILS # BLD AUTO: 0 10E3/UL (ref 0–0.2)
BASOPHILS NFR BLD AUTO: 0 %
BUN SERPL-MCNC: 28 MG/DL (ref 7–30)
CALCIUM SERPL-MCNC: 9.1 MG/DL (ref 8.5–10.1)
CHLORIDE BLD-SCNC: 110 MMOL/L (ref 94–109)
CO2 SERPL-SCNC: 25 MMOL/L (ref 20–32)
CREAT SERPL-MCNC: 0.94 MG/DL (ref 0.66–1.25)
EOSINOPHIL # BLD AUTO: 0.1 10E3/UL (ref 0–0.7)
EOSINOPHIL NFR BLD AUTO: 1 %
ERYTHROCYTE [DISTWIDTH] IN BLOOD BY AUTOMATED COUNT: 13.3 % (ref 10–15)
GFR SERPL CREATININE-BSD FRML MDRD: >90 ML/MIN/1.73M2
GLUCOSE BLD-MCNC: 134 MG/DL (ref 70–99)
HCT VFR BLD AUTO: 42.4 % (ref 40–53)
HGB BLD-MCNC: 13.7 G/DL (ref 13.3–17.7)
HOLD SPECIMEN: NORMAL
HOLD SPECIMEN: NORMAL
IMM GRANULOCYTES # BLD: 0 10E3/UL
IMM GRANULOCYTES NFR BLD: 0 %
LYMPHOCYTES # BLD AUTO: 1.5 10E3/UL (ref 0.8–5.3)
LYMPHOCYTES NFR BLD AUTO: 20 %
MCH RBC QN AUTO: 30.9 PG (ref 26.5–33)
MCHC RBC AUTO-ENTMCNC: 32.3 G/DL (ref 31.5–36.5)
MCV RBC AUTO: 96 FL (ref 78–100)
MONOCYTES # BLD AUTO: 0.6 10E3/UL (ref 0–1.3)
MONOCYTES NFR BLD AUTO: 8 %
NEUTROPHILS # BLD AUTO: 5.2 10E3/UL (ref 1.6–8.3)
NEUTROPHILS NFR BLD AUTO: 71 %
NRBC # BLD AUTO: 0 10E3/UL
NRBC BLD AUTO-RTO: 0 /100
PLATELET # BLD AUTO: 208 10E3/UL (ref 150–450)
POTASSIUM BLD-SCNC: 3.9 MMOL/L (ref 3.4–5.3)
RBC # BLD AUTO: 4.44 10E6/UL (ref 4.4–5.9)
SODIUM SERPL-SCNC: 142 MMOL/L (ref 133–144)
WBC # BLD AUTO: 7.4 10E3/UL (ref 4–11)

## 2022-08-22 PROCEDURE — 96375 TX/PRO/DX INJ NEW DRUG ADDON: CPT | Performed by: FAMILY MEDICINE

## 2022-08-22 PROCEDURE — 96376 TX/PRO/DX INJ SAME DRUG ADON: CPT | Performed by: FAMILY MEDICINE

## 2022-08-22 PROCEDURE — 73700 CT LOWER EXTREMITY W/O DYE: CPT | Mod: RT

## 2022-08-22 PROCEDURE — 85025 COMPLETE CBC W/AUTO DIFF WBC: CPT | Performed by: FAMILY MEDICINE

## 2022-08-22 PROCEDURE — 96374 THER/PROPH/DIAG INJ IV PUSH: CPT | Performed by: FAMILY MEDICINE

## 2022-08-22 PROCEDURE — 36415 COLL VENOUS BLD VENIPUNCTURE: CPT | Performed by: FAMILY MEDICINE

## 2022-08-22 PROCEDURE — 73522 X-RAY EXAM HIPS BI 3-4 VIEWS: CPT

## 2022-08-22 PROCEDURE — 99285 EMERGENCY DEPT VISIT HI MDM: CPT | Performed by: FAMILY MEDICINE

## 2022-08-22 PROCEDURE — 250N000011 HC RX IP 250 OP 636: Performed by: FAMILY MEDICINE

## 2022-08-22 PROCEDURE — 82310 ASSAY OF CALCIUM: CPT | Performed by: FAMILY MEDICINE

## 2022-08-22 PROCEDURE — 99285 EMERGENCY DEPT VISIT HI MDM: CPT | Mod: 25 | Performed by: FAMILY MEDICINE

## 2022-08-22 RX ORDER — HYDROMORPHONE HYDROCHLORIDE 1 MG/ML
0.5 INJECTION, SOLUTION INTRAMUSCULAR; INTRAVENOUS; SUBCUTANEOUS
Status: DISCONTINUED | OUTPATIENT
Start: 2022-08-22 | End: 2022-08-22 | Stop reason: HOSPADM

## 2022-08-22 RX ORDER — HYDROMORPHONE HYDROCHLORIDE 1 MG/ML
0.5 INJECTION, SOLUTION INTRAMUSCULAR; INTRAVENOUS; SUBCUTANEOUS ONCE
Status: COMPLETED | OUTPATIENT
Start: 2022-08-22 | End: 2022-08-22

## 2022-08-22 RX ORDER — ONDANSETRON 2 MG/ML
4 INJECTION INTRAMUSCULAR; INTRAVENOUS ONCE
Status: COMPLETED | OUTPATIENT
Start: 2022-08-22 | End: 2022-08-22

## 2022-08-22 RX ORDER — OXYCODONE HYDROCHLORIDE 5 MG/1
5 TABLET ORAL EVERY 6 HOURS PRN
Qty: 10 TABLET | Refills: 0 | Status: SHIPPED | OUTPATIENT
Start: 2022-08-22 | End: 2022-09-08

## 2022-08-22 RX ADMIN — HYDROMORPHONE HYDROCHLORIDE 0.5 MG: 1 INJECTION, SOLUTION INTRAMUSCULAR; INTRAVENOUS; SUBCUTANEOUS at 16:32

## 2022-08-22 RX ADMIN — HYDROMORPHONE HYDROCHLORIDE 0.5 MG: 1 INJECTION, SOLUTION INTRAMUSCULAR; INTRAVENOUS; SUBCUTANEOUS at 14:57

## 2022-08-22 RX ADMIN — ONDANSETRON 4 MG: 2 INJECTION INTRAMUSCULAR; INTRAVENOUS at 14:57

## 2022-08-22 RX ADMIN — HYDROMORPHONE HYDROCHLORIDE 0.5 MG: 1 INJECTION, SOLUTION INTRAMUSCULAR; INTRAVENOUS; SUBCUTANEOUS at 15:39

## 2022-08-22 ASSESSMENT — ACTIVITIES OF DAILY LIVING (ADL)
ADLS_ACUITY_SCORE: 37

## 2022-08-22 NOTE — Clinical Note
Houston Winters was seen and treated in our emergency department on 8/22/2022.  He may return to work on 08/25/2022.       If you have any questions or concerns, please don't hesitate to call.      Brendan Yoo MD

## 2022-08-22 NOTE — ED PROVIDER NOTES
History     Chief Complaint   Patient presents with     Hip Pain     HPI  Houston Winters is a 52 year old male, past medical history is significant for small bowel obstruction, type 2 diabetes, tobacco use disorder, hypertension, obesity, hyperlipidemia, epilepsy, erectile dysfunction, presents to the emergency department with concerns of right hip pain after falling off a riding lawnmower.  History is obtained from the patient who is brought in by his daughter with concerns of injury to his right hip just prior to arrival.  The patient was on a riding lawn more that was on an incline horizontally when he felt the machine shift and all of the weight going to his right hip the downslope side.  He felt a pop in the right hip and then fell off the riding lawnmower landing on his right hip.  He was not able to weight-bear.  He has had nothing for pain.  He reports pain in the right hip area only.  He denies trauma to his head neck chest back or abdomen.  Denies trauma to the upper extremities.    Allergies:  Allergies   Allergen Reactions     Metformin Other (See Comments)     Very low blood sugars, Blood sugar issue        Problem List:    Patient Active Problem List    Diagnosis Date Noted     Unstable angina (H) 05/18/2021     Priority: Medium     Postoperative infection, initial encounter 01/28/2018     Priority: Medium     Small bowel obstruction (H) 12/29/2017     Priority: Medium     Type 2 diabetes mellitus without complication, without long-term current use of insulin (H) 03/27/2017     Priority: Medium     Tobacco use disorder 02/04/2015     Priority: Medium     Hypertension, goal below 140/90 02/04/2015     Priority: Medium     Obesity 07/17/2014     Priority: Medium     Hyperlipidemia with target LDL less than 100 07/17/2014     Priority: Medium     Diagnosis updated by automated process. Provider to review and confirm.       Partial epilepsy (H) 03/12/2009     Priority: Medium     (Problem list name  updated by automated process. Provider to review and confirm.)       ED (erectile dysfunction) 12/18/2008     Priority: Medium     GANGLOIN CYST /RIGHT ANKLE 06/15/2006     Priority: Medium        Past Medical History:    Past Medical History:   Diagnosis Date     DM (diabetes mellitus) (H)      Epilepsy (H)      MEDICAL HISTORY OF - Age 16      Nicotine dependence        Past Surgical History:    Past Surgical History:   Procedure Laterality Date     CL AFF SURGICAL PATHOLOGY  2005    ganglion cyst removed     CV HEART CATHETERIZATION WITH POSSIBLE INTERVENTION N/A 5/18/2021    Procedure: Heart Catheterization with Possible Intervention;  Surgeon: Andrew Madrid MD;  Location:  HEART CARDIAC CATH LAB     CV LEFT HEART CATH N/A 5/18/2021    Procedure: Left Heart Cath;  Surgeon: Andrew Madrid MD;  Location:  HEART CARDIAC CATH LAB     EXCISE MASS LOWER EXTREMITY  2/15/2013    Procedure: EXCISE MASS LOWER EXTREMITY;  Right Ankle Excision of ganglion cyst;  Surgeon: Akbar Melo DPM;  Location: [facility]     HERNIA REPAIR  2000    umbilical repair     HERNIORRHAPHY UMBILICAL N/A 11/10/2015    Procedure: HERNIORRHAPHY UMBILICAL;  Surgeon: Garry Leos MD;  Location: [facility] OR       Family History:    Family History   Problem Relation Age of Onset     Arthritis Mother      Cancer Maternal Grandmother      Arthritis Maternal Grandmother         lupus     Gastrointestinal Disease Maternal Grandfather         war injury, colostomy     Respiratory Daughter         growing out of it     Unknown/Adopted Father      Diabetes No family hx of      Coronary Artery Disease No family hx of      Hypertension No family hx of      Hyperlipidemia No family hx of      Breast Cancer No family hx of      Cancer - colorectal No family hx of      Ovarian Cancer No family hx of      Prostate Cancer No family hx of        Social History:  Marital Status:   [2]  Social History     Tobacco Use     Smoking status: Former  "Smoker     Packs/day: 0.25     Years: 25.00     Pack years: 6.25     Types: Cigarettes     Quit date: 2016     Years since quittin.5     Smokeless tobacco: Former User   Vaping Use     Vaping Use: Never used   Substance Use Topics     Alcohol use: Yes     Alcohol/week: 0.0 standard drinks     Comment: mixed 2-4 on weekends, not every weekend     Drug use: No        Medications:    oxyCODONE (ROXICODONE) 5 MG tablet  alcohol swab prep pads  aspirin (ASA) 81 MG chewable tablet  atorvastatin (LIPITOR) 80 MG tablet  blood glucose (NO BRAND SPECIFIED) test strip  blood glucose calibration (NO BRAND SPECIFIED) solution  blood glucose monitoring (NO BRAND SPECIFIED) meter device kit  clobetasol (TEMOVATE) 0.05 % external cream  clopidogrel (PLAVIX) 75 MG tablet  Continuous Blood Gluc Sensor (FREESTYLE MARY 14 DAY SENSOR) MISC  gabapentin (NEURONTIN) 300 MG capsule  HUMALOG KWIKPEN 100 UNIT/ML soln  insulin glargine (BASAGLAR KWIKPEN) 100 UNIT/ML pen  isosorbide mononitrate (IMDUR) 30 MG 24 hr tablet  lisinopril (ZESTRIL) 2.5 MG tablet  methocarbamol (ROBAXIN) 750 MG tablet  metoprolol tartrate (LOPRESSOR) 25 MG tablet  naproxen (NAPROSYN) 500 MG tablet  nitroGLYcerin (NITROSTAT) 0.4 MG sublingual tablet  predniSONE (DELTASONE) 20 MG tablet          Review of Systems   All other systems reviewed and are negative.      Physical Exam   BP: 123/70  Pulse: 100  Temp: 98.8  F (37.1  C)  Resp: 16  Height: 177.8 cm (5' 10\")  Weight: 97.5 kg (215 lb)  SpO2: 97 %      Physical Exam  Vitals and nursing note reviewed.   Constitutional:       General: He is not in acute distress.     Appearance: Normal appearance. He is normal weight. He is not ill-appearing.   HENT:      Head: Normocephalic and atraumatic.      Right Ear: Tympanic membrane normal.      Left Ear: Tympanic membrane normal.      Nose: Nose normal.      Mouth/Throat:      Mouth: Mucous membranes are dry.      Pharynx: Oropharynx is clear.   Eyes:      " Extraocular Movements: Extraocular movements intact.      Conjunctiva/sclera: Conjunctivae normal.      Pupils: Pupils are equal, round, and reactive to light.   Cardiovascular:      Rate and Rhythm: Normal rate and regular rhythm.      Pulses: Normal pulses.      Heart sounds: Normal heart sounds.   Pulmonary:      Effort: Pulmonary effort is normal.      Breath sounds: Normal breath sounds.   Abdominal:      General: Bowel sounds are normal.      Palpations: Abdomen is soft.   Musculoskeletal:      Cervical back: Normal range of motion and neck supple.      Comments: Right lower extremity is shortened and internally rotated on comparison to the left.  Intact neurovascular status.  Tenderness to palpation over the right greater trochanter area.     Skin:     Capillary Refill: Capillary refill takes less than 2 seconds.   Neurological:      General: No focal deficit present.      Mental Status: He is alert and oriented to person, place, and time.   Psychiatric:         Mood and Affect: Mood normal.         Behavior: Behavior normal.         ED Course                 Procedures              Critical Care time:  none               Results for orders placed or performed during the hospital encounter of 08/22/22 (from the past 24 hour(s))   [place] Draw    Narrative    The following orders were created for panel order [place] Draw.  Procedure                               Abnormality         Status                     ---------                               -----------         ------                     Extra Blue Top Tube[[id number]]                              Final result               Extra Red Top Tube[[id number]]                               Final result                 Please view results for these tests on the individual orders.   CBC with platelets, differential    Narrative    The following orders were created for panel order CBC with platelets, differential.  Procedure                                Abnormality         Status                     ---------                               -----------         ------                     CBC with platelets and d...[[id number]]                      Final result                 Please view results for these tests on the individual orders.   Basic metabolic panel   Result Value Ref Range    Sodium 142 133 - 144 mmol/L    Potassium 3.9 3.4 - 5.3 mmol/L    Chloride 110 (H) 94 - 109 mmol/L    Carbon Dioxide (CO2) 25 20 - 32 mmol/L    Anion Gap 7 3 - 14 mmol/L    Urea Nitrogen 28 7 - 30 mg/dL    Creatinine 0.94 0.66 - 1.25 mg/dL    Calcium 9.1 8.5 - 10.1 mg/dL    Glucose 134 (H) 70 - 99 mg/dL    GFR Estimate >90 >60 mL/min/1.73m2   Extra Blue Top Tube   Result Value Ref Range    Hold Specimen JIC    Extra Red Top Tube   Result Value Ref Range    Hold Specimen JIC    CBC with platelets and differential   Result Value Ref Range    WBC Count 7.4 4.0 - 11.0 10e3/uL    RBC Count 4.44 4.40 - 5.90 10e6/uL    Hemoglobin 13.7 13.3 - 17.7 g/dL    Hematocrit 42.4 40.0 - 53.0 %    MCV 96 78 - 100 fL    MCH 30.9 26.5 - 33.0 pg    MCHC 32.3 31.5 - 36.5 g/dL    RDW 13.3 10.0 - 15.0 %    Platelet Count 208 150 - 450 10e3/uL    % Neutrophils 71 %    % Lymphocytes 20 %    % Monocytes 8 %    % Eosinophils 1 %    % Basophils 0 %    % Immature Granulocytes 0 %    NRBCs per 100 WBC 0 <1 /100    Absolute Neutrophils 5.2 1.6 - 8.3 10e3/uL    Absolute Lymphocytes 1.5 0.8 - 5.3 10e3/uL    Absolute Monocytes 0.6 0.0 - 1.3 10e3/uL    Absolute Eosinophils 0.1 0.0 - 0.7 10e3/uL    Absolute Basophils 0.0 0.0 - 0.2 10e3/uL    Absolute Immature Granulocytes 0.0 <=0.4 10e3/uL    Absolute NRBCs 0.0 10e3/uL   XR Pelvis and Hip Bilateral 2 Views    Narrative    XR PELVIS AND BILATERAL HIP TWO VIEWS   8/22/2022 4:16 PM     HISTORY:  Possible right hip dislocation/fracture. Pain.    COMPARISON: None.      Impression    IMPRESSION: Left total hip arthroplasty. Excellent position and  alignment of components without  evidence of hardware loosening or  periprosthetic fracture. No subluxation or dislocation. Moderate  osteoarthrosis of the right hip incidentally noted. Moderate amount of  heterotopic ossification lateral to the left hip incidentally noted.    TERRENCE VERGARA MD         SYSTEM ID:  [id number]   CT Hip Right w/o Contrast    Narrative    EXAM: CT HIP RIGHT W/O CONTRAST  LOCATION: [facility]  DATE/TIME: 8/22/2022 5:00 PM    INDICATION: Severe right hip pain after valgus stress and blunt trauma. X-ray negative. Evaluate for fracture.  COMPARISON: None.  TECHNIQUE: Noncontrast. Axial, sagittal and coronal thin-section reconstruction. Dose reduction techniques were used.     FINDINGS:     BONES:  -Moderate right hip degenerative changes with subchondral cyst formation and joint space narrowing. No evidence of acute fracture. Left total hip arthroplasty in place. No fracture or loosening. No evidence of sacral fracture or pelvic fracture.    SOFT TISSUES:  -The bladder is full. Pelvic contents are otherwise unremarkable. No muscle asymmetry.      Impression    IMPRESSION:  1.  No evidence of acute fracture.  2.  Moderate right hip degenerative changes.     4:34 PM  Recheck on the patient at this time finds him still quite uncomfortable even after 2 doses of 0.5 mg Dilaudid.  I reviewed the x-rays which are negative for dislocation or fracture with him.  In light of his significant pain and lack of x-ray findings I recommended that we proceed to imaging with CT.  He is in agreement with the plan.      6:21 PM  CT negative, reviewed with the patient.  I discussed disposition options with him given his significant IV pain medication requirement to be comfortable at this point.  I offered him admission to observation status for pain control but he declined this and would prefer to go home.  He was given a prescription through [facility] for oxycodone for pain that is unresponsive to acetaminophen  at appropriate doses.  Topical therapy with ice 20 minutes out of each hour while awake for the first 48 hours is recommended as well as decreased ambulation.  If he is not able to tolerate things at home he is welcome to return.  If he requires further pain medication he should follow-up in clinic with his primary care provider for this.        Medications   HYDROmorphone (PF) (DILAUDID) injection 0.5 mg (0.5 mg Intravenous Given 8/22/22 1632)   HYDROmorphone (PF) (DILAUDID) injection 0.5 mg (0.5 mg Intravenous Given 8/22/22 1457)   ondansetron (ZOFRAN) injection 4 mg (4 mg Intravenous Given 8/22/22 1457)   HYDROmorphone (PF) (DILAUDID) injection 0.5 mg (0.5 mg Intravenous Given 8/22/22 1539)       Assessments & Plan (with Medical Decision Making)   Assessments and plan with medical decision making at the time stamp above    Disclaimer: This note consists of symbols derived from keyboarding, dictation and/or voice recognition software. As a result, there may be errors in the script that have gone undetected. Please consider this when interpreting information found in this chart.      I have reviewed the nursing notes.    I have reviewed the findings, diagnosis, plan and need for follow up with the patient.          New Prescriptions    OXYCODONE (ROXICODONE) 5 MG TABLET    Take 1 tablet (5 mg) by mouth every 6 hours as needed for severe pain       Final diagnoses:   Contusion of right hip, initial encounter       8/22/2022   [facility] EMERGENCY DEPT     Brendan Yoo MD  08/22/22 4773

## 2022-08-22 NOTE — ED TRIAGE NOTES
Pt fell off lawn , pt has hx of hip pain but pt is unable to ambulate on right leg due to pain after fall.  Pt leg appears internally rotated.  Left hip has been replaced.      Triage Assessment     Row Name 08/22/22 1651       Triage Assessment (Adult)    Airway WDL WDL       Respiratory WDL    Respiratory WDL WDL       Skin Circulation/Temperature WDL    Skin Circulation/Temperature WDL WDL       Cardiac WDL    Cardiac WDL WDL       Peripheral/Neurovascular WDL    Peripheral Neurovascular WDL WDL       Cognitive/Neuro/Behavioral WDL    Cognitive/Neuro/Behavioral WDL WDL

## 2022-08-22 NOTE — DISCHARGE INSTRUCTIONS
Ice 20 minutes out of each hour first 48 hours to the area of discomfort.  Oxycodone as needed for pain that does not respond to Tylenol 650 mg p.o. every 4 hours as needed maximum 4 g in 24 hours.  Return if worse or changes.  If you require more pain medication you should follow-up in clinic with your primary care provider.

## 2022-08-25 ENCOUNTER — OFFICE VISIT (OUTPATIENT)
Dept: FAMILY MEDICINE | Facility: CLINIC | Age: 52
End: 2022-08-25
Payer: COMMERCIAL

## 2022-08-25 VITALS
OXYGEN SATURATION: 97 % | HEIGHT: 70 IN | BODY MASS INDEX: 30.78 KG/M2 | HEART RATE: 96 BPM | RESPIRATION RATE: 16 BRPM | WEIGHT: 215 LBS | DIASTOLIC BLOOD PRESSURE: 84 MMHG | SYSTOLIC BLOOD PRESSURE: 120 MMHG | TEMPERATURE: 98.8 F

## 2022-08-25 DIAGNOSIS — M25.551 HIP PAIN, RIGHT: ICD-10-CM

## 2022-08-25 DIAGNOSIS — S79.911D HIP INJURY, RIGHT, SUBSEQUENT ENCOUNTER: Primary | ICD-10-CM

## 2022-08-25 PROCEDURE — 99213 OFFICE O/P EST LOW 20 MIN: CPT | Performed by: NURSE PRACTITIONER

## 2022-08-25 RX ORDER — OXYCODONE HYDROCHLORIDE 5 MG/1
5 TABLET ORAL EVERY 6 HOURS PRN
Qty: 24 TABLET | Refills: 0 | Status: SHIPPED | OUTPATIENT
Start: 2022-08-25 | End: 2022-09-08

## 2022-08-25 NOTE — PATIENT INSTRUCTIONS
Referral placed for ortho. Please call to schedule that appointment since it is not a [place] location.     Pain medications refilled. Do not drive after taking medication. Okay to also use Tylenol 1000mg every 8 hours.     Continue to use crutches for walking. Rest and keep elevated as needed.

## 2022-08-25 NOTE — PROGRESS NOTES
"  Assessment & Plan     Hip injury, right, subsequent encounter  Follow-up post ER visit. Still with hip pain and popping, using crutches. Ortho referral to [facility] (patient preference) ordered. Letter for work given. Oxycodone refilled with enough to get him through the weekend and into early next week until he can see ortho. Advised against driving while on medication. Okay to use Tylenol with oxycodone. Discussed using oxycodone only for severe pain. No NSAIDs given cardiac history. Rest and elevate as needed. Recommended foot exercises and s/s to monitor for DVT given his sitting around.     Hip pain, right  See above.      - Orthopedic  Referral; Future  - oxyCODONE (ROXICODONE) 5 MG tablet; Take 1 tablet (5 mg) by mouth every 6 hours as needed for pain             BMI:   Estimated body mass index is 30.85 kg/m  as calculated from the following:    Height as of this encounter: 1.778 m (5' 10\").    Weight as of this encounter: 97.5 kg (215 lb).       Patient Instructions   Referral placed for ortho. Please call to schedule that appointment since it is not a [place] location.     Pain medications refilled. Do not drive after taking medication. Okay to also use Tylenol 1000mg every 8 hours.     Continue to use crutches for walking. Rest and keep elevated as needed.       Return if symptoms worsen or fail to improve.    ELMIRA Teixeira CNP  [facility] JIMMY Stephens is a 52 year old, presenting for the following health issues:  ER F/U      HPI     ED/UC Followup:    Facility:  [facility]  Date of visit: 08/22  Reason for visit: Injury of Right Hip  Current Status: Still having pain    ER notes from 08/25/22:   Houston Winters is a 52 year old male, past medical history is significant for small bowel obstruction, type 2 diabetes, tobacco use disorder, hypertension, obesity, hyperlipidemia, epilepsy, erectile dysfunction, presents to the emergency department with concerns of right " hip pain after falling off a riding lawnmower.  History is obtained from the patient who is brought in by his daughter with concerns of injury to his right hip just prior to arrival.  The patient was on a riding lawn more that was on an incline horizontally when he felt the machine shift and all of the weight going to his right hip the downslope side.  He felt a pop in the right hip and then fell off the riding lawnmower landing on his right hip.  He was not able to weight-bear.  He has had nothing for pain.  He reports pain in the right hip area only.  He denies trauma to his head neck chest back or abdomen.  Denies trauma to the upper extremities.    4:34 PM  Recheck on the patient at this time finds him still quite uncomfortable even after 2 doses of 0.5 mg Dilaudid.  I reviewed the x-rays which are negative for dislocation or fracture with him.  In light of his significant pain and lack of x-ray findings I recommended that we proceed to imaging with CT.  He is in agreement with the plan.        6:21 PM  CT negative, reviewed with the patient.  I discussed disposition options with him given his significant IV pain medication requirement to be comfortable at this point.  I offered him admission to observation status for pain control but he declined this and would prefer to go home.  He was given a prescription through [facility] for oxycodone for pain that is unresponsive to acetaminophen at appropriate doses.  Topical therapy with ice 20 minutes out of each hour while awake for the first 48 hours is recommended as well as decreased ambulation.  If he is not able to tolerate things at home he is welcome to return.  If he requires further pain medication he should follow-up in clinic with his primary care provider for this.    ----------------------------------------------------  Additional provider notes: Patient presents in clinic for follow-up from recent ER visit (see notes above). Reports he is still having  "pain in right hip. States right hip symptoms aren't improved. States he has a lot of popping/clicking in right hip. He can put his foot down, but can't bear weight on it. He is using crutches. He tried taking 2 advil and 2 tylenol for half a day and symptoms weren't improved. States cardiologist told him not to take anything but Tylenol. He has been taking 2 oxycodone twice a day for pain. Requesting refill of oxycodone, letter for work, and referral to ortho in [facility] (where he had left hip replacement done).     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 States in the recent past he was on the standing mower and hit a pot hole and was thrown from the mower. States his right hip has been \"off\" ever since. Then injured it with recent mowing accident (see above).     Allergies   Allergen Reactions     Metformin Other (See Comments)     Very low blood sugars, Blood sugar issue        Current Outpatient Medications   Medication     alcohol swab prep pads     aspirin (ASA) 81 MG chewable tablet     atorvastatin (LIPITOR) 80 MG tablet     blood glucose (NO BRAND SPECIFIED) test strip     blood glucose calibration (NO BRAND SPECIFIED) solution     blood glucose monitoring (NO BRAND SPECIFIED) meter device kit     clobetasol (TEMOVATE) 0.05 % external cream     clopidogrel (PLAVIX) 75 MG tablet     Continuous Blood Gluc Sensor (FREESTYLE MARY 14 DAY SENSOR) MISC     gabapentin (NEURONTIN) 300 MG capsule     HUMALOG KWIKPEN 100 UNIT/ML soln     insulin glargine (BASAGLAR KWIKPEN) 100 UNIT/ML pen     isosorbide mononitrate (IMDUR) 30 MG 24 hr tablet     lisinopril (ZESTRIL) 2.5 MG tablet     methocarbamol (ROBAXIN) 750 MG tablet     metoprolol tartrate (LOPRESSOR) 25 MG tablet     naproxen (NAPROSYN) 500 MG tablet     nitroGLYcerin (NITROSTAT) 0.4 MG sublingual tablet     oxyCODONE (ROXICODONE) 5 MG tablet     predniSONE (DELTASONE) 20 MG tablet     No current facility-administered medications for this visit.       Past Medical History: " "  Diagnosis Date     DM (diabetes mellitus) (H)      Epilepsy (H)     Intractable epilepsy, last seizure 2008     MEDICAL HISTORY OF - Age 16     Gangrene in foot stepped on a nail hospitalized for 7 weeks     Nicotine dependence           Review of Systems   Musculoskeletal:        Right hip pain   All other systems reviewed and are negative.           Objective    /84 (BP Location: Right arm, Patient Position: Sitting, Cuff Size: Adult Large)   Pulse 96   Temp 98.8  F (37.1  C) (Tympanic)   Resp 16   Ht 1.778 m (5' 10\")   Wt 97.5 kg (215 lb)   SpO2 97%   BMI 30.85 kg/m    Body mass index is 30.85 kg/m .  Physical Exam  Vitals reviewed.   Constitutional:       General: He is not in acute distress.     Appearance: Normal appearance. He is not ill-appearing or toxic-appearing.   Musculoskeletal:      Right hip: Tenderness and bony tenderness present. No deformity or lacerations. Decreased range of motion (due to pain). Decreased strength.      Left hip: Normal.      Comments: Using crutches   Skin:     General: Skin is warm and dry.   Neurological:      Mental Status: He is alert and oriented to person, place, and time.   Psychiatric:         Behavior: Behavior normal.            CT Hip Right w/o Contrast    Result Date: 8/22/2022  EXAM: CT HIP RIGHT W/O CONTRAST LOCATION: [facility] DATE/TIME: 8/22/2022 5:00 PM INDICATION: Severe right hip pain after valgus stress and blunt trauma. X-ray negative. Evaluate for fracture. COMPARISON: None. TECHNIQUE: Noncontrast. Axial, sagittal and coronal thin-section reconstruction. Dose reduction techniques were used. FINDINGS: BONES: -Moderate right hip degenerative changes with subchondral cyst formation and joint space narrowing. No evidence of acute fracture. Left total hip arthroplasty in place. No fracture or loosening. No evidence of sacral fracture or pelvic fracture. SOFT TISSUES: -The bladder is full. Pelvic contents are otherwise " unremarkable. No muscle asymmetry.     IMPRESSION: 1.  No evidence of acute fracture. 2.  Moderate right hip degenerative changes.     XR Pelvis and Hip Bilateral 2 Views    Result Date: 8/22/2022  XR PELVIS AND BILATERAL HIP TWO VIEWS   8/22/2022 4:16 PM HISTORY:  Possible right hip dislocation/fracture. Pain. COMPARISON: None.     IMPRESSION: Left total hip arthroplasty. Excellent position and alignment of components without evidence of hardware loosening or periprosthetic fracture. No subluxation or dislocation. Moderate osteoarthrosis of the right hip incidentally noted. Moderate amount of heterotopic ossification lateral to the left hip incidentally noted. TERRENCE VERGARA MD   SYSTEM ID:  [id number]                .  ..

## 2022-09-01 ENCOUNTER — TRANSFERRED RECORDS (OUTPATIENT)
Dept: FAMILY MEDICINE | Facility: CLINIC | Age: 52
End: 2022-09-01

## 2022-09-01 DIAGNOSIS — M25.551 HIP PAIN, RIGHT: ICD-10-CM

## 2022-09-06 RX ORDER — OXYCODONE HYDROCHLORIDE 5 MG/1
5 TABLET ORAL EVERY 6 HOURS PRN
Qty: 24 TABLET | Refills: 0 | OUTPATIENT
Start: 2022-09-06

## 2022-09-06 NOTE — TELEPHONE ENCOUNTER
Needs appt for further refills of oxycodone. Unclear whether patient saw ortho or not.     Janine Ivory DNP, NP-C

## 2022-09-07 ENCOUNTER — TELEPHONE (OUTPATIENT)
Dept: ENDOCRINOLOGY | Facility: CLINIC | Age: 52
End: 2022-09-07

## 2022-09-07 DIAGNOSIS — E11.9 TYPE 2 DIABETES MELLITUS WITHOUT COMPLICATION, WITHOUT LONG-TERM CURRENT USE OF INSULIN (H): ICD-10-CM

## 2022-09-07 NOTE — TELEPHONE ENCOUNTER
Last Written Prescription Date:    Last Fill Quantity: ,  # refills:    Last office visit: Visit date not found with prescribing provider:     Future Office Visit:   Next 5 appointments (look out 90 days)    Sep 08, 2022  5:00 PM  (Arrive by 4:40 PM)  Provider Visit with Yue Ruvalcaba MD  [facility] ([facility] ) [address] [facility] [id number] [phone]             Requested Prescriptions   Pending Prescriptions Disp Refills     Continuous Blood Gluc Sensor (FREESTYLE MARY 14 DAY SENSOR) MISC 6 each 1     Sig: CHANGE SENSOR EVERY 14 DAYS, **SCHEDULE APPOINTMENT TO ESTABLISH CARE WITH NEW PROVIDER**       There is no refill protocol information for this order

## 2022-09-08 ENCOUNTER — VIRTUAL VISIT (OUTPATIENT)
Dept: FAMILY MEDICINE | Facility: CLINIC | Age: 52
End: 2022-09-08
Payer: COMMERCIAL

## 2022-09-08 DIAGNOSIS — M25.551 HIP PAIN, RIGHT: ICD-10-CM

## 2022-09-08 PROCEDURE — 99214 OFFICE O/P EST MOD 30 MIN: CPT | Mod: GT | Performed by: FAMILY MEDICINE

## 2022-09-08 RX ORDER — OXYCODONE HYDROCHLORIDE 5 MG/1
5 TABLET ORAL EVERY 6 HOURS PRN
Qty: 90 TABLET | Refills: 0 | Status: SHIPPED | OUTPATIENT
Start: 2022-09-08 | End: 2022-10-07

## 2022-09-08 RX ORDER — FLASH GLUCOSE SENSOR
KIT MISCELLANEOUS
Qty: 6 EACH | Refills: 1 | Status: CANCELLED | OUTPATIENT
Start: 2022-09-08

## 2022-09-08 RX ORDER — FLASH GLUCOSE SENSOR
KIT MISCELLANEOUS
Qty: 6 EACH | Refills: 1 | Status: SHIPPED | OUTPATIENT
Start: 2022-09-08 | End: 2023-01-27

## 2022-09-08 ASSESSMENT — PATIENT HEALTH QUESTIONNAIRE - PHQ9
5. POOR APPETITE OR OVEREATING: NEARLY EVERY DAY
SUM OF ALL RESPONSES TO PHQ QUESTIONS 1-9: 18

## 2022-09-08 ASSESSMENT — ANXIETY QUESTIONNAIRES
2. NOT BEING ABLE TO STOP OR CONTROL WORRYING: NOT AT ALL
3. WORRYING TOO MUCH ABOUT DIFFERENT THINGS: NOT AT ALL
5. BEING SO RESTLESS THAT IT IS HARD TO SIT STILL: NOT AT ALL
1. FEELING NERVOUS, ANXIOUS, OR ON EDGE: NOT AT ALL
GAD7 TOTAL SCORE: 4
6. BECOMING EASILY ANNOYED OR IRRITABLE: SEVERAL DAYS
GAD7 TOTAL SCORE: 4
7. FEELING AFRAID AS IF SOMETHING AWFUL MIGHT HAPPEN: NOT AT ALL

## 2022-09-08 NOTE — PROGRESS NOTES
"Angelo is a 52 year old who is being evaluated via a billable video visit.      How would you like to obtain your AVS? MyChart  If the video visit is dropped, the invitation should be resent by: Text to cell phone: [phone]  Will anyone else be joining your video visit? No        Assessment & Plan       Hip pain, right   needs visit for CSA  Needs to follow up with ortho to discuss ultimate plan. Apparently had injection and won't be able to get surgery for 3 months per patient.   Apparently this is work comp and there can be some delay in getting surgery scheduled/approved    - oxyCODONE (ROXICODONE) 5 MG tablet; Take 1 tablet (5 mg) by mouth every 6 hours as needed for pain Max 90 in 30 days             BMI:   Estimated body mass index is 30.85 kg/m  as calculated from the following:    Height as of 8/25/22: 1.778 m (5' 10\").    Weight as of 8/25/22: 97.5 kg (215 lb).       Depression Screening Follow Up    PHQ 9/8/2022   PHQ-9 Total Score 18   Q9: Thoughts of better off dead/self-harm past 2 weeks More than half the days       Follow Up    Follow Up Actions Taken      Discussed the following ways the patient can remain in a safe environment:        No follow-ups on file.    Yue Ruvalcaba MD  [facility]    Subjective   Angelo is a 52 year old, presenting for the following health issues:  Video Visit (Hip pain)      History of Present Illness       Reason for visit:  Right hip seen orto needs to be replaced  Symptom onset:  1-2 weeks ago  Symptoms include:  Severe pain in hip and leg  Symptom intensity:  Severe  Symptom progression:  Staying the same  Had these symptoms before:  No  What makes it worse:  Walking,  setting, hurts all the time aches  What makes it better:  Not reallly  hurts all the time    He eats 2-3 servings of fruits and vegetables daily.He consumes 0 sweetened beverage(s) daily.He exercises with enough effort to increase his heart rate 10 to 19 minutes per day.  He " "exercises with enough effort to increase his heart rate 3 or less days per week.   He is taking medications regularly.     Middl eof July was thown off the mower and a week after that his hip hurt more.   End of August was again thrown off the mower and his \"hip popped out\".    Ortho feels this needs to be replaced  Did get a cortisone injection and it has helped a little bit  Injection was 9/1/2022 - did get hiccups for 3 days  Blood sugars are quite high after the injection.  Has been as high as 300            Pain History:  When did you first notice your pain? - Chronic Pain   Have you seen this provider for your pain in the past?   Yes   Where in your body do you have pain? Right hip  Are you seeing anyone else for your pain? Yes - Orthopedics        PHQ-9 SCORE 9/8/2022   PHQ-9 Total Score 18       YENNI-7 SCORE 9/8/2022   Total Score 4     PEG Score 9/8/2022   PEG Total Score 6.33       Chronic Pain Follow Up:    Location of pain: Right hip  Analgesia/pain control:    - Recent changes:  Somewhat improved after Cortizone injection   - Overall control: Tolerable with discomfort    - Current treatments: Oxycodone, steroid injection, Tylenol, Advil  Adherence:     - Do you ever take more pain medicine than prescribed? No    - When did you take your last dose of pain medicine?  7AM   Adverse effects: No   PDMP Review       Value Time User    State PDMP site checked  Yes 8/25/2022  8:48 AM Janine Ivory APRN CNP        Last CSA Agreement:   CSA -- Patient Level:    CSA: None found at the patient level.       Last UDS:             Review of Systems   Constitutional, HEENT, cardiovascular, pulmonary, gi and gu systems are negative, except as otherwise noted.      Objective           Vitals:  No vitals were obtained today due to virtual visit.    Physical Exam   GENERAL: Healthy, alert and no distress  EYES: Eyes grossly normal to inspection.  No discharge or erythema, or obvious scleral/conjunctival " abnormalities.  RESP: No audible wheeze, cough, or visible cyanosis.  No visible retractions or increased work of breathing.    SKIN: Visible skin clear. No significant rash, abnormal pigmentation or lesions.  NEURO: Cranial nerves grossly intact.  Mentation and speech appropriate for age.  PSYCH: Mentation appears normal, affect normal/bright, judgement and insight intact, normal speech and appearance well-groomed.                Video-Visit Details    Video Start Time: 1020    Type of service:  Video Visit    Video End Time:1040    Originating Location (pt. Location): Home    Distant Location (provider location):  [facility]     Platform used for Video Visit: BRAINDIGIT

## 2022-09-08 NOTE — TELEPHONE ENCOUNTER
Dr. Clifton no longer in  [facility] (last visit was more than 1 year ago on 7/15/21).    Patient has a virtual visit today with FP. Will route. Ok for refills?  Roxy Sutherland RN on 9/8/2022 at 9:20 AM

## 2022-09-13 ENCOUNTER — E-VISIT (OUTPATIENT)
Dept: FAMILY MEDICINE | Facility: CLINIC | Age: 52
End: 2022-09-13
Payer: COMMERCIAL

## 2022-09-13 DIAGNOSIS — R06.6 INTRACTABLE HICCOUGHS: Primary | ICD-10-CM

## 2022-09-13 PROCEDURE — 99421 OL DIG E/M SVC 5-10 MIN: CPT | Performed by: FAMILY MEDICINE

## 2022-09-13 RX ORDER — CHLORPROMAZINE HYDROCHLORIDE 25 MG/1
25 TABLET, FILM COATED ORAL 3 TIMES DAILY PRN
Qty: 9 TABLET | Refills: 0 | Status: SHIPPED | OUTPATIENT
Start: 2022-09-13 | End: 2022-09-26

## 2022-09-26 ENCOUNTER — OFFICE VISIT (OUTPATIENT)
Dept: FAMILY MEDICINE | Facility: CLINIC | Age: 52
End: 2022-09-26
Payer: COMMERCIAL

## 2022-09-26 VITALS
TEMPERATURE: 97.9 F | DIASTOLIC BLOOD PRESSURE: 74 MMHG | HEIGHT: 70 IN | BODY MASS INDEX: 32.1 KG/M2 | OXYGEN SATURATION: 97 % | WEIGHT: 224.25 LBS | RESPIRATION RATE: 18 BRPM | HEART RATE: 81 BPM | SYSTOLIC BLOOD PRESSURE: 132 MMHG

## 2022-09-26 DIAGNOSIS — I20.0 UNSTABLE ANGINA (H): ICD-10-CM

## 2022-09-26 DIAGNOSIS — R91.8 PULMONARY NODULES: ICD-10-CM

## 2022-09-26 DIAGNOSIS — I10 HYPERTENSION, GOAL BELOW 140/90: ICD-10-CM

## 2022-09-26 DIAGNOSIS — E11.9 TYPE 2 DIABETES MELLITUS WITHOUT COMPLICATION, WITHOUT LONG-TERM CURRENT USE OF INSULIN (H): Primary | ICD-10-CM

## 2022-09-26 DIAGNOSIS — Z23 NEED FOR PNEUMOCOCCAL VACCINE: ICD-10-CM

## 2022-09-26 DIAGNOSIS — Z23 NEED FOR VACCINATION FOR ZOSTER: ICD-10-CM

## 2022-09-26 DIAGNOSIS — M79.89 MASS OF SOFT TISSUE OF SHOULDER: ICD-10-CM

## 2022-09-26 DIAGNOSIS — I25.810 CORONARY ARTERY DISEASE INVOLVING CORONARY BYPASS GRAFT OF NATIVE HEART WITHOUT ANGINA PECTORIS: ICD-10-CM

## 2022-09-26 DIAGNOSIS — S79.911D HIP INJURY, RIGHT, SUBSEQUENT ENCOUNTER: ICD-10-CM

## 2022-09-26 LAB
CHOLEST SERPL-MCNC: 152 MG/DL
HBA1C MFR BLD: 6.8 % (ref 0–5.6)
HDLC SERPL-MCNC: 43 MG/DL
LDLC SERPL CALC-MCNC: 96 MG/DL
NONHDLC SERPL-MCNC: 109 MG/DL
TRIGL SERPL-MCNC: 67 MG/DL

## 2022-09-26 PROCEDURE — 90471 IMMUNIZATION ADMIN: CPT | Performed by: FAMILY MEDICINE

## 2022-09-26 PROCEDURE — 36415 COLL VENOUS BLD VENIPUNCTURE: CPT | Performed by: FAMILY MEDICINE

## 2022-09-26 PROCEDURE — 83036 HEMOGLOBIN GLYCOSYLATED A1C: CPT | Performed by: FAMILY MEDICINE

## 2022-09-26 PROCEDURE — 90750 HZV VACC RECOMBINANT IM: CPT | Performed by: FAMILY MEDICINE

## 2022-09-26 PROCEDURE — 90677 PCV20 VACCINE IM: CPT | Performed by: FAMILY MEDICINE

## 2022-09-26 PROCEDURE — 99207 PR FOOT EXAM NO CHARGE: CPT | Performed by: FAMILY MEDICINE

## 2022-09-26 PROCEDURE — 99214 OFFICE O/P EST MOD 30 MIN: CPT | Mod: 25 | Performed by: FAMILY MEDICINE

## 2022-09-26 PROCEDURE — 90472 IMMUNIZATION ADMIN EACH ADD: CPT | Performed by: FAMILY MEDICINE

## 2022-09-26 PROCEDURE — 80061 LIPID PANEL: CPT | Performed by: FAMILY MEDICINE

## 2022-09-26 RX ORDER — INSULIN LISPRO 100 [IU]/ML
INJECTION, SOLUTION INTRAVENOUS; SUBCUTANEOUS
Qty: 48 ML | Refills: 1 | Status: SHIPPED | OUTPATIENT
Start: 2022-09-26 | End: 2024-03-21

## 2022-09-26 RX ORDER — INSULIN GLARGINE 100 [IU]/ML
INJECTION, SOLUTION SUBCUTANEOUS
Qty: 75 ML | Refills: 3 | Status: SHIPPED | OUTPATIENT
Start: 2022-09-26 | End: 2022-12-15 | Stop reason: ALTCHOICE

## 2022-09-26 RX ORDER — LISINOPRIL 2.5 MG/1
2.5 TABLET ORAL DAILY
Qty: 90 TABLET | Refills: 1 | Status: SHIPPED | OUTPATIENT
Start: 2022-09-26 | End: 2023-02-27

## 2022-09-26 RX ORDER — ISOSORBIDE MONONITRATE 30 MG/1
30 TABLET, EXTENDED RELEASE ORAL DAILY
Qty: 90 TABLET | Refills: 1 | Status: SHIPPED | OUTPATIENT
Start: 2022-09-26 | End: 2023-02-27

## 2022-09-26 ASSESSMENT — PAIN SCALES - GENERAL: PAINLEVEL: MODERATE PAIN (4)

## 2022-09-26 NOTE — PATIENT INSTRUCTIONS
"Stop Clopidogrel (Plavix)    Follow up with cardiology [phone] in Clinic    You are due for a diabetic eye exam.  Please schedule that and have results sent to me.    Follow up with orthopedics    I put in a referral - Dr. Lewis  or surgical ortho for the soft tissue mass       Thank you for choosing [facility].      When you are out of refills or the refills say \"zero\", it is time to schedule your next appointment in clinic!    Our Clinic hours are:  Mondays    7:20 am - 7 pm  Tues -  Fri  7:20 am - 5 pm    To speak to the care team, call [phone] option #2      The clinic lab opens at 7:30 am Mon - Fri and appointments are required.    [facility]. [phone]  Monday-Thursday 8 am - 7pm  Tues/Wed/Fri 8 am - 5:30 pm       "

## 2022-09-27 ENCOUNTER — TELEPHONE (OUTPATIENT)
Dept: ORTHOPEDICS | Facility: CLINIC | Age: 52
End: 2022-09-27

## 2022-09-27 NOTE — TELEPHONE ENCOUNTER
ATC called pt and offered first new tumor with Dr. Lewis next week. Pt will call with questions or concerns.             -Paul, ATC- Orthopedics

## 2022-09-27 NOTE — TELEPHONE ENCOUNTER
Patient has a referral to Dr Lewis with Ortho Oncology regarding Mass of soft tissue of shoulder.    First openings are over a week out.    Please assist patient with scheduling appt within appropriate time frame.    Thank you!

## 2022-09-29 ENCOUNTER — TRANSFERRED RECORDS (OUTPATIENT)
Dept: HEALTH INFORMATION MANAGEMENT | Facility: CLINIC | Age: 52
End: 2022-09-29

## 2022-10-04 ENCOUNTER — HOSPITAL ENCOUNTER (OUTPATIENT)
Dept: CT IMAGING | Facility: CLINIC | Age: 52
Discharge: HOME | End: 2022-10-04
Attending: FAMILY MEDICINE | Admitting: FAMILY MEDICINE
Payer: COMMERCIAL

## 2022-10-04 DIAGNOSIS — R91.8 PULMONARY NODULES: ICD-10-CM

## 2022-10-04 PROCEDURE — 71250 CT THORAX DX C-: CPT

## 2022-10-04 NOTE — TELEPHONE ENCOUNTER
DIAGNOSIS: Fatty Mass RT Scapula   APPOINTMENT DATE: 10/06/2022   NOTES STATUS DETAILS   OFFICE NOTE from referring provider Internal 09/26/2022, 05/25/2022 - Yue Ruvalcaba MD - [facility]     OFFICE NOTE from other specialist Media Tab 06/28/2022 - Keshawn Webber MD - O     DISCHARGE REPORT from the ER Internal 06/02/2022 - [facility]   MEDICATION LIST Internal  Media Tab  Care Everywhere    LABS     CBC/DIFF Internal 08/22/2022   ULTRASOUND Internal    MRI Internal    CT SCAN Internal    XRAYS (IMAGES & REPORTS) Internal

## 2022-10-06 ENCOUNTER — PRE VISIT (OUTPATIENT)
Dept: ORTHOPEDICS | Facility: CLINIC | Age: 52
End: 2022-10-06

## 2022-10-06 ENCOUNTER — OFFICE VISIT (OUTPATIENT)
Dept: ORTHOPEDICS | Facility: CLINIC | Age: 52
End: 2022-10-06
Payer: COMMERCIAL

## 2022-10-06 ENCOUNTER — TELEPHONE (OUTPATIENT)
Dept: ORTHOPEDICS | Facility: CLINIC | Age: 52
End: 2022-10-06

## 2022-10-06 VITALS — HEIGHT: 70 IN | BODY MASS INDEX: 32.07 KG/M2 | WEIGHT: 224 LBS

## 2022-10-06 DIAGNOSIS — M89.8X1 PAIN IN SCAPULA: Primary | ICD-10-CM

## 2022-10-06 PROCEDURE — 99204 OFFICE O/P NEW MOD 45 MIN: CPT | Performed by: ORTHOPAEDIC SURGERY

## 2022-10-06 NOTE — LETTER
10/6/2022         RE: Houston Winters  [id number]  [place] [id number]        Dear Colleague,    Thank you for referring your patient, Houston Winters, to the [facility] [place]. Please see a copy of my visit note below.        [facility], Orthopaedic Oncology Surgery Consultation  by Norman Lewis M.D.    Houston Winters MRN# [id number]    YOB: 1970     Requesting physician: Referred Self   Yue Ruvalcaba MD, PCP            Assessment and Plan:   Assessment:  Lesion of right medial scapular border, differential diagnosis includes either vascular etiology or inflammatory condition.  Location is somewhat atypical for elastofibroma dorsi.  Highly unlikely to represent any type of malignancy or soft tissue neoplasm requiring surgical excision.  Also unlikely to be responsible for his numbness in his R hand.  Patient may have cervical pathology responsible for radicular symptoms as well as periscapular pain.     Plan:  Discussed with patient possible management strategies.  I do not advise surgical excision or exploration at the present time.  Instead, diagnostic lidocaine anesthetic injection into the site for diagnostic purposes to determine whether not this is a source of his symptoms would be reasonable.  Patient would prefer to avoid steroid injections if possible given the adverse impact it has on his blood sugars.  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  Patient expressed desire to avoid any type of injections for the present time and work on nonsurgical measures for addressing symptoms.  Symptoms appear to be somewhat reduced and controlled as he is ready on pain medication for problem involving his right hip joint.  I do not think the periscapular soft tissue lesion is responsible  for his radicular symptoms.  If these persist, the next diagnostic step would be an MRI examination of his cervical spine.    1. Nonsurgical management with surveillance involving follow-up MRI examination in 6 months time.  Follow-up visit at that time in person or virtually to review the MRI study.  2. If symptoms involving his right hand persist, I also advised he undergo MRI examination of the cervical spine to look for evidence of cervical spondylosis or disc pathology that might cause radiculopathy.     Norman Lewis MD  [facility] Family Professor  Oncology and Adult Reconstructive Surgery  Dept Orthopaedic Surgery, [facility]  [phone] office, [phone] pager  [email or web address]             History of Present Illness:   52 year old male  chief complaint    Current symptoms:  Problem: Fat mass of right shoulder  Onset and duration: Shoulder bothering him for about 4 months, mass found a couple of months ago by his PCP.  Awakens from sleep due to sx's:  No  Precipitating Injury:  No    Other joints or sites painful:  Yes  Fever: No  Appetite change or weight loss: No  History of prior or existing cancer: No           Physical Exam:     EXAMINATION pertinent findings:   PSYCH: Pleasant, healthy-appearing, alert, oriented x3, cooperative. Normal mood and affect.  VITAL SIGNS: There were no vitals taken for this visit..  Reviewed nursing intake notes.   There is no height or weight on file to calculate BMI.  RESP: non labored breathing   ABD: benign, soft, non-tender, no acute peritoneal findings  SKIN: grossly normal   LYMPHATIC: grossly normal, no adenopathy, no extremity edema  NEURO: grossly normal , no motor deficits.  5/5 strength right upper extremity throughout.  VASCULAR: satisfactory perfusion of all extremities   MUSCULOSKELETAL:   Full range of motion of his right shoulder with forward flexion 180 abduction 180 internal rotation to L5 external rotation 60 degrees.  Some slight discomfort  with motion or deep palpation.             Data:   All laboratory data reviewed  All imaging studies reviewed by me        DATA for DOCUMENTATION:         Past Medical History:     Patient Active Problem List   Diagnosis     GANGLOIN CYST /RIGHT ANKLE     ED (erectile dysfunction)     Partial epilepsy (H)     Obesity     Hyperlipidemia with target LDL less than 100     Tobacco use disorder     Hypertension, goal below 140/90     Type 2 diabetes mellitus without complication, without long-term current use of insulin (H)     Small bowel obstruction (H)     Postoperative infection, initial encounter     Coronary artery disease involving coronary bypass graft of native heart without angina pectoris     Past Medical History:   Diagnosis Date     DM (diabetes mellitus) (H)      Epilepsy (H)     Intractable epilepsy, last seizure 2008     MEDICAL HISTORY OF - Age 16     Gangrene in foot stepped on a nail hospitalized for 7 weeks     Nicotine dependence      Unstable angina (H) 5/18/2021       Also see scanned health assessment forms.       Past Surgical History:     Past Surgical History:   Procedure Laterality Date     CL AFF SURGICAL PATHOLOGY  01/01/2005    ganglion cyst removed     CORONARY ARTERY BYPASS  06/16/2021    4 Vessel CABG - at [place]     CV HEART CATHETERIZATION WITH POSSIBLE INTERVENTION N/A 05/18/2021    Procedure: Heart Catheterization with Possible Intervention;  Surgeon: Andrew Madrid MD;  Location:  HEART CARDIAC CATH LAB     CV LEFT HEART CATH N/A 05/18/2021    Procedure: Left Heart Cath;  Surgeon: Andrew Madrid MD;  Location: [facility] CARDIAC CATH LAB     EXCISE MASS LOWER EXTREMITY  02/15/2013    Procedure: EXCISE MASS LOWER EXTREMITY;  Right Ankle Excision of ganglion cyst;  Surgeon: Akbar Melo DPM;  Location: [facility] OR     HERNIA REPAIR  01/01/2000    umbilical repair     HERNIORRHAPHY UMBILICAL N/A 11/10/2015    Procedure: HERNIORRHAPHY UMBILICAL;  Surgeon: Garry Leos MD;   Location: [facility] OR            Social History:     Social History     Socioeconomic History     Marital status:      Spouse name: Not on file     Number of children: Not on file     Years of education: Not on file     Highest education level: Not on file   Occupational History     Occupation: construction   Tobacco Use     Smoking status: Former Smoker     Packs/day: 0.25     Years: 25.00     Pack years: 6.25     Types: Cigarettes     Quit date: 2016     Years since quittin.7     Smokeless tobacco: Former User   Vaping Use     Vaping Use: Never used   Substance and Sexual Activity     Alcohol use: Yes     Alcohol/week: 0.0 standard drinks     Comment: mixed 2-4 on weekends, not every weekend     Drug use: No     Sexual activity: Yes     Partners: Female     Birth control/protection: Surgical   Other Topics Concern      Service No     Blood Transfusions No     Caffeine Concern Yes     Comment: 1-2 a day     Occupational Exposure Yes     Comment: construction work, demolition, heating and airconditioning     Hobby Hazards No     Sleep Concern No     Stress Concern No     Weight Concern No     Special Diet No     Back Care No     Exercise Yes     Bike Helmet No     Seat Belt Yes     Self-Exams Not Asked     Parent/sibling w/ CABG, MI or angioplasty before 65F 55M? No   Social History Narrative     Not on file     Social Determinants of Health     Financial Resource Strain: Not on file   Food Insecurity: Not on file   Transportation Needs: Not on file   Physical Activity: Not on file   Stress: Not on file   Social Connections: Not on file   Intimate Partner Violence: Not on file   Housing Stability: Not on file            Family History:       Family History   Problem Relation Age of Onset     Arthritis Mother      Cancer Maternal Grandmother      Arthritis Maternal Grandmother         lupus     Gastrointestinal Disease Maternal Grandfather         war injury, colostomy     Respiratory Daughter          growing out of it     Unknown/Adopted Father      Diabetes No family hx of      Coronary Artery Disease No family hx of      Hypertension No family hx of      Hyperlipidemia No family hx of      Breast Cancer No family hx of      Cancer - colorectal No family hx of      Ovarian Cancer No family hx of      Prostate Cancer No family hx of             Medications:     Current Outpatient Medications   Medication Sig     alcohol swab prep pads Use to swab area of injection/jaun as directed.     aspirin (ASA) 81 MG chewable tablet Take 1 tablet (81 mg) by mouth daily     atorvastatin (LIPITOR) 80 MG tablet Take 1 tablet (80 mg) by mouth daily     blood glucose (NO BRAND SPECIFIED) test strip Use to test blood sugar 4 times daily or as directed.     blood glucose calibration (NO BRAND SPECIFIED) solution Use to calibrate blood glucose monitor as needed as directed.     blood glucose monitoring (NO BRAND SPECIFIED) meter device kit Use to test blood sugar 4 times daily or as directed.     clobetasol (TEMOVATE) 0.05 % external cream Apply topically 2 times daily     Continuous Blood Gluc Sensor (DipJarSTYLE MARY 14 DAY SENSOR) [facility] CHANGE SENSOR EVERY 14 DAYS, **SCHEDULE APPOINTMENT TO ESTABLISH CARE WITH NEW PROVIDER**     HUMALOG KWIKPEN 100 UNIT/ML soln INJECT 10 TO 18 UNITS UNDER THE SKIN 3 TIMES DAILY BEFORE MEALS.  MAX DOSE PER 24 HOURS IS 50 UNITS     insulin glargine (BASAGLAR KWIKPEN) 100 UNIT/ML pen Inject 34 units in the morning and 38 units in the evening.     isosorbide mononitrate (IMDUR) 30 MG 24 hr tablet Take 1 tablet (30 mg) by mouth daily     lisinopril (ZESTRIL) 2.5 MG tablet Take 1 tablet (2.5 mg) by mouth daily     metoprolol tartrate (LOPRESSOR) 25 MG tablet Take 1 tablet (25 mg) by mouth 2 times daily     naproxen (NAPROSYN) 500 MG tablet Take 1 tablet (500 mg) by mouth 2 times daily (with meals)     nitroGLYcerin (NITROSTAT) 0.4 MG sublingual tablet For chest pain place 1 tablet under the tongue  every 5 minutes for 3 doses. If symptoms persist 5 minutes after 1st dose call 911.     oxyCODONE (ROXICODONE) 5 MG tablet Take 1 tablet (5 mg) by mouth every 6 hours as needed for pain Max 90 in 30 days     No current facility-administered medications for this visit.              Review of Systems:   A comprehensive 10 point review of systems (constitutional, ENT, cardiac, peripheral vascular, lymphatic, respiratory, GI, , Musculoskeletal, skin, Neurological) was performed and found to be negative except as described in this note.     See intake form completed by patient

## 2022-10-06 NOTE — TELEPHONE ENCOUNTER
Called and spoke to patient. Informed patient that Dr. Lewis would like to get a cervical spine MRI in April if his symptoms are persisting. Encouraged patient to call us or send us a mychart and we will put the order in per Dr. Lewis. All questions were answered.    STACEY Haddad

## 2022-10-06 NOTE — PROGRESS NOTES
[facility], Orthopaedic Oncology Surgery Consultation  by Norman Lewis M.D.    Houston Winters MRN# [id number]    YOB: 1970     Requesting physician: Referred Self   Yue Ruvalcaba MD, PCP            Assessment and Plan:   Assessment:  Lesion of right medial scapular border, differential diagnosis includes either vascular etiology or inflammatory condition.  Location is somewhat atypical for elastofibroma dorsi.  Highly unlikely to represent any type of malignancy or soft tissue neoplasm requiring surgical excision.  Also unlikely to be responsible for his numbness in his R hand.  Patient may have cervical pathology responsible for radicular symptoms as well as periscapular pain.     Plan:  Discussed with patient possible management strategies.  I do not advise surgical excision or exploration at the present time.  Instead, diagnostic lidocaine anesthetic injection into the site for diagnostic purposes to determine whether not this is a source of his symptoms would be reasonable.  Patient would prefer to avoid steroid injections if possible given the adverse impact it has on his blood sugars.  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  Patient expressed desire to avoid any type of injections for the present time and work on nonsurgical measures for addressing symptoms.  Symptoms appear to be somewhat reduced and controlled as he is ready on pain medication for problem involving his right hip joint.  I do not think the periscapular soft tissue lesion is responsible for his radicular symptoms.  If these persist, the next diagnostic step would be an MRI examination of his cervical spine.    1. Nonsurgical management with surveillance involving follow-up MRI examination in 6 months time.  Follow-up visit at that time in person or  virtually to review the MRI study.  2. If symptoms involving his right hand persist, I also advised he undergo MRI examination of the cervical spine to look for evidence of cervical spondylosis or disc pathology that might cause radiculopathy.     Norman Lewis MD  [employer] Professor  Oncology and Adult Reconstructive Surgery  Dept Orthopaedic Surgery, [facility]  [phone] office, [phone] pager  [email or web address]             History of Present Illness:   52 year old male  chief complaint    Current symptoms:  Problem: Fat mass of right shoulder  Onset and duration: Shoulder bothering him for about 4 months, mass found a couple of months ago by his PCP.  Awakens from sleep due to sx's:  No  Precipitating Injury:  No    Other joints or sites painful:  Yes  Fever: No  Appetite change or weight loss: No  History of prior or existing cancer: No           Physical Exam:     EXAMINATION pertinent findings:   PSYCH: Pleasant, healthy-appearing, alert, oriented x3, cooperative. Normal mood and affect.  VITAL SIGNS: There were no vitals taken for this visit..  Reviewed nursing intake notes.   There is no height or weight on file to calculate BMI.  RESP: non labored breathing   ABD: benign, soft, non-tender, no acute peritoneal findings  SKIN: grossly normal   LYMPHATIC: grossly normal, no adenopathy, no extremity edema  NEURO: grossly normal , no motor deficits.  5/5 strength right upper extremity throughout.  VASCULAR: satisfactory perfusion of all extremities   MUSCULOSKELETAL:   Full range of motion of his right shoulder with forward flexion 180 abduction 180 internal rotation to L5 external rotation 60 degrees.  Some slight discomfort with motion or deep palpation.             Data:   All laboratory data reviewed  All imaging studies reviewed by me        DATA for DOCUMENTATION:         Past Medical History:     Patient Active Problem List   Diagnosis     GANGLOIN CYST /RIGHT ANKLE     ED (erectile  dysfunction)     Partial epilepsy (H)     Obesity     Hyperlipidemia with target LDL less than 100     Tobacco use disorder     Hypertension, goal below 140/90     Type 2 diabetes mellitus without complication, without long-term current use of insulin (H)     Small bowel obstruction (H)     Postoperative infection, initial encounter     Coronary artery disease involving coronary bypass graft of native heart without angina pectoris     Past Medical History:   Diagnosis Date     DM (diabetes mellitus) (H)      Epilepsy (H)     Intractable epilepsy, last seizure 2008     MEDICAL HISTORY OF - Age 16     Gangrene in foot stepped on a nail hospitalized for 7 weeks     Nicotine dependence      Unstable angina (H) 5/18/2021       Also see scanned health assessment forms.       Past Surgical History:     Past Surgical History:   Procedure Laterality Date     CL AFF SURGICAL PATHOLOGY  01/01/2005    ganglion cyst removed     CORONARY ARTERY BYPASS  06/16/2021    4 Vessel CABG - at [place]     CV HEART CATHETERIZATION WITH POSSIBLE INTERVENTION N/A 05/18/2021    Procedure: Heart Catheterization with Possible Intervention;  Surgeon: Andrew Madrid MD;  Location:  HEART CARDIAC CATH LAB     CV LEFT HEART CATH N/A 05/18/2021    Procedure: Left Heart Cath;  Surgeon: Andrew Madrid MD;  Location:  HEART CARDIAC CATH LAB     EXCISE MASS LOWER EXTREMITY  02/15/2013    Procedure: EXCISE MASS LOWER EXTREMITY;  Right Ankle Excision of ganglion cyst;  Surgeon: Akbar Melo DPM;  Location: [facility] OR     HERNIA REPAIR  01/01/2000    umbilical repair     HERNIORRHAPHY UMBILICAL N/A 11/10/2015    Procedure: HERNIORRHAPHY UMBILICAL;  Surgeon: Garry Leos MD;  Location: [facility] OR            Social History:     Social History     Socioeconomic History     Marital status:      Spouse name: Not on file     Number of children: Not on file     Years of education: Not on file     Highest education level: Not on file   Occupational  History     Occupation: construction   Tobacco Use     Smoking status: Former Smoker     Packs/day: 0.25     Years: 25.00     Pack years: 6.25     Types: Cigarettes     Quit date: 2016     Years since quittin.7     Smokeless tobacco: Former User   Vaping Use     Vaping Use: Never used   Substance and Sexual Activity     Alcohol use: Yes     Alcohol/week: 0.0 standard drinks     Comment: mixed 2-4 on weekends, not every weekend     Drug use: No     Sexual activity: Yes     Partners: Female     Birth control/protection: Surgical   Other Topics Concern      Service No     Blood Transfusions No     Caffeine Concern Yes     Comment: 1-2 a day     Occupational Exposure Yes     Comment: construction work, demolition, heating and airconditioning     Hobby Hazards No     Sleep Concern No     Stress Concern No     Weight Concern No     Special Diet No     Back Care No     Exercise Yes     Bike Helmet No     Seat Belt Yes     Self-Exams Not Asked     Parent/sibling w/ CABG, MI or angioplasty before 65F 55M? No   Social History Narrative     Not on file     Social Determinants of Health     Financial Resource Strain: Not on file   Food Insecurity: Not on file   Transportation Needs: Not on file   Physical Activity: Not on file   Stress: Not on file   Social Connections: Not on file   Intimate Partner Violence: Not on file   Housing Stability: Not on file            Family History:       Family History   Problem Relation Age of Onset     Arthritis Mother      Cancer Maternal Grandmother      Arthritis Maternal Grandmother         lupus     Gastrointestinal Disease Maternal Grandfather         war injury, colostomy     Respiratory Daughter         growing out of it     Unknown/Adopted Father      Diabetes No family hx of      Coronary Artery Disease No family hx of      Hypertension No family hx of      Hyperlipidemia No family hx of      Breast Cancer No family hx of      Cancer - colorectal No family hx of       Ovarian Cancer No family hx of      Prostate Cancer No family hx of             Medications:     Current Outpatient Medications   Medication Sig     alcohol swab prep pads Use to swab area of injection/jaun as directed.     aspirin (ASA) 81 MG chewable tablet Take 1 tablet (81 mg) by mouth daily     atorvastatin (LIPITOR) 80 MG tablet Take 1 tablet (80 mg) by mouth daily     blood glucose (NO BRAND SPECIFIED) test strip Use to test blood sugar 4 times daily or as directed.     blood glucose calibration (NO BRAND SPECIFIED) solution Use to calibrate blood glucose monitor as needed as directed.     blood glucose monitoring (NO BRAND SPECIFIED) meter device kit Use to test blood sugar 4 times daily or as directed.     clobetasol (TEMOVATE) 0.05 % external cream Apply topically 2 times daily     Continuous Blood Gluc Sensor ([employer] MARY 14 DAY SENSOR) [facility] CHANGE SENSOR EVERY 14 DAYS, **SCHEDULE APPOINTMENT TO ESTABLISH CARE WITH NEW PROVIDER**     HUMALOG KWIKPEN 100 UNIT/ML soln INJECT 10 TO 18 UNITS UNDER THE SKIN 3 TIMES DAILY BEFORE MEALS.  MAX DOSE PER 24 HOURS IS 50 UNITS     insulin glargine (BASAGLAR KWIKPEN) 100 UNIT/ML pen Inject 34 units in the morning and 38 units in the evening.     isosorbide mononitrate (IMDUR) 30 MG 24 hr tablet Take 1 tablet (30 mg) by mouth daily     lisinopril (ZESTRIL) 2.5 MG tablet Take 1 tablet (2.5 mg) by mouth daily     metoprolol tartrate (LOPRESSOR) 25 MG tablet Take 1 tablet (25 mg) by mouth 2 times daily     naproxen (NAPROSYN) 500 MG tablet Take 1 tablet (500 mg) by mouth 2 times daily (with meals)     nitroGLYcerin (NITROSTAT) 0.4 MG sublingual tablet For chest pain place 1 tablet under the tongue every 5 minutes for 3 doses. If symptoms persist 5 minutes after 1st dose call 911.     oxyCODONE (ROXICODONE) 5 MG tablet Take 1 tablet (5 mg) by mouth every 6 hours as needed for pain Max 90 in 30 days     No current facility-administered medications for this visit.               Review of Systems:   A comprehensive 10 point review of systems (constitutional, ENT, cardiac, peripheral vascular, lymphatic, respiratory, GI, , Musculoskeletal, skin, Neurological) was performed and found to be negative except as described in this note.     See intake form completed by patient

## 2022-10-28 ENCOUNTER — TELEPHONE (OUTPATIENT)
Dept: FAMILY MEDICINE | Facility: CLINIC | Age: 52
End: 2022-10-28

## 2022-10-28 DIAGNOSIS — M25.551 HIP PAIN, RIGHT: ICD-10-CM

## 2022-10-28 NOTE — TELEPHONE ENCOUNTER
Patient says that he's due for a refill of oxycodone soon, sometime around November 7th. Patient requesting to have refill sent to  Pharmacy [facility]. Routing request to PCP.    Routing refill request to provider for review/approval because:  Drug not on the [facility] refill protocol     Jenny Cruz RN  [facility]

## 2022-10-31 RX ORDER — OXYCODONE HYDROCHLORIDE 5 MG/1
5 TABLET ORAL EVERY 6 HOURS PRN
Qty: 90 TABLET | Refills: 0 | Status: SHIPPED | OUTPATIENT
Start: 2022-11-04 | End: 2022-12-05

## 2022-11-02 ENCOUNTER — TELEPHONE (OUTPATIENT)
Dept: FAMILY MEDICINE | Facility: CLINIC | Age: 52
End: 2022-11-02

## 2022-11-02 NOTE — TELEPHONE ENCOUNTER
Patient Quality Outreach    Patient is due for the following:   Colonoscopy    Next Steps:   - Complete colonoscopy  -Schedule annual physical and vaccine updat  - He has DM appointment with Dr. Grier scheduled for 12/15    Type of outreach:    Sent [employer] message.      Questions for provider review:    None     Sharee Lewis, CMA

## 2022-11-19 ENCOUNTER — HEALTH MAINTENANCE LETTER (OUTPATIENT)
Age: 52
End: 2022-11-19

## 2022-12-08 ASSESSMENT — ENCOUNTER SYMPTOMS
MUSCLE WEAKNESS: 0
BACK PAIN: 0
MUSCLE CRAMPS: 0
ARTHRALGIAS: 1
STIFFNESS: 1
JOINT SWELLING: 1
MYALGIAS: 1
NECK PAIN: 0

## 2022-12-15 ENCOUNTER — OFFICE VISIT (OUTPATIENT)
Dept: ENDOCRINOLOGY | Facility: CLINIC | Age: 52
End: 2022-12-15
Payer: COMMERCIAL

## 2022-12-15 VITALS
DIASTOLIC BLOOD PRESSURE: 72 MMHG | TEMPERATURE: 99.4 F | HEART RATE: 91 BPM | OXYGEN SATURATION: 97 % | SYSTOLIC BLOOD PRESSURE: 113 MMHG

## 2022-12-15 DIAGNOSIS — I15.2 HYPERTENSION ASSOCIATED WITH TYPE 2 DIABETES MELLITUS (H): ICD-10-CM

## 2022-12-15 DIAGNOSIS — E78.5 HYPERLIPIDEMIA DUE TO TYPE 2 DIABETES MELLITUS (H): Primary | ICD-10-CM

## 2022-12-15 DIAGNOSIS — E11.69 HYPERLIPIDEMIA DUE TO TYPE 2 DIABETES MELLITUS (H): Primary | ICD-10-CM

## 2022-12-15 DIAGNOSIS — I25.810 CORONARY ARTERY DISEASE INVOLVING CORONARY BYPASS GRAFT OF NATIVE HEART WITHOUT ANGINA PECTORIS: ICD-10-CM

## 2022-12-15 DIAGNOSIS — E11.59 HYPERTENSION ASSOCIATED WITH TYPE 2 DIABETES MELLITUS (H): ICD-10-CM

## 2022-12-15 DIAGNOSIS — E11.9 TYPE 2 DIABETES MELLITUS WITHOUT COMPLICATION, WITHOUT LONG-TERM CURRENT USE OF INSULIN (H): ICD-10-CM

## 2022-12-15 LAB
CHOLEST SERPL-MCNC: 144 MG/DL
CREAT UR-MCNC: 148.5 MG/DL
HBA1C MFR BLD: 6.6 % (ref 0–5.6)
HDLC SERPL-MCNC: 45 MG/DL
LDLC SERPL CALC-MCNC: 86 MG/DL
MICROALBUMIN UR-MCNC: <12 MG/L
MICROALBUMIN/CREAT UR: NORMAL MG/G{CREAT}
NONHDLC SERPL-MCNC: 99 MG/DL
TRIGL SERPL-MCNC: 66 MG/DL

## 2022-12-15 PROCEDURE — 99215 OFFICE O/P EST HI 40 MIN: CPT | Performed by: INTERNAL MEDICINE

## 2022-12-15 PROCEDURE — 82043 UR ALBUMIN QUANTITATIVE: CPT | Performed by: INTERNAL MEDICINE

## 2022-12-15 PROCEDURE — 80061 LIPID PANEL: CPT | Performed by: INTERNAL MEDICINE

## 2022-12-15 PROCEDURE — 36415 COLL VENOUS BLD VENIPUNCTURE: CPT | Performed by: INTERNAL MEDICINE

## 2022-12-15 PROCEDURE — 83036 HEMOGLOBIN GLYCOSYLATED A1C: CPT | Performed by: INTERNAL MEDICINE

## 2022-12-15 RX ORDER — EZETIMIBE 10 MG/1
10 TABLET ORAL DAILY
Qty: 90 TABLET | Refills: 3 | Status: CANCELLED | OUTPATIENT
Start: 2022-12-15

## 2022-12-15 RX ORDER — SEMAGLUTIDE 1.34 MG/ML
INJECTION, SOLUTION SUBCUTANEOUS
Qty: 4.5 ML | Refills: 3 | OUTPATIENT
Start: 2022-12-15 | End: 2022-12-19

## 2022-12-15 RX ORDER — INSULIN GLARGINE 300 U/ML
INJECTION, SOLUTION SUBCUTANEOUS
Qty: 3 ML | Refills: 3 | Status: SHIPPED | OUTPATIENT
Start: 2022-12-15 | End: 2023-01-27

## 2022-12-15 NOTE — PATIENT INSTRUCTIONS
Switch basaglar to Toujeo:    Skip previous nights basaglar dose  Start 63 units every morning  Increase dose by 2 units every 2 days until morning fasting sugars are <150 without overnight lows

## 2022-12-15 NOTE — PROGRESS NOTES
Endocrine Consult note    Attending Assessment/Plan :        Type 2 diabetes mellitus without complication, without long-term current use of insulin (H)  Hyperlipidemia due to type 2 diabetes mellitus (H)  Hypertension associated with type 2 diabetes mellitus (H)  Coronary artery disease involving coronary bypass graft of native heart without angina pectoris    Assessment:  -Doing well from reported blood sugars and last a1c  -no overnight hypos  -would like to transition to once daily basal to decrease injection burden  -goal LDL given DM and CAD would be <70 per recent ADA guidelines.  On max dose atorvastatin. Last LDL 96 so could consider PCSK9i but will defer to cardiology    Plan:  -switch to Toujeo U-300 - start with 63 units in the morning (10% TDD reduction to start with)  -will have him self titrate toujeo by 2 units q2 days until FBG is <150 without overnight hypos.  Current TDD of 70 of basaglar appears perfect so will likely only need a few adjustments if any  -asked him to note context for post prandial lows and will re-evaluate humalog dose when we get a full mike download.  Instructed to decrease standing dose by 2 units if any post prandial hypos, especially after starting ozempic  -start ozempic - 0.25mg weekly for first month then 0.5mg weekly.  Will benefit from ozempic from a DM, weight loss, and cardiovascular protective standpoint  -will consider addition of SGLT-2i as well at next visit but wants to start with one new med at a time  -continue atorva and BP meds at current doses     I have independently reviewed and interpreted labs, imaging as indicated.    RTC 3 months    Chief complaint:  Houston is a 52 year old male seen in consultation for type 2 diabetes      HISTORY OF PRESENT ILLNESS    Dx 5-6 years ago.  Has had CAD now s/p bypass.  Doing well.     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     Would like to decrease injection burden as he feels he is getting some scar tissue that my be impairing absorption.     Has been using mike 2 CGM.  Of note within recent few weeks he had a sensor fail and had a phone die so we do not have data for this appointment.  Other than that he has been using regularly.     He reports that his morning fasting sugars have been in the 100-120 range most days without overnight hypos.     Other meds:  HLD:  Atorvastatin 80    HTN:  imdur 30  Lisinopril 2.5  Metoprolol tartrate 25    Endocrine relevant labs and/or imaging are as follows:  Component      Latest Ref Rng & Units 8/22/2022 9/26/2022   Sodium      133 - 144 mmol/L 142    Potassium      3.4 - 5.3 mmol/L 3.9    Chloride      94 - 109 mmol/L 110 (H)    Carbon Dioxide      20 - 32 mmol/L 25    Anion Gap      3 - 14 mmol/L 7    Urea Nitrogen      7 - 30 mg/dL 28    Creatinine      0.66 - 1.25 mg/dL 0.94    Calcium      8.5 - 10.1 mg/dL 9.1    Glucose      70 - 99 mg/dL 134 (H)    GFR Estimate      >60 mL/min/1.73m2 >90    Cholesterol      <200 mg/dL  152   Triglycerides      <150 mg/dL  67   HDL Cholesterol      >=40 mg/dL  43   LDL Cholesterol Calculated      <=100 mg/dL  96   Non HDL Cholesterol      <130 mg/dL  109   Hemoglobin A1C      0.0 - 5.6 %  6.8 (H)       REVIEW OF SYSTEMS  Answers for HPI/ROS submitted by the patient on 12/8/2022  General Symptoms: No  Skin Symptoms: No  HENT Symptoms: No  EYE SYMPTOMS: No  HEART SYMPTOMS: No  LUNG SYMPTOMS: No  INTESTINAL SYMPTOMS: No  URINARY SYMPTOMS: No  REPRODUCTIVE SYMPTOMS: No  SKELETAL SYMPTOMS: Yes  BLOOD SYMPTOMS: No  NERVOUS SYSTEM SYMPTOMS: No  MENTAL HEALTH SYMPTOMS: No  Back pain: No  Muscle aches: Yes  Neck pain: No  Swollen joints: Yes  Joint pain: Yes  Bone pain: Yes  Muscle cramps: No  Muscle weakness: No  Joint stiffness: Yes  Bone fracture: No      10 system ROS otherwise as per the HPI or  negative    Past Medical History  Past Medical History:   Diagnosis Date     DM (diabetes mellitus) (H)      Epilepsy (H)     Intractable epilepsy, last seizure 2008     MEDICAL HISTORY OF - Age 16     Gangrene in foot stepped on a nail hospitalized for 7 weeks     Nicotine dependence      Unstable angina (H) 5/18/2021       Medications  Current Outpatient Medications   Medication Sig Dispense Refill     alcohol swab prep pads Use to swab area of injection/jaun as directed. 100 each 3     aspirin (ASA) 81 MG chewable tablet Take 1 tablet (81 mg) by mouth daily 14 tablet 0     atorvastatin (LIPITOR) 80 MG tablet Take 1 tablet (80 mg) by mouth daily 90 tablet 3     blood glucose (NO BRAND SPECIFIED) test strip Use to test blood sugar 4 times daily or as directed. 100 strip 6     blood glucose calibration (NO BRAND SPECIFIED) solution Use to calibrate blood glucose monitor as needed as directed. 1 each 0     blood glucose monitoring (NO BRAND SPECIFIED) meter device kit Use to test blood sugar 4 times daily or as directed. 1 kit 0     clobetasol (TEMOVATE) 0.05 % external cream Apply topically 2 times daily 60 g 1     Continuous Blood Gluc Sensor ([employer] MARY 14 DAY SENSOR) [facility] CHANGE SENSOR EVERY 14 DAYS, **SCHEDULE APPOINTMENT TO ESTABLISH CARE WITH NEW PROVIDER** 6 each 1     HUMALOG KWIKPEN 100 UNIT/ML soln INJECT 10 TO 18 UNITS UNDER THE SKIN 3 TIMES DAILY BEFORE MEALS.  MAX DOSE PER 24 HOURS IS 50 UNITS 48 mL 1     insulin glargine (BASAGLAR KWIKPEN) 100 UNIT/ML pen Inject 34 units in the morning and 38 units in the evening. 75 mL 3     isosorbide mononitrate (IMDUR) 30 MG 24 hr tablet Take 1 tablet (30 mg) by mouth daily 90 tablet 1     lisinopril (ZESTRIL) 2.5 MG tablet Take 1 tablet (2.5 mg) by mouth daily 90 tablet 1     metoprolol tartrate (LOPRESSOR) 25 MG tablet Take 1 tablet (25 mg) by mouth 2 times daily 180 tablet 3     naproxen (NAPROSYN) 500 MG tablet Take 1 tablet (500 mg) by mouth 2 times  daily (with meals) 60 tablet 0     nitroGLYcerin (NITROSTAT) 0.4 MG sublingual tablet For chest pain place 1 tablet under the tongue every 5 minutes for 3 doses. If symptoms persist 5 minutes after 1st dose call 911. 20 tablet 0     oxyCODONE (ROXICODONE) 5 MG tablet Take 1 tablet (5 mg) by mouth every 6 hours as needed for pain Max 90 in 30 days 90 tablet 0       Allergies  Allergies   Allergen Reactions     Metformin Other (See Comments)     Very low blood sugars, Blood sugar issue          Family History  family history includes Arthritis in his maternal grandmother and mother; Cancer in his maternal grandmother; Gastrointestinal Disease in his maternal grandfather; Respiratory in his daughter; Unknown/Adopted in his father.    Social History  Social History     Tobacco Use     Smoking status: Former     Packs/day: 0.25     Years: 25.00     Pack years: 6.25     Types: Cigarettes     Quit date: 2016     Years since quittin.9     Smokeless tobacco: Former   Vaping Use     Vaping Use: Never used   Substance Use Topics     Alcohol use: Yes     Alcohol/week: 0.0 standard drinks     Comment: mixed 2-4 on weekends, not every weekend     Drug use: No       Physical Exam  /72 (BP Location: Left arm, Patient Position: Sitting, Cuff Size: Adult Regular)   Pulse 91   Temp 99.4  F (37.4  C) (Tympanic)   SpO2 97%   There is no height or weight on file to calculate BMI.    Physical Exam    GENERAL :  In no apparent distress  SKIN: Normal color, normal temperature     EYES: EOMI, No scleral icterus  NECK: No visible masses  RESP: No respiratory distress, normal effort  CARDIO: regular rate  ABDOMEN: flat, nondistended       NEURO: awake, alert, responds appropriately to questions   EXTREMITIES: No clubbing, cyanosis or edema.    I spent a total of 47 minutes on the day of this new patient visit on chart review, lab review, coordinating care, reviewing previous providers notes, exam and counseling of patient

## 2022-12-15 NOTE — LETTER
12/15/2022         RE: Houston Winters  [address] [facility] [address]        Dear Colleague,    Thank you for referring your patient, Houston Winters, to the [facility]. Please see a copy of my visit note below.    Endocrine Consult note    Attending Assessment/Plan :        Type 2 diabetes mellitus without complication, without long-term current use of insulin (H)  Hyperlipidemia due to type 2 diabetes mellitus (H)  Hypertension associated with type 2 diabetes mellitus (H)  Coronary artery disease involving coronary bypass graft of native heart without angina pectoris    Assessment:  -Doing well from reported blood sugars and last a1c  -no overnight hypos  -would like to transition to once daily basal to decrease injection burden  -goal LDL given DM and CAD would be <70 per recent ADA guidelines.  On max dose atorvastatin. Last LDL 96 so could consider PCSK9i but will defer to cardiology    Plan:  -switch to Toujeo U-300 - start with 63 units in the morning (10% TDD reduction to start with)  -will have him self titrate toujeo by 2 units q2 days until FBG is <150 without overnight hypos.  Current TDD of 70 of basaglar appears perfect so will likely only need a few adjustments if any  -asked him to note context for post prandial lows and will re-evaluate humalog dose when we get a full mike download.  Instructed to decrease standing dose by 2 units if any post prandial hypos, especially after starting ozempic  -start ozempic - 0.25mg weekly for first month then 0.5mg weekly.  Will benefit from ozempic from a DM, weight loss, and cardiovascular protective standpoint  -will consider addition of SGLT-2i as well at next visit but wants to start with one new med at a time  -continue atorva and BP meds at current doses     I have independently reviewed and interpreted labs, imaging as indicated.    RTC 3 months    Chief complaint:  Houston is a 52 year old male seen in  consultation for type 2 diabetes      HISTORY OF PRESENT ILLNESS    Dx 5-6 years ago.  Has had CAD now s/p bypass.  Doing well.     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     Would like to decrease injection burden as he feels he is getting some scar tissue that my be impairing absorption.     Has been using mike 2 CGM.  Of note within recent few weeks he had a sensor fail and had a phone die so we do not have data for this appointment.  Other than that he has been using regularly.     He reports that his morning fasting sugars have been in the 100-120 range most days without overnight hypos.     Other meds:  HLD:  Atorvastatin 80    HTN:  imdur 30  Lisinopril 2.5  Metoprolol tartrate 25    Endocrine relevant labs and/or imaging are as follows:  Component      Latest Ref Rng & Units 8/22/2022 9/26/2022   Sodium      133 - 144 mmol/L 142    Potassium      3.4 - 5.3 mmol/L 3.9    Chloride      94 - 109 mmol/L 110 (H)    Carbon Dioxide      20 - 32 mmol/L 25    Anion Gap      3 - 14 mmol/L 7    Urea Nitrogen      7 - 30 mg/dL 28    Creatinine      0.66 - 1.25 mg/dL 0.94    Calcium      8.5 - 10.1 mg/dL 9.1    Glucose      70 - 99 mg/dL 134 (H)    GFR Estimate      >60 mL/min/1.73m2 >90    Cholesterol      <200 mg/dL  152   Triglycerides      <150 mg/dL  67   HDL Cholesterol      >=40 mg/dL  43   LDL Cholesterol Calculated      <=100 mg/dL  96   Non HDL Cholesterol      <130 mg/dL  109   Hemoglobin A1C      0.0 - 5.6 %  6.8 (H)       REVIEW OF SYSTEMS  Answers for HPI/ROS submitted by the patient on 12/8/2022  General Symptoms: No  Skin Symptoms: No  HENT Symptoms: No  EYE SYMPTOMS: No  HEART SYMPTOMS: No  LUNG SYMPTOMS: No  INTESTINAL SYMPTOMS: No  URINARY SYMPTOMS: No  REPRODUCTIVE SYMPTOMS: No  SKELETAL SYMPTOMS: Yes  BLOOD SYMPTOMS: No  NERVOUS SYSTEM SYMPTOMS:  No  MENTAL HEALTH SYMPTOMS: No  Back pain: No  Muscle aches: Yes  Neck pain: No  Swollen joints: Yes  Joint pain: Yes  Bone pain: Yes  Muscle cramps: No  Muscle weakness: No  Joint stiffness: Yes  Bone fracture: No      10 system ROS otherwise as per the HPI or negative    Past Medical History  Past Medical History:   Diagnosis Date     DM (diabetes mellitus) (H)      Epilepsy (H)     Intractable epilepsy, last seizure 2008     MEDICAL HISTORY OF - Age 16     Gangrene in foot stepped on a nail hospitalized for 7 weeks     Nicotine dependence      Unstable angina (H) 5/18/2021       Medications  Current Outpatient Medications   Medication Sig Dispense Refill     alcohol swab prep pads Use to swab area of injection/jaun as directed. 100 each 3     aspirin (ASA) 81 MG chewable tablet Take 1 tablet (81 mg) by mouth daily 14 tablet 0     atorvastatin (LIPITOR) 80 MG tablet Take 1 tablet (80 mg) by mouth daily 90 tablet 3     blood glucose (NO BRAND SPECIFIED) test strip Use to test blood sugar 4 times daily or as directed. 100 strip 6     blood glucose calibration (NO BRAND SPECIFIED) solution Use to calibrate blood glucose monitor as needed as directed. 1 each 0     blood glucose monitoring (NO BRAND SPECIFIED) meter device kit Use to test blood sugar 4 times daily or as directed. 1 kit 0     clobetasol (TEMOVATE) 0.05 % external cream Apply topically 2 times daily 60 g 1     Continuous Blood Gluc Sensor (FREESTYLE MARY 14 DAY SENSOR) [facility] CHANGE SENSOR EVERY 14 DAYS, **SCHEDULE APPOINTMENT TO ESTABLISH CARE WITH NEW PROVIDER** 6 each 1     HUMALOG KWIKPEN 100 UNIT/ML soln INJECT 10 TO 18 UNITS UNDER THE SKIN 3 TIMES DAILY BEFORE MEALS.  MAX DOSE PER 24 HOURS IS 50 UNITS 48 mL 1     insulin glargine (BASAGLAR KWIKPEN) 100 UNIT/ML pen Inject 34 units in the morning and 38 units in the evening. 75 mL 3     isosorbide mononitrate (IMDUR) 30 MG 24 hr tablet Take 1 tablet (30 mg) by mouth daily 90 tablet 1     lisinopril  (ZESTRIL) 2.5 MG tablet Take 1 tablet (2.5 mg) by mouth daily 90 tablet 1     metoprolol tartrate (LOPRESSOR) 25 MG tablet Take 1 tablet (25 mg) by mouth 2 times daily 180 tablet 3     naproxen (NAPROSYN) 500 MG tablet Take 1 tablet (500 mg) by mouth 2 times daily (with meals) 60 tablet 0     nitroGLYcerin (NITROSTAT) 0.4 MG sublingual tablet For chest pain place 1 tablet under the tongue every 5 minutes for 3 doses. If symptoms persist 5 minutes after 1st dose call 911. 20 tablet 0     oxyCODONE (ROXICODONE) 5 MG tablet Take 1 tablet (5 mg) by mouth every 6 hours as needed for pain Max 90 in 30 days 90 tablet 0       Allergies  Allergies   Allergen Reactions     Metformin Other (See Comments)     Very low blood sugars, Blood sugar issue          Family History  family history includes Arthritis in his maternal grandmother and mother; Cancer in his maternal grandmother; Gastrointestinal Disease in his maternal grandfather; Respiratory in his daughter; Unknown/Adopted in his father.    Social History  Social History     Tobacco Use     Smoking status: Former     Packs/day: 0.25     Years: 25.00     Pack years: 6.25     Types: Cigarettes     Quit date: 2016     Years since quittin.9     Smokeless tobacco: Former   Vaping Use     Vaping Use: Never used   Substance Use Topics     Alcohol use: Yes     Alcohol/week: 0.0 standard drinks     Comment: mixed 2-4 on weekends, not every weekend     Drug use: No       Physical Exam  /72 (BP Location: Left arm, Patient Position: Sitting, Cuff Size: Adult Regular)   Pulse 91   Temp 99.4  F (37.4  C) (Tympanic)   SpO2 97%   There is no height or weight on file to calculate BMI.    Physical Exam    GENERAL :  In no apparent distress  SKIN: Normal color, normal temperature     EYES: EOMI, No scleral icterus  NECK: No visible masses  RESP: No respiratory distress, normal effort  CARDIO: regular rate  ABDOMEN: flat, nondistended       NEURO: awake, alert, responds  appropriately to questions   EXTREMITIES: No clubbing, cyanosis or edema.    I spent a total of 47 minutes on the day of this new patient visit on chart review, lab review, coordinating care, reviewing previous providers notes, exam and counseling of patient      Again, thank you for allowing me to participate in the care of your patient.        Sincerely,        Jhonatan Grier MD

## 2022-12-15 NOTE — NURSING NOTE
"Initial /72 (BP Location: Left arm, Patient Position: Sitting, Cuff Size: Adult Regular)   Pulse 91   Temp 99.4  F (37.4  C) (Tympanic)   SpO2 97%  Estimated body mass index is 32.43 kg/m  as calculated from the following:    Height as of 10/6/22: 1.77 m (5' 9.69\").    Weight as of 10/6/22: 101.6 kg (224 lb). .    Lev WOOD CMA    "

## 2022-12-19 ENCOUNTER — TELEPHONE (OUTPATIENT)
Dept: ENDOCRINOLOGY | Facility: CLINIC | Age: 52
End: 2022-12-19

## 2022-12-19 DIAGNOSIS — E11.9 TYPE 2 DIABETES MELLITUS WITHOUT COMPLICATION, WITHOUT LONG-TERM CURRENT USE OF INSULIN (H): ICD-10-CM

## 2022-12-19 RX ORDER — SEMAGLUTIDE 1.34 MG/ML
INJECTION, SOLUTION SUBCUTANEOUS
Qty: 4.5 ML | Refills: 3 | Status: SHIPPED | OUTPATIENT
Start: 2022-12-19 | End: 2023-01-27

## 2022-12-19 NOTE — TELEPHONE ENCOUNTER
[facility] Call Center    Phone Message    May a detailed message be left on voicemail: yes     Reason for Call: Other: Per patient pharmacy is out of  semaglutide (OZEMPIC, 0.25 OR 0.5 MG/DOSE,) 2 MG/1.5ML SOPN pen Patient wants to know if [facility] pharmacy has it in stock and the order can be sent there?  [place] or [facility].   Thank you     Action Taken: Other: ENDO    Travel Screening: Not Applicable

## 2022-12-19 NOTE — TELEPHONE ENCOUNTER
Spoke with pharmacy and they do not have Ozempic in stock and cannot get it from  as there is a shortage. Pt stated he wanted the script sent to [facility] and they would hold the script until they got the medication back in stock and then fill it for him. Resent and faxed script to pharmacy as it had not transmitted previously.  Kaila MEDRANO RN BSN PHN  [facility]

## 2023-01-26 DIAGNOSIS — E11.9 TYPE 2 DIABETES MELLITUS WITHOUT COMPLICATION, WITHOUT LONG-TERM CURRENT USE OF INSULIN (H): ICD-10-CM

## 2023-01-26 NOTE — TELEPHONE ENCOUNTER
Requested Prescriptions   Pending Prescriptions Disp Refills     semaglutide (OZEMPIC (0.25 OR 0.5 MG/DOSE)) 2 MG/1.5ML SOPN pen 4.5 mL 3     Sig: Inject 0.25mg once weekly for 4 weeks then increase to 0.5mg weekly.       There is no refill protocol information for this order        insulin glargine U-300 (TOUJEO SOLOSTAR) 300 UNIT/ML (1 units dial) pen 3 mL 3     Sig: Inject 63 units every morning and adjust according to instruction.  Max TDD 80 units.       There is no refill protocol information for this order        Continuous Blood Gluc Sensor (FREESTYLE MARY 14 DAY SENSOR) MISC 6 each 1     Sig: CHANGE SENSOR EVERY 14 DAYS, **SCHEDULE APPOINTMENT TO ESTABLISH CARE WITH NEW PROVIDER**       There is no refill protocol information for this order        Patient states he contacted pharmacy weeks ago before vacation and pharmacy told him they are waiting on provider for refills and medications may require PA.    Patient states he is totally out of OZEMPIC.    Patient wanted to let Dr. Grier know that things are going well and that he has had to many lows so he decreased TOUJEO from 65 to 55.    Patient states he also sent a message on [employer] for refills, but it would not let him request refills.        Last office visit: Visit date not found with prescribing provider:  Dr. Grier   Future Office Visit:          Guille Cabrera  [facility]

## 2023-01-27 ENCOUNTER — TELEPHONE (OUTPATIENT)
Dept: ENDOCRINOLOGY | Facility: CLINIC | Age: 53
End: 2023-01-27
Payer: COMMERCIAL

## 2023-01-27 DIAGNOSIS — E11.9 TYPE 2 DIABETES MELLITUS WITHOUT COMPLICATION, WITHOUT LONG-TERM CURRENT USE OF INSULIN (H): ICD-10-CM

## 2023-01-27 RX ORDER — FLASH GLUCOSE SENSOR
KIT MISCELLANEOUS
Qty: 6 EACH | Refills: 1 | Status: SHIPPED | OUTPATIENT
Start: 2023-01-27 | End: 2023-03-17 | Stop reason: ALTCHOICE

## 2023-01-27 RX ORDER — INSULIN GLARGINE 300 U/ML
INJECTION, SOLUTION SUBCUTANEOUS
Qty: 6 ML | Refills: 5 | Status: SHIPPED | OUTPATIENT
Start: 2023-01-27 | End: 2023-03-17

## 2023-01-27 RX ORDER — SEMAGLUTIDE 1.34 MG/ML
INJECTION, SOLUTION SUBCUTANEOUS
Qty: 4.5 ML | Refills: 3 | Status: SHIPPED | OUTPATIENT
Start: 2023-01-27 | End: 2023-03-17 | Stop reason: DRUGHIGH

## 2023-01-27 NOTE — TELEPHONE ENCOUNTER
Central Prior Authorization Team   Phone: [phone]      PA Initiation    Medication: semaglutide (OZEMPIC, 0.25 OR 0.5 MG/DOSE,) 2 MG/1.5ML SOPN [facility]  Insurance Company: [employer] ([facility]) - Phone [phone] Fax [phone]  Pharmacy Filling the Rx: [facility] [address] [place] [address] - [address]  Filling Pharmacy Phone: [phone]  Filling Pharmacy Fax:    Start Date: 1/27/2023

## 2023-01-27 NOTE — TELEPHONE ENCOUNTER
Prior Authorization Retail Medication Request    Medication/Dose: semaglutide (OZEMPIC, 0.25 OR 0.5 MG/DOSE,) 2 MG/1.5ML SOPN pen  ICD code (if different than what is on RX):    Previously Tried and Failed:    Rationale:      Insurance Name:    Insurance ID:        Pharmacy Information (if different than what is on RX)  Name:  [facility] [place], [place]   Phone:  [phone]

## 2023-01-27 NOTE — TELEPHONE ENCOUNTER
Prior Authorization Approval    Authorization Effective Date: 1/27/2023  Authorization Expiration Date: 1/27/2024  Medication: semaglutide (OZEMPIC, 0.25 OR 0.5 MG/DOSE,) 2 MG/1.5ML SOPN pen-APPROVED  Approved Dose/Quantity:   Reference #:     Insurance Company: [employer] ([facility]) - Phone [phone] Fax [phone]  Expected CoPay:       CoPay Card Available:      Foundation Assistance Needed:    Which Pharmacy is filling the prescription (Not needed for infusion/clinic administered): [id number] - [place], [place] - [address]  Pharmacy Notified: Yes  Patient Notified: No  **Instructed pharmacy to notify patient when script is ready to /ship.**

## 2023-01-30 ENCOUNTER — MYC REFILL (OUTPATIENT)
Dept: FAMILY MEDICINE | Facility: CLINIC | Age: 53
End: 2023-01-30
Payer: COMMERCIAL

## 2023-01-30 DIAGNOSIS — M25.551 HIP PAIN, RIGHT: ICD-10-CM

## 2023-01-30 RX ORDER — OXYCODONE HYDROCHLORIDE 5 MG/1
5 TABLET ORAL EVERY 6 HOURS PRN
Qty: 90 TABLET | Refills: 0 | Status: SHIPPED | OUTPATIENT
Start: 2023-02-03 | End: 2023-02-27

## 2023-01-30 NOTE — TELEPHONE ENCOUNTER
Routing to ordering provider for consideration, not on refill protocol. Pt has appt the end of February          Ana Luisa Herrera RN MSN

## 2023-01-30 NOTE — TELEPHONE ENCOUNTER
Refilled - last time. Looks like despite a note a month ago that it was the last refill, he just made the appointment today for 2/27/2023.  If he does not keep that appointment there will be no further refills.    PDMP reviewed- last prescription filled 1/6/2023.  This prescription is dated 2/3/2023.    PDMP Review       Value Time User    State PDMP site checked  Yes 1/30/2023  8:54 AM Yue Ruvalcaba MD Amy C. Anderson, M.D.

## 2023-02-10 ENCOUNTER — TELEPHONE (OUTPATIENT)
Dept: FAMILY MEDICINE | Facility: CLINIC | Age: 53
End: 2023-02-10
Payer: COMMERCIAL

## 2023-02-10 NOTE — TELEPHONE ENCOUNTER
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 Letting her know if she needs anything  for his next appt   Please Contact her at [phone] - ex [phone]      Can we leave a detailed message on this number? YES    Call taken on 2/10/2023 at 1:14 PM by Tari Thomas

## 2023-02-15 ENCOUNTER — TELEPHONE (OUTPATIENT)
Dept: FAMILY MEDICINE | Facility: CLINIC | Age: 53
End: 2023-02-15
Payer: COMMERCIAL

## 2023-02-15 NOTE — TELEPHONE ENCOUNTER
General Call    Contacts       Type Contact Phone/Fax    02/15/2023 02:39 PM CST Phone (Outgoing) Angelo Winters (Self) [phone] (M)        Reason for Call:  Called pt to call us back. Received forms from [facility] care management needing clinical information to fulfill care coordination and or case management responsilbities.Pt needs to make an appointment to go over paperwork and fill it out.     .Faina LEO      .Faina Ruvalcaba PSC

## 2023-02-27 ENCOUNTER — OFFICE VISIT (OUTPATIENT)
Dept: FAMILY MEDICINE | Facility: CLINIC | Age: 53
End: 2023-02-27
Payer: COMMERCIAL

## 2023-02-27 ENCOUNTER — ANCILLARY PROCEDURE (OUTPATIENT)
Dept: GENERAL RADIOLOGY | Facility: CLINIC | Age: 53
End: 2023-02-27
Attending: FAMILY MEDICINE
Payer: COMMERCIAL

## 2023-02-27 VITALS
OXYGEN SATURATION: 98 % | SYSTOLIC BLOOD PRESSURE: 128 MMHG | RESPIRATION RATE: 20 BRPM | HEART RATE: 94 BPM | BODY MASS INDEX: 31.5 KG/M2 | DIASTOLIC BLOOD PRESSURE: 70 MMHG | HEIGHT: 70 IN | WEIGHT: 220 LBS | TEMPERATURE: 98.3 F

## 2023-02-27 DIAGNOSIS — M79.641 PAIN OF RIGHT HAND: ICD-10-CM

## 2023-02-27 DIAGNOSIS — F11.90 CHRONIC, CONTINUOUS USE OF OPIOIDS: ICD-10-CM

## 2023-02-27 DIAGNOSIS — E11.59 HYPERTENSION ASSOCIATED WITH TYPE 2 DIABETES MELLITUS (H): ICD-10-CM

## 2023-02-27 DIAGNOSIS — E11.9 TYPE 2 DIABETES MELLITUS WITHOUT COMPLICATION, WITHOUT LONG-TERM CURRENT USE OF INSULIN (H): ICD-10-CM

## 2023-02-27 DIAGNOSIS — I15.2 HYPERTENSION ASSOCIATED WITH TYPE 2 DIABETES MELLITUS (H): ICD-10-CM

## 2023-02-27 DIAGNOSIS — I25.10 CORONARY ARTERY DISEASE INVOLVING NATIVE CORONARY ARTERY OF NATIVE HEART WITHOUT ANGINA PECTORIS: ICD-10-CM

## 2023-02-27 DIAGNOSIS — M16.10 HIP ARTHRITIS: Primary | ICD-10-CM

## 2023-02-27 DIAGNOSIS — I10 HYPERTENSION, GOAL BELOW 140/90: ICD-10-CM

## 2023-02-27 LAB
CREAT UR-MCNC: 208.8 MG/DL
MICROALBUMIN UR-MCNC: <12 MG/L
MICROALBUMIN/CREAT UR: NORMAL MG/G{CREAT}

## 2023-02-27 PROCEDURE — 82570 ASSAY OF URINE CREATININE: CPT | Performed by: FAMILY MEDICINE

## 2023-02-27 PROCEDURE — 82043 UR ALBUMIN QUANTITATIVE: CPT | Performed by: FAMILY MEDICINE

## 2023-02-27 PROCEDURE — 99214 OFFICE O/P EST MOD 30 MIN: CPT | Performed by: FAMILY MEDICINE

## 2023-02-27 PROCEDURE — 80306 DRUG TEST PRSMV INSTRMNT: CPT | Performed by: FAMILY MEDICINE

## 2023-02-27 PROCEDURE — 73130 X-RAY EXAM OF HAND: CPT | Mod: TC | Performed by: RADIOLOGY

## 2023-02-27 RX ORDER — METOPROLOL TARTRATE 25 MG/1
25 TABLET, FILM COATED ORAL 2 TIMES DAILY
Qty: 180 TABLET | Refills: 3 | Status: SHIPPED | OUTPATIENT
Start: 2023-02-27 | End: 2024-03-20

## 2023-02-27 RX ORDER — ISOSORBIDE MONONITRATE 30 MG/1
30 TABLET, EXTENDED RELEASE ORAL DAILY
Qty: 90 TABLET | Refills: 1 | Status: SHIPPED | OUTPATIENT
Start: 2023-02-27 | End: 2024-03-21

## 2023-02-27 RX ORDER — OXYCODONE HYDROCHLORIDE 5 MG/1
5 TABLET ORAL EVERY 6 HOURS PRN
Qty: 90 TABLET | Refills: 0 | Status: SHIPPED | OUTPATIENT
Start: 2023-03-03 | End: 2023-03-30

## 2023-02-27 RX ORDER — ATORVASTATIN CALCIUM 80 MG/1
80 TABLET, FILM COATED ORAL DAILY
Qty: 90 TABLET | Refills: 2 | Status: SHIPPED | OUTPATIENT
Start: 2023-02-27 | End: 2023-09-21

## 2023-02-27 RX ORDER — LISINOPRIL 2.5 MG/1
2.5 TABLET ORAL DAILY
Qty: 90 TABLET | Refills: 1 | Status: SHIPPED | OUTPATIENT
Start: 2023-02-27 | End: 2023-10-13

## 2023-02-27 ASSESSMENT — ANXIETY QUESTIONNAIRES
GAD7 TOTAL SCORE: 0
1. FEELING NERVOUS, ANXIOUS, OR ON EDGE: NOT AT ALL
2. NOT BEING ABLE TO STOP OR CONTROL WORRYING: NOT AT ALL
5. BEING SO RESTLESS THAT IT IS HARD TO SIT STILL: NOT AT ALL
7. FEELING AFRAID AS IF SOMETHING AWFUL MIGHT HAPPEN: NOT AT ALL
6. BECOMING EASILY ANNOYED OR IRRITABLE: NOT AT ALL
3. WORRYING TOO MUCH ABOUT DIFFERENT THINGS: NOT AT ALL
GAD7 TOTAL SCORE: 0

## 2023-02-27 ASSESSMENT — PATIENT HEALTH QUESTIONNAIRE - PHQ9
SUM OF ALL RESPONSES TO PHQ QUESTIONS 1-9: 4
5. POOR APPETITE OR OVEREATING: NOT AT ALL

## 2023-02-27 ASSESSMENT — PAIN SCALES - GENERAL: PAINLEVEL: WORST PAIN (10)

## 2023-02-27 NOTE — PATIENT INSTRUCTIONS
"You are due for a diabetic eye exam.  Please schedule that and have results sent to me.    Looks like you need an updated dental exam/cleaning before surgery    Schedule to see cardiology [phone]      Thank you for choosing [facility].      When you are out of refills or the refills say \"zero\", it is time to schedule your next appointment in clinic!    Labs are released to you almost immediately and sometimes before I have had a chance to review them.  I review labs regularly and once they are all in, you will be sent a letter with your results and/or if you are signed up for on-line services, you will be e-mailed the results with my interpretation. If there are serious findings, you typically will be called.    If you have any questions about your visit, your symptoms, your medication, your test results or it is not clear what your diagnosis or treatment plan is please contact me (via Cloud[employer]) or call the care team at [phone] option #2      Our Clinic hours are:  Mondays    7:20 am - 7 pm  Tues -  Fri  7:20 am - 5 pm      The clinic lab opens at 7:30 am Mon - Fri and appointments are required.    [facility]  Ph. [phone]  Monday-Thursday 8 am - 7pm  Tues/Wed/Fri 8 am - 5:30 pm       "

## 2023-02-27 NOTE — PROGRESS NOTES
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   Anticipate surgeon to take over prescribing after surgery and that use will be discontinued shortly after hip replacement.  - oxyCODONE (ROXICODONE) 5 MG tablet; Take 1 tablet (5 mg) by mouth every 6 hours as needed for pain Max 90 in 30 days.  - Drug Abuse Screen Panel 13, Urine (Pain Care Package) - lab collect; Future  - Drug Abuse Screen Panel 13, Urine (Pain Care Package) - lab collect    Hypertension associated with type 2 diabetes mellitus (H)  Well controlled    Hypertension, goal below 140/90  Well controlled  - lisinopril (ZESTRIL) 2.5 MG tablet; Take 1 tablet (2.5 mg) by mouth daily    Coronary artery disease involving native coronary artery of native heart without angina pectoris  Recommend follow up with cardiology    - atorvastatin (LIPITOR) 80 MG tablet; Take 1 tablet (80 mg) by mouth daily  - metoprolol tartrate (LOPRESSOR) 25 MG tablet; Take 1 tablet (25 mg) by mouth 2 times daily  - Adult Cardiology Eval  Referral; Future    Type 2 diabetes mellitus without complication, without long-term current use of insulin (H)  Well controlled  Seeing endocrinology  - Albumin Random Urine Quantitative with Creat Ratio             BMI:   Estimated body mass index is 31.85 kg/m  as calculated from the following:    Height as of this encounter: 1.77 m (5' 9.69\").    Weight as of this encounter: 99.8 kg (220 lb).           Return in about 3 months (around 5/27/2023) for pain medication recheck.    Yue Ruvalcaba MD  [facility]    Russ Stephens is a 52 year old, presenting for the following health issues:  Hip Pain    - Paperwork for [facility]    Pt's orthopedic care was changed from [facility] to [facility] due to insurance reasons.  He       - CSA " pended    History of Present Illness       Reason for visit:  Hip and meds and paper work    He eats 0-1 servings of fruits and vegetables daily.He consumes 1 sweetened beverage(s) daily.He exercises with enough effort to increase his heart rate 9 or less minutes per day.  He exercises with enough effort to increase his heart rate 3 or less days per week.   He is taking medications regularly.       - Swelling of right hand x1 month. No injury.    Pain History:  When did you first notice your pain? - Chronic Pain   Have you seen this provider for your pain in the past?   Yes   Where in your body do you have pain? Left hip  Are you seeing anyone else for your pain? No    PHQ-9 SCORE 9/8/2022 2/27/2023   PHQ-9 Total Score 18 4       YENNI-7 SCORE 9/8/2022 2/27/2023   Total Score 4 0           PHQ-9 SCORE 9/8/2022 2/27/2023   PHQ-9 Total Score 18 4     YENNI-7 SCORE 9/8/2022 2/27/2023   Total Score 4 0     PEG Score 9/8/2022 2/27/2023   PEG Total Score 6.33 8.33       Chronic Pain Follow Up:    Location of pain: Left hip  Analgesia/pain control:    - Recent changes:  Worsened due to recent fall    - Overall control: Comfortably manageable    - Current treatments: Oxycodone, he would like to discuss increasing dose   Adherence:     - Do you ever take more pain medicine than prescribed? No    - When did you take your last dose of pain medicine?  3AM   Adverse effects: No   PDMP Review       Value Time User    State PDMP site checked  Yes 2/27/2023  2:26 PM Yue Ruvalcaba MD        Last CSA Agreement:   CSA -- Patient Level:     [Media Unavailable] Controlled Substance Agreement - Opioid - Scan on 2/27/2023  2:20 PM       Last UDS:             Diabetes Follow-up    How often are you checking your blood sugar? Continuous glucose monitor  What time of day are you checking your blood sugars (select all that apply)?  Before and after meals  Have you had any blood sugars above 200?  No  Have you had any blood sugars below 70?   No    Last HgbA1c was 6.6% 12/2022      Hyperlipidemia Follow-Up      Are you regularly taking any medication or supplement to lower your cholesterol?   Yes- atorvastatin    Are you having muscle aches or other side effects that you think could be caused by your cholesterol lowering medication?  No    Hypertension Follow-up      Do you check your blood pressure regularly outside of the clinic? No     Are you following a low salt diet? Yes    Are your blood pressures ever more than 140 on the top number (systolic) OR more   than 90 on the bottom number (diastolic), for example 140/90? Yes    BP Readings from Last 2 Encounters:   02/27/23 128/70   12/15/22 113/72     Hemoglobin A1C (%)   Date Value   12/15/2022 6.6 (H)   09/26/2022 6.8 (H)   05/18/2021 11.6 (H)   12/29/2017 12.0 (H)     LDL Cholesterol Calculated (mg/dL)   Date Value   12/15/2022 86   09/26/2022 96   05/18/2021 195 (H)   02/02/2015 90       Vascular Disease Follow-up      How often do you take nitroglycerin? Never    Do you take an aspirin every day? Yes     Patient had 4V CABG 6/2021 - I see no cardiology follow up since then   He is asymptomatic     Current Outpatient Medications   Medication Instructions     alcohol swab prep pads Use to swab area of injection/jaun as directed.     aspirin (ASA) 81 mg, Oral, DAILY     atorvastatin (LIPITOR) 80 mg, Oral, DAILY     clobetasol (TEMOVATE) 0.05 % external cream Topical, 2 TIMES DAILY     Continuous Blood Gluc Sensor (FREESTYLE MARY 14 DAY SENSOR) MISC CHANGE SENSOR EVERY 14 DAYS, **SCHEDULE APPOINTMENT TO ESTABLISH CARE WITH NEW PROVIDER**     HUMALOG KWIKPEN 100 UNIT/ML soln INJECT 10 TO 18 UNITS UNDER THE SKIN 3 TIMES DAILY BEFORE MEALS.  MAX DOSE PER 24 HOURS IS 50 UNITS     insulin glargine U-300 (TOUJEO SOLOSTAR) 300 UNIT/ML (1 units dial) pen Inject 63 units every morning and adjust according to instruction.  Max TDD 80 units.     isosorbide mononitrate (IMDUR) 30 mg, Oral, DAILY     lisinopril  "(ZESTRIL) 2.5 mg, Oral, DAILY     metoprolol tartrate (LOPRESSOR) 25 mg, Oral, 2 TIMES DAILY     nitroGLYcerin (NITROSTAT) 0.4 MG sublingual tablet For chest pain place 1 tablet under the tongue every 5 minutes for 3 doses. If symptoms persist 5 minutes after 1st dose call 911.     [START ON 3/3/2023] oxyCODONE (ROXICODONE) 5 mg, Oral, EVERY 6 HOURS PRN, Max 90 in 30 days.     semaglutide (OZEMPIC, 0.25 OR 0.5 MG/DOSE,) 2 MG/1.5ML SOPN pen Inject 0.25mg once weekly for 4 weeks then increase to 0.5mg weekly.     Past Surgical History:   Procedure Laterality Date     CL AFF SURGICAL PATHOLOGY  01/01/2005    ganglion cyst removed     CORONARY ARTERY BYPASS  06/16/2021    4 Vessel CABG - at [facility]     CV HEART CATHETERIZATION WITH POSSIBLE INTERVENTION N/A 05/18/2021    Procedure: Heart Catheterization with Possible Intervention;  Surgeon: Andrew Madrid MD;  Location:  HEART CARDIAC CATH LAB     CV LEFT HEART CATH N/A 05/18/2021    Procedure: Left Heart Cath;  Surgeon: Andrew Madrid MD;  Location:  HEART CARDIAC CATH LAB     EXCISE MASS LOWER EXTREMITY  02/15/2013    Procedure: EXCISE MASS LOWER EXTREMITY;  Right Ankle Excision of ganglion cyst;  Surgeon: Akbar Melo DPM;  Location: [facility] OR     HERNIA REPAIR  01/01/2000    umbilical repair     HERNIORRHAPHY UMBILICAL N/A 11/10/2015    Procedure: HERNIORRHAPHY UMBILICAL;  Surgeon: Garry Leos MD;  Location: [facility] OR     Past Medical History:   Diagnosis Date     DM (diabetes mellitus) (H)      Epilepsy (H)     Intractable epilepsy, last seizure 2008     MEDICAL HISTORY OF - Age 16     Gangrene in foot stepped on a nail hospitalized for 7 weeks     Nicotine dependence      Unstable angina (H) 5/18/2021         Review of Systems   Constitutional, HEENT, cardiovascular, pulmonary, gi and gu systems are negative, except as otherwise noted.      Objective    /70   Pulse 94   Temp 98.3  F (36.8  C) (Tympanic)   Resp 20   Ht 1.77 m (5' 9.69\")   Wt " 99.8 kg (220 lb)   SpO2 98%   BMI 31.85 kg/m    Body mass index is 31.85 kg/m .  Physical Exam   GENERAL: healthy, alert and no distress  NECK: no adenopathy, no asymmetry, masses, or scars and thyroid normal to palpation  RESP: lungs clear to auscultation - no rales, rhonchi or wheezes  CV: regular rate and rhythm, normal S1 S2, no S3 or S4, no murmur, click or rub, no peripheral edema and peripheral pulses strong  ABDOMEN: soft, nontender, no hepatosplenomegaly, no masses and bowel sounds normal  MS: right hand: tender to palpation right 4th MCP joint.  Unable to make full fist due to lack of full flexion    Xray - Reviewed and interpreted by me.  Arthritis of 4th MCP joint, no acute fracture.

## 2023-02-27 NOTE — LETTER
Opioid / Opioid Plus Controlled Substance Agreement    This is an agreement between you and your provider about the safe and appropriate use of controlled substance/opioids prescribed by your care team. Controlled substances are medicines that can cause physical and mental dependence (abuse).    There are strict laws about having and using these medicines. We here at [facility] are committing to working with you in your efforts to get better. To support you in this work, we ll help you schedule regular office appointments for medicine refills. If we must cancel or change your appointment for any reason, we ll make sure you have enough medicine to last until your next appointment.     As a Provider, I will:    Listen carefully to your concerns and treat you with respect.     Recommend a treatment plan that I believe is in your best interest. This plan may involve therapies other than opioid pain medication.     Talk with you often about the possible benefits, and the risk of harm of any medicine that we prescribe for you.     Provide a plan on how to taper (discontinue or go off) using this medicine if the decision is made to stop its use.    As a Patient, I understand that opioid(s):     Are a controlled substance prescribed by my care team to help me function or work and manage my condition(s).     Are strong medicines and can cause serious side effects such as:    Drowsiness, which can seriously affect my driving ability    A lower breathing rate, enough to cause death    Harm to my thinking ability     Depression     Abuse of and addiction to this medicine    Need to be taken exactly as prescribed. Combining opioids with certain medicines or chemicals (such as illegal drugs, sedatives, sleeping pills, and benzodiazepines) can be dangerous or even fatal. If I stop opioids suddenly, I may have severe withdrawal symptoms.    Do not work for all types of pain nor for all patients. If they re not helpful, I may  be asked to stop them.        The risks, benefits and side effects of these medicine(s) were explained to me. I agree that:  1. I will take part in other treatments as advised by my care team. This may be psychiatry or counseling, physical therapy, behavioral therapy, group treatment or a referral to a specialist.     2. I will keep all my appointments. I understand that this is part of the monitoring of opioids. My care team may require an office visit for EVERY opioid/controlled substance refill. If I miss appointments or don t follow instructions, my care team may stop my medicine.    3. I will take my medicines as prescribed. I will not change the dose or schedule unless my care team tells me to. There will be no refills if I run out early.     4. I may be asked to come to the clinic and complete a urine drug test or complete a pill count at any time. If I don t give a urine sample or participate in a pill count, the care team may stop my medicine.    5. I will only receive prescriptions from this clinic for chronic pain. If I am treated by another provider for acute pain issues, I will tell them that I am taking opioid pain medication for chronic pain and that I have a treatment agreement with this provider. I will inform my [facility] care team within one business day if I am given a prescription for any pain medication by another healthcare provider. My [facility] care team can contact other providers and pharmacists about my use of any medicines.    6. It is up to me to make sure that I don t run out of my medicines on weekends or holidays. If my care team is willing to refill my opioid prescription without a visit, I must request refills only during office hours. Refills may take up to 3 business days to process. I will use one pharmacy to fill all my opioid and other controlled substance prescriptions. I will notify the clinic about any changes to my insurance or medication  availability.    7. I am responsible for my prescriptions. If the medicine/prescription is lost, stolen or destroyed, it will not be replaced. I also agree not to share controlled substance medicines with anyone.    8. I am aware I should not use any illegal or recreational drugs. I agree not to drink alcohol unless my care team says I can.       9. If I enroll in the [place] Medical Cannabis program, I will tell my care team prior to my next refill.     10. I will tell my care team right away if I become pregnant, have a new medical problem treated outside of my regular clinic, or have a change in my medications.    11. I understand that this medicine can affect my thinking, judgment and reaction time. Alcohol and drugs affect the brain and body, which can affect the safety of my driving. Being under the influence of alcohol or drugs can affect my decision-making, behaviors, personal safety, and the safety of others. Driving while impaired (DWI) can occur if a person is driving, operating, or in physical control of a car, motorcycle, boat, snowmobile, ATV, motorbike, off-road vehicle, or any other motor vehicle ([place] Statute 169A.20). I understand the risk if I choose to drive or operate any vehicle or machinery.    I understand that if I do not follow any of the conditions above, my prescriptions or treatment may be stopped or changed.          Opioids  What You Need to Know    What are opioids?   Opioids are pain medicines that must be prescribed by a doctor. They are also known as narcotics.     Examples are:   1. morphine (MS Contin, Anias)  2. oxycodone (Oxycontin)  3. oxycodone and acetaminophen (Percocet)  4. hydrocodone and acetaminophen (Vicodin, Norco)   5. fentanyl patch (Duragesic)   6. hydromorphone (Dilaudid)   7. methadone  8. codeine (Tylenol #3)     What do opioids do well?   Opioids are best for severe short-term pain such as after a surgery or injury. They may work well for cancer pain. They may  help some people with long-lasting (chronic) pain.     What do opioids NOT do well?   Opioids never get rid of pain entirely, and they don t work well for most patients with chronic pain. Opioids don t reduce swelling, one of the causes of pain.                                    Other ways to manage chronic pain and improve function include:       Treat the health problem that may be causing pain    Anti-inflammation medicines, which reduce swelling and tenderness, such as ibuprofen (Advil, Motrin) or naproxen (Aleve)    Acetaminophen (Tylenol)    Antidepressants and anti-seizure medicines, especially for nerve pain    Topical treatments such as patches or creams    Injections or nerve blocks    Chiropractic or osteopathic treatment    Acupuncture, massage, deep breathing, meditation, visual imagery, aromatherapy    Use heat or ice at the pain site    Physical therapy     Exercise    Stop smoking    Take part in therapy       Risks and side effects     Talk to your doctor before you start or decide to keep taking opioids. Possible side effects include:      Lowering your breathing rate enough to cause death    Overdose, including death, especially if taking higher than prescribed doses    Worse depression symptoms; less pleasure in things you usually enjoy    Feeling tired or sluggish    Slower thoughts or cloudy thinking    Being more sensitive to pain over time; pain is harder to control    Trouble sleeping or restless sleep    Changes in hormone levels (for example, less testosterone)    Changes in sex drive or ability to have sex    Constipation    Unsafe driving    Itching and sweating    Dizziness    Nausea, throwing up and dry mouth    What else should I know about opioids?    Opioids may lead to dependence, tolerance, or addiction.      Dependence means that if you stop or reduce the medicine too quickly, you will have withdrawal symptoms. These include loose poop (diarrhea), jitters, flu-like symptoms,  nervousness and tremors. Dependence is not the same as addiction.                       Tolerance means needing higher doses over time to get the same effect. This may increase the chance of serious side effects.      Addiction is when people improperly use a substance that harms their body, their mind or their relations with others. Use of opiates can cause a relapse of addiction if you have a history of drug or alcohol abuse.      People who have used opioids for a long time may have a lower quality of life, worse depression, higher levels of pain and more visits to doctors.    You can overdose on opioids. Take these steps to lower your risk of overdose:    1. Recognize the signs:  Signs of overdose include decrease or loss of consciousness (blackout), slowed breathing, trouble waking up and blue lips. If someone is worried about overdose, they should call 911.    2. Talk to your doctor about Narcan (naloxone).   If you are at risk for overdose, you may be given a prescription for Narcan. This medicine very quickly reverses the effects of opioids.   If you overdose, a friend or family member can give you Narcan while waiting for the ambulance. They need to know the signs of overdose and how to give Narcan.     3. Don't use alcohol or street drugs.   Taking them with opioids can cause death.    4. Do not take any of these medicines unless your doctor says it s OK. Taking these with opioids can cause death:    Benzodiazepines, such as lorazepam (Ativan), alprazolam (Xanax) or diazepam (Valium)    Muscle relaxers, such as cyclobenzaprine (Flexeril)    Sleeping pills like zolpidem (Ambien)     Other opioids      How to keep you and other people safe while taking opioids:    1. Never share your opioids with others.  Opioid medicines are regulated by the Drug Enforcement Agency ([employer]). Selling or sharing medications is a criminal act.    2. Be sure to store opioids in a secure place, locked up if possible. Young children  can easily swallow them and overdose.    3. When you are traveling with your medicines, keep them in the original bottles. If you use a pill box, be sure you also carry a copy of your medicine list from your clinic or pharmacy.    4. Safe disposal of opioids    Most pharmacies have places to get rid of medicine, called disposal kiosks. Medicine disposal options are also available in every [facility]. Search your county and  medication disposal  to find more options. You can find more details at:  [email or web address]     I agree that my provider, clinic care team, and pharmacy may work with any city, state or federal law enforcement agency that investigates the misuse, sale, or other diversion of my controlled medicine. I will allow my provider to discuss my care with, or share a copy of, this agreement with any other treating provider, pharmacy or emergency room where I receive care.    I have read this agreement and have asked questions about anything I did not understand.    _______________________________________________________  Patient Signature - Houston Winters _____________________                   Date     _______________________________________________________  Provider Signature - Yue Rvualcaba MD   _____________________                   Date     _______________________________________________________  Witness Signature (required if provider not present while patient signing)   _____________________                   Date

## 2023-02-28 LAB
AMPHETAMINES UR QL: NOT DETECTED
BARBITURATES UR QL SCN: NOT DETECTED
BENZODIAZ UR QL SCN: NOT DETECTED
BUPRENORPHINE UR QL: NOT DETECTED
CANNABINOIDS UR QL: NOT DETECTED
COCAINE UR QL SCN: NOT DETECTED
D-METHAMPHET UR QL: NOT DETECTED
METHADONE UR QL SCN: NOT DETECTED
OPIATES UR QL SCN: NOT DETECTED
OXYCODONE UR QL SCN: NOT DETECTED
PCP UR QL SCN: NOT DETECTED
PROPOXYPH UR QL: NOT DETECTED
TRICYCLICS UR QL SCN: NOT DETECTED

## 2023-03-01 ENCOUNTER — APPOINTMENT (OUTPATIENT)
Dept: GENERAL RADIOLOGY | Facility: CLINIC | Age: 53
End: 2023-03-01
Attending: EMERGENCY MEDICINE
Payer: COMMERCIAL

## 2023-03-01 ENCOUNTER — HOSPITAL ENCOUNTER (EMERGENCY)
Facility: CLINIC | Age: 53
Discharge: HOME | End: 2023-03-01
Attending: EMERGENCY MEDICINE | Admitting: EMERGENCY MEDICINE
Payer: COMMERCIAL

## 2023-03-01 VITALS
HEIGHT: 69 IN | WEIGHT: 220 LBS | SYSTOLIC BLOOD PRESSURE: 154 MMHG | RESPIRATION RATE: 18 BRPM | BODY MASS INDEX: 32.58 KG/M2 | TEMPERATURE: 98.6 F | OXYGEN SATURATION: 97 % | DIASTOLIC BLOOD PRESSURE: 75 MMHG | HEART RATE: 74 BPM

## 2023-03-01 DIAGNOSIS — M25.551 HIP PAIN, RIGHT: ICD-10-CM

## 2023-03-01 PROCEDURE — 96374 THER/PROPH/DIAG INJ IV PUSH: CPT | Performed by: EMERGENCY MEDICINE

## 2023-03-01 PROCEDURE — 96376 TX/PRO/DX INJ SAME DRUG ADON: CPT | Performed by: EMERGENCY MEDICINE

## 2023-03-01 PROCEDURE — 250N000011 HC RX IP 250 OP 636: Performed by: EMERGENCY MEDICINE

## 2023-03-01 PROCEDURE — 99284 EMERGENCY DEPT VISIT MOD MDM: CPT | Performed by: EMERGENCY MEDICINE

## 2023-03-01 PROCEDURE — 96375 TX/PRO/DX INJ NEW DRUG ADDON: CPT | Performed by: EMERGENCY MEDICINE

## 2023-03-01 PROCEDURE — 73502 X-RAY EXAM HIP UNI 2-3 VIEWS: CPT

## 2023-03-01 PROCEDURE — 99285 EMERGENCY DEPT VISIT HI MDM: CPT | Mod: 25 | Performed by: EMERGENCY MEDICINE

## 2023-03-01 PROCEDURE — 72100 X-RAY EXAM L-S SPINE 2/3 VWS: CPT

## 2023-03-01 RX ORDER — ONDANSETRON 2 MG/ML
4 INJECTION INTRAMUSCULAR; INTRAVENOUS EVERY 30 MIN PRN
Status: DISCONTINUED | OUTPATIENT
Start: 2023-03-01 | End: 2023-03-01 | Stop reason: HOSPADM

## 2023-03-01 RX ORDER — OXYCODONE AND ACETAMINOPHEN 5; 325 MG/1; MG/1
1 TABLET ORAL EVERY 6 HOURS PRN
Qty: 6 TABLET | Refills: 0 | Status: SHIPPED | OUTPATIENT
Start: 2023-03-01 | End: 2023-03-30

## 2023-03-01 RX ORDER — MORPHINE SULFATE 4 MG/ML
4 INJECTION, SOLUTION INTRAMUSCULAR; INTRAVENOUS
Status: DISCONTINUED | OUTPATIENT
Start: 2023-03-01 | End: 2023-03-01 | Stop reason: HOSPADM

## 2023-03-01 RX ORDER — ONDANSETRON 2 MG/ML
4 INJECTION INTRAMUSCULAR; INTRAVENOUS ONCE
Status: COMPLETED | OUTPATIENT
Start: 2023-03-01 | End: 2023-03-01

## 2023-03-01 RX ORDER — MORPHINE SULFATE 4 MG/ML
4 INJECTION, SOLUTION INTRAMUSCULAR; INTRAVENOUS ONCE
Status: COMPLETED | OUTPATIENT
Start: 2023-03-01 | End: 2023-03-01

## 2023-03-01 RX ADMIN — ONDANSETRON 4 MG: 2 INJECTION INTRAMUSCULAR; INTRAVENOUS at 18:13

## 2023-03-01 RX ADMIN — ONDANSETRON 4 MG: 2 INJECTION INTRAMUSCULAR; INTRAVENOUS at 16:22

## 2023-03-01 RX ADMIN — MORPHINE SULFATE 4 MG: 4 INJECTION, SOLUTION INTRAMUSCULAR; INTRAVENOUS at 18:14

## 2023-03-01 RX ADMIN — MORPHINE SULFATE 4 MG: 4 INJECTION, SOLUTION INTRAMUSCULAR; INTRAVENOUS at 16:21

## 2023-03-01 ASSESSMENT — ACTIVITIES OF DAILY LIVING (ADL)
ADLS_ACUITY_SCORE: 35
ADLS_ACUITY_SCORE: 35

## 2023-03-01 ASSESSMENT — ENCOUNTER SYMPTOMS
NUMBNESS: 0
BACK PAIN: 1

## 2023-03-01 NOTE — ED NOTES
"Pt is in the process of trying to get in for R hip replacement at [place]. Today, around 1:15pm, pt slipped down stairs, landing on lower back. Pt report R lower back pain and states that hip is locked. Pt is able to slightly rotate R leg, but ROM/mobility extremely limited. Pt also reports having heard a \"pop\" when he moved his leg while waiting in triage.   R pedal pulse present, and CMS intact.   "

## 2023-03-01 NOTE — ED PROVIDER NOTES
History     Chief Complaint   Patient presents with     Fall     HPI  Houston Winters is a 52 year old male who presents with right hip pain.  The patient fell down the steps when he slipped.  This was around 3 hours ago.  He landed on his right back and his right hip.  He is not having right pain in right hip.  He says his hip block.  This happens frequently.  He is scheduled to get a total hip replacement done at the [facility] and is arranging that at this time.  He already has a left hip replacement.  He says he felt a pop when he moved his leg.  Sensation in that leg is intact. Has hx of DM and CABG. alignment    Allergies:  Allergies   Allergen Reactions     Metformin Other (See Comments)     Very low blood sugars, Blood sugar issue        Problem List:    Patient Active Problem List    Diagnosis Date Noted     Chronic, continuous use of opioids 02/27/2023     Priority: Medium     Patient is followed by Yue Ruvalcaba MD for ongoing prescription of pain medication.  All refills should only be approved by this provider, or covering partner.    Medication(s): oxycodone 5 mg  Maximum quantity per month: 90  Clinic visit frequency required: Q 3 months   PDMP Review       Value Time User    State PDMP site checked  Yes 1/30/2023  8:54 AM Yue Ruvalcaba MD        Controlled substance agreement:  CSA -- Patient Level:    CSA: None found at the patient level.     CSA 2/27/2023      Pain Clinic evaluation in the past: No    Opioid Risk Tool Total Score(s):  No flowsheet data found.  Last [facility] website verification:  done on 2/27/2023   [email or web address]         Hyperlipidemia due to type 2 diabetes mellitus (H) 12/15/2022     Priority: Medium     Hypertension associated with type 2 diabetes mellitus (H) 12/15/2022     Priority: Medium     Coronary artery disease involving coronary bypass graft of native heart without angina pectoris 09/26/2022     Priority: Medium     4 V CABG at [place]  6/16/021       Postoperative infection, initial encounter 01/28/2018     Priority: Medium     Small bowel obstruction (H) 12/29/2017     Priority: Medium     Type 2 diabetes mellitus without complication, without long-term current use of insulin (H) 03/27/2017     Priority: Medium     Tobacco use disorder 02/04/2015     Priority: Medium     Hypertension, goal below 140/90 02/04/2015     Priority: Medium     Obesity 07/17/2014     Priority: Medium     Hyperlipidemia with target LDL less than 100 07/17/2014     Priority: Medium     Diagnosis updated by automated process. Provider to review and confirm.       Partial epilepsy (H) 03/12/2009     Priority: Medium     (Problem list name updated by automated process. Provider to review and confirm.)       ED (erectile dysfunction) 12/18/2008     Priority: Medium     GANGLOIN CYST /RIGHT ANKLE 06/15/2006     Priority: Medium        Past Medical History:    Past Medical History:   Diagnosis Date     DM (diabetes mellitus) (H)      Epilepsy (H)      MEDICAL HISTORY OF - Age 16      Nicotine dependence      Unstable angina (H) 5/18/2021       Past Surgical History:    Past Surgical History:   Procedure Laterality Date     CL AFF SURGICAL PATHOLOGY  01/01/2005    ganglion cyst removed     CORONARY ARTERY BYPASS  06/16/2021    4 Vessel CABG - at [place]     CV HEART CATHETERIZATION WITH POSSIBLE INTERVENTION N/A 05/18/2021    Procedure: Heart Catheterization with Possible Intervention;  Surgeon: Andrew Madrid MD;  Location:  HEART CARDIAC CATH LAB     CV LEFT HEART CATH N/A 05/18/2021    Procedure: Left Heart Cath;  Surgeon: Andrew Madrid MD;  Location:  HEART CARDIAC CATH LAB     EXCISE MASS LOWER EXTREMITY  02/15/2013    Procedure: EXCISE MASS LOWER EXTREMITY;  Right Ankle Excision of ganglion cyst;  Surgeon: Akbar Melo DPM;  Location: [facility] OR     HERNIA REPAIR  01/01/2000    umbilical repair     HERNIORRHAPHY UMBILICAL N/A 11/10/2015    Procedure:  HERNIORRHAPHY UMBILICAL;  Surgeon: Garry Leos MD;  Location: [facility] OR       Family History:    Family History   Problem Relation Age of Onset     Arthritis Mother      Cancer Maternal Grandmother      Arthritis Maternal Grandmother         lupus     Gastrointestinal Disease Maternal Grandfather         war injury, colostomy     Respiratory Daughter         growing out of it     Unknown/Adopted Father      Diabetes No family hx of      Coronary Artery Disease No family hx of      Hypertension No family hx of      Hyperlipidemia No family hx of      Breast Cancer No family hx of      Cancer - colorectal No family hx of      Ovarian Cancer No family hx of      Prostate Cancer No family hx of        Social History:  Marital Status:   [2]  Social History     Tobacco Use     Smoking status: Former     Packs/day: 0.25     Years: 25.00     Pack years: 6.25     Types: Cigarettes     Quit date: 2016     Years since quittin.1     Smokeless tobacco: Former   Vaping Use     Vaping Use: Never used   Substance Use Topics     Alcohol use: Yes     Alcohol/week: 0.0 standard drinks     Comment: mixed 2-4 on weekends, not every weekend     Drug use: No        Medications:    oxyCODONE-acetaminophen (PERCOCET) 5-325 MG tablet  alcohol swab prep pads  aspirin (ASA) 81 MG chewable tablet  atorvastatin (LIPITOR) 80 MG tablet  clobetasol (TEMOVATE) 0.05 % external cream  Continuous Blood Gluc Sensor (Ticket ABCSTYLE MARY 14 DAY SENSOR) MISC  HUMALOG KWIKPEN 100 UNIT/ML soln  insulin glargine U-300 (TOUJEO SOLOSTAR) 300 UNIT/ML (1 units dial) pen  isosorbide mononitrate (IMDUR) 30 MG 24 hr tablet  lisinopril (ZESTRIL) 2.5 MG tablet  metoprolol tartrate (LOPRESSOR) 25 MG tablet  nitroGLYcerin (NITROSTAT) 0.4 MG sublingual tablet  [START ON 3/3/2023] oxyCODONE (ROXICODONE) 5 MG tablet  semaglutide (OZEMPIC, 0.25 OR 0.5 MG/DOSE,) 2 MG/1.5ML SOPN pen          Review of Systems   Musculoskeletal: Positive for back pain.        Hip  "pain    Neurological: Negative for numbness.   All other systems reviewed and are negative.      Physical Exam   BP: (!) 148/78  Pulse: 102  Temp: 98.6  F (37  C)  Resp: 18  Height: 175.3 cm (5' 9\")  Weight: 99.8 kg (220 lb)  SpO2: 97 %      Physical Exam  Vitals reviewed.   Constitutional:       General: He is not in acute distress.  HENT:      Head: Normocephalic and atraumatic.      Right Ear: External ear normal.      Left Ear: External ear normal.   Eyes:      General:         Right eye: No discharge.         Left eye: No discharge.   Abdominal:      General: There is no distension.   Musculoskeletal:      Comments: Right lower back with tenderness to palpation to the right side of midline.  No midline tenderness or step-offs.  No deformity.  Palpable muscle knot.    Right hip tender and with pain with movement.  No obvious deformity.    arms without pain with passive range of motion.   Skin:     Capillary Refill: Capillary refill takes less than 2 seconds.      Coloration: Skin is not jaundiced.   Neurological:      General: No focal deficit present.      Mental Status: He is alert and oriented to person, place, and time.   Psychiatric:      Comments: Very nice         ED Course              ED Course as of 03/02/23 0153   Wed Mar 01, 2023   1757 XR pelvis without e/o hip fracture. I reviewed and agree with radiology.    1758 Lumbar xray without fracture. I reviewed and agree.    1851 I talked with the patient.  He is feeling better.  He says he still has some pain but it feels more tight than \"broken\".  He would like to go home if he can.  I told him that if he still having pain I would prefer to get a CT scan to look closer for fracture in his hip but he does not want to do this.  In my judgment he has capacity.  I Merna give him pain medicine for home and discharged him.  He is very appreciative and feeling much better overall.  He thinks he can bear weight and I will trial ambulation here "     Procedures                Results for orders placed or performed during the hospital encounter of 03/01/23 (from the past 24 hour(s))   XR Pelvis w Hip Right 1 View    Narrative    EXAM: XR PELVIS AND HIP RIGHT 1 VIEW  LOCATION: [facility]  DATE/TIME: 3/1/2023 5:19 PM    INDICATION: Right hip pain.  COMPARISON: None.      Impression    IMPRESSION: Right hip and pelvis negative for fracture or bone lesion. Moderate degenerative arthrosis in the right hip, causing partial joint space narrowing and marginal osteophytes. Uncomplicated left total hip arthroplasty. Normal joint alignment.   Components appear well seated as visualized. Mild heterotopic ossification along the superolateral aspect of the hip joint. The remainder of the visualized pelvis and lower lumbar spine is unremarkable.   Lumbar spine XR, 2-3 views    Narrative    EXAM: XR LUMBAR SPINE 2/3 VIEWS  LOCATION: [facility]  DATE/TIME: 3/1/2023 5:19 PM    INDICATION: fall, back pain  COMPARISON: None.      Impression    IMPRESSION: 5 lumbar type vertebra. Vertebral body height and alignment is preserved. Disc spaces are preserved. There is straightening of usual lumbar curvature. No radiographic evidence for acute fracture or subluxation. Left hip endoprosthesis.       Medications   morphine (PF) injection 4 mg (4 mg Intravenous $Given 3/1/23 1621)   ondansetron (ZOFRAN) injection 4 mg (4 mg Intravenous $Given 3/1/23 1622)       Assessments & Plan (with Medical Decision Making)     Possible hip fracture.  Will get x-ray as starting imaging modality.  If this is negative, I will get a CT of the patient's hip given his history.  I am going to give him morphine for pain control.  We will follow-up imaging.  We will also get x-rays of his L-spine though I have low suspicion that this is is a fracture.    I have reviewed the nursing notes.    I have reviewed the findings, diagnosis, plan and need for  follow up with the patient.        Medical Decision Making  The patient's presentation was of moderate complexity (an acute complicated injury).    The patient's evaluation involved:  an assessment requiring an independent historian (wife)  ordering and/or review of 1 test(s) in this encounter (see separate area of note for details)     The patient's management necessitated moderate risk (prescription drug management including medications given in the ED).    Medical care was highly complex because of the risk of death or disability that necessitated emergent evaluation. The patient arrived when the patient's primary doctor or clinic was unavailable to provide care due to severe, acute symptoms that the patient felt precluded evaluation at the urgent care, clinic, or primary doctor's office tomorrow or later today.       Medical care in this case was highly complex due to concern for morbid process such as hip fracture, hip dislocation, spine fracture, or spinal cord injury. There was clinical concern for death, permanent debility, long term morbidity, acute threat to life or bodily function, acute decompensation, and/or increased pain without emergent evaluation and treatment.           Discharge Medication List as of 3/1/2023  7:06 PM      START taking these medications    Details   oxyCODONE-acetaminophen (PERCOCET) 5-325 MG tablet Take 1 tablet by mouth every 6 hours as needed for severe pain (7-10), Disp-6 tablet, R-0, [facility]             Final diagnoses:   Hip pain, right       3/1/2023   [facility] EMERGENCY DEPT     Otf Cardona MD  03/02/23 0153

## 2023-03-01 NOTE — ED TRIAGE NOTES
"Pt fell down interior flight of stairs at home.  States his \"hip locked up\".  C/o R hip pain. Pt is wtg to have RTHR surgery.      Triage Assessment     Row Name 03/01/23 7937       Triage Assessment (Adult)    Airway WDL WDL       Cognitive/Neuro/Behavioral WDL    Cognitive/Neuro/Behavioral WDL WDL              "

## 2023-03-02 NOTE — DISCHARGE INSTRUCTIONS
You were seen today for hip pain after a fall.  The x-ray here does not show fracture.  This does not absolutely rule out a fracture and I would prefer to get a CT scan.  However, you are feeling better and would like to go home, so I am going to give you some pain medicine for home.  Do not mix it with alcohol, driving, or other sedating medication.  Do not mix it with Tylenol either.    Please follow-up with the [facility] as planned.    Thanks for your patience, and I hope you feel better soon.    It was nice to meet you.

## 2023-03-17 ENCOUNTER — OFFICE VISIT (OUTPATIENT)
Dept: ENDOCRINOLOGY | Facility: CLINIC | Age: 53
End: 2023-03-17
Payer: COMMERCIAL

## 2023-03-17 VITALS — SYSTOLIC BLOOD PRESSURE: 106 MMHG | DIASTOLIC BLOOD PRESSURE: 71 MMHG | OXYGEN SATURATION: 99 % | HEART RATE: 77 BPM

## 2023-03-17 DIAGNOSIS — E11.9 TYPE 2 DIABETES MELLITUS WITHOUT COMPLICATION, WITHOUT LONG-TERM CURRENT USE OF INSULIN (H): ICD-10-CM

## 2023-03-17 DIAGNOSIS — E11.649 TYPE 2 DIABETES MELLITUS WITH HYPOGLYCEMIA WITHOUT COMA, WITH LONG-TERM CURRENT USE OF INSULIN (H): Primary | ICD-10-CM

## 2023-03-17 DIAGNOSIS — E78.5 HYPERLIPIDEMIA DUE TO TYPE 2 DIABETES MELLITUS (H): ICD-10-CM

## 2023-03-17 DIAGNOSIS — I15.2 HYPERTENSION ASSOCIATED WITH TYPE 2 DIABETES MELLITUS (H): ICD-10-CM

## 2023-03-17 DIAGNOSIS — E11.59 HYPERTENSION ASSOCIATED WITH TYPE 2 DIABETES MELLITUS (H): ICD-10-CM

## 2023-03-17 DIAGNOSIS — E11.69 HYPERLIPIDEMIA DUE TO TYPE 2 DIABETES MELLITUS (H): ICD-10-CM

## 2023-03-17 DIAGNOSIS — Z79.4 TYPE 2 DIABETES MELLITUS WITH HYPOGLYCEMIA WITHOUT COMA, WITH LONG-TERM CURRENT USE OF INSULIN (H): Primary | ICD-10-CM

## 2023-03-17 LAB — HBA1C MFR BLD: 6.3 %

## 2023-03-17 PROCEDURE — 83036 HEMOGLOBIN GLYCOSYLATED A1C: CPT | Performed by: INTERNAL MEDICINE

## 2023-03-17 PROCEDURE — 95251 CONT GLUC MNTR ANALYSIS I&R: CPT | Performed by: INTERNAL MEDICINE

## 2023-03-17 PROCEDURE — 36415 COLL VENOUS BLD VENIPUNCTURE: CPT | Performed by: INTERNAL MEDICINE

## 2023-03-17 PROCEDURE — 99214 OFFICE O/P EST MOD 30 MIN: CPT | Mod: 25 | Performed by: INTERNAL MEDICINE

## 2023-03-17 RX ORDER — MULTIPLE VITAMINS W/ MINERALS TAB 9MG-400MCG
1 TAB ORAL DAILY
COMMUNITY

## 2023-03-17 RX ORDER — INSULIN LISPRO 100 [IU]/ML
INJECTION, SOLUTION INTRAVENOUS; SUBCUTANEOUS
Qty: 45 ML | Refills: 4 | Status: CANCELLED | OUTPATIENT
Start: 2023-03-17

## 2023-03-17 RX ORDER — BLOOD-GLUCOSE SENSOR
1 EACH MISCELLANEOUS
Qty: 2 EACH | Refills: 5 | Status: SHIPPED | OUTPATIENT
Start: 2023-03-17 | End: 2023-09-21

## 2023-03-17 RX ORDER — INSULIN GLARGINE 300 U/ML
INJECTION, SOLUTION SUBCUTANEOUS
Qty: 18 ML | Refills: 5 | Status: SHIPPED | OUTPATIENT
Start: 2023-03-17 | End: 2023-06-30

## 2023-03-17 RX ORDER — EZETIMIBE 10 MG/1
10 TABLET ORAL DAILY
Qty: 90 TABLET | Refills: 3 | Status: CANCELLED | OUTPATIENT
Start: 2023-03-17

## 2023-03-17 RX ORDER — FLASH GLUCOSE SENSOR
KIT MISCELLANEOUS
Qty: 6 EACH | Refills: 2 | Status: CANCELLED | OUTPATIENT
Start: 2023-03-17

## 2023-03-17 NOTE — LETTER
3/17/2023         RE: [place] Winters  [id number] [facility] [address]        Dear Colleague,    Thank you for referring your patient, Houston Winters, to the [facility] ENDOCRINOLOGY. Please see a copy of my visit note below.    Endocrine Consult note    Attending Assessment/Plan :        Type 2 diabetes mellitus without complication, without long-term current use of insulin (H)  Type 2 diabetes mellitus with hypoglycemia without coma, with long-term current use of insulin (H)  Hypertension associated with type 2 diabetes mellitus (H)  Hyperlipidemia due to type 2 diabetes mellitus (H)    Assessment:  -Doing well from reported blood sugars and last a1c  -no overnight hypos, but is having some morning hypos if he skips breakfast  -tolerating ozempic very well   -goal LDL given DM and CAD would be <70 per recent ADA guidelines.  On max dose atorvastatin. Last LDL 96 so could consider ezetimibe and then PCSK9i if unable to get to goal, but will defer to cardiology per pt preference  -would benefit from addition of jardiance, pt willing to do so but would like to check with cardiology first at [facility]    Plan:  -continue Toujeo U-300 - decrease dose from 63 to 58 empirically when increasing ozempic and continue self titration plan of 2 units q2-3 days to maintain -130 without fasting hypos. Decrease by 2 units for any episode of hypo immediately.   -increase ozempic to 1mg weekly  -switch to either freestyle mike 2 or 3 (whichever is covered by insurance - script for both sent to pharmacy)  -would recommend jardiance 10mg for further cardiac protection if cardiology agrees.  If starting jardiance, decrease toujeo dose by 10% when starting  -continue as needed humalog  -check a1c today, will also order for 1 week before followup   -continue atorvastatin and lisinopril at current doses    I have independently reviewed and interpreted labs, imaging as indicated.    RTC 6  months    Chief complaint:  Houston is a 52 year old male seen in consultation for type 2 diabetes      HISTORY OF PRESENT ILLNESS    Angelo comes to clinic for followup on t2dm.     Dx ~0608-1435.  Has had CAD now s/p bypass.  Doing well.     Current diabetes regimen:  Toujeo - 63 units daily  Humalog - occasional as needed (dose requirement decreased dramatically after starting ozempic)  Ozempic 0.5mg weekly    No side effects from ozempic.  Gets some lows in the morning hours on occasion if he skips breakfast.  No overnight hypos.      Using freestyle mike consistently.     Other meds:  HLD:  Atorvastatin 80    HTN:  imdur 30  Lisinopril 2.5  Metoprolol tartrate 25    CGM download and interpretation (CPT 23436):                My interpretation: extremely well controlled with almost all times being at goal range.  Occasional pattern of morning hypoglycemia (around breakfast time, not early morning while sleeping).      Endocrine relevant labs and/or imaging are as follows:  Component      Latest Ref Rng & Units 12/15/2022 2/27/2023   Cholesterol      <200 mg/dL 144    Triglycerides      <150 mg/dL 66    HDL Cholesterol      >=40 mg/dL 45    LDL Cholesterol Calculated      <=100 mg/dL 86    Non HDL Cholesterol      <130 mg/dL 99    Albumin Urine mg/L      mg/L <12.0 <12.0   Albumin Urine mg/g Cr           Creatinine Urine      mg/dL 148.5 208.8   Hemoglobin A1C      0.0 - 5.6 % 6.6 (H)        REVIEW OF SYSTEMS  Answers for HPI/ROS submitted by the patient on 3/11/2023  General Symptoms: No  Skin Symptoms: No  HENT Symptoms: No  EYE SYMPTOMS: No  HEART SYMPTOMS: No  LUNG SYMPTOMS: No  INTESTINAL SYMPTOMS: No  URINARY SYMPTOMS: No  REPRODUCTIVE SYMPTOMS: No  SKELETAL SYMPTOMS: No  BLOOD SYMPTOMS: No  NERVOUS SYSTEM SYMPTOMS: No  MENTAL HEALTH SYMPTOMS: No        10 system ROS otherwise as per the HPI or negative    Past Medical History  Past Medical History:   Diagnosis Date     DM (diabetes mellitus) (H)       Epilepsy (H)     Intractable epilepsy, last seizure 2008     MEDICAL HISTORY OF - Age 16     Gangrene in foot stepped on a nail hospitalized for 7 weeks     Nicotine dependence      Unstable angina (H) 5/18/2021       Medications  Current Outpatient Medications   Medication Sig Dispense Refill     alcohol swab prep pads Use to swab area of injection/jaun as directed. 100 each 3     aspirin (ASA) 81 MG chewable tablet Take 1 tablet (81 mg) by mouth daily 14 tablet 0     atorvastatin (LIPITOR) 80 MG tablet Take 1 tablet (80 mg) by mouth daily 90 tablet 2     clobetasol (TEMOVATE) 0.05 % external cream Apply topically 2 times daily 60 g 1     Continuous Blood Gluc Sensor (FREESTYLE MARY 14 DAY SENSOR) MISC CHANGE SENSOR EVERY 14 DAYS, **SCHEDULE APPOINTMENT TO ESTABLISH CARE WITH NEW PROVIDER** 6 each 1     HUMALOG KWIKPEN 100 UNIT/ML soln INJECT 10 TO 18 UNITS UNDER THE SKIN 3 TIMES DAILY BEFORE MEALS.  MAX DOSE PER 24 HOURS IS 50 UNITS 48 mL 1     insulin glargine U-300 (TOUJEO SOLOSTAR) 300 UNIT/ML (1 units dial) pen Inject 63 units every morning and adjust according to instruction.  Max TDD 80 units. 6 mL 5     isosorbide mononitrate (IMDUR) 30 MG 24 hr tablet Take 1 tablet (30 mg) by mouth daily 90 tablet 1     lisinopril (ZESTRIL) 2.5 MG tablet Take 1 tablet (2.5 mg) by mouth daily 90 tablet 1     metoprolol tartrate (LOPRESSOR) 25 MG tablet Take 1 tablet (25 mg) by mouth 2 times daily 180 tablet 3     nitroGLYcerin (NITROSTAT) 0.4 MG sublingual tablet For chest pain place 1 tablet under the tongue every 5 minutes for 3 doses. If symptoms persist 5 minutes after 1st dose call 911. 20 tablet 0     oxyCODONE (ROXICODONE) 5 MG tablet Take 1 tablet (5 mg) by mouth every 6 hours as needed for pain Max 90 in 30 days. 90 tablet 0     oxyCODONE-acetaminophen (PERCOCET) 5-325 MG tablet Take 1 tablet by mouth every 6 hours as needed for severe pain (7-10) 6 tablet 0     semaglutide (OZEMPIC, 0.25 OR 0.5 MG/DOSE,) 2  MG/1.5ML SOPN pen Inject 0.25mg once weekly for 4 weeks then increase to 0.5mg weekly. 4.5 mL 3       Allergies  Allergies   Allergen Reactions     Metformin Other (See Comments)     Very low blood sugars, Blood sugar issue          Family History  family history includes Arthritis in his maternal grandmother and mother; Cancer in his maternal grandmother; Gastrointestinal Disease in his maternal grandfather; Respiratory in his daughter; Unknown/Adopted in his father.    Social History  Social History     Tobacco Use     Smoking status: Former     Packs/day: 0.25     Years: 25.00     Pack years: 6.25     Types: Cigarettes     Quit date: 2016     Years since quittin.1     Smokeless tobacco: Former   Vaping Use     Vaping Use: Never used   Substance Use Topics     Alcohol use: Yes     Alcohol/week: 0.0 standard drinks     Comment: mixed 2-4 on weekends, not every weekend     Drug use: No       Physical Exam  /71 (BP Location: Left arm, Patient Position: Left side, Cuff Size: Adult Large)   Pulse 77   SpO2 99%   There is no height or weight on file to calculate BMI.    Physical Exam    GENERAL :  In no apparent distress  SKIN: Normal color, normal temperature     EYES: EOMI, No scleral icterus  NECK: No visible masses  RESP: No respiratory distress, normal effort  CARDIO: regular rate  ABDOMEN: flat, nondistended       NEURO: awake, alert, responds appropriately to questions   EXTREMITIES: No clubbing, cyanosis or edema.    I spent a total of 35 minutes on the day of this established patient visit on chart review, lab review, coordinating care, reviewing previous providers notes, exam, counseling of patient and This time was independent of time spent on download and interpretation of CGM data (CPT 63572)      Again, thank you for allowing me to participate in the care of your patient.        Sincerely,        Jhonatan Grier MD

## 2023-03-17 NOTE — NURSING NOTE
"Initial /71 (BP Location: Left arm, Patient Position: Left side, Cuff Size: Adult Large)   Pulse 77   SpO2 99%  Estimated body mass index is 32.49 kg/m  as calculated from the following:    Height as of 3/1/23: 1.753 m (5' 9\").    Weight as of 3/1/23: 99.8 kg (220 lb). .    Linda Ruvalcaba LPN on 3/17/2023 at 7:53 AM    "

## 2023-03-17 NOTE — PROGRESS NOTES
Endocrine Consult note    Attending Assessment/Plan :        Type 2 diabetes mellitus without complication, without long-term current use of insulin (H)  Type 2 diabetes mellitus with hypoglycemia without coma, with long-term current use of insulin (H)  Hypertension associated with type 2 diabetes mellitus (H)  Hyperlipidemia due to type 2 diabetes mellitus (H)    Assessment:  -Doing well from reported blood sugars and last a1c  -no overnight hypos, but is having some morning hypos if he skips breakfast  -tolerating ozempic very well   -goal LDL given DM and CAD would be <70 per recent ADA guidelines.  On max dose atorvastatin. Last LDL 96 so could consider ezetimibe and then PCSK9i if unable to get to goal, but will defer to cardiology per pt preference  -would benefit from addition of jardiance, pt willing to do so but would like to check with cardiology first at [facility]    Plan:  -continue Toujeo U-300 - decrease dose from 63 to 58 empirically when increasing ozempic and continue self titration plan of 2 units q2-3 days to maintain -130 without fasting hypos. Decrease by 2 units for any episode of hypo immediately.   -increase ozempic to 1mg weekly  -switch to either freestyle mike 2 or 3 (whichever is covered by insurance - script for both sent to pharmacy)  -would recommend jardiance 10mg for further cardiac protection if cardiology agrees.  If starting jardiance, decrease toujeo dose by 10% when starting  -continue as needed humalog  -check a1c today, will also order for 1 week before followup   -continue atorvastatin and lisinopril at current doses    I have independently reviewed and interpreted labs, imaging as indicated.    RTC 6 months    Chief complaint:  Houston is a 52 year old male seen in consultation for type 2 diabetes      HISTORY OF PRESENT ILLNESS    Angelo comes to clinic for followup on t2dm.     Dx ~2998-1387.  Has had CAD now s/p bypass.  Doing well.     Current diabetes  regimen:  Toujeo - 63 units daily  Humalog - occasional as needed (dose requirement decreased dramatically after starting ozempic)  Ozempic 0.5mg weekly    No side effects from ozempic.  Gets some lows in the morning hours on occasion if he skips breakfast.  No overnight hypos.      Using freestyle mike consistently.     Other meds:  HLD:  Atorvastatin 80    HTN:  imdur 30  Lisinopril 2.5  Metoprolol tartrate 25    CGM download and interpretation (CPT 33495):                My interpretation: extremely well controlled with almost all times being at goal range.  Occasional pattern of morning hypoglycemia (around breakfast time, not early morning while sleeping).      Endocrine relevant labs and/or imaging are as follows:  Component      Latest Ref Rng & Units 12/15/2022 2/27/2023   Cholesterol      <200 mg/dL 144    Triglycerides      <150 mg/dL 66    HDL Cholesterol      >=40 mg/dL 45    LDL Cholesterol Calculated      <=100 mg/dL 86    Non HDL Cholesterol      <130 mg/dL 99    Albumin Urine mg/L      mg/L <12.0 <12.0   Albumin Urine mg/g Cr           Creatinine Urine      mg/dL 148.5 208.8   Hemoglobin A1C      0.0 - 5.6 % 6.6 (H)        REVIEW OF SYSTEMS  Answers for HPI/ROS submitted by the patient on 3/11/2023  General Symptoms: No  Skin Symptoms: No  HENT Symptoms: No  EYE SYMPTOMS: No  HEART SYMPTOMS: No  LUNG SYMPTOMS: No  INTESTINAL SYMPTOMS: No  URINARY SYMPTOMS: No  REPRODUCTIVE SYMPTOMS: No  SKELETAL SYMPTOMS: No  BLOOD SYMPTOMS: No  NERVOUS SYSTEM SYMPTOMS: No  MENTAL HEALTH SYMPTOMS: No        10 system ROS otherwise as per the HPI or negative    Past Medical History  Past Medical History:   Diagnosis Date     DM (diabetes mellitus) (H)      Epilepsy (H)     Intractable epilepsy, last seizure 2008     MEDICAL HISTORY OF - Age 16     Gangrene in foot stepped on a nail hospitalized for 7 weeks     Nicotine dependence      Unstable angina (H) 5/18/2021       Medications  Current Outpatient Medications    Medication Sig Dispense Refill     alcohol swab prep pads Use to swab area of injection/jaun as directed. 100 each 3     aspirin (ASA) 81 MG chewable tablet Take 1 tablet (81 mg) by mouth daily 14 tablet 0     atorvastatin (LIPITOR) 80 MG tablet Take 1 tablet (80 mg) by mouth daily 90 tablet 2     clobetasol (TEMOVATE) 0.05 % external cream Apply topically 2 times daily 60 g 1     Continuous Blood Gluc Sensor (FREESTYLE MARY 14 DAY SENSOR) [facility] SENSOR EVERY 14 DAYS, **SCHEDULE APPOINTMENT TO ESTABLISH CARE WITH NEW PROVIDER** 6 each 1     HUMALOG KWIKPEN 100 UNIT/ML soln INJECT 10 TO 18 UNITS UNDER THE SKIN 3 TIMES DAILY BEFORE MEALS.  MAX DOSE PER 24 HOURS IS 50 UNITS 48 mL 1     insulin glargine U-300 (TOUJEO SOLOSTAR) 300 UNIT/ML (1 units dial) pen Inject 63 units every morning and adjust according to instruction.  Max TDD 80 units. 6 mL 5     isosorbide mononitrate (IMDUR) 30 MG 24 hr tablet Take 1 tablet (30 mg) by mouth daily 90 tablet 1     lisinopril (ZESTRIL) 2.5 MG tablet Take 1 tablet (2.5 mg) by mouth daily 90 tablet 1     metoprolol tartrate (LOPRESSOR) 25 MG tablet Take 1 tablet (25 mg) by mouth 2 times daily 180 tablet 3     nitroGLYcerin (NITROSTAT) 0.4 MG sublingual tablet For chest pain place 1 tablet under the tongue every 5 minutes for 3 doses. If symptoms persist 5 minutes after 1st dose call 911. 20 tablet 0     oxyCODONE (ROXICODONE) 5 MG tablet Take 1 tablet (5 mg) by mouth every 6 hours as needed for pain Max 90 in 30 days. 90 tablet 0     oxyCODONE-acetaminophen (PERCOCET) 5-325 MG tablet Take 1 tablet by mouth every 6 hours as needed for severe pain (7-10) 6 tablet 0     semaglutide (OZEMPIC, 0.25 OR 0.5 MG/DOSE,) 2 MG/1.5ML SOPN pen Inject 0.25mg once weekly for 4 weeks then increase to 0.5mg weekly. 4.5 mL 3       Allergies  Allergies   Allergen Reactions     Metformin Other (See Comments)     Very low blood sugars, Blood sugar issue          Family History  family history  includes Arthritis in his maternal grandmother and mother; Cancer in his maternal grandmother; Gastrointestinal Disease in his maternal grandfather; Respiratory in his daughter; Unknown/Adopted in his father.    Social History  Social History     Tobacco Use     Smoking status: Former     Packs/day: 0.25     Years: 25.00     Pack years: 6.25     Types: Cigarettes     Quit date: 2016     Years since quittin.1     Smokeless tobacco: Former   Vaping Use     Vaping Use: Never used   Substance Use Topics     Alcohol use: Yes     Alcohol/week: 0.0 standard drinks     Comment: mixed 2-4 on weekends, not every weekend     Drug use: No       Physical Exam  /71 (BP Location: Left arm, Patient Position: Left side, Cuff Size: Adult Large)   Pulse 77   SpO2 99%   There is no height or weight on file to calculate BMI.    Physical Exam    GENERAL :  In no apparent distress  SKIN: Normal color, normal temperature     EYES: EOMI, No scleral icterus  NECK: No visible masses  RESP: No respiratory distress, normal effort  CARDIO: regular rate  ABDOMEN: flat, nondistended       NEURO: awake, alert, responds appropriately to questions   EXTREMITIES: No clubbing, cyanosis or edema.    I spent a total of 35 minutes on the day of this established patient visit on chart review, lab review, coordinating care, reviewing previous providers notes, exam, counseling of patient and This time was independent of time spent on download and interpretation of CGM data (CPT 15869)

## 2023-03-17 NOTE — PATIENT INSTRUCTIONS
Increase ozempic to 1mg weekly.  Decrease toujeo by 5 units when increasing ozempic.  Decrease by 2 units for any fasting hypoglycemia.      either freestyle mike 3 or 2, whichever is covered by insurance.

## 2023-03-21 ENCOUNTER — TELEPHONE (OUTPATIENT)
Dept: FAMILY MEDICINE | Facility: CLINIC | Age: 53
End: 2023-03-21
Payer: COMMERCIAL

## 2023-03-30 ENCOUNTER — MYC REFILL (OUTPATIENT)
Dept: FAMILY MEDICINE | Facility: CLINIC | Age: 53
End: 2023-03-30
Payer: COMMERCIAL

## 2023-03-30 DIAGNOSIS — F11.90 CHRONIC, CONTINUOUS USE OF OPIOIDS: ICD-10-CM

## 2023-03-30 RX ORDER — OXYCODONE HYDROCHLORIDE 5 MG/1
5 TABLET ORAL EVERY 6 HOURS PRN
Qty: 90 TABLET | Refills: 0 | Status: SHIPPED | OUTPATIENT
Start: 2023-03-31 | End: 2023-04-26

## 2023-04-09 ENCOUNTER — HEALTH MAINTENANCE LETTER (OUTPATIENT)
Age: 53
End: 2023-04-09

## 2023-04-26 ENCOUNTER — MYC REFILL (OUTPATIENT)
Dept: FAMILY MEDICINE | Facility: CLINIC | Age: 53
End: 2023-04-26
Payer: COMMERCIAL

## 2023-04-26 DIAGNOSIS — F11.90 CHRONIC, CONTINUOUS USE OF OPIOIDS: ICD-10-CM

## 2023-04-27 RX ORDER — INSULIN GLARGINE 300 U/ML
INJECTION, SOLUTION SUBCUTANEOUS
Qty: 12 ML | Refills: 0 | OUTPATIENT
Start: 2023-04-27

## 2023-04-27 RX ORDER — OXYCODONE HYDROCHLORIDE 5 MG/1
5 TABLET ORAL EVERY 6 HOURS PRN
Qty: 90 TABLET | Refills: 0 | Status: SHIPPED | OUTPATIENT
Start: 2023-04-27 | End: 2023-05-24

## 2023-05-24 ENCOUNTER — MYC REFILL (OUTPATIENT)
Dept: FAMILY MEDICINE | Facility: CLINIC | Age: 53
End: 2023-05-24
Payer: COMMERCIAL

## 2023-05-24 DIAGNOSIS — F11.90 CHRONIC, CONTINUOUS USE OF OPIOIDS: ICD-10-CM

## 2023-05-25 RX ORDER — OXYCODONE HYDROCHLORIDE 5 MG/1
5 TABLET ORAL EVERY 6 HOURS PRN
Qty: 90 TABLET | Refills: 0 | Status: SHIPPED | OUTPATIENT
Start: 2023-05-25 | End: 2023-06-26

## 2023-05-25 NOTE — TELEPHONE ENCOUNTER
PDMP Review       Value Time User    State PDMP site checked  Yes 5/25/2023  7:17 AM Yue Ruvalcaba MD

## 2023-05-31 ENCOUNTER — OFFICE VISIT (OUTPATIENT)
Dept: FAMILY MEDICINE | Facility: CLINIC | Age: 53
End: 2023-05-31
Payer: COMMERCIAL

## 2023-05-31 ENCOUNTER — LAB (OUTPATIENT)
Dept: LAB | Facility: CLINIC | Age: 53
End: 2023-05-31
Payer: COMMERCIAL

## 2023-05-31 VITALS
BODY MASS INDEX: 30.96 KG/M2 | OXYGEN SATURATION: 96 % | RESPIRATION RATE: 20 BRPM | HEART RATE: 81 BPM | SYSTOLIC BLOOD PRESSURE: 112 MMHG | WEIGHT: 209 LBS | DIASTOLIC BLOOD PRESSURE: 66 MMHG | HEIGHT: 69 IN | TEMPERATURE: 98.4 F

## 2023-05-31 DIAGNOSIS — Z01.818 PREOP GENERAL PHYSICAL EXAM: Primary | ICD-10-CM

## 2023-05-31 DIAGNOSIS — E11.649 TYPE 2 DIABETES MELLITUS WITH HYPOGLYCEMIA WITHOUT COMA, WITH LONG-TERM CURRENT USE OF INSULIN (H): ICD-10-CM

## 2023-05-31 DIAGNOSIS — I15.2 HYPERTENSION ASSOCIATED WITH TYPE 2 DIABETES MELLITUS (H): ICD-10-CM

## 2023-05-31 DIAGNOSIS — E11.59 HYPERTENSION ASSOCIATED WITH TYPE 2 DIABETES MELLITUS (H): ICD-10-CM

## 2023-05-31 DIAGNOSIS — M16.11 ARTHRITIS OF RIGHT HIP: ICD-10-CM

## 2023-05-31 DIAGNOSIS — I25.810 CORONARY ARTERY DISEASE INVOLVING CORONARY BYPASS GRAFT OF NATIVE HEART WITHOUT ANGINA PECTORIS: ICD-10-CM

## 2023-05-31 DIAGNOSIS — Z79.4 TYPE 2 DIABETES MELLITUS WITH HYPOGLYCEMIA WITHOUT COMA, WITH LONG-TERM CURRENT USE OF INSULIN (H): ICD-10-CM

## 2023-05-31 DIAGNOSIS — Z01.818 PREOP GENERAL PHYSICAL EXAM: ICD-10-CM

## 2023-05-31 PROBLEM — E11.69 HYPERLIPIDEMIA DUE TO TYPE 2 DIABETES MELLITUS (H): Status: RESOLVED | Noted: 2022-12-15 | Resolved: 2023-05-31

## 2023-05-31 PROBLEM — E78.5 HYPERLIPIDEMIA DUE TO TYPE 2 DIABETES MELLITUS (H): Status: RESOLVED | Noted: 2022-12-15 | Resolved: 2023-05-31

## 2023-05-31 LAB
ALBUMIN SERPL BCG-MCNC: 4.5 G/DL (ref 3.5–5.2)
ALP SERPL-CCNC: 73 U/L (ref 40–129)
ALT SERPL W P-5'-P-CCNC: 30 U/L (ref 10–50)
ANION GAP SERPL CALCULATED.3IONS-SCNC: 11 MMOL/L (ref 7–15)
AST SERPL W P-5'-P-CCNC: 22 U/L (ref 10–50)
BASOPHILS # BLD AUTO: 0 10E3/UL (ref 0–0.2)
BASOPHILS NFR BLD AUTO: 0 %
BILIRUB SERPL-MCNC: 0.5 MG/DL
BUN SERPL-MCNC: 11.4 MG/DL (ref 6–20)
CALCIUM SERPL-MCNC: 9.1 MG/DL (ref 8.6–10)
CHLORIDE SERPL-SCNC: 105 MMOL/L (ref 98–107)
CREAT SERPL-MCNC: 0.81 MG/DL (ref 0.67–1.17)
DEPRECATED HCO3 PLAS-SCNC: 26 MMOL/L (ref 22–29)
EOSINOPHIL # BLD AUTO: 0.1 10E3/UL (ref 0–0.7)
EOSINOPHIL NFR BLD AUTO: 1 %
ERYTHROCYTE [DISTWIDTH] IN BLOOD BY AUTOMATED COUNT: 13.6 % (ref 10–15)
GFR SERPL CREATININE-BSD FRML MDRD: >90 ML/MIN/1.73M2
GLUCOSE SERPL-MCNC: 100 MG/DL (ref 70–99)
HCT VFR BLD AUTO: 41.4 % (ref 40–53)
HGB BLD-MCNC: 13.1 G/DL (ref 13.3–17.7)
IMM GRANULOCYTES # BLD: 0 10E3/UL
IMM GRANULOCYTES NFR BLD: 0 %
LYMPHOCYTES # BLD AUTO: 2.1 10E3/UL (ref 0.8–5.3)
LYMPHOCYTES NFR BLD AUTO: 29 %
MCH RBC QN AUTO: 30.4 PG (ref 26.5–33)
MCHC RBC AUTO-ENTMCNC: 31.6 G/DL (ref 31.5–36.5)
MCV RBC AUTO: 96 FL (ref 78–100)
MONOCYTES # BLD AUTO: 0.6 10E3/UL (ref 0–1.3)
MONOCYTES NFR BLD AUTO: 8 %
NEUTROPHILS # BLD AUTO: 4.4 10E3/UL (ref 1.6–8.3)
NEUTROPHILS NFR BLD AUTO: 62 %
PLATELET # BLD AUTO: 243 10E3/UL (ref 150–450)
POTASSIUM SERPL-SCNC: 4.8 MMOL/L (ref 3.4–5.3)
PROT SERPL-MCNC: 6.8 G/DL (ref 6.4–8.3)
RBC # BLD AUTO: 4.31 10E6/UL (ref 4.4–5.9)
SODIUM SERPL-SCNC: 142 MMOL/L (ref 136–145)
WBC # BLD AUTO: 7.2 10E3/UL (ref 4–11)

## 2023-05-31 PROCEDURE — 99214 OFFICE O/P EST MOD 30 MIN: CPT | Performed by: FAMILY MEDICINE

## 2023-05-31 PROCEDURE — 85025 COMPLETE CBC W/AUTO DIFF WBC: CPT

## 2023-05-31 PROCEDURE — 93000 ELECTROCARDIOGRAM COMPLETE: CPT | Performed by: FAMILY MEDICINE

## 2023-05-31 PROCEDURE — 36415 COLL VENOUS BLD VENIPUNCTURE: CPT

## 2023-05-31 PROCEDURE — 80053 COMPREHEN METABOLIC PANEL: CPT

## 2023-05-31 ASSESSMENT — ANXIETY QUESTIONNAIRES
GAD7 TOTAL SCORE: 3
6. BECOMING EASILY ANNOYED OR IRRITABLE: NOT AT ALL
GAD7 TOTAL SCORE: 3
3. WORRYING TOO MUCH ABOUT DIFFERENT THINGS: NOT AT ALL
8. IF YOU CHECKED OFF ANY PROBLEMS, HOW DIFFICULT HAVE THESE MADE IT FOR YOU TO DO YOUR WORK, TAKE CARE OF THINGS AT HOME, OR GET ALONG WITH OTHER PEOPLE?: NOT DIFFICULT AT ALL
2. NOT BEING ABLE TO STOP OR CONTROL WORRYING: NOT AT ALL
4. TROUBLE RELAXING: NEARLY EVERY DAY
IF YOU CHECKED OFF ANY PROBLEMS ON THIS QUESTIONNAIRE, HOW DIFFICULT HAVE THESE PROBLEMS MADE IT FOR YOU TO DO YOUR WORK, TAKE CARE OF THINGS AT HOME, OR GET ALONG WITH OTHER PEOPLE: NOT DIFFICULT AT ALL
1. FEELING NERVOUS, ANXIOUS, OR ON EDGE: NOT AT ALL
5. BEING SO RESTLESS THAT IT IS HARD TO SIT STILL: NOT AT ALL
7. FEELING AFRAID AS IF SOMETHING AWFUL MIGHT HAPPEN: NOT AT ALL
7. FEELING AFRAID AS IF SOMETHING AWFUL MIGHT HAPPEN: NOT AT ALL

## 2023-05-31 ASSESSMENT — PAIN SCALES - GENERAL: PAINLEVEL: MODERATE PAIN (5)

## 2023-05-31 NOTE — RESULT ENCOUNTER NOTE
Houston,    These labs are normal or acceptable.    Please contact my office if you have questions.    Yue Ruvalcaba M.D.

## 2023-05-31 NOTE — PROGRESS NOTES
[facility]  [id number]  [facility] [address]  Phone: [phone]  Primary Provider: Yue Vallejo  Pre-op Performing Provider: YUE VALLEJO      PREOPERATIVE EVALUATION:  Today's date: 5/31/2023    Houston Winters is a 52 year old male who presents for a preoperative evaluation.    Surgical Information:  Surgery/Procedure: Right Total Hip Arthroplasty  Surgery Location: [facility]  Surgeon: TBD  Surgery Date: 6/21/2023  Time of Surgery: TBD  Where patient plans to recover: At home with family  Fax number for surgical facility: [phone]    Assessment & Plan     The proposed surgical procedure is considered INTERMEDIATE risk.    Preop general physical exam     - CBC with platelets and differential; Future  - Comprehensive metabolic panel (BMP + Alb, Alk Phos, ALT, AST, Total. Bili, TP); Future    Arthritis of right hip  Scheduled at [facility] for [facility]    Type 2 diabetes mellitus with hypoglycemia without coma, with long-term current use of insulin (H)  Well controlled  Last HgbA1c 6.3% in march.  Has a CGM and is well controlled at this point    - EKG 12-lead complete w/read - Clinics    Hypertension associated with type 2 diabetes mellitus (H)  Well controlled    Coronary artery disease involving coronary bypass graft of native heart without angina pectoris  Currently asymptomatic  4V CABG in 2021  Will be seeing cardiology at [facility] when he goes for his preoperative assessment there as well.            Risks and Recommendations:  The patient has the following additional risks and recommendations for perioperative complications:      Antiplatelet or Anticoagulation Medication Instructions:   - aspirin: Discontinue aspirin 7-10 days prior to procedure to reduce bleeding risk. It should be resumed postoperatively.     Additional Medication Instructions:  Patient is to take all scheduled medications on the day of surgery EXCEPT for  modifications listed below:   - ACE/ARB: HOLD on day of surgery (minimum 11 hours for general anesthesia).   - Beta Blockers: Continue taking on the day of surgery.   - Long acting insulin (e.g. glargine, detemir): Take 80% of the usual evening or morning dose before surgery.    - short acting insulin (e.g. regular, lispro, aspart): HOLD on the morning of surgery.    - GLP-1 Injectable (exenitide, liraglutide, semaglutide, dulaglutide, etc.): HOLD day of surgery     RECOMMENDATION:  APPROVAL GIVEN to proceed with proposed procedure, without further diagnostic evaluation.            Subjective       HPI related to upcoming procedure:  51 yo male with CAD, Type 2 diabetes (on insulin), HYPERTENSION, hyperlipidemia who is having increasing pain and disability due to right hip arthritis        5/24/2023     8:57 AM   Preop Questions   1. Have you ever had a heart attack or stroke? No   2. Have you ever had surgery on your heart or blood vessels, such as a stent placement, a coronary artery bypass, or surgery on an artery in your head, neck, heart, or legs? YES - 4V CABG 2021   3. Do you have chest pain with activity? No   4. Do you have a history of  heart failure? No   5. Do you currently have a cold, bronchitis or symptoms of other infection? No   6. Do you have a cough, shortness of breath, or wheezing? No   7. Do you or anyone in your family have previous history of blood clots? No   8. Do you or does anyone in your family have a serious bleeding problem such as prolonged bleeding following surgeries or cuts? No   9. Have you ever had problems with anemia or been told to take iron pills? No   10. Have you had any abnormal blood loss such as black, tarry or bloody stools? No   11. Have you ever had a blood transfusion? UNKNOWN -     12. Are you willing to have a blood transfusion if it is medically needed before, during, or after your surgery? Yes   13. Have you or any of your relatives ever had problems with  anesthesia? No   14. Do you have sleep apnea, excessive snoring or daytime drowsiness? No   15. Do you have any artifical heart valves or other implanted medical devices like a pacemaker, defibrillator, or continuous glucose monitor? No   16. Do you have artificial joints? YES - left total hip   17. Are you allergic to latex? No       Health Care Directive:  Patient does not have a Health Care Directive or Living Will:     Preoperative Review of :   reviewed - controlled substances reflected in medication list.      Status of Chronic Conditions:  See problem list for active medical problems.  Problems all longstanding and stable, except as noted/documented.  See ROS for pertinent symptoms related to these conditions.      Review of Systems  CONSTITUTIONAL: NEGATIVE for fever, chills, change in weight  ENT/MOUTH: NEGATIVE for ear, mouth and throat problems  RESP: NEGATIVE for significant cough or SOB  CV: NEGATIVE for chest pain, palpitations or peripheral edema    Patient Active Problem List    Diagnosis Date Noted     Type 2 diabetes mellitus with hypoglycemia without coma, with long-term current use of insulin (H) 03/17/2023     Priority: Medium     Chronic, continuous use of opioids 02/27/2023     Priority: Medium     Patient is followed by Yue Ruvalcaba MD for ongoing prescription of pain medication.  All refills should only be approved by this provider, or covering partner.    Medication(s): oxycodone 5 mg  Maximum quantity per month: 90  Clinic visit frequency required: Q 3 months   PDMP Review       Value Time User    State PDMP site checked  Yes 1/30/2023  8:54 AM Yue Ruvalcaba MD        Controlled substance agreement:  CSA -- Patient Level:    CSA: None found at the patient level.     CSA 2/27/2023      Pain Clinic evaluation in the past: No    Opioid Risk Tool Total Score(s):  No flowsheet data found.  Last [facility] website verification:  done on 2/27/2023    [email or web address]         Hypertension associated with type 2 diabetes mellitus (H) 12/15/2022     Priority: Medium     Coronary artery disease involving coronary bypass graft of native heart without angina pectoris 09/26/2022     Priority: Medium     4 V CABG at [facility] 6/16/021       Postoperative infection, initial encounter 01/28/2018     Priority: Medium     Small bowel obstruction (H) 12/29/2017     Priority: Medium     Type 2 diabetes mellitus without complication, without long-term current use of insulin (H) 03/27/2017     Priority: Medium     Hypertension, goal below 140/90 02/04/2015     Priority: Medium     Obesity 07/17/2014     Priority: Medium     Hyperlipidemia with target LDL less than 100 07/17/2014     Priority: Medium     Diagnosis updated by automated process. Provider to review and confirm.       Partial epilepsy (H) 03/12/2009     Priority: Medium     (Problem list name updated by automated process. Provider to review and confirm.)       ED (erectile dysfunction) 12/18/2008     Priority: Medium     GANGLOIN CYST /RIGHT ANKLE 06/15/2006     Priority: Medium      Past Medical History:   Diagnosis Date     DM (diabetes mellitus) (H)      Epilepsy (H)     Intractable epilepsy, last seizure 2008     Hyperlipidemia due to type 2 diabetes mellitus (H) 12/15/2022     MEDICAL HISTORY OF - Age 16     Gangrene in foot stepped on a nail hospitalized for 7 weeks     Nicotine dependence      Tobacco use disorder 2/4/2015     Unstable angina (H) 5/18/2021     Past Surgical History:   Procedure Laterality Date     CL AFF SURGICAL PATHOLOGY  01/01/2005    ganglion cyst removed     CORONARY ARTERY BYPASS  06/16/2021    4 Vessel CABG - at [facility]     CV HEART CATHETERIZATION WITH POSSIBLE INTERVENTION N/A 05/18/2021    Procedure: Heart Catheterization with Possible Intervention;  Surgeon: Andrew Madrid MD;  Location:  HEART CARDIAC CATH LAB     CV LEFT HEART CATH N/A 05/18/2021    Procedure:  Left Heart Cath;  Surgeon: Andrew Madrid MD;  Location:  HEART CARDIAC CATH LAB     EXCISE MASS LOWER EXTREMITY  02/15/2013    Procedure: EXCISE MASS LOWER EXTREMITY;  Right Ankle Excision of ganglion cyst;  Surgeon: Akbar Melo DPM;  Location: [facility] OR     HERNIA REPAIR  01/01/2000    umbilical repair     HERNIORRHAPHY UMBILICAL N/A 11/10/2015    Procedure: HERNIORRHAPHY UMBILICAL;  Surgeon: Garry Leos MD;  Location: [facility] OR     Current Outpatient Medications   Medication Sig Dispense Refill     aspirin (ASA) 81 MG chewable tablet Take 1 tablet (81 mg) by mouth daily 14 tablet 0     atorvastatin (LIPITOR) 80 MG tablet Take 1 tablet (80 mg) by mouth daily 90 tablet 2     HUMALOG KWIKPEN 100 UNIT/ML soln INJECT 10 TO 18 UNITS UNDER THE SKIN 3 TIMES DAILY BEFORE MEALS.  MAX DOSE PER 24 HOURS IS 50 UNITS 48 mL 1     insulin glargine U-300 (TOUJEO SOLOSTAR) 300 UNIT/ML (1 units dial) pen Inject 63 units every morning and adjust according to instruction.  Max TDD 80 units. 18 mL 5     isosorbide mononitrate (IMDUR) 30 MG 24 hr tablet Take 1 tablet (30 mg) by mouth daily 90 tablet 1     lisinopril (ZESTRIL) 2.5 MG tablet Take 1 tablet (2.5 mg) by mouth daily 90 tablet 1     metoprolol tartrate (LOPRESSOR) 25 MG tablet Take 1 tablet (25 mg) by mouth 2 times daily 180 tablet 3     multivitamin w/minerals (THERA-VIT-M) tablet Take 1 tablet by mouth daily       oxyCODONE (ROXICODONE) 5 MG tablet Take 1 tablet (5 mg) by mouth every 6 hours as needed for pain Max 90 in 30 days. 90 tablet 0     Semaglutide, 1 MG/DOSE, (OZEMPIC) 4 MG/3ML pen Inject 1 mg Subcutaneous every 7 days 3 mL 11     alcohol swab prep pads Use to swab area of injection/jaun as directed. 100 each 3     clobetasol (TEMOVATE) 0.05 % external cream Apply topically 2 times daily 60 g 1     Continuous Blood Gluc Sensor (FREESTYLE MARY 2 SENSOR) MISC Change every 14 days. 2 each 5     Continuous Blood Gluc Sensor (FREESTYLE MARY 3 SENSOR) MISC 1  "each every 14 days 2 each 5     nitroGLYcerin (NITROSTAT) 0.4 MG sublingual tablet For chest pain place 1 tablet under the tongue every 5 minutes for 3 doses. If symptoms persist 5 minutes after 1st dose call 911. 20 tablet 0       Allergies   Allergen Reactions     Metformin Other (See Comments)     Very low blood sugars, Blood sugar issue         Social History     Tobacco Use     Smoking status: Former     Packs/day: 0.25     Years: 25.00     Pack years: 6.25     Types: Cigarettes     Quit date: 2016     Years since quittin.3     Smokeless tobacco: Former   Vaping Use     Vaping status: Never Used   Substance Use Topics     Alcohol use: Yes     Alcohol/week: 0.0 standard drinks of alcohol     Comment: mixed 2-4 on weekends, not every weekend     Family History   Problem Relation Age of Onset     Arthritis Mother      Cancer Maternal Grandmother      Arthritis Maternal Grandmother         lupus     Gastrointestinal Disease Maternal Grandfather         war injury, colostomy     Respiratory Daughter         growing out of it     Unknown/Adopted Father      Diabetes No family hx of      Coronary Artery Disease No family hx of      Hypertension No family hx of      Hyperlipidemia No family hx of      Breast Cancer No family hx of      Cancer - colorectal No family hx of      Ovarian Cancer No family hx of      Prostate Cancer No family hx of      History   Drug Use No         Objective     /66   Pulse 81   Temp 98.4  F (36.9  C) (Tympanic)   Resp 20   Ht 1.753 m (5' 9\")   Wt 94.8 kg (209 lb)   SpO2 96%   BMI 30.86 kg/m      Physical Exam  GENERAL APPEARANCE: healthy, alert and no distress  HENT: ear canals and TM's normal and nose and mouth without ulcers or lesions  RESP: lungs clear to auscultation - no rales, rhonchi or wheezes  CV: regular rate and rhythm, normal S1 S2, no S3 or S4 and no murmur, click or rub   ABDOMEN: soft, nontender, no HSM or masses and bowel sounds normal  NEURO: Normal " strength and tone, sensory exam grossly normal, mentation intact and speech normal    Recent Labs   Lab Test 03/17/23  0840 12/15/22  0903 09/26/22  0934 08/22/22  1451 05/04/22  1647 09/02/21  1113 06/16/21  1743   HGB  --   --   --  13.7 14.3   < >  --    PLT  --   --   --  208 226   < >  --    INR  --   --   --   --   --   --  1.1   NA  --   --   --  142 140   < >  --    POTASSIUM  --   --   --  3.9 4.4   < >  --    CR  --   --   --  0.94 0.87   < >  --    A1C 6.3* 6.6*   < >  --   --    < >  --     < > = values in this interval not displayed.        Diagnostics:  Labs pending at this time.  Results will be reviewed when available.   EKG: appears normal, NSR, incomplete RBBB, normal axis, normal intervals, no acute ST/T changes c/w ischemia, unchanged from previous tracings    Revised Cardiac Risk Index (RCRI):  The patient has the following serious cardiovascular risks for perioperative complications:   - Coronary Artery Disease (MI, positive stress test, angina, Qs on EKG) = 1 point   - Diabetes Mellitus (on Insulin) = 1 point     RCRI Interpretation: 2 points: Class III (moderate risk - 6.6% complication rate)     Estimated Functional Capacity: Performs 4 METS exercise without symptoms (e.g., light housework, stairs, 4 mph walk, 7 mph bike, slow step dance)           Signed Electronically by: Yue Ruvalcaba MD  Copy of this evaluation report is provided to requesting physician.

## 2023-05-31 NOTE — PATIENT INSTRUCTIONS
For informational purposes only. Not to replace the advice of your health care provider. Copyright   2003,  [facility]. All rights reserved. Clinically reviewed by Valentina Estrada MD. [employer] [id number] - REV .  Preparing for Your Surgery  Getting started  A nurse will call you to review your health history and instructions. They will give you an arrival time based on your scheduled surgery time. Please be ready to share:  Your doctor's clinic name and phone number  Your medical, surgical, and anesthesia history  A list of allergies and sensitivities  A list of medicines, including herbal treatments and over-the-counter drugs  Whether the patient has a legal guardian (ask how to send us the papers in advance)  Please tell us if you're pregnant--or if there's any chance you might be pregnant. Some surgeries may injure a fetus (unborn baby), so they require a pregnancy test. Surgeries that are safe for a fetus don't always need a test, and you can choose whether to have one.   If you have a child who's having surgery, please ask for a copy of Preparing for Your Child's Surgery.    Preparing for surgery  Within 10 to 30 days of surgery: Have a pre-op exam (sometimes called an H&P, or History and Physical). This can be done at a clinic or pre-operative center.  If you're having a , you may not need this exam. Talk to your care team.  At your pre-op exam, talk to your care team about all medicines you take. If you need to stop any medicines before surgery, ask when to start taking them again.  We do this for your safety. Many medicines can make you bleed too much during surgery. Some change how well surgery (anesthesia) drugs work.  Call your insurance company to let them know you're having surgery. (If you don't have insurance, call [phone].)  Call your clinic if there's any change in your health. This includes signs of a cold or flu (sore throat, runny nose, cough, rash, fever). It  also includes a scrape or scratch near the surgery site.  If you have questions on the day of surgery, call your hospital or surgery center.  Eating and drinking guidelines  For your safety: Unless your surgeon tells you otherwise, follow the guidelines below.  Eat and drink as usual until 8 hours before you arrive for surgery. After that, no food or milk.  Drink clear liquids until 2 hours before you arrive. These are liquids you can see through, like water, Gatorade, and Propel Water. They also include plain black coffee and tea (no cream or milk), candy, and breath mints. You can spit out gum when you arrive.  If you drink alcohol: Stop drinking it the night before surgery.  If your care team tells you to take medicine on the morning of surgery, it's okay to take it with a sip of water.  Preventing infection  Shower or bathe the night before and morning of your surgery. Follow the instructions your clinic gave you. (If no instructions, use regular soap.)  Don't shave or clip hair near your surgery site. We'll remove the hair if needed.  Don't smoke or vape the morning of surgery. You may chew nicotine gum up to 2 hours before surgery. A nicotine patch is okay.  Note: Some surgeries require you to completely quit smoking and nicotine. Check with your surgeon.  Your care team will make every effort to keep you safe from infection. We will:  Clean our hands often with soap and water (or an alcohol-based hand rub).  Clean the skin at your surgery site with a special soap that kills germs.  Give you a special gown to keep you warm. (Cold raises the risk of infection.)  Wear special hair covers, masks, gowns and gloves during surgery.  Give antibiotic medicine, if prescribed. Not all surgeries need antibiotics.  What to bring on the day of surgery  Photo ID and insurance card  Copy of your health care directive, if you have one  Glasses and hearing aids (bring cases)  You can't wear contacts during surgery  Inhaler and  eye drops, if you use them (tell us about these when you arrive)  CPAP machine or breathing device, if you use them  A few personal items, if spending the night  If you have . . .  A pacemaker, ICD (cardiac defibrillator) or other implant: Bring the ID card.  An implanted stimulator: Bring the remote control.  A legal guardian: Bring a copy of the certified (court-stamped) guardianship papers.  Please remove any jewelry, including body piercings. Leave jewelry and other valuables at home.  If you're going home the day of surgery  You must have a responsible adult drive you home. They should stay with you overnight as well.  If you don't have someone to stay with you, and you aren't safe to go home alone, we may keep you overnight. Insurance often won't pay for this.  After surgery  If it's hard to control your pain or you need more pain medicine, please call your surgeon's office.  Questions?   If you have any questions for your care team, list them here: _________________________________________________________________________________________________________________________________________________________________________ ____________________________________ ____________________________________ ____________________________________    How to Take Your Medication Before Surgery  - HOLD (do not take) Aspirin for 7 days  - Toujeo - take 48 units the day of surgery - 80% of usual dose  No short acting insulin

## 2023-06-14 DIAGNOSIS — E11.9 TYPE 2 DIABETES MELLITUS WITHOUT COMPLICATION, WITHOUT LONG-TERM CURRENT USE OF INSULIN (H): ICD-10-CM

## 2023-06-29 ENCOUNTER — TELEPHONE (OUTPATIENT)
Dept: FAMILY MEDICINE | Facility: CLINIC | Age: 53
End: 2023-06-29
Payer: COMMERCIAL

## 2023-06-29 NOTE — TELEPHONE ENCOUNTER
[facility] pharmacist called with an FYI regarding patients oxycodone.  Patient has also been getting oxycodone every few days from [facility] since he had a procedure there. They just received another script from the [facility] provider. Patient also picked up the script sent in on 6/26/2023 from Dr. Ruvalcaba at the [facility] pharmacy.    He has also contacted [facility] to let them know about this situation. Wanting both [facility] and [facility] to communicate with each other to determine who is going to continue prescribing oxycodone Gave a reminder to check .     Norma Youssef RN on 6/29/2023 at 3:29 PM

## 2023-06-29 NOTE — TELEPHONE ENCOUNTER
Appreciate the call.    Patient had hip replacement surgery recently.    I did the last prescription I will do without a visit and patient will be weaned rather rapidly from the oxycodone from my standpoint.    Yue Ruvalcaba M.D.      PDMP Review       Value Time User    State PDMP site checked  Yes 6/29/2023  4:05 PM Yue Ruvalcaba MD

## 2023-06-30 DIAGNOSIS — E11.9 TYPE 2 DIABETES MELLITUS WITHOUT COMPLICATION, WITHOUT LONG-TERM CURRENT USE OF INSULIN (H): ICD-10-CM

## 2023-06-30 RX ORDER — INSULIN GLARGINE 300 U/ML
INJECTION, SOLUTION SUBCUTANEOUS
Qty: 18 ML | Refills: 5 | Status: SHIPPED | OUTPATIENT
Start: 2023-06-30 | End: 2023-09-21

## 2023-06-30 NOTE — TELEPHONE ENCOUNTER
insulin glargine U-300 (TOUJEO SOLOSTAR) 300 UNIT/ML (1 units dial) pen 18 mL 5 3/17/2023      Last Written Prescription Date:  3-  Last Fill Quantity: 18ML,   # refills: 5  Last Office Visit : 3-  Future Office visit:  9-

## 2023-06-30 NOTE — TELEPHONE ENCOUNTER
Last OV 3/17/23. RTC 6 months.Has f/u appointment on 9/21/23.     Refilled med per [facility] protocol.  Roxy Sutherland RN on 6/30/2023 at 1:00 PM

## 2023-07-05 ENCOUNTER — MEDICAL CORRESPONDENCE (OUTPATIENT)
Dept: HEALTH INFORMATION MANAGEMENT | Facility: CLINIC | Age: 53
End: 2023-07-05

## 2023-07-17 ENCOUNTER — OFFICE VISIT (OUTPATIENT)
Dept: FAMILY MEDICINE | Facility: CLINIC | Age: 53
End: 2023-07-17
Payer: COMMERCIAL

## 2023-07-17 VITALS
SYSTOLIC BLOOD PRESSURE: 122 MMHG | TEMPERATURE: 98.4 F | RESPIRATION RATE: 22 BRPM | DIASTOLIC BLOOD PRESSURE: 70 MMHG | BODY MASS INDEX: 29.7 KG/M2 | WEIGHT: 200.5 LBS | HEART RATE: 103 BPM | HEIGHT: 69 IN | OXYGEN SATURATION: 95 %

## 2023-07-17 DIAGNOSIS — Z96.641 S/P HIP REPLACEMENT, RIGHT: Primary | ICD-10-CM

## 2023-07-17 DIAGNOSIS — M62.838 MUSCLE SPASM: ICD-10-CM

## 2023-07-17 PROCEDURE — 99214 OFFICE O/P EST MOD 30 MIN: CPT | Performed by: FAMILY MEDICINE

## 2023-07-17 RX ORDER — TIZANIDINE 2 MG/1
2 TABLET ORAL 3 TIMES DAILY PRN
Qty: 40 TABLET | Refills: 1 | Status: SHIPPED | OUTPATIENT
Start: 2023-07-17 | End: 2024-03-20

## 2023-07-17 ASSESSMENT — ANXIETY QUESTIONNAIRES
5. BEING SO RESTLESS THAT IT IS HARD TO SIT STILL: SEVERAL DAYS
GAD7 TOTAL SCORE: 5
1. FEELING NERVOUS, ANXIOUS, OR ON EDGE: NOT AT ALL
7. FEELING AFRAID AS IF SOMETHING AWFUL MIGHT HAPPEN: NOT AT ALL
GAD7 TOTAL SCORE: 5
IF YOU CHECKED OFF ANY PROBLEMS ON THIS QUESTIONNAIRE, HOW DIFFICULT HAVE THESE PROBLEMS MADE IT FOR YOU TO DO YOUR WORK, TAKE CARE OF THINGS AT HOME, OR GET ALONG WITH OTHER PEOPLE: NOT DIFFICULT AT ALL
3. WORRYING TOO MUCH ABOUT DIFFERENT THINGS: NOT AT ALL
6. BECOMING EASILY ANNOYED OR IRRITABLE: SEVERAL DAYS
2. NOT BEING ABLE TO STOP OR CONTROL WORRYING: NOT AT ALL
4. TROUBLE RELAXING: NEARLY EVERY DAY

## 2023-07-17 ASSESSMENT — PAIN SCALES - GENERAL: PAINLEVEL: SEVERE PAIN (6)

## 2023-07-17 NOTE — PROGRESS NOTES
"  Assessment & Plan     S/P hip replacement, right  Having some muscle spasms in the thigh and some knee pain    Likely needs PT - ortho to order this per patient - he's been doing phone call check in with the orthopedic surgeons office.   - tiZANidine (ZANAFLEX) 2 MG tablet; Take 1 tablet (2 mg) by mouth 3 times daily as needed for muscle spasms    Stopped taking the oxycodone as it wasn't helping with the pain.   Using only tylenol, ibuprofen    Muscle spasm  Prn tizanidine  - tiZANidine (ZANAFLEX) 2 MG tablet; Take 1 tablet (2 mg) by mouth 3 times daily as needed for muscle spasms           MED REC REQUIRED  Post Medication Reconciliation Status: discharge medications reconciled, continue medications without change  BMI:   Estimated body mass index is 29.61 kg/m  as calculated from the following:    Height as of this encounter: 1.753 m (5' 9\").    Weight as of this encounter: 90.9 kg (200 lb 8 oz).   Weight management plan: Discussed healthy diet and exercise guidelines        Yue Ruvalcaba MD  [facility]    Russ Stephens is a 52 year old, presenting for the following health issues:  Hip Pain      HPI     - Follow up after right total hip arthroplasty completed 6/21/2023 at [facility]. [facility] is requesting postoperative right hip (hip: AP hip, AP pelvis, cross-table lateral and oblique) xrays be completed and sent to [facility] on CD at three months.    Today he notes that hip is feels well but that he is now having pain in right knee, outside of right thigh and buttock. He notes that oxycodone is not helping for pain so he has been taking Tylenol and Advil with some improvement. He is wondering when he can switch from working with a walking to walking with a cane. He notes that he contacted surgeon and he does not need repeat xrays done until 3 mnths after.      Review of Systems   Constitutional, HEENT, cardiovascular, pulmonary, gi and gu systems are negative, except as otherwise noted.    " "  Objective    /70   Pulse 103   Temp 98.4  F (36.9  C) (Tympanic)   Resp 22   Ht 1.753 m (5' 9\")   Wt 90.9 kg (200 lb 8 oz)   SpO2 95%   BMI 29.61 kg/m    Body mass index is 29.61 kg/m .  Physical Exam   GENERAL APPEARANCE: healthy, alert and no distress  MS: extremities normal- no gross deformities noted and incision intact.  Minimal erythema proximally, not fluctuant                      "

## 2023-08-25 ENCOUNTER — TRANSFERRED RECORDS (OUTPATIENT)
Dept: MULTI SPECIALTY CLINIC | Facility: CLINIC | Age: 53
End: 2023-08-25

## 2023-08-25 LAB — RETINOPATHY: NORMAL

## 2023-09-12 RX ORDER — INSULIN GLARGINE 300 U/ML
INJECTION, SOLUTION SUBCUTANEOUS
Qty: 6 ML | Refills: 0 | OUTPATIENT
Start: 2023-09-12

## 2023-09-21 ENCOUNTER — OFFICE VISIT (OUTPATIENT)
Dept: ENDOCRINOLOGY | Facility: CLINIC | Age: 53
End: 2023-09-21
Payer: COMMERCIAL

## 2023-09-21 VITALS
OXYGEN SATURATION: 98 % | DIASTOLIC BLOOD PRESSURE: 64 MMHG | SYSTOLIC BLOOD PRESSURE: 110 MMHG | HEIGHT: 69 IN | WEIGHT: 208 LBS | RESPIRATION RATE: 20 BRPM | BODY MASS INDEX: 30.81 KG/M2 | TEMPERATURE: 97 F | HEART RATE: 82 BPM

## 2023-09-21 DIAGNOSIS — I25.10 CORONARY ARTERY DISEASE INVOLVING NATIVE CORONARY ARTERY OF NATIVE HEART WITHOUT ANGINA PECTORIS: ICD-10-CM

## 2023-09-21 DIAGNOSIS — E11.9 TYPE 2 DIABETES MELLITUS WITHOUT COMPLICATION, WITHOUT LONG-TERM CURRENT USE OF INSULIN (H): ICD-10-CM

## 2023-09-21 LAB — HBA1C MFR BLD: 5.9 % (ref 0–5.6)

## 2023-09-21 PROCEDURE — 83036 HEMOGLOBIN GLYCOSYLATED A1C: CPT | Performed by: INTERNAL MEDICINE

## 2023-09-21 PROCEDURE — 99214 OFFICE O/P EST MOD 30 MIN: CPT | Performed by: INTERNAL MEDICINE

## 2023-09-21 PROCEDURE — 36415 COLL VENOUS BLD VENIPUNCTURE: CPT | Performed by: INTERNAL MEDICINE

## 2023-09-21 RX ORDER — BLOOD-GLUCOSE SENSOR
1 EACH MISCELLANEOUS
Qty: 2 EACH | Refills: 5 | Status: SHIPPED | OUTPATIENT
Start: 2023-09-21 | End: 2024-04-24

## 2023-09-21 RX ORDER — INSULIN GLARGINE 300 U/ML
INJECTION, SOLUTION SUBCUTANEOUS
Qty: 18 ML | Refills: 5 | Status: SHIPPED | OUTPATIENT
Start: 2023-09-21 | End: 2024-04-24

## 2023-09-21 RX ORDER — ATORVASTATIN CALCIUM 80 MG/1
80 TABLET, FILM COATED ORAL DAILY
Qty: 90 TABLET | Refills: 4 | Status: SHIPPED | OUTPATIENT
Start: 2023-09-21 | End: 2024-04-24

## 2023-09-21 ASSESSMENT — PAIN SCALES - GENERAL: PAINLEVEL: MODERATE PAIN (4)

## 2023-09-21 NOTE — NURSING NOTE
"Chief Complaint   Patient presents with    Diabetes     6 month check up diabetes       Vitals:    09/21/23 0856   BP: 110/64   BP Location: Right arm   Patient Position: Sitting   Cuff Size: Adult Regular   Pulse: 82   Resp: 20   Temp: 97  F (36.1  C)   TempSrc: Tympanic   SpO2: 98%   Weight: 94.3 kg (208 lb)   Height: 1.753 m (5' 9\")     Wt Readings from Last 1 Encounters:   09/21/23 94.3 kg (208 lb)       Anisha GAGNON CMA .................9/21/2023    "

## 2023-09-21 NOTE — PROGRESS NOTES
Endocrine Consult note    Attending Assessment/Plan :        Type 2 diabetes mellitus without complication, without long-term current use of insulin (H)  Coronary artery disease involving native coronary artery of native heart without angina pectoris    Assessment:  -a1c at goal of <7%  -tolerating regimen well, will plan to increase ozempic to max dose     Plan:  -continue Toujeo U-300 - decrease dose to 50  when increasing ozempic and continue self titration plan of 2 units q2-3 days to maintain -130 without fasting hypos. Decrease by 2 units for any episode of hypo immediately.   -increase ozempic to 2mg weekly  -continue mike CGM  -can consider adding jardiance in the future if additional DM meds indicated  -continue atorvastatin and lisinopril at current doses    I have independently reviewed and interpreted labs, imaging as indicated.    RTC 6 months    Chief complaint:  Houston is a 53 year old male seen in consultation for type 2 diabetes      HISTORY OF PRESENT ILLNESS    Angelo comes to clinic for followup on t2dm.     Dx ~9954-7184.  Has had CAD now s/p bypass.  Doing well.     Current diabetes regimen:  Toujeo - 60 units daily  Humalog - occasional as needed (dose requirement decreased dramatically after starting ozempic)  Ozempic 1mg weekly    No side effects from ozempic. Using freestyle mike consistently. Unable to download today (had multiple accounts on [employer] from a previous tech issue - unable to tell why most recent account hasn't updated since May).     Other meds:  HLD:  Atorvastatin 80    HTN:  imdur 30  Lisinopril 2.5  Metoprolol tartrate 25      Endocrine relevant labs and/or imaging are as follows:  Component      Latest Ref Rng & Units 12/15/2022 2/27/2023   Cholesterol      <200 mg/dL 144    Triglycerides      <150 mg/dL 66    HDL Cholesterol      >=40 mg/dL 45    LDL Cholesterol Calculated      <=100 mg/dL 86    Non HDL Cholesterol      <130 mg/dL 99    Albumin Urine mg/L       mg/L <12.0 <12.0   Albumin Urine mg/g Cr           Creatinine Urine      mg/dL 148.5 208.8   Hemoglobin A1C      0.0 - 5.6 % 6.6 (H)      Component      Latest Ref Rng 9/21/2023  9:56 AM   Hemoglobin A1C      0.0 - 5.6 % 5.9 (H)       Legend:  (H) High  REVIEW OF SYSTEMS    10 system ROS otherwise as per the HPI or negative    Past Medical History  Past Medical History:   Diagnosis Date    DM (diabetes mellitus) (H)     Epilepsy (H)     Intractable epilepsy, last seizure 2008    Hyperlipidemia due to type 2 diabetes mellitus (H) 12/15/2022    MEDICAL HISTORY OF - Age 16     Gangrene in foot stepped on a nail hospitalized for 7 weeks    Nicotine dependence     Tobacco use disorder 2/4/2015    Unstable angina (H) 5/18/2021       Medications  Current Outpatient Medications   Medication Sig Dispense Refill    aspirin (ASA) 81 MG chewable tablet Take 1 tablet (81 mg) by mouth daily 14 tablet 0    atorvastatin (LIPITOR) 80 MG tablet Take 1 tablet (80 mg) by mouth daily 90 tablet 4    clobetasol (TEMOVATE) 0.05 % external cream Apply topically 2 times daily 60 g 1    Continuous Blood Gluc Sensor (FREESTYLE MARY 3 SENSOR) MISC 1 each every 14 days 2 each 5    HUMALOG KWIKPEN 100 UNIT/ML soln INJECT 10 TO 18 UNITS UNDER THE SKIN 3 TIMES DAILY BEFORE MEALS.  MAX DOSE PER 24 HOURS IS 50 UNITS 48 mL 1    insulin glargine U-300 (TOUJEO SOLOSTAR) 300 UNIT/ML (1 units dial) pen Inject 63 units every morning and adjust according to instruction.  Max TDD 80 units. 18 mL 5    isosorbide mononitrate (IMDUR) 30 MG 24 hr tablet Take 1 tablet (30 mg) by mouth daily 90 tablet 1    lisinopril (ZESTRIL) 2.5 MG tablet Take 1 tablet (2.5 mg) by mouth daily 90 tablet 1    metoprolol tartrate (LOPRESSOR) 25 MG tablet Take 1 tablet (25 mg) by mouth 2 times daily 180 tablet 3    multivitamin w/minerals (THERA-VIT-M) tablet Take 1 tablet by mouth daily      nitroGLYcerin (NITROSTAT) 0.4 MG sublingual tablet For chest pain place 1 tablet under the  "tongue every 5 minutes for 3 doses. If symptoms persist 5 minutes after 1st dose call 911. 20 tablet 0    Semaglutide, 1 MG/DOSE, (OZEMPIC) 4 MG/3ML pen Inject 1 mg Subcutaneous every 7 days 3 mL 11    tiZANidine (ZANAFLEX) 2 MG tablet Take 1 tablet (2 mg) by mouth 3 times daily as needed for muscle spasms 40 tablet 1    alcohol swab prep pads Use to swab area of injection/jaun as directed. 100 each 3       Allergies  Allergies   Allergen Reactions    Metformin Other (See Comments)     Very low blood sugars, Blood sugar issue          Family History  family history includes Arthritis in his maternal grandmother and mother; Cancer in his maternal grandmother; Gastrointestinal Disease in his maternal grandfather; Respiratory in his daughter; Unknown/Adopted in his father.    Social History  Social History     Tobacco Use    Smoking status: Former     Packs/day: 0.25     Years: 25.00     Pack years: 6.25     Types: Cigarettes     Quit date: 2016     Years since quittin.6    Smokeless tobacco: Former   Vaping Use    Vaping Use: Never used   Substance Use Topics    Alcohol use: Yes     Alcohol/week: 0.0 standard drinks of alcohol     Comment: mixed 2-4 on weekends, not every weekend    Drug use: No       Physical Exam  /64 (BP Location: Right arm, Patient Position: Sitting, Cuff Size: Adult Regular)   Pulse 82   Temp 97  F (36.1  C) (Tympanic)   Resp 20   Ht 1.753 m (5' 9\")   Wt 94.3 kg (208 lb)   SpO2 98%   BMI 30.72 kg/m    Body mass index is 30.72 kg/m .    Physical Exam    GENERAL :  In no apparent distress  SKIN: Normal color, normal temperature     EYES: EOMI, No scleral icterus  NECK: No visible masses  RESP: No respiratory distress, normal effort  CARDIO: regular rate  ABDOMEN: flat, nondistended       NEURO: awake, alert, responds appropriately to questions   EXTREMITIES: No clubbing, cyanosis or edema.    I spent a total of 33 minutes on the day of this established patient visit on chart " review, lab review, coordinating care, reviewing previous providers notes, exam, and counseling of patient

## 2023-09-21 NOTE — LETTER
9/21/2023         RE: Houston Winters  [id number] [facility] [address]        Dear Colleague,    Thank you for referring your patient, Houston Winters, to the [facility] ENDOCRINOLOGY. Please see a copy of my visit note below.    Endocrine Consult note    Attending Assessment/Plan :        Type 2 diabetes mellitus without complication, without long-term current use of insulin (H)  Coronary artery disease involving native coronary artery of native heart without angina pectoris    Assessment:  -a1c at goal of <7%  -tolerating regimen well, will plan to increase ozempic to max dose     Plan:  -continue Toujeo U-300 - decrease dose to 50  when increasing ozempic and continue self titration plan of 2 units q2-3 days to maintain -130 without fasting hypos. Decrease by 2 units for any episode of hypo immediately.   -increase ozempic to 2mg weekly  -continue mike CGM  -can consider adding jardiance in the future if additional DM meds indicated  -continue atorvastatin and lisinopril at current doses    I have independently reviewed and interpreted labs, imaging as indicated.    RTC 6 months    Chief complaint:  Houston is a 53 year old male seen in consultation for type 2 diabetes      HISTORY OF PRESENT ILLNESS    Angelo comes to clinic for followup on t2dm.     Dx ~9417-4053.  Has had CAD now s/p bypass.  Doing well.     Current diabetes regimen:  Toujeo - 60 units daily  Humalog - occasional as needed (dose requirement decreased dramatically after starting ozempic)  Ozempic 1mg weekly    No side effects from ozempic. Using freestyle mike consistently. Unable to download today (had multiple accounts on [employer] from a previous tech issue - unable to tell why most recent account hasn't updated since May).     Other meds:  HLD:  Atorvastatin 80    HTN:  imdur 30  Lisinopril 2.5  Metoprolol tartrate 25      Endocrine relevant labs and/or imaging are as follows:  Component      Latest Ref Rng  & Units 12/15/2022 2/27/2023   Cholesterol      <200 mg/dL 144    Triglycerides      <150 mg/dL 66    HDL Cholesterol      >=40 mg/dL 45    LDL Cholesterol Calculated      <=100 mg/dL 86    Non HDL Cholesterol      <130 mg/dL 99    Albumin Urine mg/L      mg/L <12.0 <12.0   Albumin Urine mg/g Cr           Creatinine Urine      mg/dL 148.5 208.8   Hemoglobin A1C      0.0 - 5.6 % 6.6 (H)      Component      Latest Ref Rng 9/21/2023  9:56 AM   Hemoglobin A1C      0.0 - 5.6 % 5.9 (H)       Legend:  (H) High  REVIEW OF SYSTEMS    10 system ROS otherwise as per the HPI or negative    Past Medical History  Past Medical History:   Diagnosis Date     DM (diabetes mellitus) (H)      Epilepsy (H)     Intractable epilepsy, last seizure 2008     Hyperlipidemia due to type 2 diabetes mellitus (H) 12/15/2022     MEDICAL HISTORY OF - Age 16     Gangrene in foot stepped on a nail hospitalized for 7 weeks     Nicotine dependence      Tobacco use disorder 2/4/2015     Unstable angina (H) 5/18/2021       Medications  Current Outpatient Medications   Medication Sig Dispense Refill     aspirin (ASA) 81 MG chewable tablet Take 1 tablet (81 mg) by mouth daily 14 tablet 0     atorvastatin (LIPITOR) 80 MG tablet Take 1 tablet (80 mg) by mouth daily 90 tablet 4     clobetasol (TEMOVATE) 0.05 % external cream Apply topically 2 times daily 60 g 1     Continuous Blood Gluc Sensor (FREESTYLE MARY 3 SENSOR) MISC 1 each every 14 days 2 each 5     HUMALOG KWIKPEN 100 UNIT/ML soln INJECT 10 TO 18 UNITS UNDER THE SKIN 3 TIMES DAILY BEFORE MEALS.  MAX DOSE PER 24 HOURS IS 50 UNITS 48 mL 1     insulin glargine U-300 (TOUJEO SOLOSTAR) 300 UNIT/ML (1 units dial) pen Inject 63 units every morning and adjust according to instruction.  Max TDD 80 units. 18 mL 5     isosorbide mononitrate (IMDUR) 30 MG 24 hr tablet Take 1 tablet (30 mg) by mouth daily 90 tablet 1     lisinopril (ZESTRIL) 2.5 MG tablet Take 1 tablet (2.5 mg) by mouth daily 90 tablet 1      "metoprolol tartrate (LOPRESSOR) 25 MG tablet Take 1 tablet (25 mg) by mouth 2 times daily 180 tablet 3     multivitamin w/minerals (THERA-VIT-M) tablet Take 1 tablet by mouth daily       nitroGLYcerin (NITROSTAT) 0.4 MG sublingual tablet For chest pain place 1 tablet under the tongue every 5 minutes for 3 doses. If symptoms persist 5 minutes after 1st dose call 911. 20 tablet 0     Semaglutide, 1 MG/DOSE, (OZEMPIC) 4 MG/3ML pen Inject 1 mg Subcutaneous every 7 days 3 mL 11     tiZANidine (ZANAFLEX) 2 MG tablet Take 1 tablet (2 mg) by mouth 3 times daily as needed for muscle spasms 40 tablet 1     alcohol swab prep pads Use to swab area of injection/jaun as directed. 100 each 3       Allergies  Allergies   Allergen Reactions     Metformin Other (See Comments)     Very low blood sugars, Blood sugar issue          Family History  family history includes Arthritis in his maternal grandmother and mother; Cancer in his maternal grandmother; Gastrointestinal Disease in his maternal grandfather; Respiratory in his daughter; Unknown/Adopted in his father.    Social History  Social History     Tobacco Use     Smoking status: Former     Packs/day: 0.25     Years: 25.00     Pack years: 6.25     Types: Cigarettes     Quit date: 2016     Years since quittin.6     Smokeless tobacco: Former   Vaping Use     Vaping Use: Never used   Substance Use Topics     Alcohol use: Yes     Alcohol/week: 0.0 standard drinks of alcohol     Comment: mixed 2-4 on weekends, not every weekend     Drug use: No       Physical Exam  /64 (BP Location: Right arm, Patient Position: Sitting, Cuff Size: Adult Regular)   Pulse 82   Temp 97  F (36.1  C) (Tympanic)   Resp 20   Ht 1.753 m (5' 9\")   Wt 94.3 kg (208 lb)   SpO2 98%   BMI 30.72 kg/m    Body mass index is 30.72 kg/m .    Physical Exam    GENERAL :  In no apparent distress  SKIN: Normal color, normal temperature     EYES: EOMI, No scleral icterus  NECK: No visible masses  RESP: " No respiratory distress, normal effort  CARDIO: regular rate  ABDOMEN: flat, nondistended       NEURO: awake, alert, responds appropriately to questions   EXTREMITIES: No clubbing, cyanosis or edema.    I spent a total of 33 minutes on the day of this established patient visit on chart review, lab review, coordinating care, reviewing previous providers notes, exam, and counseling of patient      Again, thank you for allowing me to participate in the care of your patient.        Sincerely,        Jhonatan Grier MD

## 2023-09-21 NOTE — PATIENT INSTRUCTIONS
Increase ozempic to 2mg once weekly (when available from pharmacy) .    When increasing ozempic, decrease toujeo to 50 units.

## 2023-09-25 ENCOUNTER — TELEPHONE (OUTPATIENT)
Dept: ENDOCRINOLOGY | Facility: CLINIC | Age: 53
End: 2023-09-25

## 2023-09-25 DIAGNOSIS — E11.9 TYPE 2 DIABETES MELLITUS WITHOUT COMPLICATION, WITHOUT LONG-TERM CURRENT USE OF INSULIN (H): ICD-10-CM

## 2023-09-25 NOTE — TELEPHONE ENCOUNTER
[facility] Call Center    Phone Message    May a detailed message be left on voicemail: yes     Reason for Call: Medication Question or concern regarding medication   Prescription Clarification  Name of Medication: Ozempic    Prescribing Provider: Dr Grier   Pharmacy: [facility] [place], [place] - [address]   What on the order needs clarification? Patient is waiting on new Rx with increased dosage to be sent to pharmacy as discussed at las visit.      Action Taken: Other: endo    Travel Screening: Not Applicable

## 2023-09-27 ENCOUNTER — TELEPHONE (OUTPATIENT)
Dept: ENDOCRINOLOGY | Facility: CLINIC | Age: 53
End: 2023-09-27
Payer: COMMERCIAL

## 2023-10-02 ENCOUNTER — OFFICE VISIT (OUTPATIENT)
Dept: FAMILY MEDICINE | Facility: CLINIC | Age: 53
End: 2023-10-02
Payer: COMMERCIAL

## 2023-10-02 ENCOUNTER — ANCILLARY PROCEDURE (OUTPATIENT)
Dept: GENERAL RADIOLOGY | Facility: CLINIC | Age: 53
End: 2023-10-02
Attending: FAMILY MEDICINE
Payer: COMMERCIAL

## 2023-10-02 VITALS
DIASTOLIC BLOOD PRESSURE: 72 MMHG | OXYGEN SATURATION: 97 % | HEIGHT: 69 IN | HEART RATE: 95 BPM | BODY MASS INDEX: 30.66 KG/M2 | WEIGHT: 207 LBS | TEMPERATURE: 98.5 F | RESPIRATION RATE: 20 BRPM | SYSTOLIC BLOOD PRESSURE: 128 MMHG

## 2023-10-02 DIAGNOSIS — M53.3 SI (SACROILIAC) JOINT DYSFUNCTION: ICD-10-CM

## 2023-10-02 DIAGNOSIS — Z96.641 S/P TOTAL RIGHT HIP ARTHROPLASTY: ICD-10-CM

## 2023-10-02 DIAGNOSIS — R52 PAIN: ICD-10-CM

## 2023-10-02 DIAGNOSIS — M76.31 IT BAND SYNDROME, RIGHT: Primary | ICD-10-CM

## 2023-10-02 DIAGNOSIS — R91.8 PULMONARY NODULES: ICD-10-CM

## 2023-10-02 DIAGNOSIS — R04.2 HEMOPTYSIS: ICD-10-CM

## 2023-10-02 PROCEDURE — 72190 X-RAY EXAM OF PELVIS: CPT | Mod: TC | Performed by: RADIOLOGY

## 2023-10-02 PROCEDURE — 99214 OFFICE O/P EST MOD 30 MIN: CPT | Performed by: FAMILY MEDICINE

## 2023-10-02 ASSESSMENT — PAIN SCALES - GENERAL: PAINLEVEL: MODERATE PAIN (5)

## 2023-10-02 NOTE — PROGRESS NOTES
"  Assessment & Plan     It band syndrome, right     - Physical Therapy Referral; Future    SI (sacroiliac) joint dysfunction     - Physical Therapy Referral; Future    S/P total right hip arthroplasty     - Physical Therapy Referral; Future    Above all likely as a result of the hip arthroplasty and kinetic chain dysfunction.   Recommend PT for the SI joint and IT band.      Pulmonary nodules  Last CT 1 year ago, has had hemoptysis x 2 in the past 6 months, will repeat chest CT given he's higher risk having a history of tobacco abuse  - CT Chest w/o Contrast; Future    Hemoptysis     - CT Chest w/o Contrast; Future                 Yue Ruvalcaba MD  [facility]    Russ Stephens is a 53 year old, presenting for the following health issues:  Musculoskeletal Problem        10/2/2023    11:22 AM   Additional Questions   Roomed by Sharee burns CMA   Accompanied by Self       History of Present Illness       Reason for visit:  3month hip check up    He eats 2-3 servings of fruits and vegetables daily.He consumes 1 sweetened beverage(s) daily.He exercises with enough effort to increase his heart rate 20 to 29 minutes per day.    He is taking medications regularly.       - Follow up after completing total right hip arthroplasty 6/21/2023. Today he notes no hip pain but that he is having some right low back pain due to overcompensation when walking. He also notes a verucose vein near right outer side of knee that reaches to mid thigh. This is causing some pain.he is taking tizanidine with improvement.        Review of Systems   Constitutional, HEENT, cardiovascular, pulmonary, gi and gu systems are negative, except as otherwise noted.      Objective    /72   Pulse 95   Temp 98.5  F (36.9  C) (Tympanic)   Resp 20   Ht 1.753 m (5' 9\")   Wt 93.9 kg (207 lb)   SpO2 97%   BMI 30.57 kg/m    Body mass index is 30.57 kg/m .  Physical Exam   GENERAL: healthy, alert and no distress  MS: right hip " with good range of motion.    Tender to palpation right SI joint and tender along right IT band

## 2023-10-13 DIAGNOSIS — I10 HYPERTENSION, GOAL BELOW 140/90: ICD-10-CM

## 2023-10-13 RX ORDER — LISINOPRIL 2.5 MG/1
2.5 TABLET ORAL DAILY
Qty: 90 TABLET | Refills: 0 | Status: SHIPPED | OUTPATIENT
Start: 2023-10-13 | End: 2024-01-12

## 2023-10-23 ENCOUNTER — TELEPHONE (OUTPATIENT)
Dept: ENDOCRINOLOGY | Facility: CLINIC | Age: 53
End: 2023-10-23
Payer: COMMERCIAL

## 2023-10-23 DIAGNOSIS — E11.9 TYPE 2 DIABETES MELLITUS WITHOUT COMPLICATION, WITHOUT LONG-TERM CURRENT USE OF INSULIN (H): Primary | ICD-10-CM

## 2023-10-23 NOTE — TELEPHONE ENCOUNTER
Reason for Call:  Other prescription Semaglutide, 2 MG/DOSE, (OZEMPIC) 8 MG/3ML pen     Detailed comments: Medication is out of stock, and patient has tried calling other pharmacies and they are also back ordered. Patient was wondering if there was an alternative suggested? Please call back when able.    Phone Number Patient can be reached at: Home number on file [phone] (home)    Best Time: Anytime    Can we leave a detailed message on this number? YES    Thank you,  Orly GARCIAS [facility]  Specialty [facility] - [facility]      Call taken on 10/23/2023 at 2:55 PM by Orly Hamilton

## 2023-10-27 NOTE — TELEPHONE ENCOUNTER
Called patient to discuss options given 2mg dose of ozempic shortage.  Discussed going back, temporarily, to 1mg weekly vs switching to mounjaro 5mg weekly (a roughly equivalent dose but would require a new prior auth).     We decided to go down on the ozempic temporarily and can readdress at a followup visit if the 2mg shortage persists for a long period of time.     New script for 1mg ozempic sent to pharmacy w/note explaining that this is temporary.  Will leave 2mg script active and patient can return to that dose if/when 2mg ozempic becomes available.

## 2023-10-27 NOTE — TELEPHONE ENCOUNTER
Reason for Call:  Other call back and prescription    Detailed comments:  Pt calling again about his medication. Pt unable to find Semaglutide, 2 MG/DOSE, (OZEMPIC) 8 MG/3ML pen no pharmacy's have it. He is looking to see if there is a substitute medication/shot. He took his last injection today and is all out.    Wal Roxana  [employer]    Phone Number Patient can be reached at: Cell number on file:    Telephone Information:   Mobile [phone]       Best Time:       Can we leave a detailed message on this number? YES    Call taken on 10/27/2023 at 8:12 AM by Germania Crump MA

## 2023-10-27 NOTE — TELEPHONE ENCOUNTER
Pt takes for DM.  Please advise on alternative BGM management plan.    Tari WOOD   [facility]

## 2023-11-13 ENCOUNTER — TELEPHONE (OUTPATIENT)
Dept: FAMILY MEDICINE | Facility: CLINIC | Age: 53
End: 2023-11-13
Payer: COMMERCIAL

## 2023-11-13 NOTE — TELEPHONE ENCOUNTER
Marilu with [facility] reports patient wants to complete CT chest with [facility].     Phone number: [phone]     Order from 10/2/23 CT Chest w/o Contrast faxed to [phone] by [facility].    Bindu Mercer RN on 11/13/2023 at 2:46 PM

## 2023-11-22 ENCOUNTER — TELEPHONE (OUTPATIENT)
Dept: FAMILY MEDICINE | Facility: CLINIC | Age: 53
End: 2023-11-22
Payer: COMMERCIAL

## 2023-11-22 NOTE — TELEPHONE ENCOUNTER
Promise from [facility] calling to have the CT Chest scan without contrast faxed over because patient wants this completed at [facility] because his insurance will cover the CT Scan.    Fax number [phone].    Dr. Ruvalcaba placed the 10/2/23.    RN printed and faxed to [facility].    Bindu Mercer RN on 11/22/2023 at 10:48 AM

## 2023-11-27 ENCOUNTER — TRANSFERRED RECORDS (OUTPATIENT)
Dept: HEALTH INFORMATION MANAGEMENT | Facility: CLINIC | Age: 53
End: 2023-11-27
Payer: COMMERCIAL

## 2023-12-11 ENCOUNTER — TELEPHONE (OUTPATIENT)
Dept: FAMILY MEDICINE | Facility: CLINIC | Age: 53
End: 2023-12-11
Payer: COMMERCIAL

## 2023-12-11 NOTE — TELEPHONE ENCOUNTER
Call patient:    CT scan from [facility] reviewed:    Right pulmonary nodule is stable.  There are a few mildly enlarged lymph noes.  A six month follow up was recommended.    Yue Ruvalcaba M.D.

## 2023-12-11 NOTE — TELEPHONE ENCOUNTER
Called and spoke with pt, pt verbalized understanding of CT results and did not have any questions.    RONNY Corrales.

## 2024-01-12 DIAGNOSIS — I10 HYPERTENSION, GOAL BELOW 140/90: ICD-10-CM

## 2024-01-12 RX ORDER — LISINOPRIL 2.5 MG/1
2.5 TABLET ORAL DAILY
Qty: 90 TABLET | Refills: 0 | Status: SHIPPED | OUTPATIENT
Start: 2024-01-12 | End: 2024-03-20

## 2024-02-05 ENCOUNTER — TELEPHONE (OUTPATIENT)
Dept: ENDOCRINOLOGY | Facility: CLINIC | Age: 54
End: 2024-02-05
Payer: COMMERCIAL

## 2024-02-05 NOTE — TELEPHONE ENCOUNTER
Prior Authorization Retail Medication Request    Medication/Dose: Semaglutide, 2 MG/DOSE, (OZEMPIC) 8 MG/3ML pen    Insurance   Primary: [facility]  Insurance ID:  [id number]      Pharmacy Information (if different than what is on RX)  Name:  [facility]  Phone:  [phone]  Fax:[phone]

## 2024-02-16 NOTE — TELEPHONE ENCOUNTER
Note: Due to record-high volumes, our turn-around is time taking longer than normal . We are currently 10 days behind in the pools.   We are working diligently to submit all requests in a timely manner and in the order they are received. Please only flag TRUE URGENT requests as high priority to the pool at this time.   If you have questions - please send a note/message in the active PA encounter and send back to the RPPA (Retail Pharmacy Prior Authorization) team [[id number]].    If you have more specific questions about our process please reach out to our supervisor Roxy Sullivan.   Thank you!     PA Initiation    Medication: SEMAGLUTIDE (2 MG/DOSE) 8 MG/3ML SC SOPN  Insurance Company: [employer] Part D - Phone [phone] Fax [phone]  Pharmacy Filling the Rx: [facility] [address] [place], [place] - [address]  Filling Pharmacy Phone: [phone]  Filling Pharmacy Fax: [phone]  Start Date: 2/16/2024

## 2024-02-20 NOTE — TELEPHONE ENCOUNTER
Prior Authorization Approval    Medication: SEMAGLUTIDE (2 MG/DOSE) 8 MG/3ML SC SOPN  Authorization Effective Date: 2/16/2024  Authorization Expiration Date: 2/16/2025  Approved Dose/Quantity:   Reference #: [id number]   Insurance Company: [employer] Part D - Phone [phone] Fax [phone]  Which Pharmacy is filling the prescription: [facility] [address] [place] [address] [address]  Pharmacy Notified: y  Patient Notified: y - pharmacy to notify

## 2024-02-22 ENCOUNTER — HOSPITAL ENCOUNTER (EMERGENCY)
Facility: CLINIC | Age: 54
Discharge: HOME | End: 2024-02-22
Attending: FAMILY MEDICINE | Admitting: FAMILY MEDICINE
Payer: COMMERCIAL

## 2024-02-22 ENCOUNTER — APPOINTMENT (OUTPATIENT)
Dept: GENERAL RADIOLOGY | Facility: CLINIC | Age: 54
End: 2024-02-22
Attending: FAMILY MEDICINE
Payer: COMMERCIAL

## 2024-02-22 ENCOUNTER — NURSE TRIAGE (OUTPATIENT)
Dept: NURSING | Facility: CLINIC | Age: 54
End: 2024-02-22
Payer: COMMERCIAL

## 2024-02-22 VITALS
DIASTOLIC BLOOD PRESSURE: 70 MMHG | OXYGEN SATURATION: 96 % | HEART RATE: 78 BPM | WEIGHT: 210 LBS | HEIGHT: 70 IN | SYSTOLIC BLOOD PRESSURE: 116 MMHG | TEMPERATURE: 98 F | BODY MASS INDEX: 30.06 KG/M2 | RESPIRATION RATE: 22 BRPM

## 2024-02-22 DIAGNOSIS — R07.89 ATYPICAL CHEST PAIN: ICD-10-CM

## 2024-02-22 DIAGNOSIS — S01.312A: ICD-10-CM

## 2024-02-22 LAB
ANION GAP SERPL CALCULATED.3IONS-SCNC: 12 MMOL/L (ref 7–15)
BASOPHILS # BLD AUTO: 0 10E3/UL (ref 0–0.2)
BASOPHILS NFR BLD AUTO: 0 %
BUN SERPL-MCNC: 19.5 MG/DL (ref 6–20)
CALCIUM SERPL-MCNC: 9.1 MG/DL (ref 8.6–10)
CHLORIDE SERPL-SCNC: 103 MMOL/L (ref 98–107)
CREAT SERPL-MCNC: 1.12 MG/DL (ref 0.67–1.17)
DEPRECATED HCO3 PLAS-SCNC: 23 MMOL/L (ref 22–29)
EGFRCR SERPLBLD CKD-EPI 2021: 79 ML/MIN/1.73M2
EOSINOPHIL # BLD AUTO: 0.1 10E3/UL (ref 0–0.7)
EOSINOPHIL NFR BLD AUTO: 2 %
ERYTHROCYTE [DISTWIDTH] IN BLOOD BY AUTOMATED COUNT: 13.7 % (ref 10–15)
GLUCOSE SERPL-MCNC: 144 MG/DL (ref 70–99)
HCT VFR BLD AUTO: 40.4 % (ref 40–53)
HGB BLD-MCNC: 13.2 G/DL (ref 13.3–17.7)
HOLD SPECIMEN: NORMAL
HOLD SPECIMEN: NORMAL
IMM GRANULOCYTES # BLD: 0 10E3/UL
IMM GRANULOCYTES NFR BLD: 0 %
LYMPHOCYTES # BLD AUTO: 2.6 10E3/UL (ref 0.8–5.3)
LYMPHOCYTES NFR BLD AUTO: 32 %
MCH RBC QN AUTO: 30.8 PG (ref 26.5–33)
MCHC RBC AUTO-ENTMCNC: 32.7 G/DL (ref 31.5–36.5)
MCV RBC AUTO: 94 FL (ref 78–100)
MONOCYTES # BLD AUTO: 0.7 10E3/UL (ref 0–1.3)
MONOCYTES NFR BLD AUTO: 8 %
NEUTROPHILS # BLD AUTO: 4.8 10E3/UL (ref 1.6–8.3)
NEUTROPHILS NFR BLD AUTO: 58 %
NRBC # BLD AUTO: 0 10E3/UL
NRBC BLD AUTO-RTO: 0 /100
PLATELET # BLD AUTO: 193 10E3/UL (ref 150–450)
POTASSIUM SERPL-SCNC: 4.2 MMOL/L (ref 3.4–5.3)
RBC # BLD AUTO: 4.29 10E6/UL (ref 4.4–5.9)
SODIUM SERPL-SCNC: 138 MMOL/L (ref 135–145)
TROPONIN T SERPL HS-MCNC: 9 NG/L
TROPONIN T SERPL HS-MCNC: 9 NG/L
WBC # BLD AUTO: 8.2 10E3/UL (ref 4–11)

## 2024-02-22 PROCEDURE — 93010 ELECTROCARDIOGRAM REPORT: CPT | Performed by: FAMILY MEDICINE

## 2024-02-22 PROCEDURE — 85025 COMPLETE CBC W/AUTO DIFF WBC: CPT | Performed by: EMERGENCY MEDICINE

## 2024-02-22 PROCEDURE — 85025 COMPLETE CBC W/AUTO DIFF WBC: CPT | Performed by: FAMILY MEDICINE

## 2024-02-22 PROCEDURE — 36415 COLL VENOUS BLD VENIPUNCTURE: CPT | Performed by: FAMILY MEDICINE

## 2024-02-22 PROCEDURE — 80048 BASIC METABOLIC PNL TOTAL CA: CPT | Performed by: EMERGENCY MEDICINE

## 2024-02-22 PROCEDURE — 250N000013 HC RX MED GY IP 250 OP 250 PS 637: Performed by: FAMILY MEDICINE

## 2024-02-22 PROCEDURE — 84484 ASSAY OF TROPONIN QUANT: CPT | Mod: 91 | Performed by: FAMILY MEDICINE

## 2024-02-22 PROCEDURE — 36415 COLL VENOUS BLD VENIPUNCTURE: CPT | Performed by: EMERGENCY MEDICINE

## 2024-02-22 PROCEDURE — 99285 EMERGENCY DEPT VISIT HI MDM: CPT | Mod: 25

## 2024-02-22 PROCEDURE — 80048 BASIC METABOLIC PNL TOTAL CA: CPT | Performed by: FAMILY MEDICINE

## 2024-02-22 PROCEDURE — 99284 EMERGENCY DEPT VISIT MOD MDM: CPT | Mod: 25 | Performed by: FAMILY MEDICINE

## 2024-02-22 PROCEDURE — 93005 ELECTROCARDIOGRAM TRACING: CPT

## 2024-02-22 PROCEDURE — 71046 X-RAY EXAM CHEST 2 VIEWS: CPT

## 2024-02-22 PROCEDURE — 84484 ASSAY OF TROPONIN QUANT: CPT | Performed by: EMERGENCY MEDICINE

## 2024-02-22 RX ORDER — CIPROFLOXACIN AND DEXAMETHASONE 3; 1 MG/ML; MG/ML
4 SUSPENSION/ DROPS AURICULAR (OTIC) 2 TIMES DAILY
Qty: 7.5 ML | Refills: 0 | Status: SHIPPED | OUTPATIENT
Start: 2024-02-22 | End: 2024-03-03

## 2024-02-22 RX ORDER — CIPROFLOXACIN AND DEXAMETHASONE 3; 1 MG/ML; MG/ML
4 SUSPENSION/ DROPS AURICULAR (OTIC) 2 TIMES DAILY
Status: DISCONTINUED | OUTPATIENT
Start: 2024-02-22 | End: 2024-02-23 | Stop reason: HOSPADM

## 2024-02-22 RX ADMIN — CIPROFLOXACIN AND DEXAMETHASONE 4 DROP: 3; 1 SUSPENSION/ DROPS AURICULAR (OTIC) at 21:50

## 2024-02-23 ENCOUNTER — PATIENT OUTREACH (OUTPATIENT)
Dept: FAMILY MEDICINE | Facility: CLINIC | Age: 54
End: 2024-02-23
Payer: COMMERCIAL

## 2024-02-23 NOTE — ED PROVIDER NOTES
History     Chief Complaint   Patient presents with    Chest Pain     HPI  Houston Winters is a 53 year old male, past medical history is significant for type 2 diabetes, chronic continuous use of opioids, hypertension, ASCVD with history of CABG, history of small bowel obstruction, hypertension, obesity, hyperlipidemia, epilepsy, erectile dysfunction, nicotine dependence, tobacco use disorder, presents to the emergency department with concerns of chest pain and left ear drainage.  History is obtained with the patient presents with his daughter with concerns of intermittent seconds to at most 2 minutes central chest tightness occurring seemingly at random and not correlating with exertion over the preceding 2 weeks.  This has come on at rest, it is also, on when he is up moving around not doing anything in particular.  Patient is a vague historian but describes central squeezing chest discomfort that is dissimilar to his presentation when he had his unstable angina and subsequent CABG.  Occasionally gets some lightheadedness with it.  No shortness of breath no nausea or vomiting.  He has a visit with his cardiologist coming up in March in approximately 1 month at [facility].  Today when he was bending over to pick something up he had a sensation of fluid in his left ear and when he touched his ear realized it was blood coming out of his ear.  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  He had been wearing earbuds could not remember the exact time that he last had them in and took them out.      Allergies:  Allergies   Allergen Reactions    Metformin Other (See Comments)     Very low blood sugars, Blood sugar issue        Problem List:    Patient Active Problem List    Diagnosis Date Noted    Type 2 diabetes mellitus with hypoglycemia without coma, with long-term current use of insulin (H) 03/17/2023     Priority:  Medium    Chronic, continuous use of opioids 02/27/2023     Priority: Medium     Patient is followed by Yue Ruvalcaba MD for ongoing prescription of pain medication.  All refills should only be approved by this provider, or covering partner.    Medication(s): oxycodone 5 mg  Maximum quantity per month: 90  Clinic visit frequency required: Q 3 months   PDMP Review         Value Time User    State PDMP site checked  Yes 1/30/2023  8:54 AM Yue Ruvalcaba MD   Controlled substance agreement:  CSA -- Patient Level:    CSA: None found at the patient level.     CSA 2/27/2023      Pain Clinic evaluation in the past: No    Opioid Risk Tool Total Score(s):  No flowsheet data found.  Last [facility] website verification:  done on 2/27/2023   [email or web address]        Hypertension associated with type 2 diabetes mellitus (H) 12/15/2022     Priority: Medium    Coronary artery disease involving coronary bypass graft of native heart without angina pectoris 09/26/2022     Priority: Medium     4 V CABG at [place] 6/16/021      Postoperative infection, initial encounter 01/28/2018     Priority: Medium    Small bowel obstruction (H) 12/29/2017     Priority: Medium    Type 2 diabetes mellitus without complication, without long-term current use of insulin (H) 03/27/2017     Priority: Medium    Hypertension, goal below 140/90 02/04/2015     Priority: Medium    Obesity 07/17/2014     Priority: Medium    Hyperlipidemia with target LDL less than 100 07/17/2014     Priority: Medium     Diagnosis updated by automated process. Provider to review and confirm.      Partial epilepsy (H) 03/12/2009     Priority: Medium     (Problem list name updated by automated process. Provider to review and confirm.)      ED (erectile dysfunction) 12/18/2008     Priority: Medium    GANGLOIN CYST /RIGHT ANKLE 06/15/2006     Priority: Medium        Past Medical History:    Past Medical History:   Diagnosis Date    DM (diabetes mellitus) (H)      Epilepsy (H)     Hyperlipidemia due to type 2 diabetes mellitus (H) 12/15/2022    MEDICAL HISTORY OF - Age 16     Nicotine dependence     Tobacco use disorder 2/4/2015    Unstable angina (H) 5/18/2021       Past Surgical History:    Past Surgical History:   Procedure Laterality Date    CL AFF SURGICAL PATHOLOGY  01/01/2005    ganglion cyst removed    CORONARY ARTERY BYPASS  06/16/2021    4 Vessel CABG - at [place]    CV HEART CATHETERIZATION WITH POSSIBLE INTERVENTION N/A 05/18/2021    Procedure: Heart Catheterization with Possible Intervention;  Surgeon: Andrew Madrid MD;  Location:  HEART CARDIAC CATH LAB    CV LEFT HEART CATH N/A 05/18/2021    Procedure: Left Heart Cath;  Surgeon: Andrew Madrid MD;  Location:  HEART CARDIAC CATH LAB    EXCISE MASS LOWER EXTREMITY  02/15/2013    Procedure: EXCISE MASS LOWER EXTREMITY;  Right Ankle Excision of ganglion cyst;  Surgeon: Akbar Melo DPM;  Location: [facility] OR    HERNIA REPAIR  01/01/2000    umbilical repair    HERNIORRHAPHY UMBILICAL N/A 11/10/2015    Procedure: HERNIORRHAPHY UMBILICAL;  Surgeon: Garry Leos MD;  Location: [facility] OR    TOTAL HIP ARTHROPLASTY Left 2017    [facility]       Family History:    Family History   Problem Relation Age of Onset    Arthritis Mother     Cancer Maternal Grandmother     Arthritis Maternal Grandmother         lupus    Gastrointestinal Disease Maternal Grandfather         war injury, colostomy    Respiratory Daughter         growing out of it    Unknown/Adopted Father     Diabetes No family hx of     Coronary Artery Disease No family hx of     Hypertension No family hx of     Hyperlipidemia No family hx of     Breast Cancer No family hx of     Cancer - colorectal No family hx of     Ovarian Cancer No family hx of     Prostate Cancer No family hx of        Social History:  Marital Status:   [2]  Social History     Tobacco Use    Smoking status: Former     Packs/day: 0.25     Years: 25.00      "Additional pack years: 0.00     Total pack years: 6.25     Types: Cigarettes     Quit date: 2016     Years since quittin.1    Smokeless tobacco: Former   Vaping Use    Vaping Use: Never used   Substance Use Topics    Alcohol use: Yes     Alcohol/week: 0.0 standard drinks of alcohol     Comment: mixed 2-4 on weekends, not every weekend    Drug use: No        Medications:    alcohol swab prep pads  aspirin (ASA) 81 MG chewable tablet  atorvastatin (LIPITOR) 80 MG tablet  clobetasol (TEMOVATE) 0.05 % external cream  Continuous Blood Gluc Sensor (FREESTYLE MARY 3 SENSOR) MISC  HUMALOG KWIKPEN 100 UNIT/ML soln  insulin glargine U-300 (TOUJEO SOLOSTAR) 300 UNIT/ML (1 units dial) pen  isosorbide mononitrate (IMDUR) 30 MG 24 hr tablet  lisinopril (ZESTRIL) 2.5 MG tablet  metoprolol tartrate (LOPRESSOR) 25 MG tablet  multivitamin w/minerals (THERA-VIT-M) tablet  nitroGLYcerin (NITROSTAT) 0.4 MG sublingual tablet  Semaglutide, 1 MG/DOSE, (OZEMPIC) 4 MG/3ML pen  Semaglutide, 2 MG/DOSE, (OZEMPIC) 8 MG/3ML pen  tiZANidine (ZANAFLEX) 2 MG tablet          Review of Systems    Physical Exam   BP: 139/87  Pulse: 83  Temp: 98  F (36.7  C)  Resp: 18  Height: 177.8 cm (5' 10\")  Weight: 95.3 kg (210 lb)  SpO2: 97 %      Physical Exam  Vitals and nursing note reviewed.   Constitutional:       General: He is not in acute distress.     Appearance: He is well-developed and normal weight. He is not ill-appearing.   HENT:      Head: Normocephalic.      Comments: Right external auditory canal appeared unremarkable, there is a clear air-fluid level behind the right tympanic membrane.  There was blood and clots in the left external canal which I very carefully and meticulously removed in order to appropriately visualize the tympanic membrane which is intact.  There is a laceration with a slow ooze of blood to the inferior portion of the external auditory canal.  I did not see any foreign body.  Eyes:      Extraocular Movements: " Extraocular movements intact.      Pupils: Pupils are equal, round, and reactive to light.   Cardiovascular:      Rate and Rhythm: Normal rate and regular rhythm.      Heart sounds: Normal heart sounds.   Pulmonary:      Effort: Pulmonary effort is normal.      Breath sounds: Normal breath sounds.   Abdominal:      General: Bowel sounds are normal.      Palpations: Abdomen is soft.   Musculoskeletal:         General: Normal range of motion.      Cervical back: Normal range of motion and neck supple.   Skin:     General: Skin is warm and dry.      Capillary Refill: Capillary refill takes less than 2 seconds.   Neurological:      General: No focal deficit present.      Mental Status: He is alert.   Psychiatric:         Mood and Affect: Mood normal.         Behavior: Behavior normal.         ED Course                 Procedures              EKG Interpretation:      Interpreted by Brendan Yoo MD  Time reviewed: Time obtained 6:43 PM time interpreted same 81 bpm sinus rhythm, incomplete right bundle branch block, no definite acute ST-T wave changes.  Comparison cardiogram is dated 5/31/2023 which again demonstrates an incomplete right bundle branch block.  No significant change from previous cardiogram.             Results for orders placed or performed during the hospital encounter of 02/22/24 (from the past 24 hour(s))   CBC with Platelets & Differential    Narrative    The following orders were created for panel order CBC with Platelets & Differential.  Procedure                               Abnormality         Status                     ---------                               -----------         ------                     CBC with platelets and d...[[id number]]  Abnormal            Final result                 Please view results for these tests on the individual orders.   Basic metabolic panel   Result Value Ref Range    Sodium 138 135 - 145 mmol/L    Potassium 4.2 3.4 - 5.3 mmol/L    Chloride 103 98 - 107  mmol/L    Carbon Dioxide (CO2) 23 22 - 29 mmol/L    Anion Gap 12 7 - 15 mmol/L    Urea Nitrogen 19.5 6.0 - 20.0 mg/dL    Creatinine 1.12 0.67 - 1.17 mg/dL    GFR Estimate 79 >60 mL/min/1.73m2    Calcium 9.1 8.6 - 10.0 mg/dL    Glucose 144 (H) 70 - 99 mg/dL   Troponin T, High Sensitivity   Result Value Ref Range    Troponin T, High Sensitivity 9 <=22 ng/L   [place] Draw    Narrative    The following orders were created for panel order [place] Draw.  Procedure                               Abnormality         Status                     ---------                               -----------         ------                     Extra Blue Top Tube[[id number]]                              Final result               Extra Red Top Tube[[id number]]                               Final result                 Please view results for these tests on the individual orders.   CBC with platelets and differential   Result Value Ref Range    WBC Count 8.2 4.0 - 11.0 10e3/uL    RBC Count 4.29 (L) 4.40 - 5.90 10e6/uL    Hemoglobin 13.2 (L) 13.3 - 17.7 g/dL    Hematocrit 40.4 40.0 - 53.0 %    MCV 94 78 - 100 fL    MCH 30.8 26.5 - 33.0 pg    MCHC 32.7 31.5 - 36.5 g/dL    RDW 13.7 10.0 - 15.0 %    Platelet Count 193 150 - 450 10e3/uL    % Neutrophils 58 %    % Lymphocytes 32 %    % Monocytes 8 %    % Eosinophils 2 %    % Basophils 0 %    % Immature Granulocytes 0 %    NRBCs per 100 WBC 0 <1 /100    Absolute Neutrophils 4.8 1.6 - 8.3 10e3/uL    Absolute Lymphocytes 2.6 0.8 - 5.3 10e3/uL    Absolute Monocytes 0.7 0.0 - 1.3 10e3/uL    Absolute Eosinophils 0.1 0.0 - 0.7 10e3/uL    Absolute Basophils 0.0 0.0 - 0.2 10e3/uL    Absolute Immature Granulocytes 0.0 <=0.4 10e3/uL    Absolute NRBCs 0.0 10e3/uL   Extra Blue Top Tube   Result Value Ref Range    Hold Specimen JIC    Extra Red Top Tube   Result Value Ref Range    Hold Specimen JIC    XR Chest 2 Views    Narrative    EXAM: XR CHEST 2 VIEWS  LOCATION: [facility]  DATE:  2/22/2024    INDICATION: Chest pain.  COMPARISON: 02/09/2022.      Impression    IMPRESSION: No focal airspace consolidation. Minimal left basilar atelectasis. No pleural effusion or pneumothorax.    Cardiomediastinal silhouette is normal. Median sternotomy.    Atherosclerotic calcifications of the left carotid.   Troponin T, High Sensitivity   Result Value Ref Range    Troponin T, High Sensitivity 9 <=22 ng/L   9:56 PM  Unchanged normal troponins x 2.  No ischemic/infarct appearance on EKG.  No current discomfort.  Low suspicion for ACS given behavior of his discomfort.  The patient does have a cardiology visit as noted in the HPI upcoming in approximately 1 month.  Watchful observation of discomfort return if worse or changes.  With respect to the traumatized left external ear canal the patient is aware he should be putting nothing in his ear until it is completely improved.  Ciprodex 4 drops twice daily x 10 days.  Left ear.  Return criteria reviewed.  If not improving or worse recheck in primary clinic or ENT visit.      Medications   ciprofloxacin-dexAMETHasone (CIPRODEX) 0.3-0.1 % otic suspension 4 drop (4 drops Left Ear $Given 2/22/24 5460)       Assessments & Plan (with Medical Decision Making)   Assessments and plan with medical decision making at the time stamp above.      Disclaimer: This note consists of symbols derived from keyboarding, dictation and/or voice recognition software. As a result, there may be errors in the script that have gone undetected. Please consider this when interpreting information found in this chart.      I have reviewed the nursing notes.    I have reviewed the findings, diagnosis, plan and need for follow up with the patient.        New Prescriptions    No medications on file       Final diagnoses:   Atypical chest pain   Laceration of left external auditory canal, initial encounter       2/22/2024   [facility] EMERGENCY DEPT       Brendan Yoo,  MD  02/22/24 8975

## 2024-02-23 NOTE — ED TRIAGE NOTES
Pt presents with 2 weeks intermittent CP.  H/o prior MIs and CABG x4.  Pt is poor historian.      Triage Assessment (Adult)       Row Name 02/22/24 1827          Triage Assessment    Airway WDL WDL        Cognitive/Neuro/Behavioral WDL    Cognitive/Neuro/Behavioral WDL WDL

## 2024-02-23 NOTE — DISCHARGE INSTRUCTIONS
No concerning explanation is found for your chest discomfort at this time.  Your cardiac evaluation revealed an EKG without any significant changes and your cardiac troponin is normal range x 2.  Recommend follow-up in clinic with cardiology as you have planned in the future.  With respect to the laceration of the left external auditory canal as we discussed Ciprodex otic drops 4 drops twice daily next 10 days.  No earbuds or anything such as a Q-tip to the left ear until this is clearly resolved.  If no improvement or worsening please follow-up in clinic with ENT (contact information given above)

## 2024-02-23 NOTE — TELEPHONE ENCOUNTER
Nurse Triage SBAR    Is this a 2nd Level Triage? NO    Situation: Bleeding in ear.    Background: Patient reports he was at work. He bent over and when he looked up, there was blood coming out of his left ear.    Assessment: Denies any injury to ear, head or face. Reports left ear has been bleeding for over an hour, large amount and blood clots the size of the end of a Q-tip.     Protocol Recommended Disposition:   Go to ED Now    Recommendation: Recommendation is to go to ED now. Reviewed care advice and call back instructions. Patient plans to follow advice.          Does the patient meet one of the following criteria for ADS visit consideration? 16+ years old, with an [facility] PCP     TIP  Providers, please consider if this condition is appropriate for management at one of our Acute and Diagnostic Services sites.     If patient is a good candidate, please use dotphrase <dot>triageresponse and select Refer to ADS to document.      Reason for Disposition   [1] Unexplained bleeding AND [2] lasts > 10 minutes or large amount (Exception: If a few drops of blood, continue with triage.)    Additional Information   Negative: [1] Clear or white discharge AND [2] swimmer's ear suspected (e.g., recent swimming, ear pain occurs or increases when the earlobe is pulled up and down)   Negative: [1] Bloody ear discharge AND [2] after an ear injury   Negative: Earwax is main concern   Negative: [1] Bloody or clear ear discharge AND [2] after a head or face injury    Protocols used: Ear - [id number]

## 2024-02-23 NOTE — TELEPHONE ENCOUNTER
What type of discharge? Emergency Department  Risk of Hospital admission or ED visit: 68%  Is a TCM episode required? No  When should the patient follow up with PCP? 7 days of discharge.    Bindu Mercer RN  [facility]

## 2024-02-23 NOTE — ED NOTES
Pt also reports working outside in the yard today and felt like left ear had drainage.  Pt put finger in left ear and it was bloody.  Pt has gauze pad in left ear with small amount of red drainage.  Pt denies pain

## 2024-02-23 NOTE — TELEPHONE ENCOUNTER
"  ED for acute condition Discharge Protocol    \"Hi, my name is Amanda Griffin RN, a registered nurse, and I am calling from [facility].  I am calling to follow up and see how things are going for you after your recent emergency visit.\"    Tell me how you are doing now that you are home?\" Doing fine, still having some ear bleeding      Discharge Instructions    \"Let's review your discharge instructions.  What is/are the follow-up recommendations?  Pt. Response: follow up cardiology and ent if needed    \"Has an appointment with your primary care provider been scheduled?\"  No (not needed)    Medications    \"Tell me what changed about your medicines when you discharged?\"    Ear drops    \"What questions do you have about your medications?\"   None        Call Summary    \"What questions or concerns do you have about your recent visit and your follow-up care?\"     none    \"If you have questions or things don't continue to improve, we encourage you contact us through the main clinic number (give number).  Even if the clinic is not open, triage nurses are available 24/7 to help you.     We would like you to know that our clinic has extended hours (provide information).  We also have urgent care (provide details on closest location and hours/contact info)\"    \"Thank you for your time and take care!\"         "

## 2024-02-26 DIAGNOSIS — Z95.1 S/P CABG (CORONARY ARTERY BYPASS GRAFT): ICD-10-CM

## 2024-02-26 DIAGNOSIS — I25.810 CORONARY ARTERY DISEASE INVOLVING CORONARY BYPASS GRAFT OF NATIVE HEART WITHOUT ANGINA PECTORIS: Primary | ICD-10-CM

## 2024-02-26 DIAGNOSIS — E78.5 HYPERLIPIDEMIA LDL GOAL <70: ICD-10-CM

## 2024-03-18 ENCOUNTER — HOSPITAL ENCOUNTER (OUTPATIENT)
Dept: CARDIOLOGY | Facility: CLINIC | Age: 54
Discharge: HOME | End: 2024-03-18
Attending: INTERNAL MEDICINE | Admitting: INTERNAL MEDICINE
Payer: COMMERCIAL

## 2024-03-18 ENCOUNTER — LAB (OUTPATIENT)
Dept: LAB | Facility: CLINIC | Age: 54
End: 2024-03-18
Payer: COMMERCIAL

## 2024-03-18 DIAGNOSIS — I25.810 CORONARY ARTERY DISEASE INVOLVING CORONARY BYPASS GRAFT OF NATIVE HEART WITHOUT ANGINA PECTORIS: ICD-10-CM

## 2024-03-18 DIAGNOSIS — Z95.1 S/P CABG (CORONARY ARTERY BYPASS GRAFT): ICD-10-CM

## 2024-03-18 DIAGNOSIS — E78.5 HYPERLIPIDEMIA LDL GOAL <70: ICD-10-CM

## 2024-03-18 LAB
ALT SERPL W P-5'-P-CCNC: 36 U/L (ref 0–70)
CHOLEST SERPL-MCNC: 126 MG/DL
FASTING STATUS PATIENT QL REPORTED: YES
HDLC SERPL-MCNC: 38 MG/DL
LDLC SERPL CALC-MCNC: 74 MG/DL
LVEF ECHO: NORMAL
NONHDLC SERPL-MCNC: 88 MG/DL
TRIGL SERPL-MCNC: 68 MG/DL

## 2024-03-18 PROCEDURE — 36415 COLL VENOUS BLD VENIPUNCTURE: CPT | Performed by: INTERNAL MEDICINE

## 2024-03-18 PROCEDURE — 80061 LIPID PANEL: CPT | Performed by: INTERNAL MEDICINE

## 2024-03-18 PROCEDURE — 93306 TTE W/DOPPLER COMPLETE: CPT

## 2024-03-18 PROCEDURE — 84460 ALANINE AMINO (ALT) (SGPT): CPT | Performed by: INTERNAL MEDICINE

## 2024-03-18 PROCEDURE — 93306 TTE W/DOPPLER COMPLETE: CPT | Mod: 26 | Performed by: INTERNAL MEDICINE

## 2024-03-18 NOTE — RESULT ENCOUNTER NOTE
EF 55-60%; mild AoV sclerosis but no stenosis; borderline dilation asc aorta at 3.8 cm. Follow up with Dr Montiel on 3/20/24.

## 2024-03-20 ENCOUNTER — OFFICE VISIT (OUTPATIENT)
Dept: CARDIOLOGY | Facility: CLINIC | Age: 54
End: 2024-03-20
Payer: COMMERCIAL

## 2024-03-20 VITALS
BODY MASS INDEX: 30.46 KG/M2 | DIASTOLIC BLOOD PRESSURE: 79 MMHG | OXYGEN SATURATION: 96 % | HEIGHT: 70 IN | RESPIRATION RATE: 16 BRPM | WEIGHT: 212.8 LBS | HEART RATE: 105 BPM | SYSTOLIC BLOOD PRESSURE: 117 MMHG

## 2024-03-20 DIAGNOSIS — Z95.1 S/P CABG (CORONARY ARTERY BYPASS GRAFT): ICD-10-CM

## 2024-03-20 DIAGNOSIS — I25.10 CORONARY ARTERY DISEASE INVOLVING NATIVE CORONARY ARTERY OF NATIVE HEART WITHOUT ANGINA PECTORIS: ICD-10-CM

## 2024-03-20 DIAGNOSIS — I10 HYPERTENSION, GOAL BELOW 140/90: ICD-10-CM

## 2024-03-20 PROCEDURE — 99214 OFFICE O/P EST MOD 30 MIN: CPT | Performed by: INTERNAL MEDICINE

## 2024-03-20 RX ORDER — NITROGLYCERIN 0.4 MG/1
TABLET SUBLINGUAL
Qty: 20 TABLET | Refills: 11 | Status: SHIPPED | OUTPATIENT
Start: 2024-03-20

## 2024-03-20 RX ORDER — METOPROLOL TARTRATE 25 MG/1
25 TABLET, FILM COATED ORAL 2 TIMES DAILY
Qty: 180 TABLET | Refills: 3 | Status: SHIPPED | OUTPATIENT
Start: 2024-03-20 | End: 2024-04-24

## 2024-03-20 RX ORDER — EZETIMIBE 10 MG/1
10 TABLET ORAL DAILY
Qty: 90 TABLET | Refills: 3 | Status: SHIPPED | OUTPATIENT
Start: 2024-03-20

## 2024-03-20 RX ORDER — LISINOPRIL 2.5 MG/1
2.5 TABLET ORAL DAILY
Qty: 90 TABLET | Refills: 3 | Status: SHIPPED | OUTPATIENT
Start: 2024-03-20

## 2024-03-20 NOTE — LETTER
3/20/2024    Yue Ruvalcaba MD  [address]  [facility] [address]    RE: Houston Winters       Dear Colleague,     I had the pleasure of seeing Houston Winters in the [facility].  CARDIOLOGY CLINIC CONSULTATION    PRIMARY CARE PHYSICIAN:  Yue Ruvalcaba    HISTORY OF PRESENT ILLNESS:  This is a pleasant 53-year-old gentleman who is seeing me in follow-up.  He was initially seen by me in consultation in July 2021.  He has not been seen in the last 3 years nearly.  He is here for routine follow-up today.  He has the following pertinent medical issues    The patient presented in May 2021 to [facility] with chest discomfort and was found to have severe multivessel coronary disease.  Bypass surgery was recommended. Patient refused during that admission and eventually went to [facility] as an outpatient and underwent bypass surgery there.  He had LIMA to diagonal LAD sequential graft, LOUIS to ramus and vein graft to the PDA in June 2021  Uncontrolled diabetes in the past now significantly improved   Uncontrolled hyperlipidemia improved after statin use  Prior history of smoking  Controlled hypertension    Today the patient is here for routine cardiology follow-up.  On February 22, 2024 the patient was in the emergency department at [place] with chest discomfort.  He had normal troponins x 2 and no significant changes on the ECG.  Pain was thought to be atypical/noncardiac and as such he was discharged home.  Having said that the patient tells me that the pain actually resolved with nitroglycerin.  Subsequently echocardiogram was done on 3/18/2024 that showed normal biventricular size and systolic function.    The patient's daughter is a nurse and is alongside the patient at this visit today.  They tell me that a similar episode happened even in the month prior to this episode where the symptoms resolved with nitroglycerin.  At baseline the patient is reasonably functionally active.   Denies any typical symptoms of angina.  His diet is somewhat suboptimal.  He denies any GERD like symptoms.    The patient denies heart failure symptoms.  No syncope presyncope.  He has atypical symptoms of chest discomfort.  No classic symptoms of angina.  The patient is compliant with his medications.    PAST MEDICAL HISTORY:  Past Medical History:   Diagnosis Date    DM (diabetes mellitus) (H)     Epilepsy (H)     Intractable epilepsy, last seizure 2008    Hyperlipidemia due to type 2 diabetes mellitus (H) 12/15/2022    MEDICAL HISTORY OF - Age 16     Gangrene in foot stepped on a nail hospitalized for 7 weeks    Nicotine dependence     Tobacco use disorder 2/4/2015    Unstable angina (H) 5/18/2021       MEDICATIONS:  Current Outpatient Medications   Medication    aspirin (ASA) 81 MG chewable tablet    atorvastatin (LIPITOR) 80 MG tablet    insulin glargine U-300 (TOUJEO SOLOSTAR) 300 UNIT/ML (1 units dial) pen    lisinopril (ZESTRIL) 2.5 MG tablet    metoprolol tartrate (LOPRESSOR) 25 MG tablet    multivitamin w/minerals (THERA-VIT-M) tablet    nitroGLYcerin (NITROSTAT) 0.4 MG sublingual tablet    Semaglutide, 2 MG/DOSE, (OZEMPIC) 8 MG/3ML pen    alcohol swab prep pads    clobetasol (TEMOVATE) 0.05 % external cream    Continuous Blood Gluc Sensor (FREESTYLE MARY 3 SENSOR) MISC    HUMALOG KWIKPEN 100 UNIT/ML soln    isosorbide mononitrate (IMDUR) 30 MG 24 hr tablet    Semaglutide, 1 MG/DOSE, (OZEMPIC) 4 MG/3ML pen     No current facility-administered medications for this visit.       SOCIAL HISTORY:  I have reviewed this patient's social history and updated it with pertinent information if needed. Houston Winters  reports that he quit smoking about 8 years ago. His smoking use included cigarettes. He has a 6.3 pack-year smoking history. He has quit using smokeless tobacco. He reports current alcohol use. He reports that he does not use drugs.    PHYSICAL EXAM:  Pulse:  [105] 105  Resp:  [16] 16  BP: (117)/(79)  117/79  SpO2:  [96 %] 96 %  212 lbs 12.8 oz    Constitutional: alert, no distress  Respiratory: Nonlabored  Cardiovascular: Regular sounds no edema  GI: nondistended  Neuropsychiatric: appropriate affact    ASSESSMENT: Pertinent issues addressed/ reviewed during this cardiology visit  History of coronary artery disease status post bypass surgery  Atypical chest pain  Uncontrolled hyperlipidemia  Diabetes and hypertension    RECOMMENDATIONS:  The patient has had 2 episodes of chest discomfort in 2024 both of them are relieved with nitroglycerin although on a daily basis he does not have any typical symptoms of angina.  His blood pressure is under good control.  I recommend getting a Lexiscan nuclear stress test.  The patient recently had hip surgery and cannot exercise.  Echocardiogram shows normal LV function.  His LDL has significantly improved after high-dose statin but it is still in the 70 range.  Recommend initiation of Zetia 10 mg daily and try to get LDL close to 50.  Would recommend yearly follow-up with the cardiology team either at [facility] or with us.  Aggressive risk factor modification recommended.  Patient has been advised that if the stress test is abnormal he will undergo coronary angiography and in that case he is willing to proceed.    It was a pleasure seeing this patient in clinic today. Please do not hesitate to contact me with any future questions.     VIKI Chirinos, [facility]  Cardiology - [facility]  March 20, 2024    Review of the result(s) of each unique test - Last CBC BMP echo lipids ECG     The level of medical decision making during this visit was of moderate complexity.    This note was completed in part using dictation via the Dragon voice recognition software. Some word and grammatical errors may occur and must be interpreted in the appropriate clinical context.  If there are any questions pertaining to this issue, please contact me for further clarification.    Thank you for  allowing me to participate in the care of your patient.      Sincerely,     Drake Montiel MD     [facility]  cc:   Drake Montiel MD  [address]  [place] [address]

## 2024-03-20 NOTE — PROGRESS NOTES
CARDIOLOGY CLINIC CONSULTATION    PRIMARY CARE PHYSICIAN:  Yue Ruvalcaba    HISTORY OF PRESENT ILLNESS:  This is a pleasant 53-year-old gentleman who is seeing me in follow-up.  He was initially seen by me in consultation in July 2021.  He has not been seen in the last 3 years nearly.  He is here for routine follow-up today.  He has the following pertinent medical issues    The patient presented in May 2021 to [facility] with chest discomfort and was found to have severe multivessel coronary disease.  Bypass surgery was recommended. Patient refused during that admission and eventually went to [facility] as an outpatient and underwent bypass surgery there.  He had LIMA to diagonal LAD sequential graft, LOUIS to ramus and vein graft to the PDA in June 2021  Uncontrolled diabetes in the past now significantly improved   Uncontrolled hyperlipidemia improved after statin use  Prior history of smoking  Controlled hypertension    Today the patient is here for routine cardiology follow-up.  On February 22, 2024 the patient was in the emergency department at [place] with chest discomfort.  He had normal troponins x 2 and no significant changes on the ECG.  Pain was thought to be atypical/noncardiac and as such he was discharged home.  Having said that the patient tells me that the pain actually resolved with nitroglycerin.  Subsequently echocardiogram was done on 3/18/2024 that showed normal biventricular size and systolic function.    The patient's daughter is a nurse and is alongside the patient at this visit today.  They tell me that a similar episode happened even in the month prior to this episode where the symptoms resolved with nitroglycerin.  At baseline the patient is reasonably functionally active.  Denies any typical symptoms of angina.  His diet is somewhat suboptimal.  He denies any GERD like symptoms.    The patient denies heart failure symptoms.  No syncope presyncope.  He has atypical symptoms of  chest discomfort.  No classic symptoms of angina.  The patient is compliant with his medications.    PAST MEDICAL HISTORY:  Past Medical History:   Diagnosis Date    DM (diabetes mellitus) (H)     Epilepsy (H)     Intractable epilepsy, last seizure 2008    Hyperlipidemia due to type 2 diabetes mellitus (H) 12/15/2022    MEDICAL HISTORY OF - Age 16     Gangrene in foot stepped on a nail hospitalized for 7 weeks    Nicotine dependence     Tobacco use disorder 2/4/2015    Unstable angina (H) 5/18/2021       MEDICATIONS:  Current Outpatient Medications   Medication    aspirin (ASA) 81 MG chewable tablet    atorvastatin (LIPITOR) 80 MG tablet    insulin glargine U-300 (TOUJEO SOLOSTAR) 300 UNIT/ML (1 units dial) pen    lisinopril (ZESTRIL) 2.5 MG tablet    metoprolol tartrate (LOPRESSOR) 25 MG tablet    multivitamin w/minerals (THERA-VIT-M) tablet    nitroGLYcerin (NITROSTAT) 0.4 MG sublingual tablet    Semaglutide, 2 MG/DOSE, (OZEMPIC) 8 MG/3ML pen    alcohol swab prep pads    clobetasol (TEMOVATE) 0.05 % external cream    Continuous Blood Gluc Sensor (FREESTYLE MARY 3 SENSOR) MISC    HUMALOG KWIKPEN 100 UNIT/ML soln    isosorbide mononitrate (IMDUR) 30 MG 24 hr tablet    Semaglutide, 1 MG/DOSE, (OZEMPIC) 4 MG/3ML pen     No current facility-administered medications for this visit.       SOCIAL HISTORY:  I have reviewed this patient's social history and updated it with pertinent information if needed. Houston Winters  reports that he quit smoking about 8 years ago. His smoking use included cigarettes. He has a 6.3 pack-year smoking history. He has quit using smokeless tobacco. He reports current alcohol use. He reports that he does not use drugs.    PHYSICAL EXAM:  Pulse:  [105] 105  Resp:  [16] 16  BP: (117)/(79) 117/79  SpO2:  [96 %] 96 %  212 lbs 12.8 oz    Constitutional: alert, no distress  Respiratory: Nonlabored  Cardiovascular: Regular sounds no edema  GI: nondistended  Neuropsychiatric: appropriate  affact    ASSESSMENT: Pertinent issues addressed/ reviewed during this cardiology visit  History of coronary artery disease status post bypass surgery  Atypical chest pain  Uncontrolled hyperlipidemia  Diabetes and hypertension    RECOMMENDATIONS:  The patient has had 2 episodes of chest discomfort in 2024 both of them are relieved with nitroglycerin although on a daily basis he does not have any typical symptoms of angina.  His blood pressure is under good control.  I recommend getting a Lexiscan nuclear stress test.  The patient recently had hip surgery and cannot exercise.  Echocardiogram shows normal LV function.  His LDL has significantly improved after high-dose statin but it is still in the 70 range.  Recommend initiation of Zetia 10 mg daily and try to get LDL close to 50.  Would recommend yearly follow-up with the cardiology team either at [facility] ER or with us.  Aggressive risk factor modification recommended.  Patient has been advised that if the stress test is abnormal he will undergo coronary angiography and in that case he is willing to proceed.    It was a pleasure seeing this patient in clinic today. Please do not hesitate to contact me with any future questions.     VIKI Chirinos, [facility]  Cardiology - [facility]  March 20, 2024    Review of the result(s) of each unique test - Last CBC BMP echo lipids ECG     The level of medical decision making during this visit was of moderate complexity.    This note was completed in part using dictation via the Dragon voice recognition software. Some word and grammatical errors may occur and must be interpreted in the appropriate clinical context.  If there are any questions pertaining to this issue, please contact me for further clarification.

## 2024-03-21 ENCOUNTER — APPOINTMENT (OUTPATIENT)
Dept: CT IMAGING | Facility: CLINIC | Age: 54
End: 2024-03-21
Attending: FAMILY MEDICINE
Payer: COMMERCIAL

## 2024-03-21 ENCOUNTER — TRANSFERRED RECORDS (OUTPATIENT)
Dept: HEALTH INFORMATION MANAGEMENT | Facility: CLINIC | Age: 54
End: 2024-03-21

## 2024-03-21 ENCOUNTER — HOSPITAL ENCOUNTER (EMERGENCY)
Facility: CLINIC | Age: 54
Discharge: OTHER INSTITUTION | End: 2024-03-22
Attending: FAMILY MEDICINE | Admitting: FAMILY MEDICINE
Payer: COMMERCIAL

## 2024-03-21 DIAGNOSIS — I63.9 ACUTE CVA (CEREBROVASCULAR ACCIDENT) (H): ICD-10-CM

## 2024-03-21 LAB
ANION GAP SERPL CALCULATED.3IONS-SCNC: 10 MMOL/L (ref 7–15)
APTT PPP: 31 SECONDS (ref 22–38)
BASOPHILS # BLD AUTO: 0 10E3/UL (ref 0–0.2)
BASOPHILS NFR BLD AUTO: 0 %
BUN SERPL-MCNC: 21.6 MG/DL (ref 6–20)
CALCIUM SERPL-MCNC: 9.2 MG/DL (ref 8.6–10)
CHLORIDE SERPL-SCNC: 103 MMOL/L (ref 98–107)
CREAT SERPL-MCNC: 0.8 MG/DL (ref 0.67–1.17)
DEPRECATED HCO3 PLAS-SCNC: 26 MMOL/L (ref 22–29)
EGFRCR SERPLBLD CKD-EPI 2021: >90 ML/MIN/1.73M2
EOSINOPHIL # BLD AUTO: 0.2 10E3/UL (ref 0–0.7)
EOSINOPHIL NFR BLD AUTO: 2 %
ERYTHROCYTE [DISTWIDTH] IN BLOOD BY AUTOMATED COUNT: 13.4 % (ref 10–15)
GLUCOSE BLDC GLUCOMTR-MCNC: 163 MG/DL (ref 70–99)
GLUCOSE SERPL-MCNC: 170 MG/DL (ref 70–99)
HCT VFR BLD AUTO: 44.1 % (ref 40–53)
HGB BLD-MCNC: 14.4 G/DL (ref 13.3–17.7)
IMM GRANULOCYTES # BLD: 0 10E3/UL
IMM GRANULOCYTES NFR BLD: 0 %
INR PPP: 0.92 (ref 0.85–1.15)
LYMPHOCYTES # BLD AUTO: 3 10E3/UL (ref 0.8–5.3)
LYMPHOCYTES NFR BLD AUTO: 33 %
MCH RBC QN AUTO: 30.9 PG (ref 26.5–33)
MCHC RBC AUTO-ENTMCNC: 32.7 G/DL (ref 31.5–36.5)
MCV RBC AUTO: 95 FL (ref 78–100)
MONOCYTES # BLD AUTO: 0.8 10E3/UL (ref 0–1.3)
MONOCYTES NFR BLD AUTO: 8 %
NEUTROPHILS # BLD AUTO: 5.1 10E3/UL (ref 1.6–8.3)
NEUTROPHILS NFR BLD AUTO: 57 %
NRBC # BLD AUTO: 0 10E3/UL
NRBC BLD AUTO-RTO: 0 /100
PLATELET # BLD AUTO: 226 10E3/UL (ref 150–450)
POTASSIUM SERPL-SCNC: 4.3 MMOL/L (ref 3.4–5.3)
RBC # BLD AUTO: 4.66 10E6/UL (ref 4.4–5.9)
SODIUM SERPL-SCNC: 139 MMOL/L (ref 135–145)
TROPONIN T SERPL HS-MCNC: 8 NG/L
WBC # BLD AUTO: 9.1 10E3/UL (ref 4–11)

## 2024-03-21 PROCEDURE — 250N000013 HC RX MED GY IP 250 OP 250 PS 637: Performed by: FAMILY MEDICINE

## 2024-03-21 PROCEDURE — 70450 CT HEAD/BRAIN W/O DYE: CPT

## 2024-03-21 PROCEDURE — 70450 CT HEAD/BRAIN W/O DYE: CPT | Mod: 76,XU

## 2024-03-21 PROCEDURE — 93010 ELECTROCARDIOGRAM REPORT: CPT | Performed by: FAMILY MEDICINE

## 2024-03-21 PROCEDURE — 85025 COMPLETE CBC W/AUTO DIFF WBC: CPT | Performed by: FAMILY MEDICINE

## 2024-03-21 PROCEDURE — 99291 CRITICAL CARE FIRST HOUR: CPT | Mod: 25 | Performed by: FAMILY MEDICINE

## 2024-03-21 PROCEDURE — 82962 GLUCOSE BLOOD TEST: CPT

## 2024-03-21 PROCEDURE — 80048 BASIC METABOLIC PNL TOTAL CA: CPT | Performed by: FAMILY MEDICINE

## 2024-03-21 PROCEDURE — 84484 ASSAY OF TROPONIN QUANT: CPT | Performed by: FAMILY MEDICINE

## 2024-03-21 PROCEDURE — 250N000011 HC RX IP 250 OP 636: Performed by: FAMILY MEDICINE

## 2024-03-21 PROCEDURE — G0508 CRIT CARE TELEHEA CONSULT 60: HCPCS | Mod: G0 | Performed by: PSYCHIATRY & NEUROLOGY

## 2024-03-21 PROCEDURE — 250N000011 HC RX IP 250 OP 636: Performed by: PSYCHIATRY & NEUROLOGY

## 2024-03-21 PROCEDURE — 85610 PROTHROMBIN TIME: CPT | Performed by: FAMILY MEDICINE

## 2024-03-21 PROCEDURE — 99292 CRITICAL CARE ADDL 30 MIN: CPT | Mod: 25 | Performed by: FAMILY MEDICINE

## 2024-03-21 PROCEDURE — 96374 THER/PROPH/DIAG INJ IV PUSH: CPT | Mod: 59 | Performed by: FAMILY MEDICINE

## 2024-03-21 PROCEDURE — 250N000009 HC RX 250: Performed by: FAMILY MEDICINE

## 2024-03-21 PROCEDURE — 70496 CT ANGIOGRAPHY HEAD: CPT

## 2024-03-21 PROCEDURE — 85730 THROMBOPLASTIN TIME PARTIAL: CPT | Performed by: FAMILY MEDICINE

## 2024-03-21 PROCEDURE — 93005 ELECTROCARDIOGRAM TRACING: CPT | Performed by: FAMILY MEDICINE

## 2024-03-21 PROCEDURE — 36415 COLL VENOUS BLD VENIPUNCTURE: CPT | Performed by: FAMILY MEDICINE

## 2024-03-21 RX ORDER — SODIUM CHLORIDE 9 MG/ML
INJECTION, SOLUTION INTRAVENOUS CONTINUOUS PRN
Status: DISCONTINUED | OUTPATIENT
Start: 2024-03-21 | End: 2024-03-22 | Stop reason: HOSPADM

## 2024-03-21 RX ORDER — HYDRALAZINE HYDROCHLORIDE 20 MG/ML
10 INJECTION INTRAMUSCULAR; INTRAVENOUS EVERY 10 MIN PRN
Status: DISCONTINUED | OUTPATIENT
Start: 2024-03-21 | End: 2024-03-22 | Stop reason: HOSPADM

## 2024-03-21 RX ORDER — ACETAMINOPHEN 325 MG/1
975 TABLET ORAL ONCE
Status: COMPLETED | OUTPATIENT
Start: 2024-03-21 | End: 2024-03-21

## 2024-03-21 RX ORDER — ASPIRIN 81 MG
100 TABLET, DELAYED RELEASE (ENTERIC COATED) ORAL DAILY
COMMUNITY

## 2024-03-21 RX ORDER — LABETALOL HYDROCHLORIDE 5 MG/ML
10 INJECTION, SOLUTION INTRAVENOUS EVERY 10 MIN PRN
Status: DISCONTINUED | OUTPATIENT
Start: 2024-03-21 | End: 2024-03-22 | Stop reason: HOSPADM

## 2024-03-21 RX ORDER — IOPAMIDOL 755 MG/ML
67 INJECTION, SOLUTION INTRAVASCULAR ONCE
Status: COMPLETED | OUTPATIENT
Start: 2024-03-21 | End: 2024-03-21

## 2024-03-21 RX ADMIN — Medication 24.5 MG: at 18:51

## 2024-03-21 RX ADMIN — IOPAMIDOL 67 ML: 755 INJECTION, SOLUTION INTRAVENOUS at 18:11

## 2024-03-21 RX ADMIN — ACETAMINOPHEN 975 MG: 325 TABLET, FILM COATED ORAL at 20:14

## 2024-03-21 RX ADMIN — SODIUM CHLORIDE 100 ML: 9 INJECTION, SOLUTION INTRAVENOUS at 18:13

## 2024-03-21 ASSESSMENT — ACTIVITIES OF DAILY LIVING (ADL)
ADLS_ACUITY_SCORE: 35

## 2024-03-21 NOTE — CONSULTS
"[facility]    Stroke Consult Note    Reason for Consult: Stroke Code     Chief Complaint: Stroke Symptoms      HPI  Houston Winters is a 53 year old male who was having a conversation with his daughter when around 5:30 pm had sudden onset of trembling of the left UE and speech difficulty. Daughter checked him and noticed that he was weak on the left side. Patient has history of seizures in the past and his seizures were in the form of \"staring\" but he did not have subsequent one sided weakness before. Used to be on AEDs but stopped 15 years ago because he did not like how they made him feel. Last seizure was 7-8 years ago.       Imaging Findings  Head CT: no acute pathology   CTA head and neck: no significant stenosis or occlusion     Intravenous Thrombolysis  Risks (including potential for bleeding and death), benefits, and alternatives to thrombolytic therapy were discussed with Patient and Family. Tenecteplase (TNK) administered without delay.    Endovascular Treatment  Not initiated due to absence of proximal vessel occlusion    Impression   Acute ischemic stroke of right hemisphere due to undetermined etiology  as workup is incomplete.   It is possible that the patient is in a postictal state after a seizure, however, the seizure description by the family is different from what happened today and I think there is enough suspicion for stroke to warrant acute treatment.     Recommendations  - Use orderset: \"Ischemic Stroke Post-Thrombolytics/Thrombectomy ICU Admission\"  - Neurochecks and vital signs per post-thrombolytic orders and monitor closely for any evidence of CNS hemorrhage, bleeding, or orolingual angioedema  - Goal BP < 180 / 105  - Hold all antithrombotic and anticoagulant medications for 24 hrs post-thrombolytic  - Hold pharmacologic VTE prophylaxis for 24 hrs post-thrombolytic  - Statin:  Atorvastatin 40  - Repeat Head CT 24 hrs post-thrombolytic  - MRI Brain with " "and without contrast  - TTE (with Bubble Study if age 60 yrs or less)  - Telemetry, EKG  - Bedside Glucose Monitoring  - A1c, Lipid Panel, Troponin x 3  - PT/OT/SLP  - Stroke Education  - Euthermia, Euglycemia    Marilyn Gudino MD, Msc, [facility]   of Neurology  [facility]     03/21/2024 6:59 PM  To page me or covering stroke neurology team member, click here: AMCOM  Choose \"On Call\" tab at top, then search dropdown box for \"Neurology Adult\" & press Enter, look for Neuro ICU/Stroke    ______________________________________________________    Clinically Significant Risk Factors Present on Admission                # Drug Induced Platelet Defect: home medication list includes an antiplatelet medication   # Hypertension: Noted on problem list      # Obesity: Estimated body mass index is 30.81 kg/m  as calculated from the following:    Height as of 3/20/24: 1.778 m (5' 10\").    Weight as of this encounter: 97.4 kg (214 lb 11.2 oz).              Past Medical History   Past Medical History:   Diagnosis Date    DM (diabetes mellitus) (H)     Epilepsy (H)     Intractable epilepsy, last seizure 2008    Hyperlipidemia due to type 2 diabetes mellitus (H) 12/15/2022    MEDICAL HISTORY OF - Age 16     Gangrene in foot stepped on a nail hospitalized for 7 weeks    Nicotine dependence     Tobacco use disorder 2/4/2015    Unstable angina (H) 5/18/2021     Past Surgical History   Past Surgical History:   Procedure Laterality Date    CL AFF SURGICAL PATHOLOGY  01/01/2005    ganglion cyst removed    CORONARY ARTERY BYPASS  06/16/2021    4 Vessel CABG - at [place]    CV HEART CATHETERIZATION WITH POSSIBLE INTERVENTION N/A 05/18/2021    Procedure: Heart Catheterization with Possible Intervention;  Surgeon: Andrew Madrid MD;  Location:  HEART CARDIAC CATH LAB    CV LEFT HEART CATH N/A 05/18/2021    Procedure: Left Heart Cath;  Surgeon: Andrew Madrid MD;  Location:  HEART CARDIAC CATH LAB    " EXCISE MASS LOWER EXTREMITY  02/15/2013    Procedure: EXCISE MASS LOWER EXTREMITY;  Right Ankle Excision of ganglion cyst;  Surgeon: Akbar Melo DPM;  Location: [facility] OR    HERNIA REPAIR  01/01/2000    umbilical repair    HERNIORRHAPHY UMBILICAL N/A 11/10/2015    Procedure: HERNIORRHAPHY UMBILICAL;  Surgeon: Garry Leos MD;  Location: [facility] OR    TOTAL HIP ARTHROPLASTY Left 2017    [facility]     Medications   Home Meds  Prior to Admission medications    Medication Sig Start Date End Date Taking? Authorizing Provider   alcohol swab prep pads Use to swab area of injection/jaun as directed.  Patient not taking: Reported on 3/20/2024 5/19/21   Cristy Castillo DO   aspirin (ASA) 81 MG chewable tablet Take 1 tablet (81 mg) by mouth daily 5/19/21   Cristy Castillo DO   atorvastatin (LIPITOR) 80 MG tablet Take 1 tablet (80 mg) by mouth daily 9/21/23   Jhonatan Grier MD   clobetasol (TEMOVATE) 0.05 % external cream Apply topically 2 times daily  Patient not taking: Reported on 3/20/2024 8/15/22   Germania Almeida, NP   Continuous Blood Gluc Sensor (FREESTYLE MARY 3 SENSOR) MISC 1 each every 14 days  Patient not taking: Reported on 3/20/2024 9/21/23   Jhonatan Grier MD   ezetimibe (ZETIA) 10 MG tablet Take 1 tablet (10 mg) by mouth daily 3/20/24   Drake Montiel MD   HUMALOG KWIKPEN 100 UNIT/ML soln INJECT 10 TO 18 UNITS UNDER THE SKIN 3 TIMES DAILY BEFORE MEALS.  MAX DOSE PER 24 HOURS IS 50 UNITS  Patient not taking: Reported on 3/20/2024 9/26/22   Yue Ruvalcaba MD   insulin glargine U-300 (TOUJEO SOLOSTAR) 300 UNIT/ML (1 units dial) pen Inject 63 units every morning and adjust according to instruction.  Max TDD 80 units. 9/21/23   Jhonatan Grier MD   isosorbide mononitrate (IMDUR) 30 MG 24 hr tablet Take 1 tablet (30 mg) by mouth daily  Patient not taking: Reported on 3/20/2024 2/27/23   Yue Ruvalcaba MD   lisinopril (ZESTRIL) 2.5 MG tablet Take 1 tablet (2.5  mg) by mouth daily 3/20/24   Drake Montiel MD   metoprolol tartrate (LOPRESSOR) 25 MG tablet Take 1 tablet (25 mg) by mouth 2 times daily 3/20/24   Drake Montiel MD   multivitamin w/minerals (THERA-VIT-M) tablet Take 1 tablet by mouth daily    Reported, Patient   nitroGLYcerin (NITROSTAT) 0.4 MG sublingual tablet For chest pain place 1 tablet under the tongue every 5 minutes for 3 doses. If symptoms persist 5 minutes after 1st dose call 911. 3/20/24   Drake Montiel MD   Semaglutide, 1 MG/DOSE, (OZEMPIC) 4 MG/3ML pen Inject 1 mg Subcutaneous every 7 days  Patient not taking: Reported on 3/20/2024 10/27/23   Jhonatan Grier MD   Semaglutide, 2 MG/DOSE, (OZEMPIC) 8 MG/3ML pen Inject 2 mg Subcutaneous every 7 days 23   Jhonatan Grier MD       Scheduled Meds   tenecteplase  0.25 mg/kg Intravenous Once       Infusion Meds   niCARdipine      sodium chloride         PRN Meds  labetalol **OR** hydrALAZINE, niCARdipine, sodium chloride    Allergies   Allergies   Allergen Reactions    Metformin Other (See Comments)     Very low blood sugars, Blood sugar issue      Family History   Family History   Problem Relation Age of Onset    Arthritis Mother     Cancer Maternal Grandmother     Arthritis Maternal Grandmother         lupus    Gastrointestinal Disease Maternal Grandfather         war injury, colostomy    Respiratory Daughter         growing out of it    Unknown/Adopted Father     Diabetes No family hx of     Coronary Artery Disease No family hx of     Hypertension No family hx of     Hyperlipidemia No family hx of     Breast Cancer No family hx of     Cancer - colorectal No family hx of     Ovarian Cancer No family hx of     Prostate Cancer No family hx of      Social History   Social History     Tobacco Use    Smoking status: Former     Packs/day: 0.25     Years: 25.00     Additional pack years: 0.00     Total pack years: 6.25     Types: Cigarettes     Quit date: 2016     Years since quittin.1    Smokeless  tobacco: Former   Vaping Use    Vaping Use: Never used   Substance Use Topics    Alcohol use: Yes     Alcohol/week: 0.0 standard drinks of alcohol     Comment: mixed 2-4 on weekends, not every weekend    Drug use: No       Review of Systems   The 10 point Review of Systems is negative other than noted in the HPI or here: feels tired.        PHYSICAL EXAMINATION  Pulse:  [94] 94  Resp:  [15] 15  BP: (127)/(78) 127/78  SpO2:  [97 %] 97 %     Stroke Scales    NIHSS  1a. Level of Consciousness 0-->Alert, keenly responsive   1b. LOC Questions 1-->Answers one question correctly   1c. LOC Commands 0-->Performs both tasks correctly   2.   Best Gaze 0-->Normal   3.   Visual 2-->Complete hemianopia   4.   Facial Palsy 0-->Normal symmetrical movements   5a. Motor Arm, Left 1-->Drift, limb holds 90 (or 45) degrees, but drifts down before full 10 seconds, does not hit bed or other support   5b. Motor Arm, Right 0-->No drift, limb holds 90 (or 45) degrees for full 10 secs   6a. Motor Leg, Left 2-->Some effort against gravity, leg falls to bed by 5 secs, but has some effort against gravity   6b. Motor Leg, right 0-->No drift, leg holds 30 degree position for full 5 secs   7.   Limb Ataxia 0-->Absent   8.   Sensory 1-->Mild-to-moderate sensory loss, patient feels pinprick is less sharp or is dull on the affected side, or there is a loss of superficial pain with pinprick, but patient is aware of being touched   9.   Best Language 1-->Mild-to-moderate aphasia, some obvious loss of fluency or facility of comprehension, without significant limitation on ideas expressed or form of expression. Reduction of speech and/or comprehension, however, makes conversation. . . (see row details)   10. Dysarthria 1-->Mild-to-moderate dysarthria, patient slurs at least some words and, at worst, can be understood with some difficulty   11. Extinction and Inattention  0-->No abnormality   Total 9 (03/21/24 4825)       Modified [place] Score  (Pre-morbid)  0 - No symptoms.    Imaging  I personally reviewed all imaging; relevant findings per HPI.     Lab Results Data   CBC  Recent Labs   Lab 03/21/24  1810   WBC 9.1   RBC 4.66   HGB 14.4   HCT 44.1        Basic Metabolic Panel    Recent Labs   Lab 03/21/24  1816 03/21/24  1810   NA  --  139   POTASSIUM  --  4.3   CHLORIDE  --  103   CO2  --  26   BUN  --  21.6*   CR  --  0.80   * 170*   BERNABE  --  9.2     Liver Panel  Recent Labs   Lab 03/18/24  0722   ALT 36     INR    Recent Labs   Lab Test 03/21/24  1810 06/16/21  1743   INR 0.92 1.1      Lipid Profile    Recent Labs   Lab Test 03/18/24  0722 12/15/22  0903 09/26/22  0934   CHOL 126 144 152   HDL 38* 45 43   LDL 74 86 96   TRIG 68 66 67     A1C    Recent Labs   Lab Test 09/21/23  0956 03/17/23  0840 12/15/22  0903   A1C 5.9* 6.3* 6.6*     Troponin    Recent Labs   Lab 03/21/24  1810   CTROPT 8          Stroke Code Data Data   Stroke Code Data  (for stroke code with tele)  Stroke code activated 03/21/24  1806   First stroke provider response 03/21/24  1810   Video start time 03/21/24  1827   Video end time 03/21/24  1854   Last known normal 03/21/24  1730   Time of discovery (or onset of symptoms)  03/21/24  1730   Head CT read by Stroke Neuro Provider 03/21/24  1820   Was stroke code de-escalated? No           Telestroke Service Details  Type of service telemedicine diagnostic assessment of acute neurological changes   Reason telemedicine is appropriate patient requires assessment with a specialist for diagnosis and treatment of neurological symptoms   Mode of transmission secure interactive audio and video communication per Nagi   Originating site (patient location) [facility]    Distant site (provider location) Provider remote site       I spent 60 minutes in face to face with this patient and coordinating care. Over 50% of the time on the unit was spent counseling the patient and/or coordinating care as  documented.

## 2024-03-21 NOTE — ED PROVIDER NOTES
CERTIFICATE OF RETURN TO WORK    May 18, 2018      Yelitza Jaramillo  [address]  [facility] [address]                      This is to certify that Yelitza Jaramillo has been under my care on 5-18-18 and can return to work on 5-21-18.    RESTRICTIONS: No lifting more then 20 pounds with left hand for 2 weeks.      REMARKS: na          Sincerely,          Cristiane Francois MD  [address]  [address] [address] [phone]       History     Chief Complaint   Patient presents with    Stroke Symptoms     HPI  Houston Winters is a 53 year old male, past medical history significant for type 2 diabetes, chronic continuous use of opioids, hypertension, ASCVD status post CABG, history of small bowel obstruction, hypertension, obesity, hyperlipidemia, epilepsy, erectile dysfunction, presents to the emergency department by EMS with concerns of left-sided weakness and difficulty expressing himself beginning at the dinner table with his daughter at around 5:30 PM this evening.  The patient had apparently been well prior to this time, history is obtained from EMS, apparently he stopped answering his daughter completely around 530 staring off into space unable to speak, left arm and left leg were apparently quite weak, getting the patient to ambulate required assistance supporting him on his left side per EMS.  He also apparently reported headache left-sided and also blurring in his left eye vision further along the ambulance ride when he apparently was able to speak more.  At the time of his evaluation he denies any chest pain shortness of breath nausea or vomiting.  He does appear frustrated when answering questions however does answer appropriately if in a more restricted fashion.      Allergies:  Allergies   Allergen Reactions    Metformin Other (See Comments)     Very low blood sugars, Blood sugar issue        Problem List:    Patient Active Problem List    Diagnosis Date Noted    Type 2 diabetes mellitus with hypoglycemia without coma, with long-term current use of insulin (H) 03/17/2023     Priority: Medium    Chronic, continuous use of opioids 02/27/2023     Priority: Medium     Patient is followed by Yue Ruvalcaba MD for ongoing prescription of pain medication.  All refills should only be approved by this provider, or covering partner.    Medication(s): oxycodone 5 mg  Maximum quantity per month: 90  Clinic visit frequency required: Q 3  months   PDMP Review         Value Time User    State PDMP site checked  Yes 1/30/2023  8:54 AM Yue Ruvalcaba MD   Controlled substance agreement:  CSA -- Patient Level:    CSA: None found at the patient level.     CSA 2/27/2023      Pain Clinic evaluation in the past: No    Opioid Risk Tool Total Score(s):  No flowsheet data found.  Last [facility] website verification:  done on 2/27/2023   [email or web address]        Hypertension associated with type 2 diabetes mellitus (H) 12/15/2022     Priority: Medium    Coronary artery disease involving coronary bypass graft of native heart without angina pectoris 09/26/2022     Priority: Medium     4 V CABG at [facility] 6/16/021      Postoperative infection, initial encounter 01/28/2018     Priority: Medium    Small bowel obstruction (H) 12/29/2017     Priority: Medium    Type 2 diabetes mellitus without complication, without long-term current use of insulin (H) 03/27/2017     Priority: Medium    Hypertension, goal below 140/90 02/04/2015     Priority: Medium    Obesity 07/17/2014     Priority: Medium    Hyperlipidemia with target LDL less than 100 07/17/2014     Priority: Medium     Diagnosis updated by automated process. Provider to review and confirm.      Partial epilepsy (H) 03/12/2009     Priority: Medium     (Problem list name updated by automated process. Provider to review and confirm.)      ED (erectile dysfunction) 12/18/2008     Priority: Medium    GANGLOIN CYST /RIGHT ANKLE 06/15/2006     Priority: Medium        Past Medical History:    Past Medical History:   Diagnosis Date    DM (diabetes mellitus) (H)     Epilepsy (H)     Hyperlipidemia due to type 2 diabetes mellitus (H) 12/15/2022    MEDICAL HISTORY OF - Age 16     Nicotine dependence     Tobacco use disorder 2/4/2015    Unstable angina (H) 5/18/2021       Past Surgical History:    Past Surgical History:   Procedure Laterality Date    CL AFF SURGICAL PATHOLOGY  01/01/2005    ganglion cyst removed     CORONARY ARTERY BYPASS  2021    4 Vessel CABG - at [facility]    CV HEART CATHETERIZATION WITH POSSIBLE INTERVENTION N/A 2021    Procedure: Heart Catheterization with Possible Intervention;  Surgeon: Andrew Madrid MD;  Location:  HEART CARDIAC CATH LAB    CV LEFT HEART CATH N/A 2021    Procedure: Left Heart Cath;  Surgeon: Andrew Madrid MD;  Location:  HEART CARDIAC CATH LAB    EXCISE MASS LOWER EXTREMITY  02/15/2013    Procedure: EXCISE MASS LOWER EXTREMITY;  Right Ankle Excision of ganglion cyst;  Surgeon: Akbar Melo DPM;  Location: [facility] OR    HERNIA REPAIR  2000    umbilical repair    HERNIORRHAPHY UMBILICAL N/A 11/10/2015    Procedure: HERNIORRHAPHY UMBILICAL;  Surgeon: Garry Leos MD;  Location: [facility] OR    TOTAL HIP ARTHROPLASTY Left     [facility]       Family History:    Family History   Problem Relation Age of Onset    Arthritis Mother     Cancer Maternal Grandmother     Arthritis Maternal Grandmother         lupus    Gastrointestinal Disease Maternal Grandfather         war injury, colostomy    Respiratory Daughter         growing out of it    Unknown/Adopted Father     Diabetes No family hx of     Coronary Artery Disease No family hx of     Hypertension No family hx of     Hyperlipidemia No family hx of     Breast Cancer No family hx of     Cancer - colorectal No family hx of     Ovarian Cancer No family hx of     Prostate Cancer No family hx of        Social History:  Marital Status:   [2]  Social History     Tobacco Use    Smoking status: Former     Packs/day: 0.25     Years: 25.00     Additional pack years: 0.00     Total pack years: 6.25     Types: Cigarettes     Quit date: 2016     Years since quittin.1    Smokeless tobacco: Former   Vaping Use    Vaping Use: Never used   Substance Use Topics    Alcohol use: Yes     Alcohol/week: 0.0 standard drinks of alcohol     Comment: mixed 2-4 on weekends, not every weekend     "Drug use: No        Medications:    aspirin (ASA) 81 MG chewable tablet  atorvastatin (LIPITOR) 80 MG tablet  docusate sodium (COLACE) 100 MG tablet  ezetimibe (ZETIA) 10 MG tablet  insulin glargine U-300 (TOUJEO SOLOSTAR) 300 UNIT/ML (1 units dial) pen  lisinopril (ZESTRIL) 2.5 MG tablet  metoprolol tartrate (LOPRESSOR) 25 MG tablet  multivitamin w/minerals (THERA-VIT-M) tablet  nitroGLYcerin (NITROSTAT) 0.4 MG sublingual tablet  Semaglutide, 2 MG/DOSE, (OZEMPIC) 8 MG/3ML pen  Continuous Blood Gluc Sensor (FREESTYLE MARY 3 SENSOR) MISC          Review of Systems   All other systems reviewed and are negative.      Physical Exam   BP: 127/78  Pulse: 94  Temp: 98.8  F (37.1  C)  Resp: 15  Height: 177.8 cm (5' 10\")  Weight: 98.1 kg (216 lb 3.2 oz)  SpO2: 97 %      Physical Exam  Vitals and nursing note reviewed.   Constitutional:       Appearance: Normal appearance.      Comments: Alert, somewhat puzzled looking gentleman in no acute distress.   HENT:      Head: Normocephalic and atraumatic.      Right Ear: Tympanic membrane, ear canal and external ear normal.      Left Ear: Tympanic membrane, ear canal and external ear normal.      Nose: Nose normal.      Mouth/Throat:      Mouth: Mucous membranes are dry.      Pharynx: Oropharynx is clear.   Eyes:      Extraocular Movements: Extraocular movements intact.      Conjunctiva/sclera: Conjunctivae normal.      Pupils: Pupils are equal, round, and reactive to light.   Cardiovascular:      Rate and Rhythm: Normal rate and regular rhythm.      Pulses: Normal pulses.      Heart sounds: Normal heart sounds.   Pulmonary:      Effort: Pulmonary effort is normal.      Breath sounds: Normal breath sounds.   Abdominal:      General: Bowel sounds are normal.      Palpations: Abdomen is soft.   Musculoskeletal:         General: Normal range of motion.      Cervical back: Normal range of motion and neck supple.   Skin:     General: Skin is warm and dry.      Capillary Refill: " Capillary refill takes less than 2 seconds.   Neurological:      Mental Status: He is alert.      Cranial Nerves: Cranial nerves 2-12 are intact.      Motor: Weakness present.      Coordination: Finger-Nose-Finger Test abnormal and Heel to Duran Test abnormal.      Gait: Gait abnormal.      Deep Tendon Reflexes: Babinski sign absent on the right side. Babinski sign present on the left side.      Reflex Scores:       Tricep reflexes are 2+ on the right side and 3+ on the left side.       Bicep reflexes are 2+ on the right side and 3+ on the left side.       Brachioradialis reflexes are 2+ on the right side and 3+ on the left side.       Patellar reflexes are 2+ on the right side and 3+ on the left side.       Achilles reflexes are 2+ on the right side and 3+ on the left side.     Comments: Expressive aphasia, limited affect       6:11 PM  Shortly after assessing the patient I called the tier 1 code stroke and placed orders.  I then spoke with Dr. Roth the stroke neurologist on this evening regarding the patient.  He will full and requested the stroke cart be brought into the patient's room.    ED Course        Procedures              EKG Interpretation:      Interpreted by Brendan Yoo MD  Time reviewed: Time obtained 6:52 PM time interpreted same.  Sinus rhythm 99 bpm, incomplete right bundle branch block, flipped T waves V1 and V2 which are unchanged from comparison cardiogram dated 2/22/2024.  No definite acute ischemia or infarct.      Critical Care time:  was 69 minutes for this patient excluding procedures.  6:49 PM  Contacted by Dr. Venkata sloan radiologist who informs me verbally that the head CT is negative for acute infarct or bleed, no LVO on the CTA head neck.    Subsequently was requested in the room with the patient by the stroke neurologist by the telemetry cart.  The stroke neurologist has discussed and consented the family to TNKase and will place the order for that.  I discussed the brief  overview of his presentation and proposed management in the room with the family, risks and benefits, and they are in agreement with the plan to proceed.  8:26 PM  Recheck on the patient at this time finds him with a definite improvement in strength both left upper and left lower extremities from his baseline with presentation.      9:26 PM  Paged stroke neurology for clarification of their orders.  They had recommended admission to ICU.  Normally at [facility] we do not admit post TNKase to our ICU they typically require admission to a facility with neurology and neurosurgery.  Stroke neurology confirmed this and recommended the patient be transferred to a neurocritical care bed either at the [facility] or [facility].  Request made.  Transfer order placed.    10:00 PM  No beds in the [facility] system to accommodate this patient i.e. neurocritical care.  Looking outside of system I spoke with Dr. Mckinney the ICU attending at [facility] who agreed to accept the patient unfortunately just as he called the patient reports an increased left retro-orbital headache despite acetaminophen.  Obviously in the context of post lytic infusion there is a distinct possibility that this in the increasingly severe headache could be a bleed.  The patient is taken for stat head CT.  I would still anticipate transfer to [facility] but obviously, and we discussed this on the phone, I will call Dr. Mckinney back if the patient has a bleed.  10:20 PM  Repeat head CT negative for acute bleed.  Will proceed with transfer to [facility].            Results for orders placed or performed during the hospital encounter of 03/21/24 (from the past 24 hour(s))   CBC with Platelets & Differential    Narrative    The following orders were created for panel order CBC with Platelets & Differential.  Procedure                               Abnormality         Status                     ---------                               -----------         ------                      CBC with platelets and d...[[id number]]                      Final result                 Please view results for these tests on the individual orders.   Basic metabolic panel   Result Value Ref Range    Sodium 139 135 - 145 mmol/L    Potassium 4.3 3.4 - 5.3 mmol/L    Chloride 103 98 - 107 mmol/L    Carbon Dioxide (CO2) 26 22 - 29 mmol/L    Anion Gap 10 7 - 15 mmol/L    Urea Nitrogen 21.6 (H) 6.0 - 20.0 mg/dL    Creatinine 0.80 0.67 - 1.17 mg/dL    GFR Estimate >90 >60 mL/min/1.73m2    Calcium 9.2 8.6 - 10.0 mg/dL    Glucose 170 (H) 70 - 99 mg/dL   INR   Result Value Ref Range    INR 0.92 0.85 - 1.15   Partial thromboplastin time   Result Value Ref Range    aPTT 31 22 - 38 Seconds   Troponin T, High Sensitivity   Result Value Ref Range    Troponin T, High Sensitivity 8 <=22 ng/L   CBC with platelets and differential   Result Value Ref Range    WBC Count 9.1 4.0 - 11.0 10e3/uL    RBC Count 4.66 4.40 - 5.90 10e6/uL    Hemoglobin 14.4 13.3 - 17.7 g/dL    Hematocrit 44.1 40.0 - 53.0 %    MCV 95 78 - 100 fL    MCH 30.9 26.5 - 33.0 pg    MCHC 32.7 31.5 - 36.5 g/dL    RDW 13.4 10.0 - 15.0 %    Platelet Count 226 150 - 450 10e3/uL    % Neutrophils 57 %    % Lymphocytes 33 %    % Monocytes 8 %    % Eosinophils 2 %    % Basophils 0 %    % Immature Granulocytes 0 %    NRBCs per 100 WBC 0 <1 /100    Absolute Neutrophils 5.1 1.6 - 8.3 10e3/uL    Absolute Lymphocytes 3.0 0.8 - 5.3 10e3/uL    Absolute Monocytes 0.8 0.0 - 1.3 10e3/uL    Absolute Eosinophils 0.2 0.0 - 0.7 10e3/uL    Absolute Basophils 0.0 0.0 - 0.2 10e3/uL    Absolute Immature Granulocytes 0.0 <=0.4 10e3/uL    Absolute NRBCs 0.0 10e3/uL   Glucose by meter   Result Value Ref Range    GLUCOSE BY METER POCT 163 (H) 70 - 99 mg/dL   CT Head w/o Contrast    Narrative    EXAM: CT HEAD W/O CONTRAST  LOCATION: [facility]  DATE: 3/21/2024    INDICATION: Code Stroke to evaluate for potential thrombolysis and  thrombectomy.    COMPARISON: None.    TECHNIQUE: Routine CT head without intravenous contrast. Multiplanar reformats. Dose reduction techniques were used.    FINDINGS:  INTRACRANIAL CONTENTS: No intracranial hemorrhage, extra-axial collection, or mass effect. No CT evidence of acute infarct. Normal parenchymal attenuation. Normal ventricles and sulci.     VISUALIZED ORBITS/SINUSES/MASTOIDS: No intraorbital abnormality. No paranasal sinus mucosal disease. No middle ear or mastoid effusion.    BONES/SOFT TISSUES: No acute abnormality.      Impression    IMPRESSION: Normal head CT.     CTA Head Neck with Contrast    Narrative    EXAM: CTA HEAD NECK W CONTRAST  LOCATION: [facility]  DATE: 3/21/2024    INDICATION: Code Stroke to evaluate for potential thrombolysis and thrombectomy. PLEASE READ IMMEDIATELY.  COMPARISON: None.  CONTRAST: 67 mL Isovue-370.  TECHNIQUE: Head and neck CT angiogram with IV contrast. Axial helical CT images of the head and neck vessels obtained during the arterial phase of intravenous contrast administration. Axial 2D reconstructed images and multiplanar 3D MIP reconstructed   images of the head and neck vessels were performed by the technologist. Dose reduction techniques were used. All stenosis measurements made according to NASCET criteria unless otherwise specified.    FINDINGS:   HEAD CTA:  ANTERIOR CIRCULATION: No stenosis/occlusion, aneurysm, or high flow vascular malformation. Standard [place] anatomy.    POSTERIOR CIRCULATION: No stenosis/occlusion, aneurysm, or high flow vascular malformation. Dominant left and smaller right vertebral artery contribute to a normal basilar artery.     DURAL VENOUS SINUSES: Expected enhancement of the major dural venous sinuses.    NECK CTA:  CAROTIDS: There is calcified plaque at the internal carotid origins on both sides that does not result in any significant vascular narrowing on either side. No measurable  stenosis or dissection.    VERTEBRAL ARTERIES: No focal stenosis or dissection. Dominant left and smaller right vertebral arteries.    AORTIC ARCH: Classic aortic arch anatomy with no significant stenosis at the origin of the great vessels.    NONVASCULAR STRUCTURES: Unremarkable.      Impression    IMPRESSION:   HEAD CTA:   1.  Normal CTA [place].    NECK CTA:  1.  Plaque without stenosis at the internal carotid origins bilaterally.  2.  Otherwise, normal neck CTA.   CT Head w/o Contrast    Narrative    EXAM: CT HEAD W/O CONTRAST  LOCATION: [facility]  DATE: 3/21/2024    INDICATION: Increasing a left retro orbital headache post TNKase for CVA  COMPARISON: None.  TECHNIQUE: Routine CT Head without IV contrast. Multiplanar reformats. Dose reduction techniques were used.    FINDINGS:  INTRACRANIAL CONTENTS: No intracranial hemorrhage, extraaxial collection, or mass effect.  No CT evidence of acute infarct. Normal parenchymal attenuation. Normal ventricles and sulci.     VISUALIZED ORBITS/SINUSES/MASTOIDS: No intraorbital abnormality. No paranasal sinus mucosal disease. No middle ear or mastoid effusion.    BONES/SOFT TISSUES: No acute abnormality.      Impression    IMPRESSION:  1.  Normal head CT.       Medications   sodium chloride 0.9 % infusion (has no administration in time range)   labetalol (NORMODYNE/TRANDATE) injection 10 mg (has no administration in time range)     Or   hydrALAZINE (APRESOLINE) injection 10 mg (has no administration in time range)   niCARdipine 40 mg in 200 mL NS (CARDENE) infusion (has no administration in time range)   iopamidol (ISOVUE-370) solution 67 mL (67 mLs Intravenous $Given 3/21/24 1811)   sodium chloride 0.9 % bag 500 mL for CT scan flush use (100 mLs Intravenous $Given 3/21/24 1813)   tenecteplase (TNKase) injection 24.5 mg (24.5 mg Intravenous $Given 3/21/24 1851)   acetaminophen (TYLENOL) tablet 975 mg (975 mg Oral $Given 3/21/24 2014)        Assessments & Plan (with Medical Decision Making)   53-year-old male past medical history reviewed as above who presents to the emergency department by EMS with a short duration of left-sided weakness upper and lower extremity as well as expressive aphasia as discussed in the HPI and documented on exam.  A tier 1 code stroke was called immediately after my assessment and the patient reviewed with the stroke neurologist at [facility].  CT and CTA head neck are negative acutely.  Stroke neurology counseled the patient and family for TNKase to which they consented understanding the potential morbidity mortality with this medication.  The patient has ultimately improved significantly since the administration of TNKase but unfortunately also developed a more severe version of the left retro-orbital headache that he came in with post lytic infusion.  Repeat head CT is negative.  Stroke neurology recommended admission to the neurocritical care bed however unfortunately there are no beds like this within the [place] system at the present time.  Fortunately [facility] was able to accept the patient Dr. Mckinney to the ICU neurocritical care at [facility].  He was on the phone with me at the time the patient developed the increased headache left retro-orbital and understands pending head CT.  He requested that he be called back if there was a bleed.  If not he is willing to accept the patient.  Head CT negative for bleed.  Plan for disposition to neurocritical care at [facility].    Disclaimer: This note consists of symbols derived from keyboarding, dictation and/or voice recognition software. As a result, there may be errors in the script that have gone undetected. Please consider this when interpreting information found in this chart.        I have reviewed the nursing notes.    I have reviewed the findings, diagnosis, plan and need for follow up with  the patient.      New Prescriptions    No medications on file       Final diagnoses:   Acute CVA (cerebrovascular accident) (H)       3/21/2024   [facility] EMERGENCY DEPT       Brendan Yoo MD  03/21/24 0776

## 2024-03-21 NOTE — ED NOTES
To CT   [FreeTextEntry1] : 10 year old male with strep throat - rapid strep positive. Complete 10 days of antibiotics. Use antipyretics as needed. After being on antibiotics for at least 24 hours patient less likely to spread infection. RTO as needed\par Rapid flu negative

## 2024-03-21 NOTE — ED TRIAGE NOTES
"Patient arrived via EMS. ~ 1730 daughter noted patient seemed to be \"spaced out\" not responding appropriately. Chronic right arm tingling. New weakness numbness tingling present to left arm. Left leg weakness. Not on blood thinners. Type 2 diabetic.     Triage Assessment (Adult)       Row Name 03/21/24 3484          Triage Assessment    Airway WDL WDL        Respiratory WDL    Respiratory WDL WDL        Skin Circulation/Temperature WDL    Skin Circulation/Temperature WDL WDL        Cardiac WDL    Cardiac WDL WDL        Peripheral/Neurovascular WDL    Peripheral Neurovascular WDL X;neurovascular assessment upper        LUE Neurovascular Assessment    Temperature LUE warm     Color LUE no discoloration     Sensation LUE numbness present;tingling present        RUE Neurovascular Assessment    Temperature RUE warm     Color RUE no discoloration     Sensation RUE tingling present  Chronic on right                     "

## 2024-03-22 ENCOUNTER — TRANSFERRED RECORDS (OUTPATIENT)
Dept: HEALTH INFORMATION MANAGEMENT | Facility: CLINIC | Age: 54
End: 2024-03-22
Payer: COMMERCIAL

## 2024-03-22 VITALS
RESPIRATION RATE: 16 BRPM | DIASTOLIC BLOOD PRESSURE: 71 MMHG | HEIGHT: 70 IN | OXYGEN SATURATION: 92 % | HEART RATE: 81 BPM | BODY MASS INDEX: 30.74 KG/M2 | SYSTOLIC BLOOD PRESSURE: 110 MMHG | TEMPERATURE: 98.8 F | WEIGHT: 214.73 LBS

## 2024-03-22 ASSESSMENT — ACTIVITIES OF DAILY LIVING (ADL): ADLS_ACUITY_SCORE: 35

## 2024-03-22 NOTE — ED NOTES
Sensation on left side of body has improved in identifying sharp vs dull. However, pt continues to report a heaviness in the left arm.

## 2024-03-22 NOTE — MEDICATION SCRIBE - ADMISSION MEDICATION HISTORY
Medication Scribe Admission Medication History    Admission medication history is complete. The information provided in this note is only as accurate as the sources available at the time of the update.    Information Source(s): Patient via in-person    Pertinent Information: Patient takes all medications together in the AM. Zetia was prescribed today and was not started yet    Changes made to PTA medication list:  Added: Docusate 100 mg qd  Deleted: Clobetasol 0.05% Cream, Humalog Kwikpen, Isosorbide 30 mg  Changed: None    Allergies reviewed with patient and updates made in EHR: yes    Medication History Completed By: Savanna Nelson 3/21/2024 8:56 PM    PTA Med List   Medication Sig Note Last Dose    aspirin (ASA) 81 MG chewable tablet Take 1 tablet (81 mg) by mouth daily  3/21/2024 at am    atorvastatin (LIPITOR) 80 MG tablet Take 1 tablet (80 mg) by mouth daily  3/21/2024 at am    docusate sodium (COLACE) 100 MG tablet Take 100 mg by mouth daily  3/21/2024 at am    ezetimibe (ZETIA) 10 MG tablet Take 1 tablet (10 mg) by mouth daily  Unknown at not started    insulin glargine U-300 (TOUJEO SOLOSTAR) 300 UNIT/ML (1 units dial) pen Inject 63 units every morning and adjust according to instruction.  Max TDD 80 units. (Patient taking differently: Inject 60 Units Subcutaneous every morning Inject 60 units every morning and adjust according to instruction.  Max TDD 80 units.)  3/21/2024 at am    lisinopril (ZESTRIL) 2.5 MG tablet Take 1 tablet (2.5 mg) by mouth daily  3/21/2024 at am    metoprolol tartrate (LOPRESSOR) 25 MG tablet Take 1 tablet (25 mg) by mouth 2 times daily  3/21/2024 at am    multivitamin w/minerals (THERA-VIT-M) tablet Take 1 tablet by mouth daily  3/21/2024 at am    nitroGLYcerin (NITROSTAT) 0.4 MG sublingual tablet For chest pain place 1 tablet under the tongue every 5 minutes for 3 doses. If symptoms persist 5 minutes after 1st dose call 911.  Unknown at on hand    Semaglutide, 2 MG/DOSE,  (OZEMPIC) 8 MG/3ML pen Inject 2 mg Subcutaneous every 7 days 3/21/2024: Takes on Fridays 3/15/2024 at am

## 2024-03-27 ENCOUNTER — TELEPHONE (OUTPATIENT)
Dept: FAMILY MEDICINE | Facility: CLINIC | Age: 54
End: 2024-03-27

## 2024-03-27 ENCOUNTER — OFFICE VISIT (OUTPATIENT)
Dept: FAMILY MEDICINE | Facility: CLINIC | Age: 54
End: 2024-03-27
Payer: COMMERCIAL

## 2024-03-27 VITALS
HEART RATE: 85 BPM | BODY MASS INDEX: 31.07 KG/M2 | HEIGHT: 70 IN | WEIGHT: 217 LBS | RESPIRATION RATE: 20 BRPM | SYSTOLIC BLOOD PRESSURE: 128 MMHG | TEMPERATURE: 97.2 F | DIASTOLIC BLOOD PRESSURE: 70 MMHG | OXYGEN SATURATION: 96 %

## 2024-03-27 DIAGNOSIS — E11.649 TYPE 2 DIABETES MELLITUS WITH HYPOGLYCEMIA WITHOUT COMA, WITH LONG-TERM CURRENT USE OF INSULIN (H): ICD-10-CM

## 2024-03-27 DIAGNOSIS — Z79.4 TYPE 2 DIABETES MELLITUS WITH HYPOGLYCEMIA WITHOUT COMA, WITH LONG-TERM CURRENT USE OF INSULIN (H): ICD-10-CM

## 2024-03-27 DIAGNOSIS — I63.30 CEREBROVASCULAR ACCIDENT (CVA) DUE TO THROMBOSIS OF CEREBRAL ARTERY (H): ICD-10-CM

## 2024-03-27 DIAGNOSIS — Z86.73 HISTORY OF CVA (CEREBROVASCULAR ACCIDENT): ICD-10-CM

## 2024-03-27 DIAGNOSIS — Z12.11 SCREEN FOR COLON CANCER: Primary | ICD-10-CM

## 2024-03-27 PROCEDURE — 99213 OFFICE O/P EST LOW 20 MIN: CPT | Performed by: FAMILY MEDICINE

## 2024-03-27 ASSESSMENT — PAIN SCALES - GENERAL: PAINLEVEL: NO PAIN (0)

## 2024-03-27 ASSESSMENT — PATIENT HEALTH QUESTIONNAIRE - PHQ9
10. IF YOU CHECKED OFF ANY PROBLEMS, HOW DIFFICULT HAVE THESE PROBLEMS MADE IT FOR YOU TO DO YOUR WORK, TAKE CARE OF THINGS AT HOME, OR GET ALONG WITH OTHER PEOPLE: SOMEWHAT DIFFICULT
SUM OF ALL RESPONSES TO PHQ QUESTIONS 1-9: 5
SUM OF ALL RESPONSES TO PHQ QUESTIONS 1-9: 5

## 2024-03-27 NOTE — TELEPHONE ENCOUNTER
Patient Quality Outreach    Patient is due for the following:   Colonoscopy    Next Steps:   - Colonoscopy  - Schedule wellness visit      Type of outreach:    Sent Oravel message.      Questions for provider review:    None           Sharee Lewis, CMA         Yes

## 2024-03-27 NOTE — PROGRESS NOTES
"  Assessment & Plan         History of CVA (cerebrovascular accident)  Cerebrovascular accident (CVA) due to thrombosis of cerebral artery (H)  Thrombolytics give in the ER on 3/21/2024 and patient was transferred to [facility] for neuro ICU.  Needs follow up with neurology  Given the word finding difficulty and left sided weakness I have ordered speech and PT    - Adult Neurology  Referral; Future  - Physical Therapy  Referral; Future  - Speech Therapy  Referral; Future    Type 2 diabetes mellitus with hypoglycemia without coma, with long-term current use of insulin (H)  Unfortunately HgbA1c wasn't able to be added on to lab already collected.   Will have him schedule a diabetic visit and follow up in the next month    - HEMOGLOBIN A1C; Future        MED REC REQUIRED  Post Medication Reconciliation Status: discharge medications reconciled, continue medications without change  BMI  Estimated body mass index is 31.14 kg/m  as calculated from the following:    Height as of this encounter: 1.778 m (5' 10\").    Weight as of this encounter: 98.4 kg (217 lb).             Russ Stephens is a 53 year old, presenting for the following health issues:  Cerebrovascular Accident        3/27/2024     3:50 PM   Additional Questions   Roomed by Sharee burns CMA   Accompanied by Self     HPI         Hospital Follow-up Visit:    Hospital/Nursing Home/IP Rehab Facility:  [facility]  Date of Admission: 3/21/2024  Date of Discharge: 3/23/2024  Reason(s) for Admission: CVA    Was your hospitalization related to COVID-19? No   Problems taking medications regularly:  None  Medication changes since discharge: None  Problems adhering to non-medication therapy:  None    Summary of hospitalization:  [facility] discharge summary reviewed  Diagnostic Tests/Treatments reviewed.  Follow up needed: neurology 4-6 weeks, wearing a zio patch to r/o a fib  Other Healthcare Providers Involved in Patient s " "Care:         None  Update since discharge: He notes doing well, he is having some numbness on left side of face, forearm and foot. He is having some brain fog and a mildly harder time finding words when speaking. He notes swelling on upper lid of left eye. He notes that blood sugar is returning to normal now that he is back on medications       Patient has ongoing word finding difficulty and feeling \"foggy\" at times.  He also continues to have some left sided weakness.  He says [facility] was aware of the deficits at time of discharge but also that he told them he was back to normal to \"get out of there as soon as I could\".  He was told to follow up with neurology in 4-6 week.  No medications changed in the hospital.     Plan of care communicated with patient and family (daughter)               Review of Systems  Constitutional, HEENT, cardiovascular, pulmonary, gi and gu systems are negative, except as otherwise noted.      Objective    /70   Pulse 85   Temp 97.2  F (36.2  C) (Tympanic)   Resp 20   Ht 1.778 m (5' 10\")   Wt 98.4 kg (217 lb)   SpO2 96%   BMI 31.14 kg/m    Body mass index is 31.14 kg/m .  Physical Exam   GENERAL: alert and no distress  NECK: no adenopathy, no asymmetry, masses, or scars  RESP: lungs clear to auscultation - no rales, rhonchi or wheezes  CV: regular rate and rhythm, normal S1 S2, no S3 or S4, no murmur, click or rub, no peripheral edema  ABDOMEN: soft, nontender, no hepatosplenomegaly, no masses and bowel sounds normal  MS: no gross musculoskeletal defects noted, no edema  NEURO: weakness of left upper and lower extremity 4+/5, sensory exam grossly normal, mentation intact, oriented times 3, cranial nerves 2-12 intact, and gait abnormal due to left sided weakness  PSYCH: mentation appears normal, affect normal/bright            Signed Electronically by: Yue Ruvalcaba MD    "

## 2024-03-28 ENCOUNTER — TELEPHONE (OUTPATIENT)
Dept: FAMILY MEDICINE | Facility: CLINIC | Age: 54
End: 2024-03-28
Payer: COMMERCIAL

## 2024-03-28 NOTE — TELEPHONE ENCOUNTER
I saw Angelo yesterday.  I thought based on what Epic was telling me that I would be able to add on a HgbA1c, but apparently this was not the case.    I would like a visit in about a month (okay to use hospital follow up or same day) to recheck his HgbA1c and diabetic labs.    Please call patient and help him schedule this.    Yue Ruvalcaba M.D.

## 2024-04-07 ENCOUNTER — HEALTH MAINTENANCE LETTER (OUTPATIENT)
Age: 54
End: 2024-04-07

## 2024-04-10 ENCOUNTER — HOSPITAL ENCOUNTER (OUTPATIENT)
Dept: NUCLEAR MEDICINE | Facility: CLINIC | Age: 54
Setting detail: NUCLEAR MEDICINE
Discharge: HOME | End: 2024-04-10
Attending: INTERNAL MEDICINE
Payer: COMMERCIAL

## 2024-04-10 ENCOUNTER — HOSPITAL ENCOUNTER (OUTPATIENT)
Dept: CARDIOLOGY | Facility: CLINIC | Age: 54
Discharge: HOME | End: 2024-04-10
Attending: INTERNAL MEDICINE
Payer: COMMERCIAL

## 2024-04-10 VITALS — BODY MASS INDEX: 31.14 KG/M2 | HEIGHT: 70 IN

## 2024-04-10 DIAGNOSIS — I10 HYPERTENSION, GOAL BELOW 140/90: ICD-10-CM

## 2024-04-10 DIAGNOSIS — I25.10 CORONARY ARTERY DISEASE INVOLVING NATIVE CORONARY ARTERY OF NATIVE HEART WITHOUT ANGINA PECTORIS: ICD-10-CM

## 2024-04-10 DIAGNOSIS — Z95.1 S/P CABG (CORONARY ARTERY BYPASS GRAFT): ICD-10-CM

## 2024-04-10 LAB
CV BLOOD PRESSURE: 52 MMHG
CV STRESS MAX HR HE: 115
RATE PRESSURE PRODUCT: NORMAL
STRESS ECHO BASELINE DIASTOLIC HE: 80
STRESS ECHO BASELINE HR: 85 BPM
STRESS ECHO BASELINE SYSTOLIC BP: 120
STRESS ECHO CALCULATED PERCENT HR: 68 %
STRESS ECHO LAST STRESS DIASTOLIC BP: 68
STRESS ECHO LAST STRESS SYSTOLIC BP: 120
STRESS ECHO TARGET HR: 168

## 2024-04-10 PROCEDURE — 343N000001 HC RX 343: Performed by: INTERNAL MEDICINE

## 2024-04-10 PROCEDURE — 78452 HT MUSCLE IMAGE SPECT MULT: CPT

## 2024-04-10 PROCEDURE — 93016 CV STRESS TEST SUPVJ ONLY: CPT | Performed by: INTERNAL MEDICINE

## 2024-04-10 PROCEDURE — 78452 HT MUSCLE IMAGE SPECT MULT: CPT | Mod: 26 | Performed by: INTERNAL MEDICINE

## 2024-04-10 PROCEDURE — 250N000011 HC RX IP 250 OP 636: Mod: JZ | Performed by: INTERNAL MEDICINE

## 2024-04-10 PROCEDURE — 93018 CV STRESS TEST I&R ONLY: CPT | Performed by: INTERNAL MEDICINE

## 2024-04-10 PROCEDURE — A9502 TC99M TETROFOSMIN: HCPCS | Performed by: INTERNAL MEDICINE

## 2024-04-10 RX ORDER — REGADENOSON 0.08 MG/ML
0.4 INJECTION, SOLUTION INTRAVENOUS ONCE
Status: COMPLETED | OUTPATIENT
Start: 2024-04-10 | End: 2024-04-10

## 2024-04-10 RX ADMIN — TETROFOSMIN 10.3 MILLICURIE: 1.38 INJECTION, POWDER, LYOPHILIZED, FOR SOLUTION INTRAVENOUS at 08:58

## 2024-04-10 RX ADMIN — REGADENOSON 0.4 MG: 0.08 INJECTION, SOLUTION INTRAVENOUS at 10:20

## 2024-04-10 RX ADMIN — TETROFOSMIN 33.3 MILLICURIE: 1.38 INJECTION, POWDER, LYOPHILIZED, FOR SOLUTION INTRAVENOUS at 10:22

## 2024-04-18 DIAGNOSIS — E11.9 TYPE 2 DIABETES MELLITUS WITHOUT COMPLICATION, WITHOUT LONG-TERM CURRENT USE OF INSULIN (H): ICD-10-CM

## 2024-04-22 ENCOUNTER — THERAPY VISIT (OUTPATIENT)
Dept: SPEECH THERAPY | Facility: CLINIC | Age: 54
End: 2024-04-22
Attending: FAMILY MEDICINE
Payer: COMMERCIAL

## 2024-04-22 DIAGNOSIS — I63.30 CEREBROVASCULAR ACCIDENT (CVA) DUE TO THROMBOSIS OF CEREBRAL ARTERY (H): ICD-10-CM

## 2024-04-22 PROCEDURE — 96125 COGNITIVE TEST BY HC PRO: CPT | Mod: GN | Performed by: SPEECH-LANGUAGE PATHOLOGIST

## 2024-04-22 NOTE — PROGRESS NOTES
"SPEECH LANGUAGE PATHOLOGY EVALUATION    See electronic medical record for Abuse and Falls Screening details.    Subjective      Presenting condition or subjective complaint: Dr. Crook, stroke  Date of onset: 03/21/24    Relevant medical history: Chest pain; Diabetes; Dizziness; Foot drop; High blood pressure; Implanted device; Overweight; Seizures; Stroke   Dates & types of surgery:      Pt reports feeling he is 'foggy' and sometimes can't get his words out. He reports feeling he mumbles at times.  He has some difficult with following what people say to him and that his wife feels \"he is not listening to her\". He reports memory is good for long term but he has a hard time remembering more recent events.  He reports he used to play COD with his daughter but now can't as he gets dizzy and feels he can't process fast enough.  He reports being under a lot of stress and having frequent headaches. He is hoping to go back to work in the middle of June.    Prior diagnostic imaging/testing results: MRI; CT scan; X-ray; EMG     Prior therapy history for the same diagnosis, illness or injury: No      Living Environment  Social support: With family members   Help at home: Self Cares (home health aide/personal care attendant, family, etc); Medication and/or finances; Assist for driving and community activities  Equipment owned:       Employment: Yes Outdoor landscaping  Hobbies/Interests: fishing, shooting, sports, camping    Patient goals for therapy: talk without the loss for words    Pain assessment: Pain present. Headache. Reports \"It's not too bad\".     Objective     AUDITORY COMPREHENSION (understanding of spoken language)  Multi-step commands: intact  Auditory comprehension level of impairment: no impairment     VERBAL EXPRESSION (use of spoken language to express information)  Conversational Speech: connected speech: minimal impairment    Verbal expression level of impairment: mild impairment    PRAGMATICS (the social " or functional use of language)  Nonverbal skills: WNL   Verbal Skills: WNL   Pragmatics level of impairment: no impairment    COGNITIVE STATUS  Attention: impaired   Orientation status: Oriented to person, place and time  Short term memory: impaired   Long term memory: impaired   Cognition level of impairment: moderate to severe impairment  Additional cognitive evaluation: completed; see separate report for results    Repeatable Battery for the Assessment of Neuropsychological Status Update   (RBANS  Update)  The Repeatable Battery for the Assessment of Neuropsychological Status Update (RBANS  Update) was administered to Houston Winters on 4/22/2024.  The RBANS Update was originally developed with a primary focus on assessment of dementia, and measures cognitive decline or improvement across these domains: immediate memory, visuospatial/constructional; language; attention; and delayed memory.  However, special group studies are available for Alzheimer's Disease, Vascular Dementia, HIV Dementia, [place]'s Disease, Parkinson's Disease, Depression, Schizophrenia, and Closed Head Injury.   The RBANS can be used by clinicians and neuropsychologists to help screen for deficits in acute-care settings; track recovery during rehabilitation; track progression of neurological disorders; and screen for neurocognitive status in adolescents.  This test is normed for ages 12-89.  The subtest normative data are presented in scaled score units that have a mean of 10 and a standard deviation of 3.          Total Score Scaled Score  Percentile Group       I.  Immediate memory    1.  List Learning   19     2.  Story Memory   10    Immediate Memory Index Score  = 65    II.  Visuospatial/Constructional    3.  Figure copy   17    4.  Line Orientation   11       Visuospatial/Constructional Index Score  = 78    III.  Language     5.  Picture Naming   10       6.  Semantic Fluency   10    Language Index Score = 79    IV.  Attention  7.   Digit Span     7    8.  Coding     14    Attention Index Score = 56    V.  Delayed Memory  9.  List recall    0       10. List Recognition   16       11. Story Recall   4    12. Figure Recall   10    Delayed Memory Index Score = 60  Sum of Total Scores for Subtest 9+11+12 14    Sum of Index Scores = 338  Total Scale Score = 59      INTERPRETATION OF TEST RESULTS: Pt scores in the 0.3% for his age for cognitive/linguistic skills. Deficits include memory, visuospatial construct, language, and attention.    TIME ADMINISTERING TEST: 30  TIME FOR INTERPRETATION AND PREPARATION OF REPORT: 20  TOTAL TIME: 50    Repeatable Battery for the Assessment of Neuropsychological Status Update (RBANS Update). Copyright   2012 [facility] Adapted and reproduced with permission. All rights reserved.        Assessment & Plan   CLINICAL IMPRESSIONS   Medical Diagnosis: Cerebrovascular accident (CVA) due to thrombosis of cerebral artery (H) (I63.30)    Treatment Diagnosis:     Impression/Assessment: Pt is a 53 year old male who attends as recommended by doctor for cognitive/linguistic skills.. The following significant findings have been identified: impaired cognition, characterized by deficits to memory, language and attention. Identified deficits interfere with their ability to function independently as compared to previous level of function.    PLAN OF CARE  Treatment Interventions: Language , Cognitive skills    Prognosis to achieve stated therapy goals is good   Rehab potential is impacted by: patient awareness of deficits    Long Term Goals:   SLP Goal 1  Goal Identifier: Memory strategies  Goal Description: Patient will report independently using internal/external memory strategies to improve function across one week.  Rationale: To maximize safety and independence with cognitive function within the home or community  Target Date: 05/22/24  SLP Goal 2  Goal Identifier: Memory task  Goal Description: Patient will complete  immediate and delayed memory tasks with 90% accuracy and min cues.  Rationale: To maximize safety and independence with cognitive function within the home or community  Target Date: 05/22/24  SLP Goal 3  Goal Identifier: attention  Goal Description: Patient will complete a moderately complex attention task with min cues and 90% accuracy across two sessions.  Rationale: To maximize safety and independence with cognitive function within the home or community  Target Date: 05/22/24  SLP Goal 4  Goal Identifier: Problem Solving  Goal Description: Patient will complete complex problem solving task with 90% accuracy and min cues across two sessions.  Rationale: To maximize safety and independence with cognitive function within the home or community  Target Date: 05/22/24  SLP Goal 5  Goal Identifier: Home Program  Goal Description: Patient will verbalize understanding of and completion of home program to improve cognition across two sessions.  Rationale: To maximize safety and independence with cognitive function within the home or community  Target Date: 05/22/24      Frequency of Treatment: 1x a week  Duration of Treatment: 12 weeks     Recommended Referrals to Other Professionals:  has PT orders  Education Assessment:        Risks and benefits of evaluation/treatment have been explained.   Patient/Family/caregiver agrees with Plan of Care.     Evaluation Time:              Signing Clinician: Beba Molina, SLP

## 2024-04-24 ENCOUNTER — ANCILLARY PROCEDURE (OUTPATIENT)
Dept: GENERAL RADIOLOGY | Facility: CLINIC | Age: 54
End: 2024-04-24
Attending: FAMILY MEDICINE
Payer: COMMERCIAL

## 2024-04-24 ENCOUNTER — OFFICE VISIT (OUTPATIENT)
Dept: FAMILY MEDICINE | Facility: CLINIC | Age: 54
End: 2024-04-24
Payer: COMMERCIAL

## 2024-04-24 ENCOUNTER — LAB (OUTPATIENT)
Dept: LAB | Facility: CLINIC | Age: 54
End: 2024-04-24
Payer: COMMERCIAL

## 2024-04-24 VITALS
HEIGHT: 70 IN | SYSTOLIC BLOOD PRESSURE: 129 MMHG | DIASTOLIC BLOOD PRESSURE: 80 MMHG | TEMPERATURE: 97.8 F | RESPIRATION RATE: 20 BRPM | OXYGEN SATURATION: 98 % | BODY MASS INDEX: 30.98 KG/M2 | HEART RATE: 80 BPM | WEIGHT: 216.38 LBS

## 2024-04-24 DIAGNOSIS — E11.649 TYPE 2 DIABETES MELLITUS WITH HYPOGLYCEMIA WITHOUT COMA, WITH LONG-TERM CURRENT USE OF INSULIN (H): ICD-10-CM

## 2024-04-24 DIAGNOSIS — E11.9 TYPE 2 DIABETES MELLITUS WITHOUT COMPLICATION, WITHOUT LONG-TERM CURRENT USE OF INSULIN (H): Primary | ICD-10-CM

## 2024-04-24 DIAGNOSIS — Z12.11 SCREEN FOR COLON CANCER: ICD-10-CM

## 2024-04-24 DIAGNOSIS — I25.10 CORONARY ARTERY DISEASE INVOLVING NATIVE CORONARY ARTERY OF NATIVE HEART WITHOUT ANGINA PECTORIS: ICD-10-CM

## 2024-04-24 DIAGNOSIS — I10 HYPERTENSION, GOAL BELOW 140/90: ICD-10-CM

## 2024-04-24 DIAGNOSIS — M79.661 PAIN OF RIGHT LOWER LEG: ICD-10-CM

## 2024-04-24 DIAGNOSIS — Z86.73 HISTORY OF CVA (CEREBROVASCULAR ACCIDENT): ICD-10-CM

## 2024-04-24 DIAGNOSIS — Z79.4 TYPE 2 DIABETES MELLITUS WITH HYPOGLYCEMIA WITHOUT COMA, WITH LONG-TERM CURRENT USE OF INSULIN (H): ICD-10-CM

## 2024-04-24 DIAGNOSIS — R91.8 PULMONARY NODULES: ICD-10-CM

## 2024-04-24 LAB — HBA1C MFR BLD: 6.3 % (ref 0–5.6)

## 2024-04-24 PROCEDURE — 83036 HEMOGLOBIN GLYCOSYLATED A1C: CPT

## 2024-04-24 PROCEDURE — 99214 OFFICE O/P EST MOD 30 MIN: CPT | Performed by: FAMILY MEDICINE

## 2024-04-24 PROCEDURE — 73590 X-RAY EXAM OF LOWER LEG: CPT | Mod: TC | Performed by: RADIOLOGY

## 2024-04-24 PROCEDURE — 36415 COLL VENOUS BLD VENIPUNCTURE: CPT

## 2024-04-24 PROCEDURE — 99207 PR FOOT EXAM NO CHARGE: CPT | Performed by: FAMILY MEDICINE

## 2024-04-24 RX ORDER — ATORVASTATIN CALCIUM 80 MG/1
80 TABLET, FILM COATED ORAL DAILY
Qty: 90 TABLET | Refills: 4 | Status: SHIPPED | OUTPATIENT
Start: 2024-04-24

## 2024-04-24 RX ORDER — METOPROLOL TARTRATE 25 MG/1
25 TABLET, FILM COATED ORAL 2 TIMES DAILY
Qty: 180 TABLET | Refills: 3 | Status: SHIPPED | OUTPATIENT
Start: 2024-04-24 | End: 2024-09-05 | Stop reason: DRUGHIGH

## 2024-04-24 RX ORDER — BLOOD-GLUCOSE SENSOR
1 EACH MISCELLANEOUS
Qty: 2 EACH | Refills: 5 | Status: SHIPPED | OUTPATIENT
Start: 2024-04-24 | End: 2024-09-20

## 2024-04-24 RX ORDER — INSULIN GLARGINE 300 U/ML
60 INJECTION, SOLUTION SUBCUTANEOUS EVERY MORNING
Qty: 18 ML | Refills: 1 | Status: SHIPPED | OUTPATIENT
Start: 2024-04-24 | End: 2024-08-22

## 2024-04-24 ASSESSMENT — PAIN SCALES - GENERAL: PAINLEVEL: NO PAIN (0)

## 2024-04-24 NOTE — PROGRESS NOTES
"  Assessment & Plan     Type 2 diabetes mellitus without complication, without long-term current use of insulin (H)  Well controlled  Continue current treatment  Blood sugars which were high initially after his stroke have seemed to improve again  - Continuous Glucose Sensor (FREESTYLE MARY 3 SENSOR) MISC; 1 each every 14 days  - insulin glargine U-300 (TOUJEO SOLOSTAR) 300 UNIT/ML (1 units dial) pen; Inject 60 Units Subcutaneous every morning Inject 60 units every morning and adjust according to instruction.  Max TDD 80 units.  - Semaglutide, 2 MG/DOSE, (OZEMPIC) 8 MG/3ML pen; Inject 2 mg Subcutaneous every 7 days    Hypertension, goal below 140/90  Well controlled    Coronary artery disease involving native coronary artery of native heart without angina pectoris  Currently asymptomatic  - metoprolol tartrate (LOPRESSOR) 25 MG tablet; Take 1 tablet (25 mg) by mouth 2 times daily  - atorvastatin (LIPITOR) 80 MG tablet; Take 1 tablet (80 mg) by mouth daily    Screen for colon cancer  Declined referral for testing today    Pulmonary nodules  Due for CT scan  [facility] scanned results from 11/2023 reviewed  10-15 mm nodule RLL - needed 6 month follow up - that will be May  - CT Chest w/o Contrast; Future    Pain of right lower leg  Right mid tibia  ?injury he's not aware of  Doesn't sound radicular in nature   Xray negative for bony lesions  Ice/rest/stretching recommended  - XR Tibia and Fibula Right 2 Views; Future      Recent CVA  Has follow up at [facility] with neurology in May      BMI  Estimated body mass index is 31.05 kg/m  as calculated from the following:    Height as of this encounter: 1.778 m (5' 10\").    Weight as of this encounter: 98.1 kg (216 lb 6 oz).             Subjective   Angelo is a 53 year old, presenting for the following health issues:  Diabetes        4/24/2024     8:57 AM   Additional Questions   Roomed by Sharee burns CMA   Accompanied by daughter     History of Present Illness       Reason for visit:  " Follow u    He eats 0-1 servings of fruits and vegetables daily.He consumes 1 sweetened beverage(s) daily.He exercises with enough effort to increase his heart rate 9 or less minutes per day.  He exercises with enough effort to increase his heart rate 3 or less days per week.   He is taking medications regularly.         Diabetes Follow-up  Toujeo 60U SubQ every day, ozempic 2mg SubQ weekly  How often are you checking your blood sugar? Continuous glucose monitor  What time of day are you checking your blood sugars (select all that apply)?  Before and after meals  Have you had any blood sugars above 200?  Yes but food related  Have you had any blood sugars below 70?  Yes due to sensor not working  What symptoms do you notice when your blood sugar is low?  Shaky and Weak  What concerns do you have today about your diabetes? None   Do you have any of these symptoms? (Select all that apply)  No numbness or tingling in feet.  No redness, sores or blisters on feet.  No complaints of excessive thirst.  No reports of blurry vision.  No significant changes to weight.          Hyperlipidemia Follow-Up  Atorvastatin 80mg qd  Are you regularly taking any medication or supplement to lower your cholesterol?   Yes- statin  Are you having muscle aches or other side effects that you think could be caused by your cholesterol lowering medication?  No    Hypertension Follow-up  Lisinopril 2.5mg every day, metoprolol 25mg bid  Do you check your blood pressure regularly outside of the clinic? Yes   Are you following a low salt diet? Yes  Are your blood pressures ever more than 140 on the top number (systolic) OR more   than 90 on the bottom number (diastolic), for example 140/90? No    BP Readings from Last 2 Encounters:   04/24/24 129/80   03/27/24 128/70     Hemoglobin A1C (%)   Date Value   04/24/2024 6.3 (H)   09/21/2023 5.9 (H)   05/18/2021 11.6 (H)   12/29/2017 12.0 (H)     LDL Cholesterol Calculated (mg/dL)   Date Value  "  03/18/2024 74   12/15/2022 86   05/18/2021 195 (H)   02/02/2015 90           Review of Systems  Constitutional, HEENT, cardiovascular, pulmonary, gi and gu systems are negative, except as otherwise noted.      Objective    /80   Pulse 80   Temp 97.8  F (36.6  C) (Tympanic)   Resp 20   Ht 1.778 m (5' 10\")   Wt 98.1 kg (216 lb 6 oz)   SpO2 98%   BMI 31.05 kg/m    Body mass index is 31.05 kg/m .  Physical Exam       Results for orders placed or performed in visit on 04/24/24   XR Tibia and Fibula Right 2 Views     Status: None    Narrative    XR TIBIA AND FIBULA RIGHT 2 VIEWS 4/24/2024 10:02 AM     HISTORY: right mid tibia pain - no injury; Pain of right lower leg    COMPARISON: None.         Impression    IMPRESSION: The right tibia and fibula are negative.    ZAHIDA BEATTY MD         SYSTEM ID:  [id number]   Results for orders placed or performed in visit on 04/24/24   Hemoglobin A1c     Status: Abnormal   Result Value Ref Range    Hemoglobin A1C 6.3 (H) 0.0 - 5.6 %                 Signed Electronically by: Yue Ruvalcaba MD    "

## 2024-04-29 PROBLEM — I63.9 CEREBROVASCULAR ACCIDENT (H): Status: ACTIVE | Noted: 2024-04-29

## 2024-05-15 ENCOUNTER — THERAPY VISIT (OUTPATIENT)
Dept: PHYSICAL THERAPY | Facility: CLINIC | Age: 54
End: 2024-05-15
Attending: FAMILY MEDICINE
Payer: COMMERCIAL

## 2024-05-15 ENCOUNTER — TELEPHONE (OUTPATIENT)
Dept: FAMILY MEDICINE | Facility: CLINIC | Age: 54
End: 2024-05-15

## 2024-05-15 DIAGNOSIS — I63.9 CEREBROVASCULAR ACCIDENT (H): Primary | ICD-10-CM

## 2024-05-15 PROCEDURE — 97161 PT EVAL LOW COMPLEX 20 MIN: CPT | Mod: GP | Performed by: PHYSICAL MEDICINE & REHABILITATION

## 2024-05-15 PROCEDURE — 97110 THERAPEUTIC EXERCISES: CPT | Mod: GP | Performed by: PHYSICAL MEDICINE & REHABILITATION

## 2024-05-15 NOTE — TELEPHONE ENCOUNTER
Patient Quality Outreach    Patient is due for the following:   Colon screen    Next Steps:   - Schedule colonoscopy  - Schedule wellness visit    Type of outreach:    Sent MedShape message.      Questions for provider review:    None           Sharee eLwis, CMA

## 2024-05-15 NOTE — PROGRESS NOTES
PHYSICAL THERAPY EVALUATION  Type of Visit: Evaluation    See electronic medical record for Abuse and Falls Screening details.    Subjective       Presenting condition or subjective complaint: stroke Dr. Crook, stroke on 3/21/2024. Notes that he has not felt any deficits physically, only mentally. He has not had any LOB or instances of weakness/instability.  Date of onset: 03/21/24    Relevant medical history: Chest pain; Diabetes; Heart problems; High blood pressure; Severe headaches; Stroke   Dates & types of surgery:      Prior diagnostic imaging/testing results: MRI; CT scan; EMG     Prior therapy history for the same diagnosis, illness or injury: No      Living Environment  Social support: With family members   Type of home: House   Stairs to enter the home: Yes   Is there a railing: Yes   Ramp: No   Stairs inside the home: Yes   Is there a railing: Yes   Help at home: Self Cares (home health aide/personal care attendant, family, etc)  Equipment owned:       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  Hobbies/Interests: fishing, shooting, sports, camping    Patient goals for therapy: talk without the loss for words       Objective      Cognitive Status Examination  Orientation: Oriented to person, place and time   Level of Consciousness: Alert  Follows Commands and Answers Questions: 100% of the time  Personal Safety and Judgement: Intact  Memory: Impaired    OBSERVATION: no signs of acute distress  INTEGUMENTARY: Intact  POSTURE: Standing Posture: Rounded shoulders  RANGE OF MOTION: LE ROM WFL  STRENGTH: LE Strength WFL  L DF 4+/5; L hip flexion 5-/5; L hip extension 4+/5    GAIT:   Level of [place]: WFL  Assistive Device(s): None  Gait Deviations: WFL  Gait Distance: community distances  Stairs: IND, no use of railings    BALANCE: Standing Balance (dynamic):Good    SPECIAL TESTS  Mini-BESTest  TOTAL SCORE (out of 28): 27       SENSATION: UE Sensation WNL, LE Sensation WNL  COORDINATION: WFL  MUSCLE TONE:  WFL    Assessment & Plan   CLINICAL IMPRESSIONS  Medical Diagnosis: Cerebrovascular accident (CVA) due to thrombosis of cerebral artery (H) (I63.30)    Treatment Diagnosis: generalized weakness   Impression/Assessment: Patient is a 53 year old male with post stroke complaints.  The following significant findings have been identified: Decreased strength. These impairments interfere with their ability to perform recreational activities as compared to previous level of function.     Clinical Decision Making (Complexity):  Clinical Presentation: Stable/Uncomplicated  Clinical Presentation Rationale: based on medical and personal factors listed in PT evaluation  Clinical Decision Making (Complexity): Low complexity    PLAN OF CARE  Treatment Interventions:  Interventions: Gait Training, Manual Therapy, Neuromuscular Re-education, Therapeutic Activity, Therapeutic Exercise    Long Term Goals     PT Goal 1  Goal Identifier: HEP  Goal Description: Patient to be IND and consistent with HEP to aid functional recovery.  Rationale: to maximize safety and independence with performance of ADLs and functional tasks  Target Date: 05/15/24  Date Met: 05/15/24      Frequency of Treatment: one time only  Duration of Treatment: eval only    Education Assessment:   Learner/Method: Patient;Reading;Demonstration;Pictures/Video    Risks and benefits of evaluation/treatment have been explained.   Patient/Family/caregiver agrees with Plan of Care.     Evaluation Time:     PT Eval, Low Complexity Minutes (35664): 13     Signing Clinician: Bridger Parker, PT

## 2024-05-16 ENCOUNTER — THERAPY VISIT (OUTPATIENT)
Dept: SPEECH THERAPY | Facility: CLINIC | Age: 54
End: 2024-05-16
Attending: FAMILY MEDICINE
Payer: COMMERCIAL

## 2024-05-16 DIAGNOSIS — I63.9 CEREBROVASCULAR ACCIDENT (H): Primary | ICD-10-CM

## 2024-05-16 PROCEDURE — 97130 THER IVNTJ EA ADDL 15 MIN: CPT | Mod: GN | Performed by: SPEECH-LANGUAGE PATHOLOGIST

## 2024-05-16 PROCEDURE — 97129 THER IVNTJ 1ST 15 MIN: CPT | Mod: GN | Performed by: SPEECH-LANGUAGE PATHOLOGIST

## 2024-06-16 ENCOUNTER — HEALTH MAINTENANCE LETTER (OUTPATIENT)
Age: 54
End: 2024-06-16

## 2024-07-17 RX ORDER — BLOOD-GLUCOSE SENSOR
EACH MISCELLANEOUS
Refills: 0 | OUTPATIENT
Start: 2024-07-17

## 2024-08-19 ENCOUNTER — HOSPITAL ENCOUNTER (EMERGENCY)
Facility: CLINIC | Age: 54
Discharge: HOME | End: 2024-08-19
Attending: PHYSICIAN ASSISTANT | Admitting: PHYSICIAN ASSISTANT
Payer: COMMERCIAL

## 2024-08-19 ENCOUNTER — APPOINTMENT (OUTPATIENT)
Dept: GENERAL RADIOLOGY | Facility: CLINIC | Age: 54
End: 2024-08-19
Attending: PHYSICIAN ASSISTANT
Payer: COMMERCIAL

## 2024-08-19 VITALS
TEMPERATURE: 99 F | SYSTOLIC BLOOD PRESSURE: 136 MMHG | OXYGEN SATURATION: 97 % | HEART RATE: 102 BPM | DIASTOLIC BLOOD PRESSURE: 88 MMHG | RESPIRATION RATE: 18 BRPM

## 2024-08-19 DIAGNOSIS — M54.50 LOW BACK PAIN: ICD-10-CM

## 2024-08-19 PROCEDURE — 72072 X-RAY EXAM THORAC SPINE 3VWS: CPT

## 2024-08-19 PROCEDURE — 99213 OFFICE O/P EST LOW 20 MIN: CPT | Performed by: PHYSICIAN ASSISTANT

## 2024-08-19 PROCEDURE — G0463 HOSPITAL OUTPT CLINIC VISIT: HCPCS | Performed by: PHYSICIAN ASSISTANT

## 2024-08-19 PROCEDURE — 72100 X-RAY EXAM L-S SPINE 2/3 VWS: CPT

## 2024-08-19 RX ORDER — CYCLOBENZAPRINE HCL 10 MG
10 TABLET ORAL 3 TIMES DAILY PRN
Qty: 20 TABLET | Refills: 0 | Status: SHIPPED | OUTPATIENT
Start: 2024-08-19 | End: 2024-08-25

## 2024-08-19 ASSESSMENT — ACTIVITIES OF DAILY LIVING (ADL): ADLS_ACUITY_SCORE: 35

## 2024-08-19 ASSESSMENT — COLUMBIA-SUICIDE SEVERITY RATING SCALE - C-SSRS
1. IN THE PAST MONTH, HAVE YOU WISHED YOU WERE DEAD OR WISHED YOU COULD GO TO SLEEP AND NOT WAKE UP?: NO
2. HAVE YOU ACTUALLY HAD ANY THOUGHTS OF KILLING YOURSELF IN THE PAST MONTH?: NO
6. HAVE YOU EVER DONE ANYTHING, STARTED TO DO ANYTHING, OR PREPARED TO DO ANYTHING TO END YOUR LIFE?: NO

## 2024-08-19 NOTE — ED PROVIDER NOTES
History     Chief Complaint   Patient presents with    Back Pain     Low-Middle back- left side is the worse. Fell 1 month ago. Slipped and fell on grass. Locke like it was getting better but now has gotten worse again. Can't bend over without pain. Stairs are a problem. Constant uncomfortableness. Has not been see at all for this. Pain does radiate like a bolt of lighting down to left leg. Rates pain currently 7-8/10     HPI  Houston Winters is a 54 year old male who presents to urgent care with concern over low back pain which is been present for approximate last 6 weeks.  Patient sustained a fall on wet grass landing posteriorly on the ground 7/4/2024.  He had initial pain that was improving however the last several weeks has continued to worsen again.  He complains of sharp pain exacerbated by certain movements, changes in position, walking.  Occasionally will have shooting stabbing pains going down into his left leg.  He denies any persistent lower extremity or saddle numbness or paresthesias.  No fever, chills, myalgias, cough, dyspnea, wheezing, shortness of breath, nausea, vomiting, diarrhea or abdominal pain or urinary complaints.  He has attempted treat with high doses of Tylenol and ibuprofen without relief.  He denies any prior history of significant or recurrent back pain or injuries.  He has not been working since having CVA in March of this year.      Allergies:  Allergies   Allergen Reactions    Metformin Other (See Comments)     Very low blood sugars, Blood sugar issue        Problem List:    Patient Active Problem List    Diagnosis Date Noted    Cerebrovascular accident (H) 04/29/2024     Priority: Medium    Type 2 diabetes mellitus with hypoglycemia without coma, with long-term current use of insulin (H) 03/17/2023     Priority: Medium    Chronic, continuous use of opioids 02/27/2023     Priority: Medium     Patient is followed by Yue Ruvalcaba MD for ongoing prescription of pain medication.   All refills should only be approved by this provider, or covering partner.    Medication(s): oxycodone 5 mg  Maximum quantity per month: 90  Clinic visit frequency required: Q 3 months   PDMP Review         Value Time User    State PDMP site checked  Yes 1/30/2023  8:54 AM Yue Ruvalcaba MD          Controlled substance agreement:  CSA -- Patient Level:    CSA: None found at the patient level.     CSA 2/27/2023      Pain Clinic evaluation in the past: No    Opioid Risk Tool Total Score(s):  No flowsheet data found.  Last [facility] website verification:  done on 2/27/2023   [email or web address]        Hypertension associated with type 2 diabetes mellitus (H) 12/15/2022     Priority: Medium    Coronary artery disease involving coronary bypass graft of native heart without angina pectoris 09/26/2022     Priority: Medium     4 V CABG at [facility] 6/16/021      Postoperative infection, initial encounter 01/28/2018     Priority: Medium    Small bowel obstruction (H) 12/29/2017     Priority: Medium    Type 2 diabetes mellitus without complication, without long-term current use of insulin (H) 03/27/2017     Priority: Medium    Hypertension, goal below 140/90 02/04/2015     Priority: Medium    Obesity 07/17/2014     Priority: Medium    Hyperlipidemia with target LDL less than 100 07/17/2014     Priority: Medium     Diagnosis updated by automated process. Provider to review and confirm.      Partial epilepsy (H) 03/12/2009     Priority: Medium     (Problem list name updated by automated process. Provider to review and confirm.)      ED (erectile dysfunction) 12/18/2008     Priority: Medium    GANGLOIN CYST /RIGHT ANKLE 06/15/2006     Priority: Medium        Past Medical History:    Past Medical History:   Diagnosis Date    DM (diabetes mellitus) (H)     Epilepsy (H)     Hyperlipidemia due to type 2 diabetes mellitus (H) 12/15/2022    MEDICAL HISTORY OF - Age 16     Nicotine dependence     Tobacco use disorder  2015    Unstable angina (H) 2021       Past Surgical History:    Past Surgical History:   Procedure Laterality Date    CL AFF SURGICAL PATHOLOGY  2005    ganglion cyst removed    CORONARY ARTERY BYPASS  2021    4 Vessel CABG - at [facility]    CV HEART CATHETERIZATION WITH POSSIBLE INTERVENTION N/A 2021    Procedure: Heart Catheterization with Possible Intervention;  Surgeon: Andrew Madrid MD;  Location:  HEART CARDIAC CATH LAB    CV LEFT HEART CATH N/A 2021    Procedure: Left Heart Cath;  Surgeon: Andrew Madrid MD;  Location:  HEART CARDIAC CATH LAB    EXCISE MASS LOWER EXTREMITY  02/15/2013    Procedure: EXCISE MASS LOWER EXTREMITY;  Right Ankle Excision of ganglion cyst;  Surgeon: Akbar Melo DPM;  Location: [facility] OR    HERNIA REPAIR  2000    umbilical repair    HERNIORRHAPHY UMBILICAL N/A 11/10/2015    Procedure: HERNIORRHAPHY UMBILICAL;  Surgeon: Garry Leos MD;  Location: [facility] OR    TOTAL HIP ARTHROPLASTY Left [address] [place] [address]       Family History:    Family History   Problem Relation Age of Onset    Arthritis Mother     Cancer Maternal Grandmother     Arthritis Maternal Grandmother         lupus    Gastrointestinal Disease Maternal Grandfather         war injury, colostomy    Respiratory Daughter         growing out of it    Unknown/Adopted Father     Diabetes No family hx of     Coronary Artery Disease No family hx of     Hypertension No family hx of     Hyperlipidemia No family hx of     Breast Cancer No family hx of     Cancer - colorectal No family hx of     Ovarian Cancer No family hx of     Prostate Cancer No family hx of        Social History:  Marital Status:   [2]  Social History     Tobacco Use    Smoking status: Former     Current packs/day: 0.00     Average packs/day: 0.3 packs/day for 25.0 years (6.3 ttl pk-yrs)     Types: Cigarettes     Start date: 1991     Quit date: 2016     Years since quittin.5     Smokeless tobacco: Former   Vaping Use    Vaping status: Never Used   Substance Use Topics    Alcohol use: Yes     Alcohol/week: 0.0 standard drinks of alcohol     Comment: mixed 2-4 on weekends, not every weekend    Drug use: No        Medications:    aspirin (ASA) 81 MG chewable tablet  atorvastatin (LIPITOR) 80 MG tablet  Continuous Glucose Sensor (FREESTYLE MARY 3 SENSOR) MISC  docusate sodium (COLACE) 100 MG tablet  ezetimibe (ZETIA) 10 MG tablet  insulin glargine U-300 (TOUJEO SOLOSTAR) 300 UNIT/ML (1 units dial) pen  lisinopril (ZESTRIL) 2.5 MG tablet  metoprolol tartrate (LOPRESSOR) 25 MG tablet  multivitamin w/minerals (THERA-VIT-M) tablet  nitroGLYcerin (NITROSTAT) 0.4 MG sublingual tablet  Semaglutide, 2 MG/DOSE, (OZEMPIC) 8 MG/3ML pen      Review of Systems   Constitutional:  Negative for chills and fever.   Eyes:  Negative for photophobia and visual disturbance.   Respiratory:  Negative for cough, shortness of breath and wheezing.    Cardiovascular:  Negative for chest pain and palpitations.   Gastrointestinal:  Negative for abdominal distention, diarrhea, nausea and vomiting.   Genitourinary:  Negative for dysuria, flank pain, hematuria and urgency.   Skin:  Negative for color change, rash and wound.   Neurological:  Negative for dizziness, weakness, light-headedness and headaches.       /88   Pulse 102   Temp 99  F (37.2  C) (Tympanic)   Resp 18   SpO2 97%   Physical Exam  Constitutional:       Appearance: He is not ill-appearing or toxic-appearing.      Comments: Patient does appear to be in some pain   Cardiovascular:      Rate and Rhythm: Normal rate and regular rhythm.      Heart sounds: No murmur heard.     No friction rub. No gallop.   Pulmonary:      Effort: Pulmonary effort is normal. No respiratory distress.      Breath sounds: Normal breath sounds. No wheezing, rhonchi or rales.   Abdominal:      General: Abdomen is flat.      Palpations: Abdomen is soft.      Tenderness: There  is no right CVA tenderness or left CVA tenderness.   Musculoskeletal:      Cervical back: Normal.      Thoracic back: Tenderness present. No swelling, deformity, lacerations or spasms. Decreased range of motion.      Lumbar back: Tenderness and bony tenderness present. No swelling, deformity or lacerations. Decreased range of motion.   Skin:     General: Skin is warm and dry.      Findings: No abrasion, ecchymosis, erythema, laceration or rash.   Neurological:      Mental Status: He is alert.      Deep Tendon Reflexes:      Reflex Scores:       Patellar reflexes are 2+ on the right side and 2+ on the left side.      ED Course        Procedures       Critical Care time:  none          Results for orders placed or performed during the hospital encounter of 08/19/24   Lumbar spine XR, 2-3 views     Status: None    Narrative    XR LUMBAR SPINE 2/3 VIEWS, XR THORACIC SPINE 3 VIEWS  8/19/2024 2:18  PM     HISTORY: pain after fall 7/4/24    COMPARISON: None.       Impression    IMPRESSION: Alignment of the lumbar spine is within normal limits. No  loss of vertebral body height. No significant degenerative endplate  changes or loss of intervertebral disc space. Sternotomy wires.     KIT HEREDIA MD         SYSTEM ID:  [id number]   Thoracic spine XR, 3 views     Status: None    Narrative    XR LUMBAR SPINE 2/3 VIEWS, XR THORACIC SPINE 3 VIEWS  8/19/2024 2:18  PM     HISTORY: pain after fall 7/4/24    COMPARISON: None.       Impression    IMPRESSION: Alignment of the lumbar spine is within normal limits. No  loss of vertebral body height. No significant degenerative endplate  changes or loss of intervertebral disc space. Sternotomy wires.     KIT HEREDIA MD         SYSTEM ID:  [id number]     No results found for this or any previous visit (from the past 24 hour(s)).  Medications - No data to display    Assessments & Plan (with Medical Decision Making)     I have reviewed the nursing notes.    I have reviewed the  findings, diagnosis, plan and need for follow up with the patient.       Discharge Medication List as of 8/19/2024  2:48 PM        START taking these medications    Details   cyclobenzaprine (FLEXERIL) 10 MG tablet Take 1 tablet (10 mg) by mouth 3 times daily as needed for muscle spasms, Disp-20 tablet, R-0, E-Prescribe           Final diagnoses:   Low back pain     54-year-old male presents to urgent care with concern over 6-week history of low back pain after he sustained a fall and fell posteriorly onto grass.  He had stable vital signs upon arrival.  Physical exam findings that show patient who did appear to be in mild pain.  Physical exam findings significant for decreased range of motion, tender palpation over the lumbar spine and more diffusely of the musculature of the lower thoracic region.  Supportive evaluation he did have x-ray of the lumbar and thoracic spines which were negative for acute bony abnormality.  Differential for symptoms include muscle strain, spasm, DDD, spinal stenosis, herniated disc sciatica.  He did not have any worrisome red flag symptoms to suggest cauda equina, epidural abscess or other emergent need for MRI.  He was discharged home stable concerns for continued symptomatic treatment with ice/heat, Tylenol/ibuprofen, prescription for Flexeril was given.  Given duration of symptoms referral to spine  referral also placed. Follow up as directed. Worrisome reasons to return to ER/UC sooner discussed.     Disclaimer: This note consists of symbols derived from keyboarding, dictation, and/or voice recognition software. As a result, there may be errors in the script that have gone undetected.  Please consider this when interpreting information found in the chart.      8/19/2024   [facility] EMERGENCY DEPT       Jenny Ross PA-C  08/21/24 2999

## 2024-08-20 DIAGNOSIS — E11.9 TYPE 2 DIABETES MELLITUS WITHOUT COMPLICATION, WITHOUT LONG-TERM CURRENT USE OF INSULIN (H): ICD-10-CM

## 2024-08-21 ASSESSMENT — ENCOUNTER SYMPTOMS
VOMITING: 0
HEADACHES: 0
COLOR CHANGE: 0
DYSURIA: 0
COUGH: 0
HEMATURIA: 0
LIGHT-HEADEDNESS: 0
WHEEZING: 0
SHORTNESS OF BREATH: 0
DIARRHEA: 0
FLANK PAIN: 0
WOUND: 0
FEVER: 0
NAUSEA: 0
ABDOMINAL DISTENTION: 0
CHILLS: 0
PALPITATIONS: 0
DIZZINESS: 0
WEAKNESS: 0
PHOTOPHOBIA: 0

## 2024-08-22 RX ORDER — INSULIN GLARGINE 300 U/ML
INJECTION, SOLUTION SUBCUTANEOUS
Qty: 18 ML | Refills: 0 | Status: SHIPPED | OUTPATIENT
Start: 2024-08-22 | End: 2024-09-05

## 2024-08-26 ENCOUNTER — TELEPHONE (OUTPATIENT)
Dept: FAMILY MEDICINE | Facility: CLINIC | Age: 54
End: 2024-08-26
Payer: COMMERCIAL

## 2024-08-26 NOTE — TELEPHONE ENCOUNTER
Patient Quality Outreach    Patient is due for the following:   Colonoscopy    Next Steps:   - Schedule wellness visit with non fasting labs  - Colon cancer screen  - PHQ/YENNI    Type of outreach:    Sent [employer] message.      Questions for provider review:    None           Sharee Lewis, CMA

## 2024-08-30 ENCOUNTER — MYC MEDICAL ADVICE (OUTPATIENT)
Dept: FAMILY MEDICINE | Facility: CLINIC | Age: 54
End: 2024-08-30
Payer: COMMERCIAL

## 2024-09-05 ENCOUNTER — ANCILLARY PROCEDURE (OUTPATIENT)
Dept: GENERAL RADIOLOGY | Facility: CLINIC | Age: 54
End: 2024-09-05
Attending: FAMILY MEDICINE
Payer: COMMERCIAL

## 2024-09-05 ENCOUNTER — OFFICE VISIT (OUTPATIENT)
Dept: FAMILY MEDICINE | Facility: CLINIC | Age: 54
End: 2024-09-05
Payer: COMMERCIAL

## 2024-09-05 VITALS
WEIGHT: 212 LBS | DIASTOLIC BLOOD PRESSURE: 82 MMHG | SYSTOLIC BLOOD PRESSURE: 130 MMHG | TEMPERATURE: 98 F | RESPIRATION RATE: 22 BRPM | OXYGEN SATURATION: 98 % | HEIGHT: 70 IN | BODY MASS INDEX: 30.35 KG/M2 | HEART RATE: 90 BPM

## 2024-09-05 DIAGNOSIS — K21.9 GASTROESOPHAGEAL REFLUX DISEASE WITHOUT ESOPHAGITIS: ICD-10-CM

## 2024-09-05 DIAGNOSIS — I63.9 CEREBROVASCULAR ACCIDENT (CVA), UNSPECIFIED MECHANISM (H): ICD-10-CM

## 2024-09-05 DIAGNOSIS — R05.1 ACUTE COUGH: ICD-10-CM

## 2024-09-05 DIAGNOSIS — E78.5 HYPERLIPIDEMIA LDL GOAL <70: ICD-10-CM

## 2024-09-05 DIAGNOSIS — R91.8 PULMONARY NODULES: ICD-10-CM

## 2024-09-05 DIAGNOSIS — R61 DIAPHORESIS: ICD-10-CM

## 2024-09-05 DIAGNOSIS — E87.5 HYPERKALEMIA: ICD-10-CM

## 2024-09-05 DIAGNOSIS — Z12.11 SCREEN FOR COLON CANCER: ICD-10-CM

## 2024-09-05 DIAGNOSIS — E11.59 HYPERTENSION ASSOCIATED WITH TYPE 2 DIABETES MELLITUS (H): ICD-10-CM

## 2024-09-05 DIAGNOSIS — E11.649 TYPE 2 DIABETES MELLITUS WITH HYPOGLYCEMIA WITHOUT COMA, WITH LONG-TERM CURRENT USE OF INSULIN (H): ICD-10-CM

## 2024-09-05 DIAGNOSIS — I15.2 HYPERTENSION ASSOCIATED WITH TYPE 2 DIABETES MELLITUS (H): ICD-10-CM

## 2024-09-05 DIAGNOSIS — E11.9 TYPE 2 DIABETES MELLITUS WITHOUT COMPLICATION, WITHOUT LONG-TERM CURRENT USE OF INSULIN (H): ICD-10-CM

## 2024-09-05 DIAGNOSIS — I10 HYPERTENSION, GOAL BELOW 140/90: ICD-10-CM

## 2024-09-05 DIAGNOSIS — Z79.4 TYPE 2 DIABETES MELLITUS WITH HYPOGLYCEMIA WITHOUT COMA, WITH LONG-TERM CURRENT USE OF INSULIN (H): ICD-10-CM

## 2024-09-05 DIAGNOSIS — Z00.00 ROUTINE GENERAL MEDICAL EXAMINATION AT A HEALTH CARE FACILITY: Primary | ICD-10-CM

## 2024-09-05 PROBLEM — T81.40XA POSTOPERATIVE INFECTION, INITIAL ENCOUNTER: Status: RESOLVED | Noted: 2018-01-28 | Resolved: 2024-09-05

## 2024-09-05 PROBLEM — F11.90 CHRONIC, CONTINUOUS USE OF OPIOIDS: Status: RESOLVED | Noted: 2023-02-27 | Resolved: 2024-09-05

## 2024-09-05 LAB
ALBUMIN SERPL BCG-MCNC: 4.5 G/DL (ref 3.5–5.2)
ALP SERPL-CCNC: 102 U/L (ref 40–150)
ALT SERPL W P-5'-P-CCNC: 44 U/L (ref 0–70)
ANION GAP SERPL CALCULATED.3IONS-SCNC: 7 MMOL/L (ref 7–15)
AST SERPL W P-5'-P-CCNC: 20 U/L (ref 0–45)
BASOPHILS # BLD AUTO: 0 10E3/UL (ref 0–0.2)
BASOPHILS NFR BLD AUTO: 0 %
BILIRUB SERPL-MCNC: 0.5 MG/DL
BUN SERPL-MCNC: 14.6 MG/DL (ref 6–20)
CALCIUM SERPL-MCNC: 9.4 MG/DL (ref 8.8–10.4)
CHLORIDE SERPL-SCNC: 103 MMOL/L (ref 98–107)
CHOLEST SERPL-MCNC: 114 MG/DL
CREAT SERPL-MCNC: 0.78 MG/DL (ref 0.67–1.17)
EGFRCR SERPLBLD CKD-EPI 2021: >90 ML/MIN/1.73M2
EOSINOPHIL # BLD AUTO: 0.1 10E3/UL (ref 0–0.7)
EOSINOPHIL NFR BLD AUTO: 1 %
ERYTHROCYTE [DISTWIDTH] IN BLOOD BY AUTOMATED COUNT: 13.1 % (ref 10–15)
GLUCOSE SERPL-MCNC: 167 MG/DL (ref 70–99)
HBA1C MFR BLD: 6.7 % (ref 0–5.6)
HCO3 SERPL-SCNC: 29 MMOL/L (ref 22–29)
HCT VFR BLD AUTO: 44.5 % (ref 40–53)
HDLC SERPL-MCNC: 35 MG/DL
HGB BLD-MCNC: 14.4 G/DL (ref 13.3–17.7)
IMM GRANULOCYTES # BLD: 0 10E3/UL
IMM GRANULOCYTES NFR BLD: 0 %
LDLC SERPL CALC-MCNC: 57 MG/DL
LYMPHOCYTES # BLD AUTO: 1.6 10E3/UL (ref 0.8–5.3)
LYMPHOCYTES NFR BLD AUTO: 23 %
MCH RBC QN AUTO: 30.8 PG (ref 26.5–33)
MCHC RBC AUTO-ENTMCNC: 32.4 G/DL (ref 31.5–36.5)
MCV RBC AUTO: 95 FL (ref 78–100)
MONOCYTES # BLD AUTO: 0.6 10E3/UL (ref 0–1.3)
MONOCYTES NFR BLD AUTO: 8 %
NEUTROPHILS # BLD AUTO: 4.9 10E3/UL (ref 1.6–8.3)
NEUTROPHILS NFR BLD AUTO: 68 %
NONHDLC SERPL-MCNC: 79 MG/DL
PLATELET # BLD AUTO: 246 10E3/UL (ref 150–450)
POTASSIUM SERPL-SCNC: 5.4 MMOL/L (ref 3.4–5.3)
PROT SERPL-MCNC: 7.2 G/DL (ref 6.4–8.3)
RBC # BLD AUTO: 4.68 10E6/UL (ref 4.4–5.9)
SODIUM SERPL-SCNC: 139 MMOL/L (ref 135–145)
TRIGL SERPL-MCNC: 110 MG/DL
WBC # BLD AUTO: 7.1 10E3/UL (ref 4–11)

## 2024-09-05 PROCEDURE — 36415 COLL VENOUS BLD VENIPUNCTURE: CPT | Performed by: FAMILY MEDICINE

## 2024-09-05 PROCEDURE — 80053 COMPREHEN METABOLIC PANEL: CPT | Performed by: FAMILY MEDICINE

## 2024-09-05 PROCEDURE — 83036 HEMOGLOBIN GLYCOSYLATED A1C: CPT | Performed by: FAMILY MEDICINE

## 2024-09-05 PROCEDURE — 99396 PREV VISIT EST AGE 40-64: CPT | Performed by: FAMILY MEDICINE

## 2024-09-05 PROCEDURE — 71046 X-RAY EXAM CHEST 2 VIEWS: CPT | Mod: TC | Performed by: RADIOLOGY

## 2024-09-05 PROCEDURE — 80061 LIPID PANEL: CPT | Performed by: FAMILY MEDICINE

## 2024-09-05 PROCEDURE — 85025 COMPLETE CBC W/AUTO DIFF WBC: CPT | Performed by: FAMILY MEDICINE

## 2024-09-05 PROCEDURE — 99214 OFFICE O/P EST MOD 30 MIN: CPT | Mod: 25 | Performed by: FAMILY MEDICINE

## 2024-09-05 RX ORDER — METOPROLOL TARTRATE 50 MG
50 TABLET ORAL 2 TIMES DAILY
Qty: 180 TABLET | Refills: 3 | Status: SHIPPED | OUTPATIENT
Start: 2024-09-05

## 2024-09-05 RX ORDER — OMEPRAZOLE 40 MG/1
CAPSULE, DELAYED RELEASE ORAL
Qty: 120 CAPSULE | Refills: 0 | Status: SHIPPED | OUTPATIENT
Start: 2024-09-05 | End: 2024-09-09

## 2024-09-05 RX ORDER — INSULIN GLARGINE 300 U/ML
60 INJECTION, SOLUTION SUBCUTANEOUS EVERY MORNING
Qty: 18 ML | Refills: 1 | Status: SHIPPED | OUTPATIENT
Start: 2024-09-05

## 2024-09-05 ASSESSMENT — ANXIETY QUESTIONNAIRES
GAD7 TOTAL SCORE: 8
GAD7 TOTAL SCORE: 8
7. FEELING AFRAID AS IF SOMETHING AWFUL MIGHT HAPPEN: NOT AT ALL
8. IF YOU CHECKED OFF ANY PROBLEMS, HOW DIFFICULT HAVE THESE MADE IT FOR YOU TO DO YOUR WORK, TAKE CARE OF THINGS AT HOME, OR GET ALONG WITH OTHER PEOPLE?: NOT DIFFICULT AT ALL
GAD7 TOTAL SCORE: 8

## 2024-09-05 ASSESSMENT — PAIN SCALES - GENERAL: PAINLEVEL: NO PAIN (1)

## 2024-09-05 NOTE — LETTER
September 10, 2024      Angelo Winters  [address] [facility] [id number]        Dear ,    We are writing to inform you of your test results.    Potassium is very mildly elevated.  Avoid taking any potassium containing supplements.  Kidney function and liver function are normal.  Cholesterol improved, which is good.     Recheck potassium in 1 week.  Lab only.    Resulted Orders   Lipid panel reflex to direct LDL Fasting   Result Value Ref Range    Cholesterol 114 <200 mg/dL    Triglycerides 110 <150 mg/dL    Direct Measure HDL 35 (L) >=40 mg/dL    LDL Cholesterol Calculated 57 <=100 mg/dL    Non HDL Cholesterol 79 <130 mg/dL    Narrative    Cholesterol  Desirable:  <200 mg/dL    Triglycerides  Normal:  Less than 150 mg/dL  Borderline High:  150-199 mg/dL  High:  200-499 mg/dL  Very High:  Greater than or equal to 500 mg/dL    Direct Measure HDL  Female:  Greater than or equal to 50 mg/dL   Male:  Greater than or equal to 40 mg/dL    LDL Cholesterol  Desirable:  <100mg/dL  Above Desirable:  100-129 mg/dL   Borderline High:  130-159 mg/dL   High:  160-189 mg/dL   Very High:  >= 190 mg/dL    Non HDL Cholesterol  Desirable:  130 mg/dL  Above Desirable:  130-159 mg/dL  Borderline High:  160-189 mg/dL  High:  190-219 mg/dL  Very High:  Greater than or equal to 220 mg/dL       If you have any questions or concerns, please call the clinic at the number listed above.       Sincerely,      Yue Ruvalcaba MD

## 2024-09-05 NOTE — PROGRESS NOTES
Preventive Care Visit  [facility]  Yue Ruvalcaba MD, Family Medicine  Sep 5, 2024      Assessment & Plan     Routine general medical examination at a health care facility       Type 2 diabetes mellitus with hypoglycemia without coma, with long-term current use of insulin (H)  Well controlled  Continue toujeo and ozempic    - Hemoglobin A1c; Future  - Albumin Random Urine Quantitative with Creat Ratio; Future  - Hemoglobin A1c  - Lipid panel reflex to direct LDL Fasting; Future  - Lipid panel reflex to direct LDL Fasting    Hypertension, goal below 140/90  Hypertension associated with type 2 diabetes mellitus (H)  Uncontrolled- high at home 140-150s systolic, occasionally higher.  HR is also in the 90-100s each time blood pressure is checked  Will increase Metoprolol to 50 mg twice daily  Continue lisinopril 2.5 mg (may also need further adjustment) but would like to rate control him a bit better with his CAD history.   - COMPREHENSIVE METABOLIC PANEL; Future  - COMPREHENSIVE METABOLIC PANEL  - metoprolol tartrate (LOPRESSOR) 50 MG tablet; Take 1 tablet (50 mg) by mouth 2 times daily.  See above    Cerebrovascular accident (CVA), unspecified mechanism (H)  Follow-up with neurology  - Lipid panel reflex to direct LDL Fasting; Future  - Lipid panel reflex to direct LDL Fasting    Hyperlipidemia LDL goal <70  Continue statin and zetia    Screen for colon cancer     - Colonoscopy Screening  Referral; Future    Type 2 diabetes mellitus without complication, without long-term current use of insulin (H)  As above  - insulin glargine U-300 (TOUJEO SOLOSTAR) 300 UNIT/ML (1 units dial) pen; Inject 60 Units subcutaneously every morning.    Diaphoresis  Etiology uncertain, CBC normal, chest x ray normal.   - CBC with platelets and differential; Future  - CBC with platelets and differential    Acute cough  Chest x ray unremarkable  - XR Chest 2 Views; Future    Gastroesophageal reflux  "disease without esophagitis  Has been taking omeprazole 20 mg twice daily over the counter  Will increase omeprazole to 40 mg twice daily and make some lifestyle changes - let me know if things are not improving.     - omeprazole (PRILOSEC) 40 MG DR capsule; Take 1 capsule (40 mg) by mouth 2 times daily for 30 days, THEN 1 capsule (40 mg) daily.    Pulmonary nodules  CT Scan at [facility] 12/2023 - recommended 6 month follow up.  That has been ordered.  Note sent to pt to schedule this.     Patient has been advised of split billing requirements and indicates understanding: Yes      BMI  Estimated body mass index is 30.42 kg/m  as calculated from the following:    Height as of this encounter: 1.778 m (5' 10\").    Weight as of this encounter: 96.2 kg (212 lb).       Counseling  Appropriate preventive services were addressed with this patient via screening, questionnaire, or discussion as appropriate for fall prevention, nutrition, physical activity, Tobacco-use cessation, social engagement, weight loss and cognition.  Checklist reviewing preventive services available has been given to the patient.  Reviewed patient's diet, addressing concerns and/or questions.           Russ Stephens is a 54 year old, presenting for the following:  Physical        9/5/2024    10:09 AM   Additional Questions   Roomed by Sharee burns CMA   Accompanied by Self        Via the Health Maintenance questionnaire, the patient has reported the following services have been completed -Eye Exam: [facility] 2023-08-25, this information has been sent to the abstraction team.  Health Care Directive  Patient does not have a Health Care Directive or Living Will: Discussed advance care planning with patient; however, patient declined at this time.    HPI    - He notes waking at around 2AM today with mild chest pain located near left axilla. At the time he woke he was sweating and had to go lay on the couch to cool off. He has not taken nitro. He denies left sided " numbness/tingling, hearing loss and ringing in the ears. E notes that yesterday his vision in left ye was loudy but that this has now resolved. He notes that BP has been elevated lately. No new stressors.    Three nights ago had reflux very bad overnight and woke up choking/coughing.  Since then has had a bit more of a cough ?aspiration.    He has not had a fever.  See history above of overnight sweats.     Diabetes Follow-up  Toujeo 60U SubQ every day, Ozempic 2mg Sub! weekly  How often are you checking your blood sugar? Continuous glucose monitor  What time of day are you checking your blood sugars (select all that apply)?  Before and after meals  Have you had any blood sugars above 200?  No  Have you had any blood sugars below 70?  No  What symptoms do you notice when your blood sugar is low?  Shaky, Dizzy, and Weak  What concerns do you have today about your diabetes? None   Do you have any of these symptoms? (Select all that apply)  No numbness or tingling in feet.  No redness, sores or blisters on feet.  No complaints of excessive thirst.  No reports of blurry vision.  No significant changes to weight.          Hyperlipidemia Follow-Up  Atorvastatin 80mg every day + Zetia  Are you regularly taking any medication or supplement to lower your cholesterol?   Yes- statin  Are you having muscle aches or other side effects that you think could be caused by your cholesterol lowering medication?  No    Hypertension Follow-up  Lisinopril 2.5m,g every day, metoprolol 25mg bid  Do you check your blood pressure regularly outside of the clinic? Yes   Are you following a low salt diet? Yes  Are your blood pressures ever more than 140 on the top number (systolic) OR more   than 90 on the bottom number (diastolic), for example 140/90? Yes    BP Readings from Last 2 Encounters:   09/05/24 130/82   08/19/24 136/88     Hemoglobin A1C (%)   Date Value   04/24/2024 6.3 (H)   09/21/2023 5.9 (H)   05/18/2021 11.6 (H)   12/29/2017  12.0 (H)     LDL Cholesterol Calculated (mg/dL)   Date Value   03/18/2024 74   12/15/2022 86   05/18/2021 195 (H)   02/02/2015 90           9/5/2024   General Health   How would you rate your overall physical health? (!) FAIR   Feel stress (tense, anxious, or unable to sleep) To some extent      (!) STRESS CONCERN      9/5/2024   Nutrition   Three or more servings of calcium each day? Yes   Diet: Regular (no restrictions)   How many servings of fruit and vegetables per day? (!) 0-1   How many sweetened beverages each day? 0-1            9/5/2024   Exercise   Days per week of moderate/strenous exercise 4 days            9/5/2024   Social Factors   Frequency of gathering with friends or relatives Once a week   Worry food won't last until get money to buy more No   Food not last or not have enough money for food? No   Do you have housing? (Housing is defined as stable permanent housing and does not include staying ouside in a car, in a tent, in an abandoned building, in an overnight shelter, or couch-surfing.) No   Are you worried about losing your housing? No   Lack of transportation? No   Unable to get utilities (heat,electricity)? No   Want help with housing or utility concern? No      (!) HOUSING CONCERN PRESENT      9/5/2024   Fall Risk   Fallen 2 or more times in the past year? Yes   Trouble with walking or balance? Yes   Gait Speed Test (Document in seconds) 3   Gait Speed Test Interpretation Less than or equal to 5.00 seconds - PASS             9/5/2024   Dental   Dentist two times every year? Yes            9/5/2024   TB Screening   Were you born outside of the [place]? No                  9/5/2024   Substance Use   Alcohol more than 3/day or more than 7/wk No   Do you use any other substances recreationally? No        Social History     Tobacco Use    Smoking status: Former     Current packs/day: 0.00     Average packs/day: 0.3 packs/day for 25.0 years (6.3 ttl pk-yrs)     Types: Cigarettes     Start date:  "1991     Quit date: 2016     Years since quittin.6    Smokeless tobacco: Former   Vaping Use    Vaping status: Never Used   Substance Use Topics    Alcohol use: Yes     Alcohol/week: 0.0 standard drinks of alcohol     Comment: mixed 2-4 on weekends, not every weekend    Drug use: No           2024   STI Screening   New sexual partner(s) since last STI/HIV test? No      ASCVD Risk   The ASCVD Risk score (Jadyn ALLISON, et al., 2019) failed to calculate for the following reasons:    The patient has a prior MI or stroke diagnosis           Reviewed and updated as needed this visit by Provider   Tobacco  Allergies  Meds  Problems  Med Hx  Surg Hx  Fam Hx                  Review of Systems  Constitutional, HEENT, cardiovascular, pulmonary, gi and gu systems are negative, except as otherwise noted.     Objective    Exam  /82   Pulse 90   Temp 98  F (36.7  C) (Tympanic)   Resp 22   Ht 1.778 m (5' 10\")   Wt 96.2 kg (212 lb)   SpO2 98%   BMI 30.42 kg/m     Estimated body mass index is 30.42 kg/m  as calculated from the following:    Height as of this encounter: 1.778 m (5' 10\").    Weight as of this encounter: 96.2 kg (212 lb).    Physical Exam  GENERAL: alert and no distress  EYES: Eyes grossly normal to inspection, PERRL and conjunctivae and sclerae normal  NECK: no adenopathy, no asymmetry, masses, or scars  RESP: few scattered wheezes LLL.  Otherwise clear.   CV: regular rate and rhythm, normal S1 S2, no S3 or S4, no murmur, click or rub, no peripheral edema  ABDOMEN: soft, nontender, no hepatosplenomegaly, no masses and bowel sounds normal  MS: no gross musculoskeletal defects noted, no edema  NEURO: Normal strength and tone, mentation intact and speech normal  PSYCH: mentation appears normal, affect normal/bright    Results for orders placed or performed in visit on 24   Hemoglobin A1c     Status: Abnormal   Result Value Ref Range    Hemoglobin A1C 6.7 (H) 0.0 - 5.6 % "   CBC with platelets and differential     Status: None   Result Value Ref Range    WBC Count 7.1 4.0 - 11.0 10e3/uL    RBC Count 4.68 4.40 - 5.90 10e6/uL    Hemoglobin 14.4 13.3 - 17.7 g/dL    Hematocrit 44.5 40.0 - 53.0 %    MCV 95 78 - 100 fL    MCH 30.8 26.5 - 33.0 pg    MCHC 32.4 31.5 - 36.5 g/dL    RDW 13.1 10.0 - 15.0 %    Platelet Count 246 150 - 450 10e3/uL    % Neutrophils 68 %    % Lymphocytes 23 %    % Monocytes 8 %    % Eosinophils 1 %    % Basophils 0 %    % Immature Granulocytes 0 %    Absolute Neutrophils 4.9 1.6 - 8.3 10e3/uL    Absolute Lymphocytes 1.6 0.8 - 5.3 10e3/uL    Absolute Monocytes 0.6 0.0 - 1.3 10e3/uL    Absolute Eosinophils 0.1 0.0 - 0.7 10e3/uL    Absolute Basophils 0.0 0.0 - 0.2 10e3/uL    Absolute Immature Granulocytes 0.0 <=0.4 10e3/uL   CBC with platelets and differential     Status: None    Narrative    The following orders were created for panel order CBC with platelets and differential.  Procedure                               Abnormality         Status                     ---------                               -----------         ------                     CBC with platelets and d...[[id number]]                      Final result                 Please view results for these tests on the individual orders.       CHEST X RAY:  unremarkable          Signed Electronically by: Yue Ruvalcaba MD

## 2024-09-05 NOTE — PATIENT INSTRUCTIONS
Increase metoprolol to 50 mg twice daily - new prescription for 50 mg pills but use what you have at home first.     Let me know if blood pressure continues to be routinely >140/90  Or if your HR continue to be in the 90-100s.     You are due for a diabetic eye exam.  Please schedule that and have results sent to me.    Health Maintenance reviewed and plan for update discussed.  Patient asked to schedule colonoscopy/do cologuard - for colon cancer screening        Patient Education   Preventive Care Advice   This is general advice given by our system to help you stay healthy. However, your care team may have specific advice just for you. Please talk to your care team about your preventive care needs.  Nutrition  Eat 5 or more servings of fruits and vegetables each day.  Try wheat bread, brown rice and whole grain pasta (instead of white bread, rice, and pasta).  Get enough calcium and vitamin D. Check the label on foods and aim for 100% of the RDA (recommended daily allowance).  Lifestyle  Exercise at least 150 minutes each week  (30 minutes a day, 5 days a week).  Do muscle strengthening activities 2 days a week. These help control your weight and prevent disease.  No smoking.  Wear sunscreen to prevent skin cancer.  Have a dental exam and cleaning every 6 months.  Yearly exams  See your health care team every year to talk about:  Any changes in your health.  Any medicines your care team has prescribed.  Preventive care, family planning, and ways to prevent chronic diseases.  Shots (vaccines)   HPV shots (up to age 26), if you've never had them before.  Hepatitis B shots (up to age 59), if you've never had them before.  COVID-19 shot: Get this shot when it's due.  Flu shot: Get a flu shot every year.  Tetanus shot: Get a tetanus shot every 10 years.  Pneumococcal, hepatitis A, and RSV shots: Ask your care team if you need these based on your risk.  Shingles shot (for age 50 and up)  General health tests  Diabetes  screening:  Starting at age 35, Get screened for diabetes at least every 3 years.  If you are younger than age 35, ask your care team if you should be screened for diabetes.  Cholesterol test: At age 39, start having a cholesterol test every 5 years, or more often if advised.  Bone density scan (DEXA): At age 50, ask your care team if you should have this scan for osteoporosis (brittle bones).  Hepatitis C: Get tested at least once in your life.  STIs (sexually transmitted infections)  Before age 24: Ask your care team if you should be screened for STIs.  After age 24: Get screened for STIs if you're at risk. You are at risk for STIs (including HIV) if:  You are sexually active with more than one person.  You don't use condoms every time.  You or a partner was diagnosed with a sexually transmitted infection.  If you are at risk for HIV, ask about PrEP medicine to prevent HIV.  Get tested for HIV at least once in your life, whether you are at risk for HIV or not.  Cancer screening tests  Cervical cancer screening: If you have a cervix, begin getting regular cervical cancer screening tests starting at age 21.  Breast cancer scan (mammogram): If you've ever had breasts, begin having regular mammograms starting at age 40. This is a scan to check for breast cancer.  Colon cancer screening: It is important to start screening for colon cancer at age 45.  Have a colonoscopy test every 10 years (or more often if you're at risk) Or, ask your provider about stool tests like a FIT test every year or Cologuard test every 3 years.  To learn more about your testing options, visit:   .  For help making a decision, visit:   https://bit.ly/qq01341.  Prostate cancer screening test: If you have a prostate, ask your care team if a prostate cancer screening test (PSA) at age 55 is right for you.  Lung cancer screening: If you are a current or former smoker ages 50 to 80, ask your care team if ongoing lung cancer screenings are right for  you.  For informational purposes only. Not to replace the advice of your health care provider. Copyright   2023 [facility]. All rights reserved. Clinically reviewed by the [facility]. Memonic 882323 - REV 01/24.

## 2024-09-06 ENCOUNTER — TELEPHONE (OUTPATIENT)
Dept: FAMILY MEDICINE | Facility: CLINIC | Age: 54
End: 2024-09-06
Payer: COMMERCIAL

## 2024-09-06 DIAGNOSIS — K21.9 GASTROESOPHAGEAL REFLUX DISEASE WITHOUT ESOPHAGITIS: Primary | ICD-10-CM

## 2024-09-06 NOTE — TELEPHONE ENCOUNTER
Per fax received from [employer] - omeprazole (PRILOSEC) 40 MG DR capsule   is not covered by patient's Insurance Company  .  - Please choose:  1.  Change medication that is not covered to a different medication and send new prescription to patient's pharmacy?  2.  Patient will need to pay for the non-covered medication out-of-pocket?   3.  Try for Prior Authorization with Insurance Company to get medication covered?        P.A. Phone #: [phone]       P.A.  ID#: [id number]    .Faina Chawla PSC

## 2024-09-09 RX ORDER — PANTOPRAZOLE SODIUM 40 MG/1
40 TABLET, DELAYED RELEASE ORAL DAILY
Qty: 60 TABLET | Refills: 1 | Status: SHIPPED | OUTPATIENT
Start: 2024-09-09

## 2024-09-10 ENCOUNTER — LAB (OUTPATIENT)
Dept: LAB | Facility: CLINIC | Age: 54
End: 2024-09-10
Payer: COMMERCIAL

## 2024-09-10 DIAGNOSIS — E11.649 TYPE 2 DIABETES MELLITUS WITH HYPOGLYCEMIA WITHOUT COMA, WITH LONG-TERM CURRENT USE OF INSULIN (H): ICD-10-CM

## 2024-09-10 DIAGNOSIS — E87.5 HYPERKALEMIA: ICD-10-CM

## 2024-09-10 DIAGNOSIS — Z79.4 TYPE 2 DIABETES MELLITUS WITH HYPOGLYCEMIA WITHOUT COMA, WITH LONG-TERM CURRENT USE OF INSULIN (H): ICD-10-CM

## 2024-09-10 LAB — POTASSIUM SERPL-SCNC: 4.3 MMOL/L (ref 3.4–5.3)

## 2024-09-10 PROCEDURE — 36415 COLL VENOUS BLD VENIPUNCTURE: CPT

## 2024-09-10 PROCEDURE — 84132 ASSAY OF SERUM POTASSIUM: CPT

## 2024-09-12 NOTE — PROGRESS NOTES
DISCHARGE  Reason for Discharge: Patient chooses to discontinue therapy.  Patient has failed to schedule further appointments.    Equipment Issued:     Discharge Plan: unknown      Referring Provider:  Yue Ruvalcaba    05/16/24 0500   Appointment Info   Treating Provider Beba Molina MA, CCC/SLP   Visits Used 2   Medical Diagnosis Cerebrovascular accident (CVA) due to thrombosis of cerebral artery (H) (I63.30)   Progress Note/Certification   Start Of Care Date 04/22/24   Onset Of Illness/injury Or Date Of Surgery 03/21/24   Therapy Frequency 1x a week   Predicted Duration 12 weeks   Certification date from 04/22/24   Certification date to 05/22/24   Progress Note Due Date 05/22/24   Progress Note Completed Date 04/22/24   Subjective Report   Subjective Report Pt attends session with his daughter. He reports feeling his word finding and cognition are about the same. He got back from a trip to [place] on Monday.  Pt responded with little content to clinician questions about function with language and cognition over the past few weeks.  He is currently not working and his daughter is driving him to appointments. He reports being very fatigued and overall not feeling well. He currently has a headache that he reports is an 8/10.   SLP Goal 1   Goal Identifier Memory strategies   Goal Description Patient will report independently using internal/external memory strategies to improve function across one week.   Rationale To maximize safety and independence with cognitive function within the home or community   Goal Progress Discussed and cued on strategies such as chunking, repetition, and deeper level processing.   Target Date 05/22/24   SLP Goal 2   Goal Identifier Memory task   Goal Description Patient will complete immediate and delayed memory tasks with 90% accuracy and min cues.   Rationale To maximize safety and independence with cognitive function within the home or community   Target Date 05/22/24   Goal  Progress 12 item chunking task given mod-max cues, Pt generates 8 imediately on first attempt and 10 on second on attempt 2 with addition cues. Given cue for category for missing items, pt is able to generate remaining items.   SLP Goal 3   Goal Identifier attention   Goal Description Patient will complete a moderately complex attention task with min cues and 90% accuracy across two sessions.   Rationale To maximize safety and independence with cognitive function within the home or community   Target Date 05/22/24   Goal Progress Pt able to participate in verbal problem solving activities without distraction. He benefits from mod cues to complete activities.   SLP Goal 4   Goal Identifier Problem Solving   Goal Description Patient will complete complex problem solving task with 90% accuracy and min cues across two sessions.   Rationale To maximize safety and independence with cognitive function within the home or community   Target Date 05/22/24   Goal Progress Pt generated a concept given 3 clues with 60% accuracy independently. With mod verbal cues he is able to generate more.   SLP Goal 5   Goal Identifier Home Program   Goal Description Patient will verbalize understanding of and completion of home program to improve cognition across two sessions.   Rationale To maximize safety and independence with cognitive function within the home or community   Target Date 05/22/24   Goal Progress pt given odd one out, synonyms, antonyms, SFA homework and educated on aphasia therapy online website.   Treatment Interventions (SLP)   Treatment Interventions Treatment Cognitive Skill   Treatment Cognitive Skill   Skilled Intervention Internal memory strategy training;External memory strategy training;Other  (verbal problem solving)   Cognitive Skills Intervention: 1st 15 minutes Timed (68661) 15 Minutes   Cognitive Skills Intervention: Addl minutes Timed (82616) 15 Minutes   Plan   Home program see above under home program    Plan for next session word finding   Total Session Time   Timed Code Treatment Minutes 30   Total Treatment Time (sum of timed and untimed services) 30

## 2024-09-20 DIAGNOSIS — E11.9 TYPE 2 DIABETES MELLITUS WITHOUT COMPLICATION, WITHOUT LONG-TERM CURRENT USE OF INSULIN (H): ICD-10-CM

## 2024-09-20 RX ORDER — BLOOD-GLUCOSE SENSOR
EACH MISCELLANEOUS
Qty: 6 EACH | Refills: 3 | Status: SHIPPED | OUTPATIENT
Start: 2024-09-20

## 2024-10-25 ENCOUNTER — OFFICE VISIT (OUTPATIENT)
Dept: CARDIOLOGY | Facility: CLINIC | Age: 54
End: 2024-10-25
Payer: COMMERCIAL

## 2024-10-25 VITALS
WEIGHT: 212.7 LBS | SYSTOLIC BLOOD PRESSURE: 128 MMHG | HEIGHT: 70 IN | RESPIRATION RATE: 21 BRPM | OXYGEN SATURATION: 97 % | DIASTOLIC BLOOD PRESSURE: 75 MMHG | HEART RATE: 89 BPM | BODY MASS INDEX: 30.45 KG/M2

## 2024-10-25 DIAGNOSIS — E78.5 HYPERLIPIDEMIA LDL GOAL <50: ICD-10-CM

## 2024-10-25 DIAGNOSIS — I10 BENIGN ESSENTIAL HYPERTENSION: ICD-10-CM

## 2024-10-25 DIAGNOSIS — I25.810 CORONARY ARTERY DISEASE INVOLVING CORONARY BYPASS GRAFT OF NATIVE HEART, UNSPECIFIED WHETHER ANGINA PRESENT: Primary | ICD-10-CM

## 2024-10-25 PROCEDURE — 99215 OFFICE O/P EST HI 40 MIN: CPT | Performed by: NURSE PRACTITIONER

## 2024-10-25 PROCEDURE — 93000 ELECTROCARDIOGRAM COMPLETE: CPT | Performed by: NURSE PRACTITIONER

## 2024-10-25 RX ORDER — RANOLAZINE 500 MG/1
500 TABLET, EXTENDED RELEASE ORAL 2 TIMES DAILY
Qty: 60 TABLET | Refills: 11 | Status: SHIPPED | OUTPATIENT
Start: 2024-10-25

## 2024-10-25 ASSESSMENT — PAIN SCALES - GENERAL: PAINLEVEL_OUTOF10: NO PAIN (0)

## 2024-10-25 NOTE — PATIENT INSTRUCTIONS
Medication Changes:  Start ranexa 500 mg twice a day     Recommendations:  Check blood pressure at least 1 hour after medications. Call the clinic if your blood pressure is consistently greater than 130/80.     Follow-up:  Follow-up with primary care about hand numbness/pain   Cardiology follow up at [facility]: Geena in 2 months     Cardiology Scheduling~[phone]  Cardiology Clinic RN~[phone] (Nelly RN, Kandi RN; Cindy RN)

## 2024-10-25 NOTE — ASSESSMENT & PLAN NOTE
Rey Tristan   1/25/2017 10:30 AM   Anticoagulation Therapy Visit    Description:  75 year old male   Provider:   ANTI COAGULATION   Department:   Anti Coagulation           INR as of 1/25/2017     Selected INR 3.7! (1/25/2017)      Anticoagulation Summary as of 1/25/2017     INR goal 2.0-3.0   Selected INR 3.7! (1/25/2017)   Full instructions 1/25: 2 mg; Otherwise 6 mg every day   Next INR check 2/15/2017    Indications   Long-term (current) use of anticoagulants [Z79.01] [Z79.01]  DVT (deep venous thrombosis) (H) [I82.409]  Pulmonary embolism (H) [I26.99]         Your next Anticoagulation Clinic appointment(s)     Jan 25, 2017 10:30 AM   Anticoagulation Visit with  [facility]   [facility] ([facility] [place] [facility])    [address]  [place] [address]   [phone]            Feb 15, 2017 11:00 AM   Anticoagulation Visit with  ANTI COAGULATION   [facility] ([facility] [place] [facility])    [address] [address]   [phone]              January 2017 Details    Sun Mon Tue Wed Thu Fri Sat     1               2               3               4               5               6               7                 8               9               10               11               12               13               14                 15               16               17               18               19               20               21                 22               23               24               25      2 mg   See details      26      6 mg         27      6 mg         28      6 mg           29      6 mg         30      6 mg         31      6 mg              Date Details   01/25 This INR check               How to take your warfarin dose     To take:  2 mg Take 0.5 of a 4 mg tablet.    To take:  6 mg Take 1.5 of the 4 mg tablets.           February 2017 Details    Sun Mon Tue Wed Thu Fri Sat        1      6 mg         2      6 mg         3      6 mg         4      6  LDL at goal  Continue statin, Zetia   mg           5      6 mg         6      6 mg         7      6 mg         8      6 mg         9      6 mg         10      6 mg         11      6 mg           12      6 mg         13      6 mg         14      6 mg         15            16               17               18                 19               20               21               22               23               24               25                 26               27               28                    Date Details   No additional details    Date of next INR:  2/15/2017         How to take your warfarin dose     To take:  6 mg Take 1.5 of the 4 mg tablets.

## 2024-10-25 NOTE — LETTER
10/25/2024    Yue Ruvalcaba MD  [address]  [facility] [address]    RE: Houston Winters       Dear Colleague,     I had the pleasure of seeing Houston Winters in the [facility].  Cardiology Clinic Progress Note  Houston Winters MRN# [id number]   YOB: 1970 Age: 54 year old      Primary Cardiologist:   Dr. Montiel for 6-month follow-up          History of Presenting Illness:      He was seen by Dr. Montiel in March 2024 he had a recent ED visit for chest discomfort resolved with nitroglycerin.  Further workup with Lexiscan was recommended, which ultimately showed no ischemia or infarction.  Echocardiogram March 2024 showed normal biventricular size and function.    The day after he was seen by Dr. Montiel he has stroke like symptoms and was transferred to [facility].  He had negative brain MRI and no clear cause for neurological changes.  They considered it may be seizure related.    At the end of August patient called noting dizziness and palpitations along with elevated BP.  He was then seen by PCP on 9/5/2024.  They recommended increasing metoprolol to 50 mg twice daily.    Most recent lipid profile, BMP, ALT reviewed today.    EKG today, personally viewed by me, shows sinus rhythm with ventricular rate 86 bpm  Side effect of increasing metoprolol? None   BP? controlled   Routine activity?  Walking   Angina? Chest discomfort with activity or rest, but not every time he exerts. Wakes up overnight with chest discomfort . Has needed nitroglycerin 3 times this week, with resolution of symptoms.   He also notes symptoms of numbness/tingling in his hands, overnight.    Patient reports no shortness of breath, PND, orthopnea, presyncope, syncope, edema, heart racing, or palpitations.                    Assessment and Plan:     Plan  Patient Instructions   Medication Changes:  Start ranexa 500 mg twice a day -if not helpful, will consider discontinuing and seeing if coronary angiogram would  be appropriate.    Recommendations:  Check blood pressure at least 1 hour after medications. Call the clinic if your blood pressure is consistently greater than 130/80.     Follow-up:  Follow-up with primary care about hand numbness/pain   Cardiology follow up at [facility]: Geena in 2 months to reassess chest discomfort    Cardiology Scheduling~[phone]  Cardiology Clinic RN~[phone] (Nelly RN, Kandi RN; Cindy RN)          Problem List as of 10/25/2024 Reviewed: 9/5/2024 11:27 AM by Yue Ruvalcaba MD            Noted       Active Problems    1. Benign essential hypertension 2/4/2015     Last Assessment & Plan 10/25/2024 Office Visit Written 10/25/2024  2:57 PM by Geena Nuñez APRN CNP      Controlled  Continue current medications          Relevant Medications     ranolazine (RANEXA) 500 MG 12 hr tablet     Other Relevant Orders     Follow-Up with Cardiology     EKG 12-lead complete w/read - Clinics (performed today) (Completed)    2. Coronary artery disease involving coronary bypass graft of native heart, unspecified whether angina present - Primary 9/26/2022     Overview Addendum 10/25/2024  2:59 PM by Geena Nuñez APRN CNP      4 V CABG at [place] 6/16/021 (LIMA to diagonal and LAD, LOUIS to ramus, SVG to PDA)  Negative Brittany scan stress test 4/2024          Last Assessment & Plan 10/25/2024 Office Visit Written 10/25/2024  2:58 PM by Geena Nuñez APRN CNP      Stable angina  Continue GDMT          Relevant Medications     ranolazine (RANEXA) 500 MG 12 hr tablet     Other Relevant Orders     Follow-Up with Cardiology     EKG 12-lead complete w/read - Clinics (performed today) (Completed)    3. Hyperlipidemia LDL goal <50 9/5/2024     Last Assessment & Plan 10/25/2024 Office Visit Written 10/25/2024  2:57 PM by Geena Nuñez APRN CNP      LDL at goal  Continue statin, Zetia          Relevant Medications     ranolazine (RANEXA) 500 MG 12 hr tablet      Other Relevant Orders     Follow-Up with Cardiology     EKG 12-lead complete w/read - Clinics (performed today) (Completed)             Respiratory:  clear to auscultation; normal symmetry        Cardiac: regular rate and rhythm     GI:  abdomen nondistended     Extremities and Muscular Skeletal:   no edema          Total time spent today was 46 mins, reviewing labs, testing, notes, documenting notes, and seeing patient.        Thank you for allowing me to participate in this delightful patient's care.   This note was completed in part using Dragon voice recognition software. Although reviewed after completion, some word and grammatical errors may occur.    ELMIRA Daley CNP                  Thank you for allowing me to participate in the care of your patient.      Sincerely,     ELMIRA Daley CNP     [facility]  cc:   ELMIRA Moran CNP  [address] [place] [address]

## 2024-10-25 NOTE — PROGRESS NOTES
Cardiology Clinic Progress Note  Houston Winters MRN# [id number]   YOB: 1970 Age: 54 year old      Primary Cardiologist:   Dr. Montiel for 6-month follow-up          History of Presenting Illness:      He was seen by Dr. Montiel in March 2024 he had a recent ED visit for chest discomfort resolved with nitroglycerin.  Further workup with Lexiscan was recommended, which ultimately showed no ischemia or infarction.  Echocardiogram March 2024 showed normal biventricular size and function.    The day after he was seen by Dr. Montiel he has stroke like symptoms and was transferred to [facility].  He had negative brain MRI and no clear cause for neurological changes.  They considered it may be seizure related.    At the end of August patient called noting dizziness and palpitations along with elevated BP.  He was then seen by PCP on 9/5/2024.  They recommended increasing metoprolol to 50 mg twice daily.    Most recent lipid profile, BMP, ALT reviewed today.    EKG today, personally viewed by me, shows sinus rhythm with ventricular rate 86 bpm  Side effect of increasing metoprolol? None   BP? controlled   Routine activity?  Walking   Angina? Chest discomfort with activity or rest, but not every time he exerts. Wakes up overnight with chest discomfort . Has needed nitroglycerin 3 times this week, with resolution of symptoms.   He also notes symptoms of numbness/tingling in his hands, overnight.    Patient reports no shortness of breath, PND, orthopnea, presyncope, syncope, edema, heart racing, or palpitations.                    Assessment and Plan:     Plan  Patient Instructions   Medication Changes:  Start ranexa 500 mg twice a day -if not helpful, will consider discontinuing and seeing if coronary angiogram would be appropriate.    Recommendations:  Check blood pressure at least 1 hour after medications. Call the clinic if your blood pressure is consistently greater than 130/80.     Follow-up:  Follow-up with primary  care about hand numbness/pain   Cardiology follow up at [facility]: [place] in 2 months to reassess chest discomfort    Cardiology Scheduling~[phone]  Cardiology Clinic RN~[phone] (Nelly RN, Kandi RN; Cindy RN)          Problem List as of 10/25/2024 Reviewed: 9/5/2024 11:27 AM by Yue Ruvalcaba MD            Noted       Active Problems    1. Benign essential hypertension 2/4/2015     Last Assessment & Plan 10/25/2024 Office Visit Written 10/25/2024  2:57 PM by Geena Nuñez APRN CNP      Controlled  Continue current medications          Relevant Medications     ranolazine (RANEXA) 500 MG 12 hr tablet     Other Relevant Orders     Follow-Up with Cardiology     EKG 12-lead complete w/read - Clinics (performed today) (Completed)    2. Coronary artery disease involving coronary bypass graft of native heart, unspecified whether angina present - Primary 9/26/2022     Overview Addendum 10/25/2024  2:59 PM by Geena Nuñez APRN CNP      4 V CABG at [place] 6/16/021 (LIMA to diagonal and LAD, LOUIS to ramus, SVG to PDA)  Negative Brittany scan stress test 4/2024          Last Assessment & Plan 10/25/2024 Office Visit Written 10/25/2024  2:58 PM by Geena Nuñez APRN CNP      Stable angina  Continue GDMT          Relevant Medications     ranolazine (RANEXA) 500 MG 12 hr tablet     Other Relevant Orders     Follow-Up with Cardiology     EKG 12-lead complete w/read - Clinics (performed today) (Completed)    3. Hyperlipidemia LDL goal <50 9/5/2024     Last Assessment & Plan 10/25/2024 Office Visit Written 10/25/2024  2:57 PM by Geena Nuñez APRN CNP      LDL at goal  Continue statin, Zetia          Relevant Medications     ranolazine (RANEXA) 500 MG 12 hr tablet     Other Relevant Orders     Follow-Up with Cardiology     EKG 12-lead complete w/read - Clinics (performed today) (Completed)             Respiratory:  clear to auscultation; normal symmetry        Cardiac:  regular rate and rhythm     GI:  abdomen nondistended     Extremities and Muscular Skeletal:   no edema          Total time spent today was 46 mins, reviewing labs, testing, notes, documenting notes, and seeing patient.        Thank you for allowing me to participate in this delightful patient's care.   This note was completed in part using Dragon voice recognition software. Although reviewed after completion, some word and grammatical errors may occur.    Geena Cline, APRN CNP

## 2024-11-19 ENCOUNTER — HOSPITAL ENCOUNTER (EMERGENCY)
Facility: CLINIC | Age: 54
Discharge: HOME | End: 2024-11-19
Attending: FAMILY MEDICINE | Admitting: FAMILY MEDICINE
Payer: COMMERCIAL

## 2024-11-19 ENCOUNTER — APPOINTMENT (OUTPATIENT)
Dept: CT IMAGING | Facility: CLINIC | Age: 54
End: 2024-11-19
Attending: FAMILY MEDICINE
Payer: COMMERCIAL

## 2024-11-19 ENCOUNTER — APPOINTMENT (OUTPATIENT)
Dept: MRI IMAGING | Facility: CLINIC | Age: 54
End: 2024-11-19
Attending: FAMILY MEDICINE
Payer: COMMERCIAL

## 2024-11-19 VITALS
HEART RATE: 84 BPM | HEIGHT: 70 IN | BODY MASS INDEX: 29.46 KG/M2 | WEIGHT: 205.8 LBS | TEMPERATURE: 98.5 F | OXYGEN SATURATION: 96 % | DIASTOLIC BLOOD PRESSURE: 88 MMHG | RESPIRATION RATE: 20 BRPM | SYSTOLIC BLOOD PRESSURE: 128 MMHG

## 2024-11-19 DIAGNOSIS — R47.01 APHASIA: Primary | ICD-10-CM

## 2024-11-19 DIAGNOSIS — Z86.73 HISTORY OF CVA (CEREBROVASCULAR ACCIDENT): ICD-10-CM

## 2024-11-19 DIAGNOSIS — R20.2 PARESTHESIAS: ICD-10-CM

## 2024-11-19 DIAGNOSIS — R41.0 CONFUSION: ICD-10-CM

## 2024-11-19 LAB
ALBUMIN UR-MCNC: NEGATIVE MG/DL
AMPHETAMINES UR QL SCN: NORMAL
ANION GAP SERPL CALCULATED.3IONS-SCNC: 10 MMOL/L (ref 7–15)
APAP SERPL-MCNC: <5 UG/ML (ref 10–30)
APPEARANCE UR: CLEAR
APTT PPP: 28 SECONDS (ref 22–38)
ATRIAL RATE - MUSE: 89 BPM
BARBITURATES UR QL SCN: NORMAL
BASOPHILS # BLD AUTO: 0 10E3/UL (ref 0–0.2)
BASOPHILS NFR BLD AUTO: 0 %
BENZODIAZ UR QL SCN: NORMAL
BILIRUB UR QL STRIP: NEGATIVE
BUN SERPL-MCNC: 13.1 MG/DL (ref 6–20)
BZE UR QL SCN: NORMAL
CALCIUM SERPL-MCNC: 9.2 MG/DL (ref 8.8–10.4)
CANNABINOIDS UR QL SCN: NORMAL
CHLORIDE SERPL-SCNC: 103 MMOL/L (ref 98–107)
COLOR UR AUTO: YELLOW
CREAT SERPL-MCNC: 0.84 MG/DL (ref 0.67–1.17)
DIASTOLIC BLOOD PRESSURE - MUSE: NORMAL MMHG
EGFRCR SERPLBLD CKD-EPI 2021: >90 ML/MIN/1.73M2
EOSINOPHIL # BLD AUTO: 0.1 10E3/UL (ref 0–0.7)
EOSINOPHIL NFR BLD AUTO: 1 %
ERYTHROCYTE [DISTWIDTH] IN BLOOD BY AUTOMATED COUNT: 13.5 % (ref 10–15)
ETHANOL SERPL-MCNC: <0.01 G/DL
FENTANYL UR QL: NORMAL
GLUCOSE BLDC GLUCOMTR-MCNC: 173 MG/DL (ref 70–99)
GLUCOSE SERPL-MCNC: 193 MG/DL (ref 70–99)
GLUCOSE UR STRIP-MCNC: 50 MG/DL
HCO3 SERPL-SCNC: 28 MMOL/L (ref 22–29)
HCT VFR BLD AUTO: 44.2 % (ref 40–53)
HGB BLD-MCNC: 14.6 G/DL (ref 13.3–17.7)
HGB UR QL STRIP: NEGATIVE
IMM GRANULOCYTES # BLD: 0 10E3/UL
IMM GRANULOCYTES NFR BLD: 0 %
INR PPP: 0.91 (ref 0.85–1.15)
INTERPRETATION ECG - MUSE: NORMAL
KETONES UR STRIP-MCNC: NEGATIVE MG/DL
LEUKOCYTE ESTERASE UR QL STRIP: NEGATIVE
LYMPHOCYTES # BLD AUTO: 1.6 10E3/UL (ref 0.8–5.3)
LYMPHOCYTES NFR BLD AUTO: 24 %
MCH RBC QN AUTO: 31.3 PG (ref 26.5–33)
MCHC RBC AUTO-ENTMCNC: 33 G/DL (ref 31.5–36.5)
MCV RBC AUTO: 95 FL (ref 78–100)
MONOCYTES # BLD AUTO: 0.6 10E3/UL (ref 0–1.3)
MONOCYTES NFR BLD AUTO: 9 %
MUCOUS THREADS #/AREA URNS LPF: PRESENT /LPF
NEUTROPHILS # BLD AUTO: 4.6 10E3/UL (ref 1.6–8.3)
NEUTROPHILS NFR BLD AUTO: 66 %
NITRATE UR QL: NEGATIVE
NRBC # BLD AUTO: 0 10E3/UL
NRBC BLD AUTO-RTO: 0 /100
OPIATES UR QL SCN: NORMAL
P AXIS - MUSE: 30 DEGREES
PCP QUAL URINE (ROCHE): NORMAL
PH UR STRIP: 7 [PH] (ref 5–7)
PLATELET # BLD AUTO: 227 10E3/UL (ref 150–450)
POTASSIUM SERPL-SCNC: 4.3 MMOL/L (ref 3.4–5.3)
PR INTERVAL - MUSE: 152 MS
QRS DURATION - MUSE: 98 MS
QT - MUSE: 384 MS
QTC - MUSE: 467 MS
R AXIS - MUSE: -27 DEGREES
RBC # BLD AUTO: 4.67 10E6/UL (ref 4.4–5.9)
RBC URINE: 1 /HPF
SALICYLATES SERPL-MCNC: <0.3 MG/DL
SODIUM SERPL-SCNC: 141 MMOL/L (ref 135–145)
SP GR UR STRIP: 1.02 (ref 1–1.03)
SYSTOLIC BLOOD PRESSURE - MUSE: NORMAL MMHG
T AXIS - MUSE: 40 DEGREES
TROPONIN T SERPL HS-MCNC: <6 NG/L
UROBILINOGEN UR STRIP-MCNC: NORMAL MG/DL
VENTRICULAR RATE- MUSE: 89 BPM
WBC # BLD AUTO: 6.9 10E3/UL (ref 4–11)
WBC URINE: 0 /HPF

## 2024-11-19 PROCEDURE — 82962 GLUCOSE BLOOD TEST: CPT

## 2024-11-19 PROCEDURE — 70496 CT ANGIOGRAPHY HEAD: CPT

## 2024-11-19 PROCEDURE — 255N000002 HC RX 255 OP 636: Performed by: FAMILY MEDICINE

## 2024-11-19 PROCEDURE — 82077 ASSAY SPEC XCP UR&BREATH IA: CPT | Performed by: FAMILY MEDICINE

## 2024-11-19 PROCEDURE — 36415 COLL VENOUS BLD VENIPUNCTURE: CPT | Performed by: FAMILY MEDICINE

## 2024-11-19 PROCEDURE — 93005 ELECTROCARDIOGRAM TRACING: CPT | Performed by: FAMILY MEDICINE

## 2024-11-19 PROCEDURE — 85730 THROMBOPLASTIN TIME PARTIAL: CPT | Performed by: FAMILY MEDICINE

## 2024-11-19 PROCEDURE — 81003 URINALYSIS AUTO W/O SCOPE: CPT | Performed by: FAMILY MEDICINE

## 2024-11-19 PROCEDURE — 0042T CT HEAD PERFUSION W CONTRAST: CPT

## 2024-11-19 PROCEDURE — 70553 MRI BRAIN STEM W/O & W/DYE: CPT

## 2024-11-19 PROCEDURE — 99285 EMERGENCY DEPT VISIT HI MDM: CPT | Performed by: FAMILY MEDICINE

## 2024-11-19 PROCEDURE — 80048 BASIC METABOLIC PNL TOTAL CA: CPT | Performed by: FAMILY MEDICINE

## 2024-11-19 PROCEDURE — 85004 AUTOMATED DIFF WBC COUNT: CPT | Performed by: FAMILY MEDICINE

## 2024-11-19 PROCEDURE — 80143 DRUG ASSAY ACETAMINOPHEN: CPT | Performed by: FAMILY MEDICINE

## 2024-11-19 PROCEDURE — 250N000013 HC RX MED GY IP 250 OP 250 PS 637

## 2024-11-19 PROCEDURE — 250N000009 HC RX 250: Performed by: FAMILY MEDICINE

## 2024-11-19 PROCEDURE — 93010 ELECTROCARDIOGRAM REPORT: CPT | Performed by: FAMILY MEDICINE

## 2024-11-19 PROCEDURE — A9585 GADOBUTROL INJECTION: HCPCS | Performed by: FAMILY MEDICINE

## 2024-11-19 PROCEDURE — 80179 DRUG ASSAY SALICYLATE: CPT | Performed by: FAMILY MEDICINE

## 2024-11-19 PROCEDURE — 85610 PROTHROMBIN TIME: CPT | Performed by: FAMILY MEDICINE

## 2024-11-19 PROCEDURE — 99285 EMERGENCY DEPT VISIT HI MDM: CPT | Mod: 25 | Performed by: FAMILY MEDICINE

## 2024-11-19 PROCEDURE — 84484 ASSAY OF TROPONIN QUANT: CPT | Performed by: FAMILY MEDICINE

## 2024-11-19 PROCEDURE — 250N000011 HC RX IP 250 OP 636: Performed by: FAMILY MEDICINE

## 2024-11-19 PROCEDURE — 85018 HEMOGLOBIN: CPT | Performed by: FAMILY MEDICINE

## 2024-11-19 PROCEDURE — 80307 DRUG TEST PRSMV CHEM ANLYZR: CPT | Performed by: FAMILY MEDICINE

## 2024-11-19 PROCEDURE — 70450 CT HEAD/BRAIN W/O DYE: CPT

## 2024-11-19 RX ORDER — IOPAMIDOL 755 MG/ML
167 INJECTION, SOLUTION INTRAVASCULAR ONCE
Status: COMPLETED | OUTPATIENT
Start: 2024-11-19 | End: 2024-11-19

## 2024-11-19 RX ORDER — ACETAMINOPHEN 500 MG
1000 TABLET ORAL ONCE
Status: COMPLETED | OUTPATIENT
Start: 2024-11-19 | End: 2024-11-19

## 2024-11-19 RX ORDER — GADOBUTROL 604.72 MG/ML
9 INJECTION INTRAVENOUS ONCE
Status: COMPLETED | OUTPATIENT
Start: 2024-11-19 | End: 2024-11-19

## 2024-11-19 RX ADMIN — IOPAMIDOL 167 ML: 755 INJECTION, SOLUTION INTRAVENOUS at 10:32

## 2024-11-19 RX ADMIN — ACETAMINOPHEN 1000 MG: 500 TABLET ORAL at 15:58

## 2024-11-19 RX ADMIN — GADOBUTROL 9 ML: 604.72 INJECTION INTRAVENOUS at 14:36

## 2024-11-19 RX ADMIN — SODIUM CHLORIDE 200 ML: 9 INJECTION, SOLUTION INTRAVENOUS at 10:33

## 2024-11-19 ASSESSMENT — COLUMBIA-SUICIDE SEVERITY RATING SCALE - C-SSRS
2. HAVE YOU ACTUALLY HAD ANY THOUGHTS OF KILLING YOURSELF IN THE PAST MONTH?: NO
6. HAVE YOU EVER DONE ANYTHING, STARTED TO DO ANYTHING, OR PREPARED TO DO ANYTHING TO END YOUR LIFE?: NO
1. IN THE PAST MONTH, HAVE YOU WISHED YOU WERE DEAD OR WISHED YOU COULD GO TO SLEEP AND NOT WAKE UP?: NO

## 2024-11-19 ASSESSMENT — ACTIVITIES OF DAILY LIVING (ADL)
ADLS_ACUITY_SCORE: 0

## 2024-11-19 NOTE — CONSULTS
"[facility]    Stroke Telephone Note    I was called by Dr. Kobe Araujo on 11/19/24 regarding patient Houston \"Angelo\" JESSICA Winters. 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  History obtained from ED provider.  Angelo was last seen well at 4:40 AM by his wife when she left for work.  He was then found at 9:37 am by his daughter to have new confusion and mixed aphasia.  On ED providers examination, Angelo has bilateral horizontal nystagmus, bilateral coordination with finger-to-nose testing, confusion, expressive > receptive aphasia (slow to respond), no significant weakness is appreciated on exam.  Presenting blood pressure was 142/88.  On aspirin 81 mg, Lipitor 80, Zetia 10 mg and Ranexa 500 mg twice daily PTA.     Vitals  BP: 126/82   Pulse: 89   Resp: 24   Temp: 98.5  F (36.9  C)   Weight: 93.4 kg (205 lb 12.8 oz)    Stroke Code Data (for stroke code without tele)  Stroke code activated 11/19/24  1024   Stroke provider first response 11/19/24  1025   Last known normal 11/19/24  0440      Time of discovery (or onset of symptoms) 11/19/24  0937   Head CT read by Stroke Neuro Provider 11/19/24  1029   Was stroke code de-escalated? Yes  11/19/24  1053     Imaging Findings  CT head: No evidence of hemorrhage.  CTA head/neck: No LVO.  Mild atherosclerotic disease in the carotid bilaterally.  CT perfusion: No perfusion deficit.     Intravenous Thrombolysis  Not given due to:   - unclear or unfavorable risk-benefit profile for extended window thrombolysis beyond the conventional 4.5 hour time window    Endovascular Treatment  Not initiated due to absence of proximal vessel occlusion    Impression  Confusion, mixed aphasia, ?left sided weakness, unclear " "etiology.  Differential includes toxic/metabolic/infectious etiology versus acute stroke versus seizure versus other    Recommendations   - brain MRI with and without contrast; please page stroke neurology for further recommendations  - Recommend metabolic/infectious/toxic workup per ED team     Case discussed with vascular neurology attending Dr. Cook.    My recommendations are based on the information provided over the phone by Houston Winters's in-person providers. They are not intended to replace the clinical judgment of his in-person providers. I was not requested to personally see or examine the patient at this time.     Teresa Domínguez PA-C  Vascular Neurology    To page me or covering stroke neurology team member, click here: [facility]  Choose \"On Call\" tab at top, then select \"NEUROLOGY/ALL SITES\" from middle drop-down box, press Enter, then look for \"stroke\" or \"telestroke\" for your site.   "

## 2024-11-19 NOTE — ED TRIAGE NOTES
Patient placed immediately into room for stroke symptoms, last known normal 0500. Stroke evaluation completed, Dr. Araujo at bedside. Patient placed on continuous cardiac monitoring, blood pressure and oxygen saturation monitoring. EKG done on arrival to room, blood sugar obtained and was 173. PIV placed, labs drawn and sent. Patient taken to CT at 1022 with RN at bedside on monitor. See neuro assessment for details, will continue to monitor.      Triage Assessment (Adult)       Row Name 11/19/24 1014          Triage Assessment    Airway WDL WDL        Respiratory WDL    Respiratory WDL WDL        Skin Circulation/Temperature WDL    Skin Circulation/Temperature WDL WDL        Cognitive/Neuro/Behavioral WDL    Cognitive/Neuro/Behavioral WDL X     Level of Consciousness confused     Orientation disoriented to;situation     Mood/Behavior sad        Pupils (CN II)    Pupil PERRLA yes     Pupil Size Left 2 mm     Pupil Size Right 2 mm        Stevenson Coma Scale    Best Eye Response 4-->(E4) spontaneous     Best Motor Response 6-->(M6) obeys commands     Best Verbal Response 4-->(V4) confused     Nataly Coma Scale Score 14        Motor Response    LUE Motor Response purposeful/movement localizing     RUE Motor Response purposeful/movement localizing     LLE Motor Response purposeful/movement localizing     RLE Motor Response purposeful/movement localizing

## 2024-11-19 NOTE — ED PROVIDER NOTES
History     Chief Complaint   Patient presents with    Stroke Symptoms     Confusion, LKW ~ 0500, Left sided weakness per daughter, hx of stroke in April/may of this year     HPI  Houston Winters is a 54 year old male who presents with a history of type 2 diabetes hypertension coronary disease status post coronary artery bypass graft.  History of prior CVA  He is here this morning last known well time when he was seen by his wife at around 4:40 AM.  He was noted by his daughter who identified this morning on seeing him in the last hour to be acutely confused.   He also had delayed responses and a possible aphasia.  He had a history of left-sided weakness but appears to be worse today.  There is no obvious history of recent head trauma.  The patient is unaware of why he is here.     He was awake when his wife saw earlier and confirmed normal status at 4:40 AM.  His daughter found him at around 9:37 AM and he was brought here.      Allergies:  Allergies   Allergen Reactions    Metformin Other (See Comments)     Very low blood sugars, Blood sugar issue        Problem List:    Patient Active Problem List    Diagnosis Date Noted    Hyperlipidemia LDL goal <50 09/05/2024     Priority: Medium    Pulmonary nodules 09/05/2024     Priority: Medium    Cerebrovascular accident (H) 04/29/2024     Priority: Medium    Type 2 diabetes mellitus with hypoglycemia without coma, with long-term current use of insulin (H) 03/17/2023     Priority: Medium    Hypertension associated with type 2 diabetes mellitus (H) 12/15/2022     Priority: Medium    Coronary artery disease involving coronary bypass graft of native heart, unspecified whether angina present 09/26/2022     Priority: Medium     4 V CABG at [facility] 6/16/021 (LIMA to diagonal and LAD, LOUIS to ramus, SVG to PDA)  Negative Brittany scan stress test 4/2024      Small bowel obstruction (H) 12/29/2017     Priority: Medium    Type 2 diabetes mellitus without complication, without long-term  current use of insulin (H) 03/27/2017     Priority: Medium    Benign essential hypertension 02/04/2015     Priority: Medium    Obesity 07/17/2014     Priority: Medium    Partial epilepsy (H) 03/12/2009     Priority: Medium     (Problem list name updated by automated process. Provider to review and confirm.)      ED (erectile dysfunction) 12/18/2008     Priority: Medium    GANGLOIN CYST /RIGHT ANKLE 06/15/2006     Priority: Medium        Past Medical History:    Past Medical History:   Diagnosis Date    Chronic, continuous use of opioids 02/27/2023    DM (diabetes mellitus) (H)     Epilepsy (H)     Hyperlipidemia due to type 2 diabetes mellitus (H) 12/15/2022    Hyperlipidemia with target LDL less than 100 07/17/2014    MEDICAL HISTORY OF - Age 16     Nicotine dependence     Tobacco use disorder 02/04/2015    Unstable angina (H) 05/18/2021       Past Surgical History:    Past Surgical History:   Procedure Laterality Date    CL AFF SURGICAL PATHOLOGY  01/01/2005    ganglion cyst removed    CORONARY ARTERY BYPASS  06/16/2021    4 Vessel CABG - at [facility]    CV HEART CATHETERIZATION WITH POSSIBLE INTERVENTION N/A 05/18/2021    Procedure: Heart Catheterization with Possible Intervention;  Surgeon: Andrew Madrid MD;  Location:  HEART CARDIAC CATH LAB    CV LEFT HEART CATH N/A 05/18/2021    Procedure: Left Heart Cath;  Surgeon: Andrew Madrid MD;  Location:  HEART CARDIAC CATH LAB    EXCISE MASS LOWER EXTREMITY  02/15/2013    Procedure: EXCISE MASS LOWER EXTREMITY;  Right Ankle Excision of ganglion cyst;  Surgeon: Akbar Melo DPM;  Location: [facility] OR    HERNIA REPAIR  01/01/2000    umbilical repair    HERNIORRHAPHY UMBILICAL N/A 11/10/2015    Procedure: HERNIORRHAPHY UMBILICAL;  Surgeon: Garry Leos MD;  Location: [facility] OR    TOTAL HIP ARTHROPLASTY Left 2017    [facility]       Family History:    Family History   Problem Relation Age of Onset    Arthritis Mother     Cancer Maternal  Grandmother     Arthritis Maternal Grandmother         lupus    Gastrointestinal Disease Maternal Grandfather         war injury, colostomy    Respiratory Daughter         growing out of it    Unknown/Adopted Father     Diabetes No family hx of     Coronary Artery Disease No family hx of     Hypertension No family hx of     Hyperlipidemia No family hx of     Breast Cancer No family hx of     Cancer - colorectal No family hx of     Ovarian Cancer No family hx of     Prostate Cancer No family hx of        Social History:  Marital Status:   [2]  Social History     Tobacco Use    Smoking status: Former     Current packs/day: 0.00     Average packs/day: 0.3 packs/day for 25.0 years (6.3 ttl pk-yrs)     Types: Cigarettes     Start date: 1991     Quit date: 2016     Years since quittin.8    Smokeless tobacco: Former   Vaping Use    Vaping status: Never Used   Substance Use Topics    Alcohol use: Yes     Alcohol/week: 0.0 standard drinks of alcohol     Comment: mixed 2-4 on weekends, not every weekend    Drug use: No        Medications:    aspirin (ASA) 81 MG chewable tablet  atorvastatin (LIPITOR) 80 MG tablet  Continuous Glucose Sensor (FREESTYLE MARY 3 SENSOR) MISC  docusate sodium (COLACE) 100 MG tablet  ezetimibe (ZETIA) 10 MG tablet  insulin glargine U-300 (TOUJEO SOLOSTAR) 300 UNIT/ML (1 units dial) pen  lisinopril (ZESTRIL) 2.5 MG tablet  metoprolol tartrate (LOPRESSOR) 50 MG tablet  multivitamin w/minerals (THERA-VIT-M) tablet  nitroGLYcerin (NITROSTAT) 0.4 MG sublingual tablet  pantoprazole (PROTONIX) 40 MG EC tablet  ranolazine (RANEXA) 500 MG 12 hr tablet  Semaglutide, 2 MG/DOSE, (OZEMPIC) 8 MG/3ML pen          Review of Systems   Unable to perform ROS: Mental status change         Physical Exam   BP: (!) 142/88  Pulse: 91  Resp: 20  SpO2: 97 %      Physical Exam  Constitutional:       General: He is in acute distress.      Appearance: He is not diaphoretic.   Eyes:      Extraocular  Movements:      Right eye: Nystagmus present.      Left eye: Nystagmus present.      Conjunctiva/sclera: Conjunctivae normal.   Cardiovascular:      Rate and Rhythm: Normal rate and regular rhythm.      Heart sounds: No murmur heard.  Pulmonary:      Effort: No respiratory distress.      Breath sounds: No stridor. No wheezing or rhonchi.   Musculoskeletal:      Cervical back: Neck supple.      Right lower leg: No edema.      Left lower leg: No edema.   Skin:     Coloration: Skin is not pale.      Findings: No rash.   Neurological:      General: No focal deficit present.      Mental Status: He is alert and oriented to person, place, and time.      Cranial Nerves: No cranial nerve deficit.      Sensory: No sensory deficit.      Motor: Weakness present.      Coordination: Coordination abnormal.             ED Course        Procedures                 EKG Interpretation:      Interpreted by Kobe Araujo MD  EKG done at 1044 hrs. demonstrates a sinus rhythm at 89 bpm left axis.  There is no ST change.  There is T wave inversion V1 V2.  There is T wave flattening inferiorly.  There is a rapid R progression related to her right bundle.  No ectopy.  Normal conduction otherwise.  Impression bifascicular block with a right bundle and a left axis.  This is incomplete given the QRS width of only 98.    Critical Care time:  none     The patient has stroke symptoms:         ED Stroke specific documentation           NIHSS PDF     Patient last known well time: 0440  ED Provider first to bedside at: arrival  CT Results received at: 1045    Thrombolytics:   Not given due to:   - out of windpow    If treating with thrombolytics: Ensure SBP<180 and DBP<105 prior to treatment with thrombolytics.  Administering thrombolytics after treatment with IV labetalol, hydralazine, or nicardipine is reasonable once BP control is established.    Endovascular Retrieval:  Not initiated due to absence of proximal vessel occlusion    National  Institutes of Health Stroke Scale (Baseline)  Time Performed: 1020     Score    Level of consciousness: (0)   Alert, keenly responsive    LOC questions: (2)   Answers neither question correctly    LOC commands: (0)   Performs both tasks correctly    Best gaze: (0)   Normal - but bilateral nystagmus    Visual: (0)   No visual loss    Facial palsy: (0)   Normal symmetrical movements    Motor arm (left): (0)   No drift    Motor arm (right): (0)   No drift    Motor leg (left): (0)   No drift    Motor leg (right): (0)   No drift    Limb ataxia: (2)   Present in two limbs    Sensory: (0)   Normal- no sensory loss    Best language: (1)   Mild to moderate aphasia    Dysarthria: (0)   Normal    Extinction and inattention: (0)   No abnormality        Total Score:  5        Stroke Mimics were considered (including migraine headache, seizure disorder, hypoglycemia (or hyperglycemia), head or spinal trauma, CNS infection, Toxin ingestion and shock state (e.g. sepsis) .       deescalated shortly after return from CT.            Results for orders placed or performed during the hospital encounter of 11/19/24 (from the past 24 hours)   Glucose by meter   Result Value Ref Range    GLUCOSE BY METER POCT 173 (H) 70 - 99 mg/dL   CBC with Platelets & Differential    Narrative    The following orders were created for panel order CBC with Platelets & Differential.  Procedure                               Abnormality         Status                     ---------                               -----------         ------                     CBC with platelets and d...[[id number]]                      Final result                 Please view results for these tests on the individual orders.   INR   Result Value Ref Range    INR 0.91 0.85 - 1.15   Partial thromboplastin time   Result Value Ref Range    aPTT 28 22 - 38 Seconds   CBC with platelets and differential   Result Value Ref Range    WBC Count 6.9 4.0 - 11.0 10e3/uL    RBC Count 4.67 4.40  - 5.90 10e6/uL    Hemoglobin 14.6 13.3 - 17.7 g/dL    Hematocrit 44.2 40.0 - 53.0 %    MCV 95 78 - 100 fL    MCH 31.3 26.5 - 33.0 pg    MCHC 33.0 31.5 - 36.5 g/dL    RDW 13.5 10.0 - 15.0 %    Platelet Count 227 150 - 450 10e3/uL    % Neutrophils 66 %    % Lymphocytes 24 %    % Monocytes 9 %    % Eosinophils 1 %    % Basophils 0 %    % Immature Granulocytes 0 %    NRBCs per 100 WBC 0 <1 /100    Absolute Neutrophils 4.6 1.6 - 8.3 10e3/uL    Absolute Lymphocytes 1.6 0.8 - 5.3 10e3/uL    Absolute Monocytes 0.6 0.0 - 1.3 10e3/uL    Absolute Eosinophils 0.1 0.0 - 0.7 10e3/uL    Absolute Basophils 0.0 0.0 - 0.2 10e3/uL    Absolute Immature Granulocytes 0.0 <=0.4 10e3/uL    Absolute NRBCs 0.0 10e3/uL   CT Head w/o Contrast    Narrative    CT SCAN OF THE HEAD WITHOUT CONTRAST November 19, 2024 10:29 AM     HISTORY: Aphasia. Chronic left weakness.    TECHNIQUE: Axial images of the head and coronal reformations without  IV contrast material. Radiation dose for this scan was reduced using  automated exposure control, adjustment of the mA and/or kV according  to patient size, or iterative reconstruction technique.    COMPARISON: Head CT 3/21/2024.    FINDINGS: There is no evidence of intracranial hemorrhage, mass, acute  infarct or anomaly. The ventricles are normal in size, shape and  configuration. The brain parenchyma and subarachnoid spaces are  normal.     The visualized portions of the sinuses and mastoids appear normal. The  bony calvarium and bones of the skull base appear intact.       Impression    IMPRESSION: No evidence of acute intracranial hemorrhage, mass, or  herniation.     EKG 12-lead, tracing only   Result Value Ref Range    Systolic Blood Pressure  mmHg    Diastolic Blood Pressure  mmHg    Ventricular Rate 89 BPM    Atrial Rate 89 BPM    MS Interval 152 ms    QRS Duration 98 ms     ms    QTc 467 ms    P Axis 30 degrees    R AXIS -27 degrees    T Axis 40 degrees    Interpretation ECG       Sinus  rhythm  Incomplete right bundle branch block  Borderline ECG  When compared with ECG of 10-Apr-2024 10:19, (unconfirmed)  Premature ventricular complexes are no longer Present           Medications   iopamidol (ISOVUE-370) solution 167 mL (has no administration in time range)     And   sodium chloride 0.9 % bag for CT scan flush use (has no administration in time range)       Assessments & Plan (with Medical Decision Making)     MDM: Houston Winters is a 54 year old male presenting with acute CVA-like symptoms with new onset confusion, possible aphasia, chronic left-sided weakness that may be worse currently although not appreciated by me.  Definite bilateral nystagmus horizontal that significant and also with a vertigo type sensation.  Finger-nose-finger is abnormal.  For CT head CT angio head and neck and MRI.    CT CTA were reassuring.  MRI is pending.  Updated family and patient.  No acute changes from his presentation.    Signed out to Dr. Giron pending MRI      I have reviewed the nursing notes.    I have reviewed the findings, diagnosis, plan and need for follow up with the patient.           Medical Decision Making  The patient's presentation was of high complexity (an acute health issue posing potential threat to life or bodily function).    The patient's evaluation involved:  ordering and/or review of 3+ test(s) in this encounter (see separate area of note for details)    The patient's management necessitated moderate risk (IV contrast administration).        New Prescriptions    No medications on file       Final diagnoses:   Confusion   Aphasia   History of CVA (cerebrovascular accident)       11/19/2024   [facility] EMERGENCY DEPT       Kobe Araujo MD  11/19/24 0946

## 2024-11-19 NOTE — PROGRESS NOTES
[facility]    Brief Stroke Telephone Note    I was called by Fer Griffith on 11/19/24 regarding patient Houston Winters. See stroke consult note from earlier today for HPI. MRI brain completed and was negative for acute infarct.  Additional history obtained from ED provider.  Angelo reports he currently has headache and left-sided paresthesias which were also present earlier today.  His daughter reports his mental status has improved a little.  On ED providers examination, Angelo continues to be slow to answer questions, strength is intact with the exception of slight decreased left ankle dorsiflexion, and has subjective decreased light touch sensation.     Angelo reports he has not had a seizure in >10 years.  He is not currently on AED.  In March 2024, 29-minute routine EEG was completed and was negative for seizure-like activity.      Vitals  BP: 122/70   Pulse: 85   Resp: 20   Temp: 98.5  F (36.9  C)   Weight: 93.4 kg (205 lb 12.8 oz)    Imaging Findings  MRI Brain: No acute infarct.     Impression  Confusion/altered mental status (now with some improvement), ?Mixed aphasia, left-sided weakness and paresthesias, unclear etiology.  MRI brain is negative for acute infarct.  Differential includes toxic/metabolic/infectious etiology (however initial workup has been unrevealing) versus seizure versus other    Recommendations  - Recommend transfer for general neurology evaluation including EEG and possible consideration for lumbar puncture to further evaluate presenting symptoms    Case discussed with vascular neurology attending Dr. Cook.    My recommendations are based on the information provided over the phone by Houston Winters's in-person providers. They are not intended to replace the clinical judgment of his in-person providers. I was not requested to personally see or examine the patient at this time.     Teresa Domínguez PA-C  Vascular Neurology    To page me or covering  "stroke neurology team member, click here: AMCOM  Choose \"On Call\" tab at top, then select \"NEUROLOGY/ALL SITES\" from middle drop-down box, press Enter, then look for \"stroke\" or \"telestroke\" for your site.   "

## 2024-11-19 NOTE — ED PROVIDER NOTES
"Emergency Department Sign-out Note    Time of sign-out: 1450    Patient summary:   54M, hx prior CVA (left-sided). Confusion and ?aphasia this morning. CT imaging negative. Labs reassuring. Awaiting MRI. Likely discharge if negative.    Additional ED course:   1600  Patient reassessed. Resting comfortably in bed. He still feels \"like crap\". Noting headache and left-sided arm and leg paresthesias. Daughter thinks mental status has improved somewhat though still not back to baseline. She feels he is still not as quick to answer questions as normal. Patient still feels somewhat \"cloudy\". CNII-XII intact. 5/5  strength bilaterally. 5/5 dorsiflexion/plantarflexion strength RLE, perhaps 4.5/5 dorsiflexion strength LLE (not as vigorous with full dorsiflexion). Sensation grossly intact but subjectively diminished left arm and leg.    1630  Stroke neuro recommending admit for EEG and consideration of LP. Would need transfer to do this. Patient does not want hospital admission and wants to go home. We discussed risks of leaving including death or permanent neurologic deficit, of which he expressed understanding. He would still like to go home. Daughter present during this discussion and agreeable. We discussed strict ED return precautions with new or worsening symptoms. Sent with urgent neurology follow-up referral.    Discharged.    Fer Griffith MD  Staff Emergency Physician  [facility]       Hunter Griffith MD  11/19/24 1636    "

## 2024-11-19 NOTE — DISCHARGE INSTRUCTIONS
You were seen in the emergency department today for confusion, difficulty communicating, and left-sided numbness and tingling. We did tests including blood tests, urine tests, CT scan, and MRI that showed no clear cause for your symptoms. We recommended transfer for seizure monitoring and additional testing though you wished to go home to monitor your symptoms there.     Please follow up with the neurologists (stroke and seizure doctors) within 1 week for ongoing evaluation and management of your symptoms. A referral has been provided for you.    Return to the emergency department with new or worsening symptoms that you find concerning.

## 2024-12-18 DIAGNOSIS — E11.9 TYPE 2 DIABETES MELLITUS WITHOUT COMPLICATION, WITHOUT LONG-TERM CURRENT USE OF INSULIN (H): ICD-10-CM

## 2024-12-19 RX ORDER — SEMAGLUTIDE 2.68 MG/ML
INJECTION, SOLUTION SUBCUTANEOUS
Qty: 9 ML | Refills: 0 | OUTPATIENT
Start: 2024-12-19

## 2025-01-07 ENCOUNTER — OFFICE VISIT (OUTPATIENT)
Dept: PEDIATRICS | Facility: CLINIC | Age: 55
End: 2025-01-07
Payer: COMMERCIAL

## 2025-01-07 ENCOUNTER — NURSE TRIAGE (OUTPATIENT)
Dept: FAMILY MEDICINE | Facility: CLINIC | Age: 55
End: 2025-01-07

## 2025-01-07 ENCOUNTER — ANCILLARY PROCEDURE (OUTPATIENT)
Dept: GENERAL RADIOLOGY | Facility: CLINIC | Age: 55
End: 2025-01-07
Payer: COMMERCIAL

## 2025-01-07 VITALS
DIASTOLIC BLOOD PRESSURE: 83 MMHG | WEIGHT: 205 LBS | HEART RATE: 100 BPM | TEMPERATURE: 98.5 F | SYSTOLIC BLOOD PRESSURE: 131 MMHG | OXYGEN SATURATION: 100 % | HEIGHT: 70 IN | BODY MASS INDEX: 29.35 KG/M2 | RESPIRATION RATE: 20 BRPM

## 2025-01-07 DIAGNOSIS — R05.8 PRODUCTIVE COUGH: ICD-10-CM

## 2025-01-07 DIAGNOSIS — R06.09 EXERTIONAL DYSPNEA: ICD-10-CM

## 2025-01-07 DIAGNOSIS — R05.8 PRODUCTIVE COUGH: Primary | ICD-10-CM

## 2025-01-07 LAB
ANION GAP SERPL CALCULATED.3IONS-SCNC: 15 MMOL/L (ref 7–15)
BUN SERPL-MCNC: 15.4 MG/DL (ref 6–20)
CALCIUM SERPL-MCNC: 9.2 MG/DL (ref 8.8–10.4)
CHLORIDE SERPL-SCNC: 103 MMOL/L (ref 98–107)
CREAT SERPL-MCNC: 0.9 MG/DL (ref 0.67–1.17)
CRP SERPL-MCNC: 10.23 MG/L
EGFRCR SERPLBLD CKD-EPI 2021: >90 ML/MIN/1.73M2
ERYTHROCYTE [DISTWIDTH] IN BLOOD BY AUTOMATED COUNT: 13.1 % (ref 10–15)
ERYTHROCYTE [SEDIMENTATION RATE] IN BLOOD BY WESTERGREN METHOD: 32 MM/HR (ref 0–20)
GLUCOSE SERPL-MCNC: 138 MG/DL (ref 70–99)
HCO3 SERPL-SCNC: 21 MMOL/L (ref 22–29)
HCT VFR BLD AUTO: 42 % (ref 40–53)
HGB BLD-MCNC: 13.8 G/DL (ref 13.3–17.7)
MCH RBC QN AUTO: 30.8 PG (ref 26.5–33)
MCHC RBC AUTO-ENTMCNC: 32.9 G/DL (ref 31.5–36.5)
MCV RBC AUTO: 94 FL (ref 78–100)
PLATELET # BLD AUTO: 252 10E3/UL (ref 150–450)
POTASSIUM SERPL-SCNC: 4 MMOL/L (ref 3.4–5.3)
RBC # BLD AUTO: 4.48 10E6/UL (ref 4.4–5.9)
SODIUM SERPL-SCNC: 139 MMOL/L (ref 135–145)
WBC # BLD AUTO: 8.8 10E3/UL (ref 4–11)

## 2025-01-07 PROCEDURE — 86140 C-REACTIVE PROTEIN: CPT

## 2025-01-07 PROCEDURE — 99215 OFFICE O/P EST HI 40 MIN: CPT

## 2025-01-07 PROCEDURE — 85652 RBC SED RATE AUTOMATED: CPT

## 2025-01-07 PROCEDURE — 80048 BASIC METABOLIC PNL TOTAL CA: CPT

## 2025-01-07 PROCEDURE — 36415 COLL VENOUS BLD VENIPUNCTURE: CPT

## 2025-01-07 PROCEDURE — 85027 COMPLETE CBC AUTOMATED: CPT

## 2025-01-07 PROCEDURE — 71046 X-RAY EXAM CHEST 2 VIEWS: CPT | Mod: TC | Performed by: RADIOLOGY

## 2025-01-07 RX ORDER — BENZONATATE 100 MG/1
100 CAPSULE ORAL 3 TIMES DAILY PRN
Qty: 24 CAPSULE | Refills: 0 | Status: SHIPPED | OUTPATIENT
Start: 2025-01-07

## 2025-01-07 ASSESSMENT — PAIN SCALES - GENERAL: PAINLEVEL_OUTOF10: MODERATE PAIN (5)

## 2025-01-07 NOTE — Clinical Note
"I had the pleasure of seeing Angelo today in the [place] ADS regarding his cough of 2 weeks duration.  Aside from mild elevation of ESR and CRP, labs were nonrevealing, and chest x-ray was entirely normal.  I think he probably has some form of post viral bronchiolitis causing his airways to be \"twitchy\", and as result had hoped to prescribe prednisone.  However, because of his side effect of hiccups with previous systemic steroid use, severe enough that he has required cotreatment with an antipsychotic (probably Thorazine?), Angelo has decided not to embark on prednisone treatment today.  Instead, I prescribed benzonatate to see if antitussives and additional time will allow for spontaneous resolution of his cough.  If cough persists, a trial of prednisone and Thorazine would seem to be a reasonable next step.  Thank you."

## 2025-01-07 NOTE — TELEPHONE ENCOUNTER
"Nurse Triage SBAR    Is this a 2nd Level Triage? YES, LICENSED PRACTITIONER REVIEW IS REQUIRED    Situation: Pt calls to schedule an appt for c/o cough and chest congestion.     Background: Says that symptoms started the day after [place], wife has also been ill with similar symptoms. Covid test negative 5 days ago. Says that he's tried many different OTC meds without relief.     Assessment: See triage assessment below. Cough has been intermittent, although pt coughing frequently while on phone with writer. Says that cough is mostly non-productive, occasional sputum is grayish/green in color. Does get short of breath with activity. Says that he has \"crunchiness\" in his chest and occasional wheezing, especially when laying down at night. Denies fever.     Protocol Recommended Disposition:   Go To Office Now    Recommendation: No clinic appts remaining today; possible candidate for ADS evaluation? Writer spoke with Ellen JEFFERSON in ADS, will have provider review and determine if appropriate for ADS.      Routed to provider    Does the patient meet one of the following criteria for ADS visit consideration? 16+ years old, with an [facility] PCP     TIP  Providers, please consider if this condition is appropriate for management at one of our Acute and Diagnostic Services sites.     If patient is a good candidate, please use dotphrase <dot>triageresponse and select Refer to ADS to document.    Reason for Disposition   MILD difficulty breathing (e.g., minimal/no SOB at rest, SOB with walking, pulse <100) and still present when not coughing    Additional Information   Negative: Bluish (or gray) lips or face   Negative: SEVERE difficulty breathing (e.g., struggling for each breath, speaks in single words)   Negative: Rapid onset of cough and has hives   Negative: Coughing started suddenly after medicine, an allergic food or bee sting   Negative: Difficulty breathing after exposure to flames, smoke, or fumes   Negative: Sounds like a " "life-threatening emergency to the triager   Negative: Previous asthma attacks and this feels like asthma attack   Negative: Dry cough (non-productive; no sputum or minimal clear sputum) and within 14 days of COVID-19 Exposure   Negative: MODERATE difficulty breathing (e.g., speaks in phrases, SOB even at rest, pulse 100-120) and still present when not coughing   Negative: Chest pain present when not coughing   Negative: Passed out (e.g., fainted, lost consciousness, blacked out and was not responding)   Negative: Patient sounds very sick or weak to the triager    Answer Assessment - Initial Assessment Questions  1. ONSET: \"When did the cough begin?\"       The day after [place]  2. SEVERITY: \"How bad is the cough today?\"       Comes and goes, cold air makes it worse.   3. SPUTUM: \"Describe the color of your sputum\" (e.g., none, dry cough; clear, white, yellow, green)      Barely any sputum, mostly non-productive. Grayish/green.   4. HEMOPTYSIS: \"Are you coughing up any blood?\" If Yes, ask: \"How much?\" (e.g., flecks, streaks, tablespoons, etc.)      No  5. DIFFICULTY BREATHING: \"Are you having difficulty breathing?\" If Yes, ask: \"How bad is it?\" (e.g., mild, moderate, severe)       A little short of breath with activity  6. FEVER: \"Do you have a fever?\" If Yes, ask: \"What is your temperature, how was it measured, and when did it start?\"      No  7. CARDIAC HISTORY: \"Do you have any history of heart disease?\" (e.g., heart attack, congestive heart failure)       Quadruple bypass, stent  8. LUNG HISTORY: \"Do you have any history of lung disease?\"  (e.g., pulmonary embolus, asthma, emphysema)      No  9. PE RISK FACTORS: \"Do you have a history of blood clots?\" (or: recent major surgery, recent prolonged travel, bedridden)      No  10. OTHER SYMPTOMS: \"Do you have any other symptoms?\" (e.g., runny nose, wheezing, chest pain)        Pt says that he feels very congested in his chest, and it's been difficult to sleep. Says " "it's worse when he's laying down, has also been having some wheezing when laying down. Denies fever. Says that he has pain in lower back and accessory muscles from coughing so much.   11. PREGNANCY: \"Is there any chance you are pregnant?\" \"When was your last menstrual period?\"        N/A  12. TRAVEL: \"Have you traveled out of the country in the last month?\" (e.g., travel history, exposures)        No    Protocols used: Cough-A-KISHOR Cruz RN  [facility]  "

## 2025-01-07 NOTE — PROGRESS NOTES
"Acute and Diagnostic Services Clinic Visit    Assessment & Plan     (R05.8) Productive cough  (primary encounter diagnosis)  Comment: Presents with a cough since 12/26/2024.  His wife was ill with a lower respiratory tract infection at that time, and also had a cough.  Her symptoms have improved, though Angelo continues to cough, occasionally producing thick, grayish-green sputum, without blood.  Cough seems to be triggered by exertion and cold air exposure.  He can go as long as 3 or 4 hours without cough, after which it recurs.  Cough is interfering with his sleep on a nightly basis.  OTC cold and flu preparations have not helped.  He tested negative for COVID-19 as recently as 5 days ago, though has not been tested for influenza.  Exam is notable for \"twitchy\" airways; any deep inspiratory effort triggers cough.  A few right posterior basilar inspiratory crackles are heard.  There is no expiratory wheeze.  Plan:  Chest x-ray PA and lateral.  CBC, basic metabolic panel, ESR, and CRP ordered.  We discussed the possibility of testing him for influenza, though since he has been symptomatic already for 12 days, he is outside the window for treatment with oseltamavir.  As a result, both he and I do not see a compelling reason to test.  Since he tested negative for COVID-19 on a home test 5 days ago, we will not repeat that.    (R06.09) Exertional dyspnea  Comment: Mild, but noticeable.  He describes ascending 1 flight of stairs as being a trigger for dyspnea.  His dyspnea is new.  Plan:  See planned workup above.    DISCUSSION/MEDICAL DECISION MAKING:    CBC was normal.  Basic metabolic panel was normal.  Mild elevation of inflammatory markers was noted: ESR 32, CRP 10.2.  Chest x-ray (reviewed personally) is clear.  As result, we do not have a firm explanation for Angelo's ongoing cough.  However, occurring on the heels of what sounds to have been a lower respiratory tract infection, probably viral, that he and his wife " "shared, I think he probably has a postinfectious bronchiolitis resulting in ongoing cough, which we discussed.  We discussed the possibility of treating him with a 5-day course of prednisone.  However, he says that he has intractable hiccups while on systemic steroids, which has necessitated cotreatment with an antipsychotic (probably Thorazine).  He does say that Thorazine is effective at controlling hiccups while on prednisone, however.    After further discussion, Angelo would like to avoid the possibility of the side effect of hiccups, so we will use prednisone (and thorazine) as Plan B.  For now, I am going to prescribe an antitussive, benzonatate, to see if the commendation of benzonatate and additional time allows for gradual resolution of his symptoms.  He agrees to this plan.  He also agrees to contact Dr. Ruvalcaba should his cough fail to improve, in which case prednisone and Thorazine could be considered.    51 minutes were spent doing chart review, history and exam, documentation and further activities per the note.       BMI  Estimated body mass index is 29.41 kg/m  as calculated from the following:    Height as of this encounter: 1.778 m (5' 10\").    Weight as of this encounter: 93 kg (205 lb).     Russ Stephens is a 54 year old, presenting for the following health issues:  URI (Cough, SOB, Wheezing)    HPI     Shortness of Breath/Breathing Problem  Onset/Duration: 12/26/2024  Progression of symptoms: same and intermittent  Accompanying signs and symptoms:       SOB at rest: YES       SOB with activity: YES       Pain with inspiration: discomfort       Cough: YES       Pink tinged sputum: YES- since resolved       Sweating: YES       Nausea/vomiting: No       Lightheadedness: YES       Palpitations: No       Fever/chills: No       Heartburn: No  History   Family history of coagulation disorders: No  Tobacco use: No  Previous similar symptoms: no   Precipitating factors:  Related to eating: No  Better " "with burping: No  Therapies tried and outcome: cold medicines, mucinex, tylenol    Angelo Winters is a 54-year-old man seen today in the Acute Diagnostic Services (ADS) clinic at [facility] regarding persistent, productive cough of 2 weeks duration.    Angelo's wife became ill right around Vaishali 2024 with symptoms that suggested lower respiratory tract infection, including severe, productive cough.  Angelo himself began to cough 12/26/2024, and has been coughing ever since.  He has not been tested for influenza, nor was his wife.  He tested negative for COVID-19 at home 5 days ago.  [place] been using OTC cold and flu preparations, without success.  Because of this persistent cough, he contacted his primary care provider this morning, following which this ADS appointment was arranged.    Angelo explains that this cough is new for him; he is not prone to chronic or recurrent coughing.  His cough has been productive off-and-on of gray, thick sputum, without blood.  Today, his cough is nonproductive, which makes him think that he may be improving.  Cough is associated with a \"tight\" feeling over the upper, anterior chest on both sides.  Cough seems to be triggered by exertion, as well as by cold air exposure.  He does not have pleuritic chest pain.  He does not think that he has had associated fever, though has not measured.  His wife symptoms have resolved entirely.  He and his wife are scheduled to go to [place] on 1/17/2025.      Review of Systems  Cardiovascular: Notable for coronary artery disease, status post CABG in 2021.  Subsequently, he required drug-eluting stent placement x 1 angina.  He denies exertional chest pain since that procedure.  Respiratory: Pulmonary nodules are being followed longitudinally by chest imaging.  Of note: These appear not to be visible on plain chest x-ray.  His most recent chest x-ray 9/5/2024 does not describe pulmonary nodules.      Objective    /83 (BP Location: " "Left arm, Patient Position: Sitting, Cuff Size: Adult Regular)   Pulse 100   Temp 98.5  F (36.9  C) (Oral)   Resp 20   Ht 1.778 m (5' 10\")   Wt 93 kg (205 lb)   SpO2 100%   BMI 29.41 kg/m    Body mass index is 29.41 kg/m .  Physical Exam   GENERAL: Very pleasant man, who coughed occasionally during my visit with him, though does not appear to be in any acute distress.  NECK: Voice is slightly hoarse.  Trachea midline, no stridor.   RESP: No accessory muscle use.  Even a slightly deep inspiratory effort triggers cough.  Lungs are notable for a few inspiratory crackles at the right posterior lung base, though elsewhere inspiration is clear.  Expiration not prolonged, no wheeze heard with quiet expiration.  CV: Regular rate and rhythm, non-tachycardic.  Normal S1 S2, no murmur or extra sound.    ABDOMEN: Soft, non-tender, no guarding.  Bowel sounds positive.  MS: No bony deformities noted.  No red or inflamed joints.  SKIN: Warm and dry, no rashes.  NEURO: Alert, oriented, conversant.  Cranial nerves III - XII grossly intact.  No gross motor or sensory deficits.  PSYCH: Calm, alert, conversant.  Able to articulate logical thoughts, no tangential thoughts, no hallucinations or delusions.  Affect normal.      Results for orders placed or performed in visit on 01/07/25 (from the past 24 hours)   Basic metabolic panel   Result Value Ref Range    Sodium 139 135 - 145 mmol/L    Potassium 4.0 3.4 - 5.3 mmol/L    Chloride 103 98 - 107 mmol/L    Carbon Dioxide (CO2) 21 (L) 22 - 29 mmol/L    Anion Gap 15 7 - 15 mmol/L    Urea Nitrogen 15.4 6.0 - 20.0 mg/dL    Creatinine 0.90 0.67 - 1.17 mg/dL    GFR Estimate >90 >60 mL/min/1.73m2    Calcium 9.2 8.8 - 10.4 mg/dL    Glucose 138 (H) 70 - 99 mg/dL   CBC with platelets   Result Value Ref Range    WBC Count 8.8 4.0 - 11.0 10e3/uL    RBC Count 4.48 4.40 - 5.90 10e6/uL    Hemoglobin 13.8 13.3 - 17.7 g/dL    Hematocrit 42.0 40.0 - 53.0 %    MCV 94 78 - 100 fL    MCH 30.8 26.5 - " 33.0 pg    MCHC 32.9 31.5 - 36.5 g/dL    RDW 13.1 10.0 - 15.0 %    Platelet Count 252 150 - 450 10e3/uL   CRP inflammation   Result Value Ref Range    CRP Inflammation 10.23 (H) <5.00 mg/L   Erythrocyte sedimentation rate auto   Result Value Ref Range    Erythrocyte Sedimentation Rate 32 (H) 0 - 20 mm/hr     EXAM: XR CHEST 2 VIEWS  LOCATION: [facility]  DATE: 1/7/2025     INDICATION: Productive cough of 2 weeks duration.  COMPARISON: 9/5/2024                                                                      IMPRESSION: No focal consolidation is seen in the lungs. Normal cardiomediastinal silhouette. Median sternotomy wires are unchanged. No acute bony abnormality.      In 1408  Out 1442  In 1555  Out 1612      Signed Electronically by: Akbar Herrera MD

## 2025-01-07 NOTE — PATIENT INSTRUCTIONS
"We got a lot of favorable results from workup here today, though I am not able to explain your ongoing cough with any certainty.  Your chest x-ray was entirely clear, and CBC was normal, as were your electrolytes and kidney function.  The only lab abnormalities we found were those mildly elevated inflammatory markers, which could suggest there is some mild, ongoing inflammation in your lungs.  One option for treatment ongoing lung inflammation is the use of prednisone.  However, given your experience with severe hiccups while taking prednisone, I agree with you that we should use prednisone as a \"Plan B\" medication.  For now, I have prescribed benzonatate, the cough suppressant we discussed; you can take 1 capsule every 8 hours as needed.  I would suggest that you certainly use 1 at bedtime in hopes of improving her sleep.  I anticipate that your cough is going to slowly improve on its own and resolved within the next several days.  If it does not, please contact Dr. Ruvalcaba, as we could always see you back here again in reevaluation.    It was nice meeting you.  I hope you enjoy the [facility] cough-free.  "

## 2025-01-07 NOTE — TELEPHONE ENCOUNTER
Writer was notified by Ellen in ADS, that pt will be seen in ADS today.    Jenny Cruz RN  [facility]

## 2025-01-09 ENCOUNTER — TELEPHONE (OUTPATIENT)
Dept: FAMILY MEDICINE | Facility: CLINIC | Age: 55
End: 2025-01-09

## 2025-01-09 DIAGNOSIS — R05.8 PRODUCTIVE COUGH: Primary | ICD-10-CM

## 2025-01-09 RX ORDER — PREDNISONE 20 MG/1
40 TABLET ORAL DAILY
Qty: 10 TABLET | Refills: 0 | Status: SHIPPED | OUTPATIENT
Start: 2025-01-09 | End: 2025-01-14

## 2025-01-09 RX ORDER — CHLORPROMAZINE HYDROCHLORIDE 25 MG/1
25 TABLET, FILM COATED ORAL 3 TIMES DAILY
Qty: 15 TABLET | Refills: 0 | Status: SHIPPED | OUTPATIENT
Start: 2025-01-09 | End: 2025-01-14

## 2025-01-09 NOTE — TELEPHONE ENCOUNTER
Prednisone prescription sent to pharmacy.    Thorazine prescribed due to history of severe intractable hiccoughs with steroids in the past.  This will be 25 mg three times daily.  Could start this at the same time as the prednisone OR pick it up from pharmacy and take it only if the hiccoughs begin.     Yue Ruvalcaba M.D.

## 2025-01-09 NOTE — TELEPHONE ENCOUNTER
FYI - Status Update    Who is Calling: patient    Update: Pt was seen in ADS 1/7 and was prescribed benzonatate (TESSALON) 100 MG capsule. Pt calls and states the medication is not working for him and would like to go with plan B, provider note from ADS below regarding plan B    After further discussion, Angelo would like to avoid the possibility of the side effect of hiccups, so we will use prednisone (and thorazine) as Plan B.  For now, I am going to prescribe an antitussive, benzonatate, to see if the commendation of benzonatate and additional time allows for gradual resolution of his symptoms.  He agrees to this plan.  He also agrees to contact Dr. Ruvalcaba should his cough fail to improve, in which case prednisone and Thorazine could be considered.     Please advise.    Does caller want a call/response back: Yes     Could we send this information to you in [facility] or would you prefer to receive a phone call?:   Patient would prefer a phone call   Okay to leave a detailed message?: Yes at Home number on file [phone] (home)

## 2025-02-05 ENCOUNTER — TRANSFERRED RECORDS (OUTPATIENT)
Dept: MULTI SPECIALTY CLINIC | Facility: CLINIC | Age: 55
End: 2025-02-05

## 2025-02-05 LAB — RETINOPATHY: NORMAL

## 2025-03-03 ENCOUNTER — TELEPHONE (OUTPATIENT)
Dept: FAMILY MEDICINE | Facility: CLINIC | Age: 55
End: 2025-03-03
Payer: COMMERCIAL

## 2025-03-03 NOTE — TELEPHONE ENCOUNTER
Prior authorization please.    Also notify patient he is due for 6 month diabetic check.    Yue Ruvalcaba M.D.

## 2025-03-03 NOTE — TELEPHONE ENCOUNTER
Per fax received from [facility] - OZEMPIC, 2 MG/DOSE, 8 MG/3ML pen  is not covered by patient's Insurance Company  Dr. Ruvalcaba - Please choose:  1.  Change medication that is not covered to a different medication and send new prescription to patient's pharmacy?  2.  Patient will need to pay for the non-covered medication out-of-pocket?   3.  Try for Prior Authorization with Insurance Company to get medication covered?   4.  Patient needs clinic appointment to discuss medication options?       Key#   [id number]

## 2025-03-05 NOTE — TELEPHONE ENCOUNTER
PA Initiation    Medication: OZEMPIC (2 MG/DOSE) 8 MG/3ML SC SOPN  Insurance Company: [employer] ([facility]) - Phone [phone] Fax [phone]  Pharmacy Filling the Rx: [facility] [address] [place] [address] - [address]  Filling Pharmacy Phone: [phone]  Filling Pharmacy Fax: [phone]  Start Date: 3/5/2025

## 2025-03-06 NOTE — TELEPHONE ENCOUNTER
Prior Authorization Approval    Medication: OZEMPIC (2 MG/DOSE) 8 MG/3ML SC SOPN  Authorization Effective Date: 3/5/2025  Authorization Expiration Date: 3/6/2026  Approved Dose/Quantity:   Reference #: [id number]   Insurance Company: [employer] ([facility]) - Phone [phone] Fax [phone]  Which Pharmacy is filling the prescription: [facility] [address] [place] [address] - [address]  Pharmacy Notified: yes  Patient Notified: Instructed pharmacy to notify patient when script is ready to pick-up/ship

## 2025-03-08 DIAGNOSIS — E11.9 TYPE 2 DIABETES MELLITUS WITHOUT COMPLICATION, WITHOUT LONG-TERM CURRENT USE OF INSULIN (H): ICD-10-CM

## 2025-03-10 ENCOUNTER — TELEPHONE (OUTPATIENT)
Dept: FAMILY MEDICINE | Facility: CLINIC | Age: 55
End: 2025-03-10
Payer: COMMERCIAL

## 2025-03-10 RX ORDER — INSULIN GLARGINE 300 U/ML
INJECTION, SOLUTION SUBCUTANEOUS
Qty: 18 ML | Refills: 0 | OUTPATIENT
Start: 2025-03-10

## 2025-03-10 NOTE — TELEPHONE ENCOUNTER
Patient Quality Outreach    Patient is due for the following:   Colonoscopy    Action(s) Taken:   - Complete colon screen  - Schedule DM visit  - Vaccine update    Type of outreach:    Sent [employer] message.    Questions for provider review:    None           Sharee Lewis, CMA

## 2025-03-29 ENCOUNTER — HEALTH MAINTENANCE LETTER (OUTPATIENT)
Age: 55
End: 2025-03-29

## 2025-03-31 DIAGNOSIS — E11.9 TYPE 2 DIABETES MELLITUS WITHOUT COMPLICATION, WITHOUT LONG-TERM CURRENT USE OF INSULIN (H): ICD-10-CM

## 2025-03-31 RX ORDER — SEMAGLUTIDE 2.68 MG/ML
2 INJECTION, SOLUTION SUBCUTANEOUS
Qty: 3 ML | Refills: 0 | Status: SHIPPED | OUTPATIENT
Start: 2025-03-31

## 2025-04-10 PROBLEM — I63.9 CEREBROVASCULAR ACCIDENT (H): Status: RESOLVED | Noted: 2024-04-29 | Resolved: 2025-04-10

## 2025-05-16 DIAGNOSIS — E11.9 TYPE 2 DIABETES MELLITUS WITHOUT COMPLICATION, WITHOUT LONG-TERM CURRENT USE OF INSULIN (H): ICD-10-CM

## 2025-05-16 NOTE — TELEPHONE ENCOUNTER
Has appointment scheduled for 6/4/25.      Requested Prescriptions   Pending Prescriptions Disp Refills    TOUJEO SOLOSTAR 300 UNIT/ML (1 units dial) pen [Pharmacy Med Name: Toujeo SoloStar 300 UNIT/ML Subcutaneous Solution Pen-injector] 18 mL 0     Sig: INJECT 60 UNITS SUBCUTANEOUSLY IN THE MORNING.  DR APPOINTMENT NEEDED FOR FUTURE FILLS.       Insulin Protocol Failed - 5/16/2025  3:18 PM        Failed - HgbA1C in past 3 or 6 months     If HgbA1C is 8 or greater, it needs to be on file within the past 3 months.  If less than 8, must be on file within the past 6 months.     Recent Labs   Lab Test 09/05/24  1023   A1C 6.7*             Failed - Medication is active on med list and the sig matches. RN to manually verify dose and sig if red X/fail.     If the protocol passes (green check), you do not need to verify med dose and sig.    A prescription matches if they are the same clinical intention.    For Example: once daily and every morning are the same.    The protocol can not identify upper and lower case letters as matching and will fail.     For Example: Take 1 tablet (50 mg) by mouth daily     TAKE 1 TABLET (50 MG) BY MOUTH DAILY    For all fails (red x), verify dose and sig.    If the refill does match what is on file, the RN can still proceed to approve the refill request.       If they do not match, route to the appropriate provider.             Failed - Chart review required     Review Chart.    Do not approve if insulin is used in a pump.  Instead, direct refill request to the patient's endocrinologist.  If the patient doesn't have an endocrinologist, then send the refill to the patient's PCP for review            Passed - Recent (6 mo) or future (90 days) visit within the authorizing provider's specialty     The patient must have completed an in-person or virtual visit within the past 6 months or has a future visit scheduled within the next 90 days with the authorizing provider s specialty.  Urgent care  and e-visits do not quality as an office visit for this protocol.          Passed - Medication indicated for associated diagnosis     Medication is associated with one or more of the following diagnoses:   - Type 1 diabetes mellitus  - Type 2 diabetes mellitus  - Diabetic nephropathy; Prophylaxis  - Neuropathy due to diabetes mellitus; Prophylaxis  - Retinopathy due to diabetes mellitus; Prophylaxis  - Diabetes mellitus associated with cystic fibrosis  - Disorder of cardiovascular system; Prophylaxis - Type 1 diabetes mellitus   - Disorder of cardiovascular system; Prophylaxis - Type 2 diabetes mellitus            Passed - Patient is 18 years of age or older          OZEMPIC (2 MG/DOSE) 8 MG/3ML pen [Pharmacy Med Name: Ozempic (2 MG/DOSE) 8 MG/3ML Subcutaneous Solution Pen-injector] 3 mL 0     Sig: INJECT 2 MG SUBCUTANEOUSLY ONCE A WEEK . APPOINTMENT REQUIRED FOR FUTURE REFILLS       GLP-1 Agonists Protocol Failed - 5/16/2025  3:18 PM        Failed - HgbA1C in past 3 or 6 months     If HgbA1C is 8 or greater, it needs to be on file within the past 3 months.  If less than 8, must be on file within the past 6 months.     Recent Labs   Lab Test 09/05/24  1023   A1C 6.7*             Failed - Medication is active on med list and the sig matches. RN to manually verify dose and sig if red X/fail.     If the protocol passes (green check), you do not need to verify med dose and sig.    A prescription matches if they are the same clinical intention.    For Example: once daily and every morning are the same.    The protocol can not identify upper and lower case letters as matching and will fail.     For Example: Take 1 tablet (50 mg) by mouth daily     TAKE 1 TABLET (50 MG) BY MOUTH DAILY    For all fails (red x), verify dose and sig.    If the refill does match what is on file, the RN can still proceed to approve the refill request.       If they do not match, route to the appropriate provider.             Passed - Has GFR on  file in past 12 months and most recent value is normal        Passed - Recent (6 mo) or future (90 days) visit within the authorizing provider's specialty     The patient must have completed an in-person or virtual visit within the past 6 months or has a future visit scheduled within the next 90 days with the authorizing provider s specialty.  Urgent care and e-visits do not quality as an office visit for this protocol.          Passed - Medication indicated for associated diagnosis     Medication is associated with one or more of the following diagnoses:     Type 2 diabetes mellitus           Passed - Patient is age 18 or older

## 2025-05-19 RX ORDER — SEMAGLUTIDE 2.68 MG/ML
INJECTION, SOLUTION SUBCUTANEOUS
Qty: 3 ML | Refills: 0 | Status: SHIPPED | OUTPATIENT
Start: 2025-05-19

## 2025-05-19 RX ORDER — INSULIN GLARGINE 300 U/ML
INJECTION, SOLUTION SUBCUTANEOUS
Qty: 18 ML | Refills: 0 | Status: SHIPPED | OUTPATIENT
Start: 2025-05-19

## 2025-05-19 NOTE — TELEPHONE ENCOUNTER
Pt calling on status of this. He has been out of medication since Friday and not feeling the best.    Susanna Nair on 5/19/2025 at 7:08 AM

## 2025-06-04 ENCOUNTER — ANCILLARY PROCEDURE (OUTPATIENT)
Dept: GENERAL RADIOLOGY | Facility: CLINIC | Age: 55
End: 2025-06-04
Attending: FAMILY MEDICINE
Payer: COMMERCIAL

## 2025-06-04 ENCOUNTER — OFFICE VISIT (OUTPATIENT)
Dept: FAMILY MEDICINE | Facility: CLINIC | Age: 55
End: 2025-06-04
Payer: COMMERCIAL

## 2025-06-04 ENCOUNTER — RESULTS FOLLOW-UP (OUTPATIENT)
Dept: FAMILY MEDICINE | Facility: CLINIC | Age: 55
End: 2025-06-04

## 2025-06-04 VITALS
BODY MASS INDEX: 29.32 KG/M2 | RESPIRATION RATE: 18 BRPM | HEART RATE: 78 BPM | SYSTOLIC BLOOD PRESSURE: 132 MMHG | WEIGHT: 204.8 LBS | HEIGHT: 70 IN | OXYGEN SATURATION: 98 % | DIASTOLIC BLOOD PRESSURE: 78 MMHG | TEMPERATURE: 98.2 F

## 2025-06-04 DIAGNOSIS — R91.8 PULMONARY NODULES: ICD-10-CM

## 2025-06-04 DIAGNOSIS — E11.59 HYPERTENSION ASSOCIATED WITH TYPE 2 DIABETES MELLITUS (H): ICD-10-CM

## 2025-06-04 DIAGNOSIS — M79.641 HAND PAIN, RIGHT: ICD-10-CM

## 2025-06-04 DIAGNOSIS — I15.2 HYPERTENSION ASSOCIATED WITH TYPE 2 DIABETES MELLITUS (H): ICD-10-CM

## 2025-06-04 DIAGNOSIS — Z79.4 TYPE 2 DIABETES MELLITUS WITH HYPOGLYCEMIA WITHOUT COMA, WITH LONG-TERM CURRENT USE OF INSULIN (H): Primary | ICD-10-CM

## 2025-06-04 DIAGNOSIS — I10 HYPERTENSION, GOAL BELOW 140/90: ICD-10-CM

## 2025-06-04 DIAGNOSIS — Z95.1 S/P CABG (CORONARY ARTERY BYPASS GRAFT): ICD-10-CM

## 2025-06-04 DIAGNOSIS — M19.041 DEGENERATIVE ARTHRITIS OF METACARPOPHALANGEAL JOINT OF RING FINGER OF RIGHT HAND: ICD-10-CM

## 2025-06-04 DIAGNOSIS — I25.810 CORONARY ARTERY DISEASE INVOLVING CORONARY BYPASS GRAFT OF NATIVE HEART, UNSPECIFIED WHETHER ANGINA PRESENT: ICD-10-CM

## 2025-06-04 DIAGNOSIS — I25.10 CORONARY ARTERY DISEASE INVOLVING NATIVE CORONARY ARTERY OF NATIVE HEART WITHOUT ANGINA PECTORIS: ICD-10-CM

## 2025-06-04 DIAGNOSIS — E11.649 TYPE 2 DIABETES MELLITUS WITH HYPOGLYCEMIA WITHOUT COMA, WITH LONG-TERM CURRENT USE OF INSULIN (H): Primary | ICD-10-CM

## 2025-06-04 LAB
ALBUMIN SERPL BCG-MCNC: 4.2 G/DL (ref 3.5–5.2)
ALP SERPL-CCNC: 96 U/L (ref 40–150)
ALT SERPL W P-5'-P-CCNC: 36 U/L (ref 0–70)
ANION GAP SERPL CALCULATED.3IONS-SCNC: 15 MMOL/L (ref 7–15)
AST SERPL W P-5'-P-CCNC: 23 U/L (ref 0–45)
BILIRUB SERPL-MCNC: 0.6 MG/DL
BUN SERPL-MCNC: 14.7 MG/DL (ref 6–20)
CALCIUM SERPL-MCNC: 9.3 MG/DL (ref 8.8–10.4)
CHLORIDE SERPL-SCNC: 104 MMOL/L (ref 98–107)
CHOLEST SERPL-MCNC: 92 MG/DL
CREAT SERPL-MCNC: 0.75 MG/DL (ref 0.67–1.17)
CREAT UR-MCNC: 25.8 MG/DL
EGFRCR SERPLBLD CKD-EPI 2021: >90 ML/MIN/1.73M2
EST. AVERAGE GLUCOSE BLD GHB EST-MCNC: 123 MG/DL
FASTING STATUS PATIENT QL REPORTED: NO
FASTING STATUS PATIENT QL REPORTED: NO
GLUCOSE SERPL-MCNC: 163 MG/DL (ref 70–99)
HBA1C MFR BLD: 5.9 % (ref 0–5.6)
HCO3 SERPL-SCNC: 22 MMOL/L (ref 22–29)
HDLC SERPL-MCNC: 41 MG/DL
LDLC SERPL CALC-MCNC: 40 MG/DL
MICROALBUMIN UR-MCNC: <12 MG/L
MICROALBUMIN/CREAT UR: NORMAL MG/G{CREAT}
NONHDLC SERPL-MCNC: 51 MG/DL
POTASSIUM SERPL-SCNC: 5.2 MMOL/L (ref 3.4–5.3)
PROT SERPL-MCNC: 6.8 G/DL (ref 6.4–8.3)
SODIUM SERPL-SCNC: 141 MMOL/L (ref 135–145)
TRIGL SERPL-MCNC: 55 MG/DL
URATE SERPL-MCNC: 4 MG/DL (ref 3.4–7)

## 2025-06-04 PROCEDURE — 36415 COLL VENOUS BLD VENIPUNCTURE: CPT | Performed by: FAMILY MEDICINE

## 2025-06-04 PROCEDURE — 82570 ASSAY OF URINE CREATININE: CPT | Performed by: FAMILY MEDICINE

## 2025-06-04 PROCEDURE — 80053 COMPREHEN METABOLIC PANEL: CPT | Performed by: FAMILY MEDICINE

## 2025-06-04 PROCEDURE — 1125F AMNT PAIN NOTED PAIN PRSNT: CPT | Performed by: FAMILY MEDICINE

## 2025-06-04 PROCEDURE — 99207 PR FOOT EXAM NO CHARGE: CPT | Performed by: FAMILY MEDICINE

## 2025-06-04 PROCEDURE — 80061 LIPID PANEL: CPT | Performed by: FAMILY MEDICINE

## 2025-06-04 PROCEDURE — 73130 X-RAY EXAM OF HAND: CPT | Mod: TC | Performed by: RADIOLOGY

## 2025-06-04 PROCEDURE — 82043 UR ALBUMIN QUANTITATIVE: CPT | Performed by: FAMILY MEDICINE

## 2025-06-04 PROCEDURE — 99214 OFFICE O/P EST MOD 30 MIN: CPT | Performed by: FAMILY MEDICINE

## 2025-06-04 PROCEDURE — 84550 ASSAY OF BLOOD/URIC ACID: CPT | Performed by: FAMILY MEDICINE

## 2025-06-04 PROCEDURE — 3044F HG A1C LEVEL LT 7.0%: CPT | Performed by: FAMILY MEDICINE

## 2025-06-04 PROCEDURE — 3075F SYST BP GE 130 - 139MM HG: CPT | Performed by: FAMILY MEDICINE

## 2025-06-04 PROCEDURE — 83036 HEMOGLOBIN GLYCOSYLATED A1C: CPT | Performed by: FAMILY MEDICINE

## 2025-06-04 PROCEDURE — 3078F DIAST BP <80 MM HG: CPT | Performed by: FAMILY MEDICINE

## 2025-06-04 RX ORDER — EZETIMIBE 10 MG/1
10 TABLET ORAL DAILY
Qty: 90 TABLET | Refills: 3 | Status: SHIPPED | OUTPATIENT
Start: 2025-06-04

## 2025-06-04 RX ORDER — ATORVASTATIN CALCIUM 80 MG/1
80 TABLET, FILM COATED ORAL DAILY
Qty: 90 TABLET | Refills: 4 | Status: SHIPPED | OUTPATIENT
Start: 2025-06-04

## 2025-06-04 RX ORDER — CHLORPROMAZINE HYDROCHLORIDE 25 MG/1
25 TABLET, FILM COATED ORAL 3 TIMES DAILY
Qty: 15 TABLET | Refills: 0 | Status: CANCELLED | OUTPATIENT
Start: 2025-06-04

## 2025-06-04 RX ORDER — SEMAGLUTIDE 2.68 MG/ML
2 INJECTION, SOLUTION SUBCUTANEOUS
Qty: 9 ML | Refills: 1 | Status: SHIPPED | OUTPATIENT
Start: 2025-06-04

## 2025-06-04 RX ORDER — METOPROLOL TARTRATE 50 MG
50 TABLET ORAL 2 TIMES DAILY
Qty: 180 TABLET | Refills: 3 | Status: SHIPPED | OUTPATIENT
Start: 2025-06-04

## 2025-06-04 RX ORDER — ACYCLOVIR 800 MG/1
TABLET ORAL
Qty: 6 EACH | Refills: 3 | Status: SHIPPED | OUTPATIENT
Start: 2025-06-04

## 2025-06-04 RX ORDER — INSULIN GLARGINE 300 U/ML
60 INJECTION, SOLUTION SUBCUTANEOUS EVERY MORNING
Qty: 18 ML | Refills: 1 | Status: SHIPPED | OUTPATIENT
Start: 2025-06-04

## 2025-06-04 RX ORDER — CLOPIDOGREL BISULFATE 75 MG/1
75 TABLET ORAL DAILY
COMMUNITY

## 2025-06-04 RX ORDER — LISINOPRIL 2.5 MG/1
2.5 TABLET ORAL 2 TIMES DAILY
Qty: 180 TABLET | Refills: 3 | Status: SHIPPED | OUTPATIENT
Start: 2025-06-04

## 2025-06-04 ASSESSMENT — PAIN SCALES - GENERAL: PAINLEVEL_OUTOF10: SEVERE PAIN (7)

## 2025-06-04 NOTE — PROGRESS NOTES
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  Does keep something with him (juice/soda, etc).   Overall control is very good, but with the lows, we talked about taking less (54-56 units of Toujeo on days he will be more active)    - HEMOGLOBIN A1C; Future  - Lipid panel reflex to direct LDL Fasting; Future  - Comprehensive metabolic panel (BMP + Alb, Alk Phos, ALT, AST, Total. Bili, TP); Future  - Albumin Random Urine Quantitative with Creat Ratio; Future  - FOOT EXAM  - Semaglutide, 2 MG/DOSE, (OZEMPIC, 2 MG/DOSE,) 8 MG/3ML pen; Inject 2 mg subcutaneously every 7 days.  - insulin glargine U-300 (TOUJEO SOLOSTAR) 300 UNIT/ML (1 units dial) pen; Inject 60 Units subcutaneously every morning.  - HEMOGLOBIN A1C  - Lipid panel reflex to direct LDL Fasting  - Comprehensive metabolic panel (BMP + Alb, Alk Phos, ALT, AST, Total. Bili, TP)  - Albumin Random Urine Quantitative with Creat Ratio    Hand pain, right  Suspect some CTS and OA of the right 4th MCP joint  Recommend he see ortho  Start topical voltaren  R/o gout    Steroid injection may help both issues, however he gets hiccoughs with any prednisone (needs thorazine to combat this)    - Uric acid; Future  - XR Hand Right G/E 3 Views; Future  - Orthopedic  Referral; Future    Degenerative arthritis of metacarpophalangeal joint of ring finger of right hand  As above  - Orthopedic  Referral; Future    Coronary artery disease involving native coronary artery of native heart without angina pectoris  Continue follow up with cardiology  Last intervention 12/2024    - atorvastatin (LIPITOR) 80 MG tablet; Take 1 tablet (80 mg) by mouth daily.  - ezetimibe (ZETIA) 10 MG tablet; Take 1 tablet (10 mg) by mouth  "daily.    S/P CABG (coronary artery bypass graft)    - ezetimibe (ZETIA) 10 MG tablet; Take 1 tablet (10 mg) by mouth daily.    Hypertension, goal below 140/90  Continue metoprolol, lisinopril  - lisinopril (ZESTRIL) 2.5 MG tablet; Take 1 tablet (2.5 mg) by mouth 2 times daily.    Hypertension associated with type 2 diabetes mellitus (H)     - metoprolol tartrate (LOPRESSOR) 50 MG tablet; Take 1 tablet (50 mg) by mouth 2 times daily.    Coronary artery disease involving coronary bypass graft of native heart, unspecified whether angina present       Pulmonary nodules  Is 1 year overdue for CT scan of the lungs  Discussed importance of follow up  - CT Chest w/o Contrast; Future          BMI  Estimated body mass index is 29.39 kg/m  as calculated from the following:    Height as of this encounter: 1.778 m (5' 10\").    Weight as of this encounter: 92.9 kg (204 lb 12.8 oz).             Russ Stephens is a 54 year old, presenting for the following health issues:  Diabetes, Health Maintenance (Declines vaccines ), and Hand Pain (Right hand )        6/4/2025     8:13 AM   Additional Questions   Roomed by Li Peña CMA   Accompanied by self       Via the Health Maintenance questionnaire, the patient has reported the following services have been completed -Eye Exam: [facility] 2025-02-05, this information has been sent to the abstraction team.  History of Present Illness       Diabetes:   He presents for follow up of diabetes.  He is checking home blood glucose three times daily.   He checks blood glucose before meals.  Blood glucose is sometimes over 200 and sometimes under 70. He is aware of hypoglycemia symptoms including shakiness, dizziness, weakness and blurred vision.    He has no concerns regarding his diabetes at this time.   He is not experiencing numbness or burning in feet, excessive thirst, blurry vision, weight changes or redness, sores or blisters on feet. The patient has had a diabetic eye exam in the last " "12 months. Eye exam performed on 020525. Location of last eye exam [facility].        Reason for visit:  Meds  and hand    He eats 2-3 servings of fruits and vegetables daily.He consumes 1 sweetened beverage(s) daily.He exercises with enough effort to increase his heart rate 30 to 60 minutes per day.  He exercises with enough effort to increase his heart rate 5 days per week.   He is taking medications regularly.          Hyperlipidemia Follow-Up    Are you regularly taking any medication or supplement to lower your cholesterol?   Yes- Lipitor   Are you having muscle aches or other side effects that you think could be caused by your cholesterol lowering medication?  No    Hypertension Follow-up    Do you check your blood pressure regularly outside of the clinic? Yes   Are you following a low salt diet? Yes  Are your blood pressures ever more than 140 on the top number (systolic) OR more   than 90 on the bottom number (diastolic), for example 140/90? No    BP Readings from Last 2 Encounters:   06/04/25 132/78   01/07/25 131/83       Concern - Right Hand Pain   Onset: 1 week   Description: throbbing   Intensity: 7/10  Progression of Symptoms:  worsening and constant  Accompanying Signs & Symptoms: Throbbing, aches   Previous history of similar problem: yes   Precipitating factors:        Worsened by: at night is worse and worse with movement   Alleviating factors:        Improved by: none  Therapies tried and outcome: Ice, hand sleeve, OTC Tylenol Ibuprofen and icy hot with not much relief     Xray last 2023.  Saw orthopedics. \"They focused only on what they said is carpal tunnel and didn't offer anything else\".     Patient has burning/tingling of his right hand in typical CTS distribution.  He also notes pain focused on the 5th MCP joint.  No injury. Previous xray showed degenerative changes at that joint. He does wear wrist splints at night.  Has tried ice/heat/icy hot/tylenol/nsaids.         Review of " "Systems  Constitutional, HEENT, cardiovascular, pulmonary, gi and gu systems are negative, except as otherwise noted.      Objective    /78 (BP Location: Right arm, Patient Position: Sitting, Cuff Size: Adult Regular)   Pulse 78   Temp 98.2  F (36.8  C) (Tympanic)   Resp 18   Ht 1.778 m (5' 10\")   Wt 92.9 kg (204 lb 12.8 oz)   SpO2 98%   BMI 29.39 kg/m    Body mass index is 29.39 kg/m .  Physical Exam   GENERAL: alert and no distress  NECK: no adenopathy, no asymmetry, masses, or scars  RESP: lungs clear to auscultation - no rales, rhonchi or wheezes  CV: regular rate and rhythm, normal S1 S2, no S3 or S4, no murmur, click or rub, no peripheral edema  ABDOMEN: soft, nontender, no hepatosplenomegaly, no masses and bowel sounds normal  MS: right 4th MCP joint with swelling, tender to palpation.  Dorsum of the hand is also mildly swollen.  Describes numbness in the median nerve distribution    Diabetic Foot Screen:  Any complaints of increased pain or numbness ? No  Is there a foot ulcer now or a history of foot ulcer? No  Does the foot have an abnormal shape? No  Are the nails thick, too long or ingrown? No  Are there any redness or open areas? No         Sensation Testing done at all points on the diagram with monofilament     Right Foot: Sensation Normal at all points  Left Foot: Sensation Normal at all points     Risk Category: 0- No loss of protective sensation  Performed by Yue Ruvalcaba MD      Results for orders placed or performed in visit on 06/04/25   HEMOGLOBIN A1C     Status: Abnormal   Result Value Ref Range    Estimated Average Glucose 123 (H) <117 mg/dL    Hemoglobin A1C 5.9 (H) 0.0 - 5.6 %             Signed Electronically by: Yue Ruvalcaba MD    "

## 2025-06-04 NOTE — PATIENT INSTRUCTIONS
"Schedule CT scan of the chest - Call to schedule imaging in [phone]    Voltaren/diclofenac gel over the counter.     See the hand specialist      When you are out of refills or the refills say \"zero\", it is time to schedule your next appointment in clinic!    Labs are released to you almost immediately and sometimes before I have had a chance to review them.  I review labs regularly and once they are all in, you will either be sent a letter with your results or if you are signed up for on-line services, you will be notified that results are available to you on [facility]. If there are serious findings, you typically will be called.    If you have any questions about your visit, your symptoms, your medication, your test results or it is not clear what your diagnosis or treatment plan is please contact me (via [facility]) or call the care team at [phone] and say \"Care Team\"          "

## 2025-06-04 NOTE — LETTER
June 4, 2025      [place] Win[place]  [address] [facility] [address]        To Whom It May Concern:    [place] Winters  was seen on 6/4/2025 .  Please excuse him  until 6/9/2025 due to right hand pain.        Sincerely,        Yue Ruvalcaba MD

## 2025-06-04 NOTE — Clinical Note
2025    [place] Winters   1970        To Whom it May Concern;    Please excuse [place] Winters from work/school for a healthcare visit on 2025.    Sincerely,        Yue Ruvalcaba MD

## 2025-06-05 ENCOUNTER — PATIENT OUTREACH (OUTPATIENT)
Dept: CARE COORDINATION | Facility: CLINIC | Age: 55
End: 2025-06-05
Payer: COMMERCIAL

## 2025-06-09 ENCOUNTER — PATIENT OUTREACH (OUTPATIENT)
Dept: CARE COORDINATION | Facility: CLINIC | Age: 55
End: 2025-06-09
Payer: COMMERCIAL

## 2025-07-01 ENCOUNTER — APPOINTMENT (OUTPATIENT)
Dept: GENERAL RADIOLOGY | Facility: CLINIC | Age: 55
End: 2025-07-01
Attending: PHYSICIAN ASSISTANT
Payer: COMMERCIAL

## 2025-07-01 ENCOUNTER — HOSPITAL ENCOUNTER (EMERGENCY)
Facility: CLINIC | Age: 55
Discharge: HOME | End: 2025-07-01
Attending: PHYSICIAN ASSISTANT
Payer: COMMERCIAL

## 2025-07-01 VITALS
TEMPERATURE: 97.9 F | DIASTOLIC BLOOD PRESSURE: 76 MMHG | RESPIRATION RATE: 17 BRPM | OXYGEN SATURATION: 98 % | HEART RATE: 71 BPM | SYSTOLIC BLOOD PRESSURE: 127 MMHG

## 2025-07-01 DIAGNOSIS — S52.616A: ICD-10-CM

## 2025-07-01 DIAGNOSIS — S52.571A OTHER CLOSED INTRA-ARTICULAR FRACTURE OF DISTAL END OF RIGHT RADIUS, INITIAL ENCOUNTER: ICD-10-CM

## 2025-07-01 PROCEDURE — 73090 X-RAY EXAM OF FOREARM: CPT | Mod: RT

## 2025-07-01 PROCEDURE — 29125 APPL SHORT ARM SPLINT STATIC: CPT

## 2025-07-01 PROCEDURE — 99213 OFFICE O/P EST LOW 20 MIN: CPT | Mod: 25 | Performed by: PHYSICIAN ASSISTANT

## 2025-07-01 PROCEDURE — 29125 APPL SHORT ARM SPLINT STATIC: CPT | Performed by: PHYSICIAN ASSISTANT

## 2025-07-01 PROCEDURE — G0463 HOSPITAL OUTPT CLINIC VISIT: HCPCS | Mod: 25

## 2025-07-01 RX ORDER — OXYCODONE HYDROCHLORIDE 5 MG/1
5 TABLET ORAL EVERY 6 HOURS PRN
Qty: 10 TABLET | Refills: 0 | Status: SHIPPED | OUTPATIENT
Start: 2025-07-01

## 2025-07-01 ASSESSMENT — ACTIVITIES OF DAILY LIVING (ADL): ADLS_ACUITY_SCORE: 46

## 2025-07-01 NOTE — Clinical Note
[place] Winters was seen and treated in our emergency department on 7/1/2025.    He needs to rest the right wrist with splint use until his next follow-up appointment which should occur within the next 7 to 10 days.       Sincerely,     [facility] Emergency Dept
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
## 2025-07-02 ENCOUNTER — PATIENT OUTREACH (OUTPATIENT)
Dept: CARE COORDINATION | Facility: CLINIC | Age: 55
End: 2025-07-02
Payer: COMMERCIAL

## 2025-07-05 ENCOUNTER — PATIENT OUTREACH (OUTPATIENT)
Dept: CARE COORDINATION | Facility: CLINIC | Age: 55
End: 2025-07-05
Payer: COMMERCIAL

## 2025-07-07 ENCOUNTER — TELEPHONE (OUTPATIENT)
Dept: FAMILY MEDICINE | Facility: CLINIC | Age: 55
End: 2025-07-07
Payer: COMMERCIAL

## 2025-07-07 DIAGNOSIS — S62.101D CLOSED FRACTURE OF RIGHT WRIST WITH ROUTINE HEALING, SUBSEQUENT ENCOUNTER: Primary | ICD-10-CM

## 2025-07-07 NOTE — TELEPHONE ENCOUNTER
New Medication Request    What medication are you requesting?: Oxy    Reason for medication request: Pt fractured his right wrist 7/1 and got Oxy Rx (#10 tabs) in UC 7/1.   Pt saw Ortho @ [facility] today.  Has appt for cast placement next week or the week after, if swelling goes down.  Pt was told to call PCP for refill on pain meds.  Please call patient and advise.      Have you taken this medication before?: Yes: 7/1 - #10 tabs of Oxy    Controlled Substance Agreement on file:   CSA -- Patient Level:     [Media Unavailable] Controlled Substance Agreement - Opioid - Scan on 2/27/2023  2:20 PM       Patient offered an appointment? No    Preferred Pharmacy:   [facility] [address] [facility] [address] [place] [id number]  Phone: [phone] Fax: [phone]    Could we send this information to you in [facility] or would you prefer to receive a phone call?:   Patient would prefer a phone call   Okay to leave a detailed message?: Yes at Cell number on file:    Telephone Information:   Mobile [phone]

## 2025-07-08 RX ORDER — OXYCODONE HYDROCHLORIDE 5 MG/1
5 TABLET ORAL EVERY 6 HOURS PRN
Qty: 20 TABLET | Refills: 0 | Status: CANCELLED | OUTPATIENT
Start: 2025-07-08
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
## 2025-07-09 RX ORDER — OXYCODONE HYDROCHLORIDE 5 MG/1
5 TABLET ORAL EVERY 6 HOURS PRN
Qty: 10 TABLET | Refills: 0 | Status: SHIPPED | OUTPATIENT
Start: 2025-07-09

## 2025-07-09 NOTE — TELEPHONE ENCOUNTER
Usually narcotics are not refilled without an appointment.     #10 oxycodone sent to pharmacy, but should be taking Acetaminophen 1000 mg three times daily and Ibuprofen 800 mg three times daily and also be icing the area to help with pain and swelling.    Yue Ruvalcaba M.D.

## 2025-08-06 ENCOUNTER — PATIENT OUTREACH (OUTPATIENT)
Dept: CARE COORDINATION | Facility: CLINIC | Age: 55
End: 2025-08-06
Payer: COMMERCIAL

## (undated) DEVICE — MANIFOLD KIT ANGIO AUTOMATED 014613

## (undated) DEVICE — TOTE ANGIO CORP PC15AT SAN32CC83O

## (undated) DEVICE — CATH DIAGNOSTIC RADIAL 5FR TIG 4.0

## (undated) DEVICE — KIT HAND CONTROL ANGIOTOUCH ACIST 65CM AT-P65

## (undated) DEVICE — DEFIB PRO-PADZ LVP LQD GEL ADULT 8900-2105-01

## (undated) DEVICE — SLEEVE TR BAND RADIAL COMPRESSION DEVICE 24CM TRB24-REG

## (undated) DEVICE — INTRO GLIDESHEATH SLENDER 6FR 10X45CM 60-1060

## (undated) RX ORDER — LIDOCAINE HYDROCHLORIDE 10 MG/ML
INJECTION, SOLUTION EPIDURAL; INFILTRATION; INTRACAUDAL; PERINEURAL
Status: DISPENSED
Start: 2021-05-18

## (undated) RX ORDER — REGADENOSON 0.08 MG/ML
INJECTION, SOLUTION INTRAVENOUS
Status: DISPENSED
Start: 2024-04-10

## (undated) RX ORDER — VERAPAMIL HYDROCHLORIDE 2.5 MG/ML
INJECTION, SOLUTION INTRAVENOUS
Status: DISPENSED
Start: 2021-05-18

## (undated) RX ORDER — HEPARIN SODIUM 1000 [USP'U]/ML
INJECTION, SOLUTION INTRAVENOUS; SUBCUTANEOUS
Status: DISPENSED
Start: 2021-05-18

## (undated) RX ORDER — FENTANYL CITRATE 50 UG/ML
INJECTION, SOLUTION INTRAMUSCULAR; INTRAVENOUS
Status: DISPENSED
Start: 2021-05-18

## (undated) RX ORDER — HEPARIN SODIUM 200 [USP'U]/100ML
INJECTION, SOLUTION INTRAVENOUS
Status: DISPENSED
Start: 2021-05-18

## (undated) RX ORDER — NITROGLYCERIN 5 MG/ML
VIAL (ML) INTRAVENOUS
Status: DISPENSED
Start: 2021-05-18